# Patient Record
Sex: FEMALE | Race: WHITE | NOT HISPANIC OR LATINO | Employment: UNEMPLOYED | URBAN - METROPOLITAN AREA
[De-identification: names, ages, dates, MRNs, and addresses within clinical notes are randomized per-mention and may not be internally consistent; named-entity substitution may affect disease eponyms.]

---

## 2017-01-02 DIAGNOSIS — G47.09 OTHER INSOMNIA: ICD-10-CM

## 2017-01-02 DIAGNOSIS — F41.8 DEPRESSION WITH ANXIETY: ICD-10-CM

## 2017-01-03 RX ORDER — TRAZODONE HYDROCHLORIDE 50 MG/1
TABLET ORAL
Qty: 30 TABLET | Refills: 4 | Status: SHIPPED | OUTPATIENT
Start: 2017-01-03 | End: 2017-01-11 | Stop reason: DRUGHIGH

## 2017-01-11 RX ORDER — TRAZODONE HYDROCHLORIDE 100 MG/1
100 TABLET ORAL NIGHTLY
Qty: 30 TABLET | Refills: 11 | Status: SHIPPED | OUTPATIENT
Start: 2017-01-11 | End: 2017-05-16

## 2017-01-17 RX ORDER — OMEPRAZOLE 40 MG/1
CAPSULE, DELAYED RELEASE ORAL
Qty: 30 CAPSULE | Refills: 5 | Status: SHIPPED | OUTPATIENT
Start: 2017-01-17 | End: 2017-07-27 | Stop reason: SDUPTHER

## 2017-02-03 ENCOUNTER — OFFICE VISIT (OUTPATIENT)
Dept: FAMILY MEDICINE CLINIC | Facility: CLINIC | Age: 47
End: 2017-02-03

## 2017-02-03 VITALS
BODY MASS INDEX: 28.44 KG/M2 | SYSTOLIC BLOOD PRESSURE: 94 MMHG | OXYGEN SATURATION: 98 % | TEMPERATURE: 98.2 F | WEIGHT: 192 LBS | HEART RATE: 70 BPM | HEIGHT: 69 IN | DIASTOLIC BLOOD PRESSURE: 68 MMHG

## 2017-02-03 DIAGNOSIS — B08.5 ACUTE PHARYNGITIS DUE TO COXSACKIE VIRUS: ICD-10-CM

## 2017-02-03 DIAGNOSIS — J11.1 FLU: Primary | ICD-10-CM

## 2017-02-03 LAB
EXPIRATION DATE: NORMAL
FLUAV AG NPH QL: NORMAL
FLUBV AG NPH QL: NORMAL
INTERNAL CONTROL: NORMAL
Lab: NORMAL

## 2017-02-03 PROCEDURE — 99213 OFFICE O/P EST LOW 20 MIN: CPT | Performed by: NURSE PRACTITIONER

## 2017-02-03 PROCEDURE — 87804 INFLUENZA ASSAY W/OPTIC: CPT | Performed by: NURSE PRACTITIONER

## 2017-02-03 RX ORDER — DOXYCYCLINE HYCLATE 100 MG/1
100 CAPSULE ORAL 2 TIMES DAILY
Qty: 14 CAPSULE | Refills: 0 | Status: SHIPPED | OUTPATIENT
Start: 2017-02-03 | End: 2017-03-02

## 2017-02-03 NOTE — PROGRESS NOTES
"Subjective   Karen Nash is a 46 y.o. female who is here with body aches, fever, mostly dry cough and sore throat.     History of Present Illness   4 day history of fever and body aches, cough, congestion, ST and ear pain, taking tylenol and ibuprofen for fever and joint pain. Unsure how high fever got.   The following portions of the patient's history were reviewed and updated as appropriate: allergies, current medications, past family history, past medical history, past social history, past surgical history and problem list.    Review of Systems   Constitutional: Negative for chills and fever.   HENT: Positive for congestion, ear pain, rhinorrhea, sinus pressure, sneezing and sore throat. Negative for ear discharge, facial swelling, hearing loss, nosebleeds and trouble swallowing.    Respiratory: Positive for cough and wheezing. Negative for shortness of breath.    Cardiovascular: Negative.  Negative for chest pain.   Gastrointestinal: Negative for abdominal pain.   Endocrine: Negative.    Musculoskeletal: Negative for arthralgias.   Allergic/Immunologic: Negative for environmental allergies.   Psychiatric/Behavioral: Negative.      Visit Vitals   • BP 94/68   • Pulse 70   • Temp 98.2 °F (36.8 °C) (Oral)   • Ht 69\" (175.3 cm)   • Wt 192 lb (87.1 kg)   • LMP 11/16/2016   • SpO2 98%   • BMI 28.35 kg/m2     Objective   Physical Exam   Constitutional: She appears well-developed and well-nourished.   HENT:   Mouth/Throat: Posterior oropharyngeal erythema present. No oropharyngeal exudate or posterior oropharyngeal edema.   Neck: Normal range of motion. Neck supple. No JVD present. No tracheal deviation present. No thyromegaly present.   Cardiovascular: Normal rate, regular rhythm and normal heart sounds.    Pulmonary/Chest: Effort normal and breath sounds normal.   Abdominal: Soft. Bowel sounds are normal.   Musculoskeletal: Normal range of motion.   Lymphadenopathy:     She has cervical adenopathy.   Psychiatric: " She has a normal mood and affect. Her behavior is normal. Judgment and thought content normal.   Nursing note and vitals reviewed.    Assessment/Plan   Problems Addressed this Visit     None      Visit Diagnoses     Flu    -  Primary    Relevant Orders    POC Influenza A / B        Flu swab negative, but symptoms improving. Antibiotics sent for pharyngitis, but as improving can hold. Good handwashing.  FU if not settling.

## 2017-02-09 RX ORDER — TOPIRAMATE 100 MG/1
TABLET, FILM COATED ORAL
Qty: 30 TABLET | Refills: 4 | Status: SHIPPED | OUTPATIENT
Start: 2017-02-09 | End: 2017-05-30 | Stop reason: SDUPTHER

## 2017-03-02 ENCOUNTER — OFFICE VISIT (OUTPATIENT)
Dept: FAMILY MEDICINE CLINIC | Facility: CLINIC | Age: 47
End: 2017-03-02

## 2017-03-02 VITALS
OXYGEN SATURATION: 98 % | TEMPERATURE: 97.9 F | DIASTOLIC BLOOD PRESSURE: 70 MMHG | WEIGHT: 190 LBS | SYSTOLIC BLOOD PRESSURE: 128 MMHG | HEIGHT: 69 IN | HEART RATE: 74 BPM | BODY MASS INDEX: 28.14 KG/M2

## 2017-03-02 DIAGNOSIS — Z79.899 DRUG THERAPY: ICD-10-CM

## 2017-03-02 DIAGNOSIS — M25.60 JOINT STIFFNESS: Primary | ICD-10-CM

## 2017-03-02 DIAGNOSIS — R22.9 MULTIPLE SKIN NODULES: ICD-10-CM

## 2017-03-02 DIAGNOSIS — M54.42 CHRONIC LOW BACK PAIN WITH BILATERAL SCIATICA, UNSPECIFIED BACK PAIN LATERALITY: ICD-10-CM

## 2017-03-02 DIAGNOSIS — M54.41 CHRONIC LOW BACK PAIN WITH BILATERAL SCIATICA, UNSPECIFIED BACK PAIN LATERALITY: ICD-10-CM

## 2017-03-02 DIAGNOSIS — R53.83 OTHER FATIGUE: ICD-10-CM

## 2017-03-02 DIAGNOSIS — G89.29 CHRONIC LOW BACK PAIN WITH BILATERAL SCIATICA, UNSPECIFIED BACK PAIN LATERALITY: ICD-10-CM

## 2017-03-02 DIAGNOSIS — F32.89 OTHER DEPRESSION: ICD-10-CM

## 2017-03-02 PROCEDURE — 99214 OFFICE O/P EST MOD 30 MIN: CPT | Performed by: FAMILY MEDICINE

## 2017-03-02 RX ORDER — GABAPENTIN 300 MG/1
CAPSULE ORAL
Qty: 60 CAPSULE | Refills: 5 | Status: SHIPPED | OUTPATIENT
Start: 2017-03-02 | End: 2017-05-16 | Stop reason: SDUPTHER

## 2017-03-02 NOTE — PROGRESS NOTES
Subjective   Karen Nash is a 46 y.o. female. Presents today for   Chief Complaint   Patient presents with   • Back Pain     pt has knots in her back that is swollen and sensitive. the pain is radiating down her right leg and is having a constant ache in groin. and her left knee is giving out on her   • Joint Swelling     pt said all her joints are swelling and painful.       Back Pain   This is a recurrent problem. The current episode started 1 to 4 weeks ago. The problem occurs constantly. The problem has been waxing and waning since onset. The pain is present in the sacro-iliac. The quality of the pain is described as aching, shooting and stabbing. The pain radiates to the left foot, left knee and left thigh. The pain is at a severity of 9/10. The pain is worse during the day. The symptoms are aggravated by bending, position, sitting, standing and twisting. Stiffness is present all day. Associated symptoms include leg pain, paresthesias, pelvic pain, tingling and weakness. Pertinent negatives include no abdominal pain, bladder incontinence, bowel incontinence, chest pain, dysuria, fever, headaches, numbness, paresis, perianal numbness or weight loss. She has tried NSAIDs (tylenol) for the symptoms. The treatment provided no relief.   Joint Swelling   Associated symptoms include joint swelling, a rash (Has kerotosis pilaris) and weakness. Pertinent negatives include no abdominal pain, chest pain, fever, headaches or numbness. She has tried acetaminophen and NSAIDs for the symptoms. The treatment provided no relief.   Arthritis   Presents for initial visit. The disease course has been worsening. She complains of stiffness and joint swelling. Affected locations include the left DIP, left hip, right hip, right DIP, left PIP, left wrist, right wrist, left elbow, left shoulder, left knee, right toes, right elbow and right shoulder. Associated symptoms include dry eyes and rash (Has kerotosis pilaris). Pertinent  negatives include no dysuria, fever or weight loss. Her family medical history includes family history of osteoarthritis. She shows no family history of lupus or family history of rheumatoid arthritis. Past treatments include acetaminophen and NSAIDs. The treatment provided no relief. Factors aggravating her arthritis include activity.     Patient would like to see counselor.  Under a lot of stress.   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    Review of Systems   Constitutional: Negative for fever and weight loss.   Cardiovascular: Negative for chest pain.   Gastrointestinal: Negative for abdominal pain and bowel incontinence.   Genitourinary: Positive for pelvic pain. Negative for bladder incontinence and dysuria.   Musculoskeletal: Positive for arthritis, back pain, joint swelling and stiffness.   Skin: Positive for rash (Has kerotosis pilaris).   Neurological: Positive for tingling, weakness and paresthesias. Negative for numbness and headaches.       The following portions of the patient's history were reviewed and updated as appropriate: allergies, current medications, past medical history, past social history and problem list.    Patient Active Problem List   Diagnosis   • Abdominal pain   • Asthma with acute exacerbation   • Atopic rhinitis   • Anemia   • Benign essential hypertension   • Constipation   • Dyslipidemia   • Gastroesophageal reflux disease   • Menorrhagia   • Migraine   • Muscle cramps   • Vocal cord dysfunction       Allergies   Allergen Reactions   • Corn-Containing Products Rash   • Penicillins        Current Outpatient Prescriptions on File Prior to Visit   Medication Sig Dispense Refill   • amLODIPine (NORVASC) 10 MG tablet TAKE 1 TABLET BY MOUTH ONE TIME A DAY  30 tablet 6   • atorvastatin (LIPITOR) 20 MG tablet TAKE 1 TABLET BY MOUTH ONE TIME A DAY  30 tablet 10   • budesonide-formoterol (SYMBICORT) 160-4.5 MCG/ACT inhaler Inhale 2 puffs 2 (two) times a day. 10.2  "g 12   • busPIRone (BUSPAR) 10 MG tablet Take 1 tablet by mouth 3 (Three) Times a Day. 90 tablet 5   • Cetirizine HCl 10 MG capsule Take  by mouth.     • fluticasone (FLONASE) 50 MCG/ACT nasal spray into each nostril daily.     • hydrochlorothiazide (HYDRODIURIL) 25 MG tablet TAKE 1 TABLET BY MOUTH ONE TIME A DAY  30 tablet 6   • montelukast (SINGULAIR) 10 MG tablet Take 1 tablet (10 mg total) by mouth every night 30 tablet 11   • Multiple Vitamin (MULTI VITAMIN DAILY PO) Take by mouth daily.     • Omega-3 Fatty Acids (FISH OIL) 1000 MG capsule capsule Take 1,000 mg by mouth daily with breakfast.     • omeprazole (priLOSEC) 40 MG capsule TAKE 1 CAPSULE BY MOUTH ONE TIME A DAY  30 capsule 5   • sertraline (ZOLOFT) 100 MG tablet 2 po daily 30 tablet 11   • topiramate (TOPAMAX) 100 MG tablet TAKE 1 TABLET BY MOUTH TWO TIMES A DAY  30 tablet 4   • traZODone (DESYREL) 100 MG tablet Take 1 tablet by mouth Every Night. 30 tablet 11   • VENTOLIN  (90 BASE) MCG/ACT inhaler INHALE 1 OR 2 PUFFS ORALLY EVERY FOUR TO SIX HOURS AS NEEDED 18 g 2   • [DISCONTINUED] doxycycline (VIBRAMYCIN) 100 MG capsule Take 1 capsule by mouth 2 (Two) Times a Day. 14 capsule 0     No current facility-administered medications on file prior to visit.        Objective   Vitals:    03/02/17 1307   BP: 128/70   BP Location: Left arm   Patient Position: Sitting   Cuff Size: Adult   Pulse: 74   Temp: 97.9 °F (36.6 °C)   TempSrc: Oral   SpO2: 98%   Weight: 190 lb (86.2 kg)   Height: 69\" (175.3 cm)       Physical Exam   Constitutional: She appears well-developed and well-nourished.   HENT:   Head: Normocephalic and atraumatic.   Neck: Neck supple. No JVD present. No thyromegaly present.   Cardiovascular: Normal rate, regular rhythm and normal heart sounds.  Exam reveals no gallop and no friction rub.    No murmur heard.  Pulmonary/Chest: Effort normal and breath sounds normal. No respiratory distress. She has no wheezes. She has no rales. "   Abdominal: Soft. Bowel sounds are normal. She exhibits no distension. There is no tenderness. There is no rebound and no guarding.   Musculoskeletal: She exhibits no edema.   Neurological: She is alert.   Skin: Skin is warm and dry.   Psychiatric: She has a normal mood and affect. Her behavior is normal.   Nursing note and vitals reviewed.      Assessment/Plan   Karen was seen today for back pain and joint swelling.    Diagnoses and all orders for this visit:    Joint stiffness  -     CBC & Differential  -     Comprehensive Metabolic Panel  -     [facility]IRSTIE With / DsDNA, RNP, Sjogrens A / B, Smith  -     Sedimentation Rate  -     C-reactive Protein  -     Rheumatoid Factor, Quant  -     Cyclic Citrul Peptide Antibody, IgG / IgA  -     TSH  -     Vitamin B12  -     diclofenac (VOLTAREN) 50 MG EC tablet; Take 1 tablet by mouth 2 (Two) Times a Day As Needed (pain).  -     gabapentin (NEURONTIN) 300 MG capsule; 1 po nightly x 7 days then 1 po bid    Other fatigue  -     CBC & Differential  -     Comprehensive Metabolic Panel  -     KIRSTIE With / DsDNA, RNP, Sjogrens A / B, Smith  -     Sedimentation Rate  -     C-reactive Protein  -     Rheumatoid Factor, Quant  -     Cyclic Citrul Peptide Antibody, IgG / IgA  -     TSH  -     Vitamin B12    Multiple skin nodules  -     CBC & Differential  -     Comprehensive Metabolic Panel  -     KIRSTIE With / DsDNA, RNP, Sjogrens A / B, Smith  -     Sedimentation Rate  -     C-reactive Protein  -     Rheumatoid Factor, Quant  -     Cyclic Citrul Peptide Antibody, IgG / IgA  -     TSH  -     Vitamin B12  -     diclofenac (VOLTAREN) 50 MG EC tablet; Take 1 tablet by mouth 2 (Two) Times a Day As Needed (pain).  -     gabapentin (NEURONTIN) 300 MG capsule; 1 po nightly x 7 days then 1 po bid    Drug therapy  -     CBC & Differential  -     Comprehensive Metabolic Panel  -     KIRSTIE With / DsDNA, RNP, Sjogrens A / B, Smith  -     Sedimentation Rate  -     C-reactive Protein  -     Rheumatoid Factor,  Quant  -     Cyclic Citrul Peptide Antibody, IgG / IgA  -     TSH  -     Vitamin B12    Chronic low back pain with bilateral sciatica, unspecified back pain laterality  -     diclofenac (VOLTAREN) 50 MG EC tablet; Take 1 tablet by mouth 2 (Two) Times a Day As Needed (pain).  -     gabapentin (NEURONTIN) 300 MG capsule; 1 po nightly x 7 days then 1 po bid             -Follow up: 4 weeks       Current Outpatient Prescriptions:   •  amLODIPine (NORVASC) 10 MG tablet, TAKE 1 TABLET BY MOUTH ONE TIME A DAY , Disp: 30 tablet, Rfl: 6  •  atorvastatin (LIPITOR) 20 MG tablet, TAKE 1 TABLET BY MOUTH ONE TIME A DAY , Disp: 30 tablet, Rfl: 10  •  budesonide-formoterol (SYMBICORT) 160-4.5 MCG/ACT inhaler, Inhale 2 puffs 2 (two) times a day., Disp: 10.2 g, Rfl: 12  •  busPIRone (BUSPAR) 10 MG tablet, Take 1 tablet by mouth 3 (Three) Times a Day., Disp: 90 tablet, Rfl: 5  •  Cetirizine HCl 10 MG capsule, Take  by mouth., Disp: , Rfl:   •  fluticasone (FLONASE) 50 MCG/ACT nasal spray, into each nostril daily., Disp: , Rfl:   •  hydrochlorothiazide (HYDRODIURIL) 25 MG tablet, TAKE 1 TABLET BY MOUTH ONE TIME A DAY , Disp: 30 tablet, Rfl: 6  •  montelukast (SINGULAIR) 10 MG tablet, Take 1 tablet (10 mg total) by mouth every night, Disp: 30 tablet, Rfl: 11  •  Multiple Vitamin (MULTI VITAMIN DAILY PO), Take by mouth daily., Disp: , Rfl:   •  Omega-3 Fatty Acids (FISH OIL) 1000 MG capsule capsule, Take 1,000 mg by mouth daily with breakfast., Disp: , Rfl:   •  omeprazole (priLOSEC) 40 MG capsule, TAKE 1 CAPSULE BY MOUTH ONE TIME A DAY , Disp: 30 capsule, Rfl: 5  •  sertraline (ZOLOFT) 100 MG tablet, 2 po daily, Disp: 30 tablet, Rfl: 11  •  topiramate (TOPAMAX) 100 MG tablet, TAKE 1 TABLET BY MOUTH TWO TIMES A DAY , Disp: 30 tablet, Rfl: 4  •  traZODone (DESYREL) 100 MG tablet, Take 1 tablet by mouth Every Night., Disp: 30 tablet, Rfl: 11  •  VENTOLIN  (90 BASE) MCG/ACT inhaler, INHALE 1 OR 2 PUFFS ORALLY EVERY FOUR TO SIX HOURS  AS NEEDED, Disp: 18 g, Rfl: 2

## 2017-03-04 LAB
ALBUMIN SERPL-MCNC: 4.8 G/DL (ref 3.5–5.5)
ALBUMIN/GLOB SERPL: 1.9 {RATIO} (ref 1.1–2.5)
ALP SERPL-CCNC: 74 IU/L (ref 39–117)
ALT SERPL-CCNC: 16 IU/L (ref 0–32)
ANA SER QL: NEGATIVE
AST SERPL-CCNC: 17 IU/L (ref 0–40)
BASOPHILS # BLD AUTO: 0 X10E3/UL (ref 0–0.2)
BASOPHILS NFR BLD AUTO: 0 %
BILIRUB SERPL-MCNC: 0.3 MG/DL (ref 0–1.2)
BUN SERPL-MCNC: 13 MG/DL (ref 6–24)
BUN/CREAT SERPL: 15 (ref 9–23)
CALCIUM SERPL-MCNC: 9.5 MG/DL (ref 8.7–10.2)
CCP IGA+IGG SERPL IA-ACNC: 4 UNITS (ref 0–19)
CHLORIDE SERPL-SCNC: 102 MMOL/L (ref 96–106)
CO2 SERPL-SCNC: 20 MMOL/L (ref 18–29)
CREAT SERPL-MCNC: 0.88 MG/DL (ref 0.57–1)
CRP SERPL-MCNC: 0.7 MG/L (ref 0–4.9)
EOSINOPHIL # BLD AUTO: 0.1 X10E3/UL (ref 0–0.4)
EOSINOPHIL NFR BLD AUTO: 1 %
ERYTHROCYTE [DISTWIDTH] IN BLOOD BY AUTOMATED COUNT: 13.7 % (ref 12.3–15.4)
ERYTHROCYTE [SEDIMENTATION RATE] IN BLOOD BY WESTERGREN METHOD: 9 MM/HR (ref 0–32)
GLOBULIN SER CALC-MCNC: 2.5 G/DL (ref 1.5–4.5)
GLUCOSE SERPL-MCNC: 85 MG/DL (ref 65–99)
HCT VFR BLD AUTO: 45.1 % (ref 34–46.6)
HGB BLD-MCNC: 14.8 G/DL (ref 11.1–15.9)
IMM GRANULOCYTES # BLD: 0 X10E3/UL (ref 0–0.1)
IMM GRANULOCYTES NFR BLD: 0 %
LYMPHOCYTES # BLD AUTO: 2.2 X10E3/UL (ref 0.7–3.1)
LYMPHOCYTES NFR BLD AUTO: 29 %
MCH RBC QN AUTO: 29.2 PG (ref 26.6–33)
MCHC RBC AUTO-ENTMCNC: 32.8 G/DL (ref 31.5–35.7)
MCV RBC AUTO: 89 FL (ref 79–97)
MONOCYTES # BLD AUTO: 0.5 X10E3/UL (ref 0.1–0.9)
MONOCYTES NFR BLD AUTO: 6 %
NEUTROPHILS # BLD AUTO: 4.7 X10E3/UL (ref 1.4–7)
NEUTROPHILS NFR BLD AUTO: 64 %
PLATELET # BLD AUTO: 345 X10E3/UL (ref 150–379)
POTASSIUM SERPL-SCNC: 3.6 MMOL/L (ref 3.5–5.2)
PROT SERPL-MCNC: 7.3 G/DL (ref 6–8.5)
RBC # BLD AUTO: 5.06 X10E6/UL (ref 3.77–5.28)
RHEUMATOID FACT SERPL-ACNC: <10 IU/ML (ref 0–13.9)
SODIUM SERPL-SCNC: 141 MMOL/L (ref 134–144)
TSH SERPL DL<=0.005 MIU/L-ACNC: 4.14 UIU/ML (ref 0.45–4.5)
VIT B12 SERPL-MCNC: 1623 PG/ML (ref 211–946)
WBC # BLD AUTO: 7.5 X10E3/UL (ref 3.4–10.8)

## 2017-03-04 NOTE — PROGRESS NOTES
Call results to patient.  Blood counts, liver, kidney and thyroid function normal.  Blood sugar normal.  Inflammatory markers and rheumatological screen normal.  B12 normal.  Is diclofenac and gabapentin helping?

## 2017-03-06 ENCOUNTER — TELEPHONE (OUTPATIENT)
Dept: FAMILY MEDICINE CLINIC | Facility: CLINIC | Age: 47
End: 2017-03-06

## 2017-03-06 NOTE — TELEPHONE ENCOUNTER
----- Message from Rob Bardales DO sent at 3/4/2017  8:11 AM EST -----  Call results to patient.  Blood counts, liver, kidney and thyroid function normal.  Blood sugar normal.  Inflammatory markers and rheumatological screen normal.  B12 normal.  Is diclofenac and gabapentin helping?

## 2017-03-07 NOTE — TELEPHONE ENCOUNTER
Osteoarthritis likely diagnosis.  It's possible fibromyalgia.  We could try sending to rheumatology as well??

## 2017-03-07 NOTE — TELEPHONE ENCOUNTER
Pt would like to know what next step will be, she wants to know what the painful knots and joint pain is coming from. She doesn't want to just take medications, she would like a diagnosis.

## 2017-03-08 DIAGNOSIS — M40.209 KYPHOSIS, UNSPECIFIED KYPHOSIS TYPE, UNSPECIFIED SPINAL REGION: ICD-10-CM

## 2017-03-08 DIAGNOSIS — M12.9 ARTHRITIS, MULTIPLE JOINT INVOLVEMENT: Primary | ICD-10-CM

## 2017-03-08 DIAGNOSIS — M79.10 MUSCLE PAIN: ICD-10-CM

## 2017-03-08 DIAGNOSIS — M25.60 JOINT STIFFNESS: ICD-10-CM

## 2017-03-29 DIAGNOSIS — F41.8 DEPRESSION WITH ANXIETY: ICD-10-CM

## 2017-03-29 RX ORDER — SERTRALINE HYDROCHLORIDE 100 MG/1
TABLET, FILM COATED ORAL
Qty: 30 TABLET | Refills: 10 | Status: SHIPPED | OUTPATIENT
Start: 2017-03-29 | End: 2017-11-20 | Stop reason: SDUPTHER

## 2017-04-01 DIAGNOSIS — F41.9 ANXIETY: ICD-10-CM

## 2017-04-01 RX ORDER — BUSPIRONE HYDROCHLORIDE 10 MG/1
TABLET ORAL
Qty: 90 TABLET | Refills: 4 | Status: SHIPPED | OUTPATIENT
Start: 2017-04-01 | End: 2017-08-30 | Stop reason: SDUPTHER

## 2017-04-06 RX ORDER — AMLODIPINE BESYLATE 10 MG/1
TABLET ORAL
Qty: 30 TABLET | Refills: 5 | Status: SHIPPED | OUTPATIENT
Start: 2017-04-06 | End: 2018-05-07

## 2017-04-13 RX ORDER — HYDROCHLOROTHIAZIDE 25 MG/1
TABLET ORAL
Qty: 30 TABLET | Refills: 5 | Status: SHIPPED | OUTPATIENT
Start: 2017-04-13 | End: 2017-12-28 | Stop reason: SDUPTHER

## 2017-04-14 ENCOUNTER — TELEPHONE (OUTPATIENT)
Dept: FAMILY MEDICINE CLINIC | Facility: CLINIC | Age: 47
End: 2017-04-14

## 2017-04-28 RX ORDER — TOPIRAMATE 25 MG/1
TABLET ORAL
Qty: 60 TABLET | Refills: 2 | OUTPATIENT
Start: 2017-04-28

## 2017-05-10 ENCOUNTER — TELEPHONE (OUTPATIENT)
Dept: FAMILY MEDICINE CLINIC | Facility: CLINIC | Age: 47
End: 2017-05-10

## 2017-05-16 ENCOUNTER — OFFICE VISIT (OUTPATIENT)
Dept: FAMILY MEDICINE CLINIC | Facility: CLINIC | Age: 47
End: 2017-05-16

## 2017-05-16 VITALS
HEIGHT: 69 IN | DIASTOLIC BLOOD PRESSURE: 78 MMHG | OXYGEN SATURATION: 98 % | TEMPERATURE: 98.5 F | SYSTOLIC BLOOD PRESSURE: 112 MMHG | HEART RATE: 57 BPM | BODY MASS INDEX: 29.62 KG/M2 | WEIGHT: 200 LBS

## 2017-05-16 DIAGNOSIS — N39.41 URGE URINARY INCONTINENCE: Primary | ICD-10-CM

## 2017-05-16 DIAGNOSIS — R22.9 MULTIPLE SKIN NODULES: ICD-10-CM

## 2017-05-16 DIAGNOSIS — M54.42 CHRONIC LOW BACK PAIN WITH BILATERAL SCIATICA, UNSPECIFIED BACK PAIN LATERALITY: ICD-10-CM

## 2017-05-16 DIAGNOSIS — G89.29 CHRONIC LOW BACK PAIN WITH BILATERAL SCIATICA, UNSPECIFIED BACK PAIN LATERALITY: ICD-10-CM

## 2017-05-16 DIAGNOSIS — M54.41 CHRONIC LOW BACK PAIN WITH BILATERAL SCIATICA, UNSPECIFIED BACK PAIN LATERALITY: ICD-10-CM

## 2017-05-16 DIAGNOSIS — M79.10 MUSCLE ACHE: ICD-10-CM

## 2017-05-16 DIAGNOSIS — M15.9 PRIMARY OSTEOARTHRITIS INVOLVING MULTIPLE JOINTS: ICD-10-CM

## 2017-05-16 DIAGNOSIS — M25.60 JOINT STIFFNESS: ICD-10-CM

## 2017-05-16 PROCEDURE — 99213 OFFICE O/P EST LOW 20 MIN: CPT | Performed by: FAMILY MEDICINE

## 2017-05-16 RX ORDER — GABAPENTIN 300 MG/1
CAPSULE ORAL
Qty: 120 CAPSULE | Refills: 5 | Status: SHIPPED | OUTPATIENT
Start: 2017-05-16 | End: 2018-01-29 | Stop reason: SDUPTHER

## 2017-05-16 RX ORDER — OXYBUTYNIN CHLORIDE 5 MG/1
5 TABLET, EXTENDED RELEASE ORAL DAILY
Qty: 30 TABLET | Refills: 5 | Status: SHIPPED | OUTPATIENT
Start: 2017-05-16 | End: 2017-11-03 | Stop reason: SDUPTHER

## 2017-05-30 RX ORDER — TOPIRAMATE 100 MG/1
TABLET, FILM COATED ORAL
Qty: 60 TABLET | Refills: 3 | Status: SHIPPED | OUTPATIENT
Start: 2017-05-30 | End: 2017-10-09 | Stop reason: SDUPTHER

## 2017-06-02 DIAGNOSIS — G89.29 CHRONIC LOW BACK PAIN WITH BILATERAL SCIATICA, UNSPECIFIED BACK PAIN LATERALITY: ICD-10-CM

## 2017-06-02 DIAGNOSIS — M54.42 CHRONIC LOW BACK PAIN WITH BILATERAL SCIATICA, UNSPECIFIED BACK PAIN LATERALITY: ICD-10-CM

## 2017-06-02 DIAGNOSIS — M25.60 JOINT STIFFNESS: ICD-10-CM

## 2017-06-02 DIAGNOSIS — M54.41 CHRONIC LOW BACK PAIN WITH BILATERAL SCIATICA, UNSPECIFIED BACK PAIN LATERALITY: ICD-10-CM

## 2017-06-02 DIAGNOSIS — R22.9 MULTIPLE SKIN NODULES: ICD-10-CM

## 2017-06-29 RX ORDER — ALBUTEROL SULFATE 90 UG/1
AEROSOL, METERED RESPIRATORY (INHALATION)
Qty: 18 G | Refills: 1 | Status: SHIPPED | OUTPATIENT
Start: 2017-06-29 | End: 2018-02-26 | Stop reason: SDUPTHER

## 2017-06-30 DIAGNOSIS — J45.40 MODERATE PERSISTENT ASTHMA WITHOUT COMPLICATION: ICD-10-CM

## 2017-06-30 DIAGNOSIS — J30.9 ATOPIC RHINITIS: ICD-10-CM

## 2017-06-30 RX ORDER — MONTELUKAST SODIUM 10 MG/1
TABLET ORAL
Qty: 30 TABLET | Refills: 10 | Status: SHIPPED | OUTPATIENT
Start: 2017-06-30 | End: 2018-06-25 | Stop reason: SDUPTHER

## 2017-07-27 RX ORDER — OMEPRAZOLE 40 MG/1
CAPSULE, DELAYED RELEASE ORAL
Qty: 30 CAPSULE | Refills: 4 | Status: SHIPPED | OUTPATIENT
Start: 2017-07-27 | End: 2017-12-28 | Stop reason: SDUPTHER

## 2017-08-15 DIAGNOSIS — M25.60 JOINT STIFFNESS: ICD-10-CM

## 2017-08-15 DIAGNOSIS — R22.9 MULTIPLE SKIN NODULES: ICD-10-CM

## 2017-08-15 DIAGNOSIS — M54.42 CHRONIC LOW BACK PAIN WITH BILATERAL SCIATICA, UNSPECIFIED BACK PAIN LATERALITY: ICD-10-CM

## 2017-08-15 DIAGNOSIS — M54.41 CHRONIC LOW BACK PAIN WITH BILATERAL SCIATICA, UNSPECIFIED BACK PAIN LATERALITY: ICD-10-CM

## 2017-08-15 DIAGNOSIS — G89.29 CHRONIC LOW BACK PAIN WITH BILATERAL SCIATICA, UNSPECIFIED BACK PAIN LATERALITY: ICD-10-CM

## 2017-08-17 ENCOUNTER — OFFICE VISIT (OUTPATIENT)
Dept: FAMILY MEDICINE CLINIC | Facility: CLINIC | Age: 47
End: 2017-08-17

## 2017-08-17 VITALS
SYSTOLIC BLOOD PRESSURE: 110 MMHG | DIASTOLIC BLOOD PRESSURE: 82 MMHG | WEIGHT: 194 LBS | TEMPERATURE: 97.8 F | OXYGEN SATURATION: 98 % | HEIGHT: 69 IN | HEART RATE: 60 BPM | BODY MASS INDEX: 28.73 KG/M2

## 2017-08-17 DIAGNOSIS — L03.211 FACIAL CELLULITIS: Primary | ICD-10-CM

## 2017-08-17 PROCEDURE — 99213 OFFICE O/P EST LOW 20 MIN: CPT | Performed by: FAMILY MEDICINE

## 2017-08-17 RX ORDER — GLUCOSAMINE/D3/BOSWELLIA SERRA 1500MG-400
TABLET ORAL
COMMUNITY
End: 2018-05-07

## 2017-08-17 RX ORDER — LANOLIN ALCOHOL/MO/W.PET/CERES
1000 CREAM (GRAM) TOPICAL DAILY
COMMUNITY
End: 2018-05-07

## 2017-08-17 RX ORDER — SULFAMETHOXAZOLE AND TRIMETHOPRIM 800; 160 MG/1; MG/1
1 TABLET ORAL 2 TIMES DAILY
Qty: 20 TABLET | Refills: 0 | Status: SHIPPED | OUTPATIENT
Start: 2017-08-17 | End: 2017-08-21

## 2017-08-17 RX ORDER — TRAZODONE HYDROCHLORIDE 100 MG/1
100 TABLET ORAL NIGHTLY
COMMUNITY
End: 2017-12-18

## 2017-08-17 RX ORDER — MELATONIN
1000 DAILY
COMMUNITY
End: 2018-05-07

## 2017-08-17 RX ORDER — BETA-CAROTENE 7500 MCG
25000 CAPSULE ORAL DAILY
COMMUNITY
End: 2018-05-07

## 2017-08-17 NOTE — PROGRESS NOTES
Subjective   Karen Nash is a 47 y.o. female. Presents today for   Chief Complaint   Patient presents with   • Insect Bite     pt has rash on her jaw for last 2 weeks it has been swelling and seeping and her neck is swollen and sore and has a knot in it. she has tried using coconut oil, tea tree oil, antibiotic ointment, and benadryl ointment on it.   • Follow-up     pt is here for 3 month follow up visit for her incontinence       Rash   This is a new problem. The current episode started in the past 7 days. The problem has been gradually worsening since onset. The affected locations include the face. The rash is characterized by burning, draining, redness and swelling. She was exposed to nothing. Pertinent negatives include no fever. (Did go to IC last night, but has not started bactrim yet;  Started as red dot pruritic.  Now painful, red, warm and tender.) Past treatments include moisturizer (antihistamine). The treatment provided no relief.       Review of Systems   Constitutional: Negative for chills and fever.   Skin: Positive for rash.       The following portions of the patient's history were reviewed and updated as appropriate: allergies, current medications, past medical history and problem list.    Patient Active Problem List   Diagnosis   • Abdominal pain   • Asthma with acute exacerbation   • Atopic rhinitis   • Anemia   • Benign essential hypertension   • Constipation   • Dyslipidemia   • Gastroesophageal reflux disease   • Migraine   • Muscle cramps   • Vocal cord dysfunction       Allergies   Allergen Reactions   • Corn-Containing Products Rash   • Penicillins        Current Outpatient Prescriptions on File Prior to Visit   Medication Sig Dispense Refill   • amLODIPine (NORVASC) 10 MG tablet TAKE 1 TABLET BY MOUTH ONE TIME A DAY  30 tablet 5   • budesonide-formoterol (SYMBICORT) 160-4.5 MCG/ACT inhaler Inhale 2 puffs 2 (two) times a day. 10.2 g 12   • busPIRone (BUSPAR) 10 MG tablet TAKE 1 TABLET BY  "MOUTH THREE TIMES A DAY  90 tablet 4   • Cetirizine HCl 10 MG capsule Take  by mouth.     • diclofenac (VOLTAREN) 50 MG EC tablet TAKE 1 TABLET BY MOUTH TWO TIMES A DAY AS NEEDED 60 tablet 0   • fluticasone (FLONASE) 50 MCG/ACT nasal spray into each nostril daily.     • gabapentin (NEURONTIN) 300 MG capsule 2 po twice daily 120 capsule 5   • hydrochlorothiazide (HYDRODIURIL) 25 MG tablet TAKE 1 TABLET BY MOUTH ONE TIME A DAY  30 tablet 5   • montelukast (SINGULAIR) 10 MG tablet TAKE 1 TABLET BY MOUTH AT BEDTIME  30 tablet 10   • Multiple Vitamin (MULTI VITAMIN DAILY PO) Take by mouth daily.     • Omega-3 Fatty Acids (FISH OIL) 1000 MG capsule capsule Take 1,000 mg by mouth daily with breakfast.     • omeprazole (priLOSEC) 40 MG capsule TAKE 1 CAPSULE BY MOUTH ONE TIME A DAY  30 capsule 4   • oxybutynin XL (DITROPAN-XL) 5 MG 24 hr tablet Take 1 tablet by mouth Daily. 30 tablet 5   • sertraline (ZOLOFT) 100 MG tablet TAKE 2 TABLETS BY MOUTH ONE TIME A DAY  30 tablet 10   • topiramate (TOPAMAX) 100 MG tablet TAKE 1 TABLET BY MOUTH TWO TIMES A DAY  60 tablet 3   • VENTOLIN  (90 BASE) MCG/ACT inhaler INHALE 1 OR 2 PUFFS ORALLY EVERY FOUR TO SIX HOURS as needed 18 g 1     No current facility-administered medications on file prior to visit.        Objective   Vitals:    08/17/17 0958   BP: 110/82   BP Location: Left arm   Patient Position: Sitting   Cuff Size: Adult   Pulse: 60   Temp: 97.8 °F (36.6 °C)   TempSrc: Oral   SpO2: 98%   Weight: 194 lb (88 kg)   Height: 69\" (175.3 cm)       Physical Exam   Constitutional: She is oriented to person, place, and time. She appears well-developed and well-nourished.   Neurological: She is alert and oriented to person, place, and time.   Skin: Skin is warm and dry.   Left lower jaw - skin abrasion with swelling, warmth and redness;  Mildly ttp;  Left neck lymphadenopathy.   Psychiatric: She has a normal mood and affect. Her behavior is normal.   Nursing note and vitals " reviewed.      Assessment/Plan   Karen was seen today for insect bite and follow-up.    Diagnoses and all orders for this visit:    Facial cellulitis  -     mupirocin (BACTROBAN) 2 % ointment; Apply  topically 2 (Two) Times a Day.  -     sulfamethoxazole-trimethoprim (BACTRIM DS,SEPTRA DS) 800-160 MG per tablet; Take 1 tablet by mouth 2 (Two) Times a Day.    -RTC if worsen;  Start abx immediately.  GO ER immediately if worsening.         -Follow up: Prn - RTC if worse or no improvement.          Current Outpatient Prescriptions:   •  amLODIPine (NORVASC) 10 MG tablet, TAKE 1 TABLET BY MOUTH ONE TIME A DAY , Disp: 30 tablet, Rfl: 5  •  beta carotene ([employer] BETA CAROTENE) 74690 UNIT capsule, Take 25,000 Units by mouth Daily., Disp: , Rfl:   •  Biotin 18585 MCG tablet, Take  by mouth., Disp: , Rfl:   •  budesonide-formoterol (SYMBICORT) 160-4.5 MCG/ACT inhaler, Inhale 2 puffs 2 (two) times a day., Disp: 10.2 g, Rfl: 12  •  busPIRone (BUSPAR) 10 MG tablet, TAKE 1 TABLET BY MOUTH THREE TIMES A DAY , Disp: 90 tablet, Rfl: 4  •  CALCIUM-MAGNESIUM-ZINC PO, Take  by mouth., Disp: , Rfl:   •  Cetirizine HCl 10 MG capsule, Take  by mouth., Disp: , Rfl:   •  cholecalciferol (VITAMIN D3) 1000 units tablet, Take 1,000 Units by mouth Daily., Disp: , Rfl:   •  diclofenac (VOLTAREN) 50 MG EC tablet, TAKE 1 TABLET BY MOUTH TWO TIMES A DAY AS NEEDED, Disp: 60 tablet, Rfl: 0  •  fluticasone (FLONASE) 50 MCG/ACT nasal spray, into each nostril daily., Disp: , Rfl:   •  gabapentin (NEURONTIN) 300 MG capsule, 2 po twice daily, Disp: 120 capsule, Rfl: 5  •  Green Tea, Camillia sinensis, (GREEN TEA PO), Take  by mouth., Disp: , Rfl:   •  hydrochlorothiazide (HYDRODIURIL) 25 MG tablet, TAKE 1 TABLET BY MOUTH ONE TIME A DAY , Disp: 30 tablet, Rfl: 5  •  montelukast (SINGULAIR) 10 MG tablet, TAKE 1 TABLET BY MOUTH AT BEDTIME , Disp: 30 tablet, Rfl: 10  •  Multiple Vitamin (MULTI VITAMIN DAILY PO), Take by mouth daily., Disp: , Rfl:   •  Omega-3  Fatty Acids (FISH OIL) 1000 MG capsule capsule, Take 1,000 mg by mouth daily with breakfast., Disp: , Rfl:   •  omeprazole (priLOSEC) 40 MG capsule, TAKE 1 CAPSULE BY MOUTH ONE TIME A DAY , Disp: 30 capsule, Rfl: 4  •  oxybutynin XL (DITROPAN-XL) 5 MG 24 hr tablet, Take 1 tablet by mouth Daily., Disp: 30 tablet, Rfl: 5  •  sertraline (ZOLOFT) 100 MG tablet, TAKE 2 TABLETS BY MOUTH ONE TIME A DAY , Disp: 30 tablet, Rfl: 10  •  topiramate (TOPAMAX) 100 MG tablet, TAKE 1 TABLET BY MOUTH TWO TIMES A DAY , Disp: 60 tablet, Rfl: 3  •  traZODone (DESYREL) 100 MG tablet, Take 100 mg by mouth Every Night., Disp: , Rfl:   •  VENTOLIN  (90 BASE) MCG/ACT inhaler, INHALE 1 OR 2 PUFFS ORALLY EVERY FOUR TO SIX HOURS as needed, Disp: 18 g, Rfl: 1  •  vitamin B-12 (CYANOCOBALAMIN) 1000 MCG tablet, Take 1,000 mcg by mouth Daily., Disp: , Rfl:   •  mupirocin (BACTROBAN) 2 % ointment, Apply  topically 2 (Two) Times a Day., Disp: 22 g, Rfl: 0  •  sulfamethoxazole-trimethoprim (BACTRIM DS,SEPTRA DS) 800-160 MG per tablet, Take 1 tablet by mouth 2 (Two) Times a Day., Disp: 20 tablet, Rfl: 0

## 2017-08-21 ENCOUNTER — TELEPHONE (OUTPATIENT)
Dept: FAMILY MEDICINE CLINIC | Facility: CLINIC | Age: 47
End: 2017-08-21

## 2017-08-21 RX ORDER — METHYLPREDNISOLONE 4 MG/1
TABLET ORAL
Qty: 21 TABLET | Refills: 0 | Status: SHIPPED | OUTPATIENT
Start: 2017-08-21 | End: 2017-10-27

## 2017-08-21 RX ORDER — DOXYCYCLINE HYCLATE 100 MG/1
100 CAPSULE ORAL 2 TIMES DAILY
Qty: 20 CAPSULE | Refills: 0 | Status: SHIPPED | OUTPATIENT
Start: 2017-08-21 | End: 2017-08-31

## 2017-08-30 DIAGNOSIS — J45.51 SEVERE PERSISTENT ASTHMA WITH ACUTE EXACERBATION: ICD-10-CM

## 2017-08-30 DIAGNOSIS — F41.9 ANXIETY: ICD-10-CM

## 2017-08-30 RX ORDER — BUDESONIDE AND FORMOTEROL FUMARATE DIHYDRATE 160; 4.5 UG/1; UG/1
AEROSOL RESPIRATORY (INHALATION)
Qty: 10.2 G | Refills: 11 | Status: SHIPPED | OUTPATIENT
Start: 2017-08-30 | End: 2018-08-16 | Stop reason: SDUPTHER

## 2017-08-30 RX ORDER — BUSPIRONE HYDROCHLORIDE 10 MG/1
TABLET ORAL
Qty: 90 TABLET | Refills: 3 | Status: SHIPPED | OUTPATIENT
Start: 2017-08-30 | End: 2017-12-18

## 2017-09-11 DIAGNOSIS — M54.42 CHRONIC LOW BACK PAIN WITH BILATERAL SCIATICA, UNSPECIFIED BACK PAIN LATERALITY: ICD-10-CM

## 2017-09-11 DIAGNOSIS — M25.60 JOINT STIFFNESS: ICD-10-CM

## 2017-09-11 DIAGNOSIS — R22.9 MULTIPLE SKIN NODULES: ICD-10-CM

## 2017-09-11 DIAGNOSIS — M54.41 CHRONIC LOW BACK PAIN WITH BILATERAL SCIATICA, UNSPECIFIED BACK PAIN LATERALITY: ICD-10-CM

## 2017-09-11 DIAGNOSIS — G89.29 CHRONIC LOW BACK PAIN WITH BILATERAL SCIATICA, UNSPECIFIED BACK PAIN LATERALITY: ICD-10-CM

## 2017-10-09 DIAGNOSIS — M25.60 JOINT STIFFNESS: ICD-10-CM

## 2017-10-09 DIAGNOSIS — M54.41 CHRONIC LOW BACK PAIN WITH BILATERAL SCIATICA, UNSPECIFIED BACK PAIN LATERALITY: ICD-10-CM

## 2017-10-09 DIAGNOSIS — R22.9 MULTIPLE SKIN NODULES: ICD-10-CM

## 2017-10-09 DIAGNOSIS — G89.29 CHRONIC LOW BACK PAIN WITH BILATERAL SCIATICA, UNSPECIFIED BACK PAIN LATERALITY: ICD-10-CM

## 2017-10-09 DIAGNOSIS — M54.42 CHRONIC LOW BACK PAIN WITH BILATERAL SCIATICA, UNSPECIFIED BACK PAIN LATERALITY: ICD-10-CM

## 2017-10-10 RX ORDER — TOPIRAMATE 100 MG/1
TABLET, FILM COATED ORAL
Qty: 60 TABLET | Refills: 2 | Status: SHIPPED | OUTPATIENT
Start: 2017-10-10 | End: 2017-10-27 | Stop reason: SDUPTHER

## 2017-10-13 DIAGNOSIS — R22.9 MULTIPLE SKIN NODULES: ICD-10-CM

## 2017-10-13 DIAGNOSIS — G89.29 CHRONIC LOW BACK PAIN WITH BILATERAL SCIATICA, UNSPECIFIED BACK PAIN LATERALITY: ICD-10-CM

## 2017-10-13 DIAGNOSIS — M54.42 CHRONIC LOW BACK PAIN WITH BILATERAL SCIATICA, UNSPECIFIED BACK PAIN LATERALITY: ICD-10-CM

## 2017-10-13 DIAGNOSIS — M54.41 CHRONIC LOW BACK PAIN WITH BILATERAL SCIATICA, UNSPECIFIED BACK PAIN LATERALITY: ICD-10-CM

## 2017-10-13 DIAGNOSIS — M25.60 JOINT STIFFNESS: ICD-10-CM

## 2017-10-24 ENCOUNTER — TELEPHONE (OUTPATIENT)
Dept: FAMILY MEDICINE CLINIC | Facility: CLINIC | Age: 47
End: 2017-10-24

## 2017-10-24 NOTE — TELEPHONE ENCOUNTER
Pharmacy at [facility] called gabapentin 300mg 2 bid #120 x 2 refills jm

## 2017-10-27 ENCOUNTER — OFFICE VISIT (OUTPATIENT)
Dept: FAMILY MEDICINE CLINIC | Facility: CLINIC | Age: 47
End: 2017-10-27

## 2017-10-27 VITALS
WEIGHT: 203 LBS | OXYGEN SATURATION: 98 % | TEMPERATURE: 97.7 F | SYSTOLIC BLOOD PRESSURE: 104 MMHG | HEART RATE: 62 BPM | DIASTOLIC BLOOD PRESSURE: 80 MMHG | BODY MASS INDEX: 30.07 KG/M2 | HEIGHT: 69 IN

## 2017-10-27 DIAGNOSIS — F32.2 SEVERE MAJOR DEPRESSION WITHOUT PSYCHOTIC FEATURES (HCC): Primary | ICD-10-CM

## 2017-10-27 DIAGNOSIS — G43.009 MIGRAINE WITHOUT AURA AND WITHOUT STATUS MIGRAINOSUS, NOT INTRACTABLE: ICD-10-CM

## 2017-10-27 PROCEDURE — 99214 OFFICE O/P EST MOD 30 MIN: CPT | Performed by: FAMILY MEDICINE

## 2017-10-27 RX ORDER — LAMOTRIGINE 25 MG/1
TABLET ORAL
Qty: 42 TABLET | Refills: 0 | Status: SHIPPED | OUTPATIENT
Start: 2017-10-27 | End: 2017-11-15 | Stop reason: SDUPTHER

## 2017-10-27 RX ORDER — LAMOTRIGINE 100 MG/1
100 TABLET ORAL NIGHTLY
Qty: 30 TABLET | Refills: 4 | Status: SHIPPED | OUTPATIENT
Start: 2017-10-27 | End: 2018-03-26 | Stop reason: SDUPTHER

## 2017-10-27 RX ORDER — TOPIRAMATE 50 MG/1
TABLET, FILM COATED ORAL
Qty: 21 TABLET | Refills: 0 | Status: SHIPPED | OUTPATIENT
Start: 2017-10-27 | End: 2017-12-18

## 2017-11-03 DIAGNOSIS — N39.41 URGE URINARY INCONTINENCE: ICD-10-CM

## 2017-11-03 DIAGNOSIS — R22.9 MULTIPLE SKIN NODULES: ICD-10-CM

## 2017-11-03 DIAGNOSIS — M54.41 CHRONIC LOW BACK PAIN WITH BILATERAL SCIATICA, UNSPECIFIED BACK PAIN LATERALITY: ICD-10-CM

## 2017-11-03 DIAGNOSIS — M54.42 CHRONIC LOW BACK PAIN WITH BILATERAL SCIATICA, UNSPECIFIED BACK PAIN LATERALITY: ICD-10-CM

## 2017-11-03 DIAGNOSIS — M25.60 JOINT STIFFNESS: ICD-10-CM

## 2017-11-03 DIAGNOSIS — G89.29 CHRONIC LOW BACK PAIN WITH BILATERAL SCIATICA, UNSPECIFIED BACK PAIN LATERALITY: ICD-10-CM

## 2017-11-03 RX ORDER — OXYBUTYNIN CHLORIDE 5 MG/1
TABLET, EXTENDED RELEASE ORAL
Qty: 30 TABLET | Refills: 4 | Status: SHIPPED | OUTPATIENT
Start: 2017-11-03 | End: 2017-11-20 | Stop reason: SDUPTHER

## 2017-11-11 DIAGNOSIS — F32.2 SEVERE MAJOR DEPRESSION WITHOUT PSYCHOTIC FEATURES (HCC): ICD-10-CM

## 2017-11-13 RX ORDER — TOPIRAMATE 50 MG/1
TABLET, FILM COATED ORAL
Qty: 21 TABLET | Refills: 0 | OUTPATIENT
Start: 2017-11-13

## 2017-11-15 ENCOUNTER — OFFICE VISIT (OUTPATIENT)
Dept: FAMILY MEDICINE CLINIC | Facility: CLINIC | Age: 47
End: 2017-11-15

## 2017-11-15 VITALS
SYSTOLIC BLOOD PRESSURE: 112 MMHG | WEIGHT: 198 LBS | BODY MASS INDEX: 29.33 KG/M2 | HEART RATE: 72 BPM | TEMPERATURE: 97.7 F | OXYGEN SATURATION: 96 % | HEIGHT: 69 IN | DIASTOLIC BLOOD PRESSURE: 68 MMHG

## 2017-11-15 DIAGNOSIS — G89.29 CHRONIC LOW BACK PAIN WITH BILATERAL SCIATICA, UNSPECIFIED BACK PAIN LATERALITY: ICD-10-CM

## 2017-11-15 DIAGNOSIS — M54.41 CHRONIC LOW BACK PAIN WITH BILATERAL SCIATICA, UNSPECIFIED BACK PAIN LATERALITY: ICD-10-CM

## 2017-11-15 DIAGNOSIS — M25.60 JOINT STIFFNESS: ICD-10-CM

## 2017-11-15 DIAGNOSIS — R22.9 MULTIPLE SKIN NODULES: ICD-10-CM

## 2017-11-15 DIAGNOSIS — T88.1XXA POST-IMMUNIZATION REACTION, INITIAL ENCOUNTER: Primary | ICD-10-CM

## 2017-11-15 DIAGNOSIS — M54.42 CHRONIC LOW BACK PAIN WITH BILATERAL SCIATICA, UNSPECIFIED BACK PAIN LATERALITY: ICD-10-CM

## 2017-11-15 PROCEDURE — 99213 OFFICE O/P EST LOW 20 MIN: CPT | Performed by: NURSE PRACTITIONER

## 2017-11-15 NOTE — PROGRESS NOTES
"Subjective   Karen Nash is a 47 y.o. female who presents with rash on left arm. Had pneumonia vaccine in this arm 2 days ago.     History of Present Illness   L arm rash noted with minimal spread since day after injection. R arm with no rash, but both are painful.     Stressors are escalating in home life, denies any acute worsening of mood.  The following portions of the patient's history were reviewed and updated as appropriate: allergies, current medications, past family history, past medical history, past social history, past surgical history and problem list.    Review of Systems   Constitutional: Negative.    HENT: Negative.    Respiratory: Negative.  Negative for cough and wheezing.    Skin: Positive for rash.   Allergic/Immunologic: Negative for environmental allergies and food allergies.   Psychiatric/Behavioral: Positive for dysphoric mood. Negative for self-injury and suicidal ideas.     /68  Pulse 72  Temp 97.7 °F (36.5 °C) (Oral)   Ht 69\" (175.3 cm)  Wt 198 lb (89.8 kg)  SpO2 96%  BMI 29.24 kg/m2    Objective   Physical Exam   Constitutional: She appears well-developed and well-nourished.   Skin: Skin is warm and dry.   L upper deltoid with diagonal erythema and heat mild measuring 3 cm x 1 cm   Psychiatric: She has a normal mood and affect. Her behavior is normal. Judgment and thought content normal.   Nursing note and vitals reviewed.    Assessment/Plan   Problems Addressed this Visit     None      Visit Diagnoses     Post-immunization reaction, initial encounter    -  Primary        Watch and wait, local reaction should settle. Apply ice to address the pain.     Continue to watch mood, as transitioning lamictal, ready to titrate up to 100mg tomorrow. ER any suicidal thoughts, worsening mood.          "

## 2017-11-20 ENCOUNTER — OFFICE VISIT (OUTPATIENT)
Dept: FAMILY MEDICINE CLINIC | Facility: CLINIC | Age: 47
End: 2017-11-20

## 2017-11-20 VITALS
TEMPERATURE: 98 F | SYSTOLIC BLOOD PRESSURE: 132 MMHG | WEIGHT: 210 LBS | HEART RATE: 65 BPM | OXYGEN SATURATION: 97 % | DIASTOLIC BLOOD PRESSURE: 62 MMHG | BODY MASS INDEX: 31.01 KG/M2

## 2017-11-20 DIAGNOSIS — N39.41 URGE URINARY INCONTINENCE: ICD-10-CM

## 2017-11-20 DIAGNOSIS — F32.2 SEVERE MAJOR DEPRESSION WITHOUT PSYCHOTIC FEATURES (HCC): Primary | ICD-10-CM

## 2017-11-20 DIAGNOSIS — F41.8 DEPRESSION WITH ANXIETY: ICD-10-CM

## 2017-11-20 PROCEDURE — 99213 OFFICE O/P EST LOW 20 MIN: CPT | Performed by: FAMILY MEDICINE

## 2017-11-20 RX ORDER — ACETAMINOPHEN 500 MG
500 TABLET ORAL EVERY 6 HOURS PRN
COMMUNITY

## 2017-11-20 RX ORDER — OXYBUTYNIN CHLORIDE 10 MG/1
10 TABLET, EXTENDED RELEASE ORAL DAILY
Qty: 30 TABLET | Refills: 5 | Status: SHIPPED | OUTPATIENT
Start: 2017-11-20 | End: 2018-05-19 | Stop reason: SDUPTHER

## 2017-11-20 RX ORDER — MELOXICAM 7.5 MG/1
7.5 TABLET ORAL 2 TIMES DAILY
COMMUNITY
End: 2018-05-07

## 2017-11-20 RX ORDER — SERTRALINE HYDROCHLORIDE 25 MG/1
TABLET, FILM COATED ORAL
Qty: 7 TABLET | Refills: 0 | Status: SHIPPED | OUTPATIENT
Start: 2017-11-20 | End: 2017-12-18

## 2017-11-20 RX ORDER — SERTRALINE HYDROCHLORIDE 100 MG/1
TABLET, FILM COATED ORAL
Qty: 21 TABLET | Refills: 0 | Status: SHIPPED | OUTPATIENT
Start: 2017-11-20 | End: 2017-12-11 | Stop reason: SDUPTHER

## 2017-11-20 RX ORDER — ALBUTEROL SULFATE 1.25 MG/3ML
1 SOLUTION RESPIRATORY (INHALATION) EVERY 6 HOURS PRN
Qty: 120 VIAL | Refills: 12 | Status: SHIPPED | OUTPATIENT
Start: 2017-11-20 | End: 2019-01-14 | Stop reason: SDUPTHER

## 2017-11-20 NOTE — PROGRESS NOTES
Subjective   Karen Nash is a 47 y.o. female. Presents today for   Chief Complaint   Patient presents with   • Follow-up     was seen by ra  and changed some meds needs to discuss results.       Depression   Visit Type: follow-up (Patient with RA, told possible fibromyalgia;  Hurts all the time.  Having marital discord, losing business and trying to care for kids with special needs, mother with health problems.  Last ov weaned off topamax and started lamictal;  )  Patient presents with the following symptoms: anhedonia, confusion, decreased concentration, depressed mood, feelings of worthlessness, muscle tension, nervousness/anxiety and thoughts of death.  Patient is not experiencing: suicidal ideas and suicidal planning.  Frequency of symptoms: constantly   Severity: incapacitating   Sleep quality: poor  Nighttime awakenings: several  Compliance with medications:  %        Reports also oxybutinin no longer helping incontinence issues.    Review of Systems   Musculoskeletal: Positive for arthralgias and joint swelling.   Psychiatric/Behavioral: Positive for confusion, decreased concentration and sleep disturbance. Negative for self-injury and suicidal ideas. The patient is nervous/anxious.        The following portions of the patient's history were reviewed and updated as appropriate: allergies, current medications, past medical history and problem list.    Patient Active Problem List   Diagnosis   • Abdominal pain   • Asthma with acute exacerbation   • Atopic rhinitis   • Anemia   • Benign essential hypertension   • Constipation   • Dyslipidemia   • Gastroesophageal reflux disease   • Migraine   • Muscle cramps   • Vocal cord dysfunction       Allergies   Allergen Reactions   • Corn-Containing Products Rash   • Penicillins    • Sulfamethoxazole-Trimethoprim Hives and Itching       Current Outpatient Prescriptions on File Prior to Visit   Medication Sig Dispense Refill   • amLODIPine (NORVASC) 10 MG  tablet TAKE 1 TABLET BY MOUTH ONE TIME A DAY  30 tablet 5   • beta carotene ([employer] BETA CAROTENE) 30973 UNIT capsule Take 25,000 Units by mouth Daily.     • Biotin 18231 MCG tablet Take  by mouth.     • busPIRone (BUSPAR) 10 MG tablet TAKE 1 TABLET BY MOUTH THREE TIMES A DAY  90 tablet 3   • CALCIUM-MAGNESIUM-ZINC PO Take  by mouth.     • Cetirizine HCl 10 MG capsule Take  by mouth.     • cholecalciferol (VITAMIN D3) 1000 units tablet Take 1,000 Units by mouth Daily.     • fluticasone (FLONASE) 50 MCG/ACT nasal spray into each nostril daily.     • gabapentin (NEURONTIN) 300 MG capsule 2 po twice daily 120 capsule 5   • hydrochlorothiazide (HYDRODIURIL) 25 MG tablet TAKE 1 TABLET BY MOUTH ONE TIME A DAY  30 tablet 5   • lamoTRIgine (LaMICtal) 100 MG tablet Take 1 tablet by mouth Every Night. 30 tablet 4   • methotrexate 2.5 MG tablet TAKE 4 TABLETs BY MOUTH ONE TIME A WEEK      • montelukast (SINGULAIR) 10 MG tablet TAKE 1 TABLET BY MOUTH AT BEDTIME  30 tablet 10   • Multiple Vitamin (MULTI VITAMIN DAILY PO) Take by mouth daily.     • Omega-3 Fatty Acids (FISH OIL) 1000 MG capsule capsule Take 1,000 mg by mouth daily with breakfast.     • omeprazole (priLOSEC) 40 MG capsule TAKE 1 CAPSULE BY MOUTH ONE TIME A DAY  30 capsule 4   • SYMBICORT 160-4.5 MCG/ACT inhaler INHALE 2 PUFFS BY MOUTH TWO TIMES A DAY  10.2 g 11   • topiramate (TOPAMAX) 50 MG tablet Once on 100mg of lamictal start 1/2 am and 1 nightly x 7d, 1/2 bid x 7 days, 1/2 nightly x 7 days then stop.  Call if migraines recur 21 tablet 0   • traZODone (DESYREL) 100 MG tablet Take 100 mg by mouth Every Night.     • VENTOLIN  (90 BASE) MCG/ACT inhaler INHALE 1 OR 2 PUFFS ORALLY EVERY FOUR TO SIX HOURS as needed 18 g 1   • vitamin B-12 (CYANOCOBALAMIN) 1000 MCG tablet Take 1,000 mcg by mouth Daily.       No current facility-administered medications on file prior to visit.        Objective   Vitals:    11/20/17 0909   BP: 132/62   Pulse: 65   Temp: 98 °F (36.7  °C)   SpO2: 97%   Weight: 210 lb (95.3 kg)       Physical Exam   Constitutional: She is oriented to person, place, and time. She appears well-developed and well-nourished.   Neurological: She is alert and oriented to person, place, and time.   Skin: Skin is warm and dry.   Psychiatric: Her behavior is normal. Her mood appears anxious. She exhibits a depressed mood.   Nursing note and vitals reviewed.      Assessment/Plan   Karen was seen today for follow-up.    Diagnoses and all orders for this visit:    Severe major depression without psychotic features  -     sertraline (ZOLOFT) 25 MG tablet; After 1/2 100mg, take 1 po daily x 7 days then stop.  -     Vortioxetine HBr (TRINTELLIX) 5 MG tablet; 1 po daily x 4 weeks, then go to 10mg (samples)  -     Vortioxetine HBr (TRINTELLIX) 10 MG tablet; After 5mg daily, take 10mg daily (either 2 5mg tabs or 1 10mg tab daily).    Depression with anxiety  -     sertraline (ZOLOFT) 100 MG tablet; 1.5 tablets x 7 days, then 1 po daily x 7 days, then 1/2 po daily x 7 days then go to 25mg  -     sertraline (ZOLOFT) 25 MG tablet; After 1/2 100mg, take 1 po daily x 7 days then stop.  -     Vortioxetine HBr (TRINTELLIX) 5 MG tablet; 1 po daily x 4 weeks, then go to 10mg (samples)  -     Vortioxetine HBr (TRINTELLIX) 10 MG tablet; After 5mg daily, take 10mg daily (either 2 5mg tabs or 1 10mg tab daily).    Urge urinary incontinence  -     oxybutynin XL (DITROPAN-XL) 10 MG 24 hr tablet; Take 1 tablet by mouth Daily.    Other orders  -     albuterol (ACCUNEB) 1.25 MG/3ML nebulizer solution; Take 3 mL by nebulization Every 6 (Six) Hours As Needed for Wheezing.    -patient with severe depression and failed several medications;  I gave several weeks of trintellix sampes and will try though branded as patient not doing well;  Will also wean off zoloft slowly as patient will be on too many sterotonin related agents.  Will have complete topamax wean as well.  -ok increase ditropan dose  -needs  refills on albuterol  -seek care immediatley if any thoughts of self harm       -Follow up: 4 weeks       Current Outpatient Prescriptions:   •  acetaminophen (TYLENOL) 500 MG tablet, Take 500 mg by mouth Every 6 (Six) Hours As Needed for Mild Pain ., Disp: , Rfl:   •  amLODIPine (NORVASC) 10 MG tablet, TAKE 1 TABLET BY MOUTH ONE TIME A DAY , Disp: 30 tablet, Rfl: 5  •  beta carotene ([employer]S BETA CAROTENE) 35372 UNIT capsule, Take 25,000 Units by mouth Daily., Disp: , Rfl:   •  Biotin 58371 MCG tablet, Take  by mouth., Disp: , Rfl:   •  busPIRone (BUSPAR) 10 MG tablet, TAKE 1 TABLET BY MOUTH THREE TIMES A DAY , Disp: 90 tablet, Rfl: 3  •  CALCIUM-MAGNESIUM-ZINC PO, Take  by mouth., Disp: , Rfl:   •  Cetirizine HCl 10 MG capsule, Take  by mouth., Disp: , Rfl:   •  cholecalciferol (VITAMIN D3) 1000 units tablet, Take 1,000 Units by mouth Daily., Disp: , Rfl:   •  fluticasone (FLONASE) 50 MCG/ACT nasal spray, into each nostril daily., Disp: , Rfl:   •  gabapentin (NEURONTIN) 300 MG capsule, 2 po twice daily, Disp: 120 capsule, Rfl: 5  •  hydrochlorothiazide (HYDRODIURIL) 25 MG tablet, TAKE 1 TABLET BY MOUTH ONE TIME A DAY , Disp: 30 tablet, Rfl: 5  •  lamoTRIgine (LaMICtal) 100 MG tablet, Take 1 tablet by mouth Every Night., Disp: 30 tablet, Rfl: 4  •  meloxicam (MOBIC) 7.5 MG tablet, Take 7.5 mg by mouth 2 (Two) Times a Day., Disp: , Rfl:   •  methotrexate 2.5 MG tablet, TAKE 4 TABLETs BY MOUTH ONE TIME A WEEK , Disp: , Rfl:   •  montelukast (SINGULAIR) 10 MG tablet, TAKE 1 TABLET BY MOUTH AT BEDTIME , Disp: 30 tablet, Rfl: 10  •  Multiple Vitamin (MULTI VITAMIN DAILY PO), Take by mouth daily., Disp: , Rfl:   •  Omega-3 Fatty Acids (FISH OIL) 1000 MG capsule capsule, Take 1,000 mg by mouth daily with breakfast., Disp: , Rfl:   •  omeprazole (priLOSEC) 40 MG capsule, TAKE 1 CAPSULE BY MOUTH ONE TIME A DAY , Disp: 30 capsule, Rfl: 4  •  oxybutynin XL (DITROPAN-XL) 10 MG 24 hr tablet, Take 1 tablet by mouth Daily., Disp: 30  tablet, Rfl: 5  •  sertraline (ZOLOFT) 100 MG tablet, 1.5 tablets x 7 days, then 1 po daily x 7 days, then 1/2 po daily x 7 days then go to 25mg, Disp: 21 tablet, Rfl: 0  •  SYMBICORT 160-4.5 MCG/ACT inhaler, INHALE 2 PUFFS BY MOUTH TWO TIMES A DAY , Disp: 10.2 g, Rfl: 11  •  topiramate (TOPAMAX) 50 MG tablet, Once on 100mg of lamictal start 1/2 am and 1 nightly x 7d, 1/2 bid x 7 days, 1/2 nightly x 7 days then stop.  Call if migraines recur, Disp: 21 tablet, Rfl: 0  •  traZODone (DESYREL) 100 MG tablet, Take 100 mg by mouth Every Night., Disp: , Rfl:   •  VENTOLIN  (90 BASE) MCG/ACT inhaler, INHALE 1 OR 2 PUFFS ORALLY EVERY FOUR TO SIX HOURS as needed, Disp: 18 g, Rfl: 1  •  vitamin B-12 (CYANOCOBALAMIN) 1000 MCG tablet, Take 1,000 mcg by mouth Daily., Disp: , Rfl:   •  albuterol (ACCUNEB) 1.25 MG/3ML nebulizer solution, Take 3 mL by nebulization Every 6 (Six) Hours As Needed for Wheezing., Disp: 120 vial, Rfl: 12  •  sertraline (ZOLOFT) 25 MG tablet, After 1/2 100mg, take 1 po daily x 7 days then stop., Disp: 7 tablet, Rfl: 0  •  Vortioxetine HBr (TRINTELLIX) 10 MG tablet, After 5mg daily, take 10mg daily (either 2 5mg tabs or 1 10mg tab daily)., Disp: 30 tablet, Rfl:   •  Vortioxetine HBr (TRINTELLIX) 5 MG tablet, 1 po daily x 4 weeks, then go to 10mg (samples), Disp: 30 tablet, Rfl:

## 2017-11-24 DIAGNOSIS — G43.009 MIGRAINE WITHOUT AURA AND WITHOUT STATUS MIGRAINOSUS, NOT INTRACTABLE: ICD-10-CM

## 2017-11-24 DIAGNOSIS — F32.2 SEVERE MAJOR DEPRESSION WITHOUT PSYCHOTIC FEATURES (HCC): ICD-10-CM

## 2017-11-25 DIAGNOSIS — F41.8 DEPRESSION WITH ANXIETY: ICD-10-CM

## 2017-11-25 DIAGNOSIS — F32.2 SEVERE MAJOR DEPRESSION WITHOUT PSYCHOTIC FEATURES (HCC): ICD-10-CM

## 2017-11-27 RX ORDER — LAMOTRIGINE 25 MG/1
100 TABLET ORAL DAILY
Qty: 30 TABLET | Refills: 5 | Status: SHIPPED | OUTPATIENT
Start: 2017-11-27 | End: 2017-12-18 | Stop reason: SDUPTHER

## 2017-11-27 RX ORDER — SERTRALINE HYDROCHLORIDE 25 MG/1
TABLET, FILM COATED ORAL
Qty: 7 TABLET | Refills: 0 | OUTPATIENT
Start: 2017-11-27

## 2017-11-30 DIAGNOSIS — R22.9 MULTIPLE SKIN NODULES: ICD-10-CM

## 2017-11-30 DIAGNOSIS — M54.41 CHRONIC LOW BACK PAIN WITH BILATERAL SCIATICA, UNSPECIFIED BACK PAIN LATERALITY: ICD-10-CM

## 2017-11-30 DIAGNOSIS — F41.8 DEPRESSION WITH ANXIETY: ICD-10-CM

## 2017-11-30 DIAGNOSIS — G89.29 CHRONIC LOW BACK PAIN WITH BILATERAL SCIATICA, UNSPECIFIED BACK PAIN LATERALITY: ICD-10-CM

## 2017-11-30 DIAGNOSIS — M54.42 CHRONIC LOW BACK PAIN WITH BILATERAL SCIATICA, UNSPECIFIED BACK PAIN LATERALITY: ICD-10-CM

## 2017-11-30 DIAGNOSIS — M25.60 JOINT STIFFNESS: ICD-10-CM

## 2017-11-30 RX ORDER — SERTRALINE HYDROCHLORIDE 100 MG/1
TABLET, FILM COATED ORAL
Qty: 21 TABLET | Refills: 0 | OUTPATIENT
Start: 2017-11-30

## 2017-11-30 RX ORDER — DICLOFENAC POTASSIUM 50 MG/1
TABLET, FILM COATED ORAL
Qty: 60 TABLET | Refills: 0 | Status: SHIPPED | OUTPATIENT
Start: 2017-11-30 | End: 2018-05-07

## 2017-12-11 DIAGNOSIS — F41.8 DEPRESSION WITH ANXIETY: ICD-10-CM

## 2017-12-11 RX ORDER — SERTRALINE HYDROCHLORIDE 100 MG/1
TABLET, FILM COATED ORAL
Qty: 21 TABLET | Refills: 0 | Status: SHIPPED | OUTPATIENT
Start: 2017-12-11 | End: 2017-12-18

## 2017-12-11 RX ORDER — TOPIRAMATE 100 MG/1
TABLET, FILM COATED ORAL
Qty: 60 TABLET | Refills: 1 | Status: SHIPPED | OUTPATIENT
Start: 2017-12-11 | End: 2017-12-18

## 2017-12-18 ENCOUNTER — OFFICE VISIT (OUTPATIENT)
Dept: FAMILY MEDICINE CLINIC | Facility: CLINIC | Age: 47
End: 2017-12-18

## 2017-12-18 VITALS
OXYGEN SATURATION: 95 % | WEIGHT: 218 LBS | SYSTOLIC BLOOD PRESSURE: 120 MMHG | DIASTOLIC BLOOD PRESSURE: 70 MMHG | HEART RATE: 62 BPM | BODY MASS INDEX: 32.19 KG/M2 | TEMPERATURE: 97.7 F

## 2017-12-18 DIAGNOSIS — J01.10 ACUTE NON-RECURRENT FRONTAL SINUSITIS: ICD-10-CM

## 2017-12-18 DIAGNOSIS — F32.2 SEVERE MAJOR DEPRESSION (HCC): Primary | ICD-10-CM

## 2017-12-18 PROCEDURE — 99214 OFFICE O/P EST MOD 30 MIN: CPT | Performed by: FAMILY MEDICINE

## 2017-12-18 RX ORDER — DOXYCYCLINE HYCLATE 100 MG/1
100 CAPSULE ORAL 2 TIMES DAILY
Qty: 20 CAPSULE | Refills: 0 | Status: SHIPPED | OUTPATIENT
Start: 2017-12-18 | End: 2018-01-29

## 2017-12-18 RX ORDER — TRAZODONE HYDROCHLORIDE 50 MG/1
TABLET ORAL
Qty: 10 TABLET | Refills: 0 | Status: SHIPPED | OUTPATIENT
Start: 2017-12-18 | End: 2018-01-11

## 2017-12-18 RX ORDER — BUSPIRONE HYDROCHLORIDE 5 MG/1
TABLET ORAL
Qty: 32 TABLET | Refills: 0 | Status: SHIPPED | OUTPATIENT
Start: 2017-12-18 | End: 2018-01-11

## 2017-12-18 RX ORDER — BUPROPION HYDROCHLORIDE 150 MG/1
150 TABLET ORAL DAILY
Qty: 30 TABLET | Refills: 5 | Status: SHIPPED | OUTPATIENT
Start: 2017-12-18 | End: 2018-01-11 | Stop reason: SDUPTHER

## 2017-12-18 NOTE — PROGRESS NOTES
Subjective   Karen Nash is a 47 y.o. female. Presents today for   Chief Complaint   Patient presents with   • Follow-up     med changes and depression with weight gain.       Depression   Visit Type: follow-up (Last ov started trintellix now weaned off sertraline and topamax.  Trying to reduce polypharmacy and improve depression.  Have tried several meds;  Trintellix helping a little.  Gave samples, will need Rx but not sure covered.  Will give sampmles. )  Patient presents with the following symptoms: anhedonia, choking sensation, depressed mood, excessive worry, fatigue, feelings of hopelessness, feelings of worthlessness, insomnia, muscle tension, nervousness/anxiety, thoughts of death and weight gain (Frustrated as gaining weight;  Reports has increased appetite.  She has weaned off topamax, ).  Patient is not experiencing: palpitations, shortness of breath, suicidal ideas and suicidal planning.  Frequency of symptoms: most days   Severity: severe   Sleep quality: fair  Nighttime awakenings: occasional  Compliance with medications:  %        Sinusitis   This is a new problem. The current episode started in the past 7 days. The problem is unchanged. There has been no fever. The pain is moderate. Associated symptoms include chills, congestion, coughing, headaches, sinus pressure and a sore throat. Pertinent negatives include no ear pain or shortness of breath. Past treatments include nothing. The treatment provided no relief.       Weaned off topamax and sertraline now.     Review of Systems   Constitutional: Positive for appetite change, chills, fatigue, unexpected weight change and weight gain (Frustrated as gaining weight;  Reports has increased appetite.  She has weaned off topamax, ).   HENT: Positive for congestion, postnasal drip, sinus pain, sinus pressure and sore throat. Negative for ear pain.    Respiratory: Positive for cough and choking. Negative for shortness of breath.    Cardiovascular:  Negative for palpitations.   Gastrointestinal: Negative for abdominal pain, diarrhea, nausea and vomiting.   Musculoskeletal: Positive for arthralgias and myalgias.   Neurological: Positive for headaches.   Psychiatric/Behavioral: Positive for sleep disturbance. Negative for self-injury and suicidal ideas. The patient is nervous/anxious and has insomnia.        The following portions of the patient's history were reviewed and updated as appropriate: allergies, current medications, past medical history and problem list.    Patient Active Problem List   Diagnosis   • Abdominal pain   • Asthma with acute exacerbation   • Atopic rhinitis   • Anemia   • Benign essential hypertension   • Constipation   • Dyslipidemia   • Gastroesophageal reflux disease   • Migraine   • Muscle cramps   • Vocal cord dysfunction       Allergies   Allergen Reactions   • Corn-Containing Products Rash   • Penicillins    • Sulfamethoxazole-Trimethoprim Hives and Itching       Current Outpatient Prescriptions on File Prior to Visit   Medication Sig Dispense Refill   • acetaminophen (TYLENOL) 500 MG tablet Take 500 mg by mouth Every 6 (Six) Hours As Needed for Mild Pain .     • albuterol (ACCUNEB) 1.25 MG/3ML nebulizer solution Take 3 mL by nebulization Every 6 (Six) Hours As Needed for Wheezing. 120 vial 12   • amLODIPine (NORVASC) 10 MG tablet TAKE 1 TABLET BY MOUTH ONE TIME A DAY  30 tablet 5   • beta carotene ([employer]S BETA CAROTENE) 77536 UNIT capsule Take 25,000 Units by mouth Daily.     • Biotin 02692 MCG tablet Take  by mouth.     • busPIRone (BUSPAR) 10 MG tablet TAKE 1 TABLET BY MOUTH THREE TIMES A DAY  90 tablet 3   • CALCIUM-MAGNESIUM-ZINC PO Take  by mouth.     • Cetirizine HCl 10 MG capsule Take  by mouth.     • cholecalciferol (VITAMIN D3) 1000 units tablet Take 1,000 Units by mouth Daily.     • diclofenac (CATAFLAM) 50 MG tablet TAKE 1 TABLET BY MOUTH TWO TIMES A DAY AS NEEDED 60 tablet 0   • fluticasone (FLONASE) 50 MCG/ACT nasal spray  into each nostril daily.     • gabapentin (NEURONTIN) 300 MG capsule 2 po twice daily 120 capsule 5   • hydrochlorothiazide (HYDRODIURIL) 25 MG tablet TAKE 1 TABLET BY MOUTH ONE TIME A DAY  30 tablet 5   • lamoTRIgine (LaMICtal) 100 MG tablet Take 1 tablet by mouth Every Night. 30 tablet 4   • meloxicam (MOBIC) 7.5 MG tablet Take 7.5 mg by mouth 2 (Two) Times a Day.     • methotrexate 2.5 MG tablet TAKE 4 TABLETs BY MOUTH ONE TIME A WEEK      • montelukast (SINGULAIR) 10 MG tablet TAKE 1 TABLET BY MOUTH AT BEDTIME  30 tablet 10   • Multiple Vitamin (MULTI VITAMIN DAILY PO) Take by mouth daily.     • Omega-3 Fatty Acids (FISH OIL) 1000 MG capsule capsule Take 1,000 mg by mouth daily with breakfast.     • omeprazole (priLOSEC) 40 MG capsule TAKE 1 CAPSULE BY MOUTH ONE TIME A DAY  30 capsule 4   • oxybutynin XL (DITROPAN-XL) 10 MG 24 hr tablet Take 1 tablet by mouth Daily. 30 tablet 5   • sertraline (ZOLOFT) 25 MG tablet After 1/2 100mg, take 1 po daily x 7 days then stop. 7 tablet 0   • SYMBICORT 160-4.5 MCG/ACT inhaler INHALE 2 PUFFS BY MOUTH TWO TIMES A DAY  10.2 g 11   • traZODone (DESYREL) 100 MG tablet Take 100 mg by mouth Every Night.     • VENTOLIN  (90 BASE) MCG/ACT inhaler INHALE 1 OR 2 PUFFS ORALLY EVERY FOUR TO SIX HOURS as needed 18 g 1   • vitamin B-12 (CYANOCOBALAMIN) 1000 MCG tablet Take 1,000 mcg by mouth Daily.     • Vortioxetine HBr (TRINTELLIX) 10 MG tablet After 5mg daily, take 10mg daily (either 2 5mg tabs or 1 10mg tab daily). 30 tablet    • [DISCONTINUED] Vortioxetine HBr (TRINTELLIX) 5 MG tablet 1 po daily x 4 weeks, then go to 10mg (samples) 30 tablet    • [DISCONTINUED] lamoTRIgine (LaMICtal) 25 MG tablet Take 4 tablets by mouth Daily. 30 tablet 5   • [DISCONTINUED] sertraline (ZOLOFT) 100 MG tablet TAKE 1.5 TABLETS by mouth FOR 7 DAYS, 1 TABLET BY MOUTH FOR 7 DAYS, 1/2 TABLET BY MOUTH FOR 7 DAYS THEN GO TO 25 MG 21 tablet 0   • [DISCONTINUED] topiramate (TOPAMAX) 100 MG tablet TAKE 1  TABLET BY MOUTH TWO TIMES A DAY  60 tablet 1   • [DISCONTINUED] topiramate (TOPAMAX) 50 MG tablet Once on 100mg of lamictal start 1/2 am and 1 nightly x 7d, 1/2 bid x 7 days, 1/2 nightly x 7 days then stop.  Call if migraines recur 21 tablet 0     No current facility-administered medications on file prior to visit.        Objective   Vitals:    12/18/17 0904   BP: 120/70   Pulse: 62   Temp: 97.7 °F (36.5 °C)   SpO2: 95%   Weight: 98.9 kg (218 lb)       Physical Exam   Constitutional: She appears well-developed and well-nourished.   HENT:   Head: Normocephalic and atraumatic.   Right Ear: Tympanic membrane normal.   Left Ear: Tympanic membrane normal.   Nose: Mucosal edema present.   Mouth/Throat: Uvula is midline, oropharynx is clear and moist and mucous membranes are normal.   Neck: Neck supple. No JVD present. No thyromegaly present.   Cardiovascular: Normal rate, regular rhythm and normal heart sounds.  Exam reveals no gallop and no friction rub.    No murmur heard.  Pulmonary/Chest: Effort normal and breath sounds normal. No respiratory distress. She has no wheezes. She has no rales.   Abdominal: Soft. Bowel sounds are normal. She exhibits no distension. There is no tenderness. There is no rebound and no guarding.   Musculoskeletal: She exhibits no edema.   Neurological: She is alert.   Skin: Skin is warm and dry.   Psychiatric: Her behavior is normal. Her mood appears anxious. She exhibits a depressed mood. She expresses no homicidal and no suicidal ideation. She expresses no suicidal plans.   Nursing note and vitals reviewed.      Assessment/Plan   Karen was seen today for follow-up.    Diagnoses and all orders for this visit:    Severe major depression  -     buPROPion XL (WELLBUTRIN XL) 150 MG 24 hr tablet; Take 1 tablet by mouth Daily.  -     traZODone (DESYREL) 50 MG tablet; 1 po nightly x 1 week, then 1/2 po nightly x 1 week then stop    Acute non-recurrent frontal sinusitis  -     doxycycline (VIBRAMYCIN)  100 MG capsule; Take 1 capsule by mouth 2 (Two) Times a Day.    Other orders  -     busPIRone (BUSPAR) 5 MG tablet; 1 po tid x x 7 days, then 1/2 po tid x 7 days then stop    -reports headaches same off topamax, tolerating lamictal.  Will try adding buproprion as may decrease appetite, give more energy and improve mood.   Will effectively increase trintellix dose as well.  Gave another 10 weeks of trintellix.    -to avoid polypharmacy and b/c no relief, will wean off trazodone and buspar.  -To seek care immediately if any thoughts of self harm  -nasal saline rinses as directed;  Rx as directed for sinusitis       -Follow up: 2 months       Current Outpatient Prescriptions:   •  acetaminophen (TYLENOL) 500 MG tablet, Take 500 mg by mouth Every 6 (Six) Hours As Needed for Mild Pain ., Disp: , Rfl:   •  albuterol (ACCUNEB) 1.25 MG/3ML nebulizer solution, Take 3 mL by nebulization Every 6 (Six) Hours As Needed for Wheezing., Disp: 120 vial, Rfl: 12  •  amLODIPine (NORVASC) 10 MG tablet, TAKE 1 TABLET BY MOUTH ONE TIME A DAY , Disp: 30 tablet, Rfl: 5  •  beta carotene ([employer] BETA CAROTENE) 21849 UNIT capsule, Take 25,000 Units by mouth Daily., Disp: , Rfl:   •  Biotin 82941 MCG tablet, Take  by mouth., Disp: , Rfl:   •  busPIRone (BUSPAR) 10 MG tablet, TAKE 1 TABLET BY MOUTH THREE TIMES A DAY , Disp: 90 tablet, Rfl: 3  •  CALCIUM-MAGNESIUM-ZINC PO, Take  by mouth., Disp: , Rfl:   •  Cetirizine HCl 10 MG capsule, Take  by mouth., Disp: , Rfl:   •  cholecalciferol (VITAMIN D3) 1000 units tablet, Take 1,000 Units by mouth Daily., Disp: , Rfl:   •  diclofenac (CATAFLAM) 50 MG tablet, TAKE 1 TABLET BY MOUTH TWO TIMES A DAY AS NEEDED, Disp: 60 tablet, Rfl: 0  •  fluticasone (FLONASE) 50 MCG/ACT nasal spray, into each nostril daily., Disp: , Rfl:   •  gabapentin (NEURONTIN) 300 MG capsule, 2 po twice daily, Disp: 120 capsule, Rfl: 5  •  hydrochlorothiazide (HYDRODIURIL) 25 MG tablet, TAKE 1 TABLET BY MOUTH ONE TIME A DAY , Disp:  30 tablet, Rfl: 5  •  lamoTRIgine (LaMICtal) 100 MG tablet, Take 1 tablet by mouth Every Night., Disp: 30 tablet, Rfl: 4  •  meloxicam (MOBIC) 7.5 MG tablet, Take 7.5 mg by mouth 2 (Two) Times a Day., Disp: , Rfl:   •  methotrexate 2.5 MG tablet, TAKE 4 TABLETs BY MOUTH ONE TIME A WEEK , Disp: , Rfl:   •  montelukast (SINGULAIR) 10 MG tablet, TAKE 1 TABLET BY MOUTH AT BEDTIME , Disp: 30 tablet, Rfl: 10  •  Multiple Vitamin (MULTI VITAMIN DAILY PO), Take by mouth daily., Disp: , Rfl:   •  Omega-3 Fatty Acids (FISH OIL) 1000 MG capsule capsule, Take 1,000 mg by mouth daily with breakfast., Disp: , Rfl:   •  omeprazole (priLOSEC) 40 MG capsule, TAKE 1 CAPSULE BY MOUTH ONE TIME A DAY , Disp: 30 capsule, Rfl: 4  •  oxybutynin XL (DITROPAN-XL) 10 MG 24 hr tablet, Take 1 tablet by mouth Daily., Disp: 30 tablet, Rfl: 5  •  sertraline (ZOLOFT) 25 MG tablet, After 1/2 100mg, take 1 po daily x 7 days then stop., Disp: 7 tablet, Rfl: 0  •  SYMBICORT 160-4.5 MCG/ACT inhaler, INHALE 2 PUFFS BY MOUTH TWO TIMES A DAY , Disp: 10.2 g, Rfl: 11  •  traZODone (DESYREL) 100 MG tablet, Take 100 mg by mouth Every Night., Disp: , Rfl:   •  VENTOLIN  (90 BASE) MCG/ACT inhaler, INHALE 1 OR 2 PUFFS ORALLY EVERY FOUR TO SIX HOURS as needed, Disp: 18 g, Rfl: 1  •  vitamin B-12 (CYANOCOBALAMIN) 1000 MCG tablet, Take 1,000 mcg by mouth Daily., Disp: , Rfl:   •  Vortioxetine HBr (TRINTELLIX) 10 MG tablet, After 5mg daily, take 10mg daily (either 2 5mg tabs or 1 10mg tab daily)., Disp: 30 tablet, Rfl:

## 2017-12-25 DIAGNOSIS — F41.9 ANXIETY: ICD-10-CM

## 2017-12-26 RX ORDER — BUSPIRONE HYDROCHLORIDE 10 MG/1
TABLET ORAL
Qty: 90 TABLET | Refills: 2 | OUTPATIENT
Start: 2017-12-26

## 2017-12-28 DIAGNOSIS — F41.8 DEPRESSION WITH ANXIETY: ICD-10-CM

## 2017-12-28 RX ORDER — SERTRALINE HYDROCHLORIDE 100 MG/1
TABLET, FILM COATED ORAL
Qty: 21 TABLET | Refills: 0 | OUTPATIENT
Start: 2017-12-28

## 2017-12-28 RX ORDER — OMEPRAZOLE 40 MG/1
CAPSULE, DELAYED RELEASE ORAL
Qty: 15 CAPSULE | Refills: 3 | Status: SHIPPED | OUTPATIENT
Start: 2017-12-28 | End: 2018-05-07 | Stop reason: SDUPTHER

## 2017-12-28 RX ORDER — HYDROCHLOROTHIAZIDE 25 MG/1
TABLET ORAL
Qty: 15 TABLET | Refills: 4 | Status: SHIPPED | OUTPATIENT
Start: 2017-12-28 | End: 2018-03-12 | Stop reason: SDUPTHER

## 2018-01-03 DIAGNOSIS — F41.8 DEPRESSION WITH ANXIETY: ICD-10-CM

## 2018-01-03 RX ORDER — SERTRALINE HYDROCHLORIDE 100 MG/1
TABLET, FILM COATED ORAL
Qty: 21 TABLET | Refills: 0 | OUTPATIENT
Start: 2018-01-03

## 2018-01-11 DIAGNOSIS — F32.2 SEVERE MAJOR DEPRESSION (HCC): ICD-10-CM

## 2018-01-11 RX ORDER — BUPROPION HYDROCHLORIDE 300 MG/1
300 TABLET ORAL DAILY
Qty: 30 TABLET | Refills: 5 | Status: SHIPPED | OUTPATIENT
Start: 2018-01-11 | End: 2018-08-17 | Stop reason: SDUPTHER

## 2018-01-12 RX ORDER — TRAZODONE HYDROCHLORIDE 100 MG/1
TABLET ORAL
Qty: 15 TABLET | Refills: 10 | OUTPATIENT
Start: 2018-01-12

## 2018-01-13 DIAGNOSIS — F41.8 DEPRESSION WITH ANXIETY: ICD-10-CM

## 2018-01-15 RX ORDER — SERTRALINE HYDROCHLORIDE 100 MG/1
TABLET, FILM COATED ORAL
Qty: 21 TABLET | Refills: 0 | OUTPATIENT
Start: 2018-01-15

## 2018-01-15 RX ORDER — BUSPIRONE HYDROCHLORIDE 5 MG/1
TABLET ORAL
Qty: 32 TABLET | Refills: 0 | OUTPATIENT
Start: 2018-01-15

## 2018-01-15 RX ORDER — TRAZODONE HYDROCHLORIDE 100 MG/1
TABLET ORAL
Qty: 15 TABLET | Refills: 10 | OUTPATIENT
Start: 2018-01-15

## 2018-01-28 DIAGNOSIS — M25.60 JOINT STIFFNESS: ICD-10-CM

## 2018-01-28 DIAGNOSIS — R22.9 MULTIPLE SKIN NODULES: ICD-10-CM

## 2018-01-28 DIAGNOSIS — M54.42 CHRONIC LOW BACK PAIN WITH BILATERAL SCIATICA, UNSPECIFIED BACK PAIN LATERALITY: ICD-10-CM

## 2018-01-28 DIAGNOSIS — M54.41 CHRONIC LOW BACK PAIN WITH BILATERAL SCIATICA, UNSPECIFIED BACK PAIN LATERALITY: ICD-10-CM

## 2018-01-28 DIAGNOSIS — G89.29 CHRONIC LOW BACK PAIN WITH BILATERAL SCIATICA, UNSPECIFIED BACK PAIN LATERALITY: ICD-10-CM

## 2018-01-28 DIAGNOSIS — F41.8 DEPRESSION WITH ANXIETY: ICD-10-CM

## 2018-01-28 RX ORDER — GABAPENTIN 300 MG/1
CAPSULE ORAL
Qty: 60 CAPSULE | Refills: 1 | Status: CANCELLED | OUTPATIENT
Start: 2018-01-28

## 2018-01-29 ENCOUNTER — OFFICE VISIT (OUTPATIENT)
Dept: FAMILY MEDICINE CLINIC | Facility: CLINIC | Age: 48
End: 2018-01-29

## 2018-01-29 VITALS
WEIGHT: 233 LBS | DIASTOLIC BLOOD PRESSURE: 82 MMHG | HEART RATE: 72 BPM | OXYGEN SATURATION: 97 % | HEIGHT: 69 IN | SYSTOLIC BLOOD PRESSURE: 122 MMHG | BODY MASS INDEX: 34.51 KG/M2

## 2018-01-29 DIAGNOSIS — F32.1 MODERATE SINGLE CURRENT EPISODE OF MAJOR DEPRESSIVE DISORDER (HCC): Primary | ICD-10-CM

## 2018-01-29 DIAGNOSIS — M54.41 CHRONIC LOW BACK PAIN WITH BILATERAL SCIATICA, UNSPECIFIED BACK PAIN LATERALITY: ICD-10-CM

## 2018-01-29 DIAGNOSIS — M25.60 JOINT STIFFNESS: ICD-10-CM

## 2018-01-29 DIAGNOSIS — G89.29 CHRONIC LOW BACK PAIN WITH BILATERAL SCIATICA, UNSPECIFIED BACK PAIN LATERALITY: ICD-10-CM

## 2018-01-29 DIAGNOSIS — G43.009 MIGRAINE WITHOUT AURA AND WITHOUT STATUS MIGRAINOSUS, NOT INTRACTABLE: ICD-10-CM

## 2018-01-29 DIAGNOSIS — M54.42 CHRONIC LOW BACK PAIN WITH BILATERAL SCIATICA, UNSPECIFIED BACK PAIN LATERALITY: ICD-10-CM

## 2018-01-29 DIAGNOSIS — R22.9 MULTIPLE SKIN NODULES: ICD-10-CM

## 2018-01-29 PROCEDURE — 99213 OFFICE O/P EST LOW 20 MIN: CPT | Performed by: FAMILY MEDICINE

## 2018-01-29 RX ORDER — TRAZODONE HYDROCHLORIDE 100 MG/1
TABLET ORAL
Qty: 15 TABLET | Refills: 10 | OUTPATIENT
Start: 2018-01-29

## 2018-01-29 RX ORDER — TOPIRAMATE 25 MG/1
CAPSULE, EXTENDED RELEASE ORAL
Qty: 30 CAPSULE
Start: 2018-01-29 | End: 2018-03-12 | Stop reason: SDUPTHER

## 2018-01-29 RX ORDER — BUSPIRONE HYDROCHLORIDE 5 MG/1
TABLET ORAL
Qty: 32 TABLET | Refills: 0 | OUTPATIENT
Start: 2018-01-29

## 2018-01-29 RX ORDER — SERTRALINE HYDROCHLORIDE 100 MG/1
TABLET, FILM COATED ORAL
Qty: 21 TABLET | Refills: 0 | OUTPATIENT
Start: 2018-01-29

## 2018-01-29 RX ORDER — GABAPENTIN 300 MG/1
CAPSULE ORAL
Qty: 120 CAPSULE | Refills: 5 | OUTPATIENT
Start: 2018-01-29 | End: 2018-09-26

## 2018-01-29 RX ORDER — TOPIRAMATE 100 MG/1
CAPSULE, EXTENDED RELEASE ORAL
Qty: 30 CAPSULE
Start: 2018-01-29 | End: 2018-05-07

## 2018-01-29 RX ORDER — TOPIRAMATE 100 MG/1
TABLET, FILM COATED ORAL
Qty: 60 TABLET | Refills: 0 | OUTPATIENT
Start: 2018-01-29

## 2018-01-29 NOTE — PROGRESS NOTES
Subjective   Karen Nash is a 47 y.o. female. Presents today for   Chief Complaint   Patient presents with   • Depression       Depression   Visit Type: follow-up  Patient presents with the following symptoms: depressed mood, excessive worry, feelings of hopelessness, feelings of worthlessness, thoughts of death and weight gain.  Patient is not experiencing: suicidal ideas and suicidal planning.  Frequency of symptoms: most days   Current severity: Now moderate, not severe with improvement.   Sleep quality: fair  Compliance with medications:  % (Gave samples trintellix;  Have been weaning off medications placed on in past as no relief;  Reports concerned as gaining a lot of weight (started more after went off topamax))        Migraine    The current episode started more than 1 year ago. The problem occurs intermittently. The problem has been gradually worsening. The quality of the pain is described as throbbing. The pain is moderate. Nothing aggravates the symptoms. Treatments tried: Have been weaning off some meds as no relief of depression and had migraines no relief, but feels actually worse (off topamax) Her past medical history is significant for migraine headaches.     Close to Mom and now living with her sister as cannot stand her .    Review of Systems   Constitutional: Positive for weight gain.   Psychiatric/Behavioral: Negative for suicidal ideas.       The following portions of the patient's history were reviewed and updated as appropriate: allergies, current medications, past medical history and problem list.    Patient Active Problem List   Diagnosis   • Abdominal pain   • Asthma with acute exacerbation   • Atopic rhinitis   • Anemia   • Benign essential hypertension   • Constipation   • Dyslipidemia   • Gastroesophageal reflux disease   • Migraine   • Muscle cramps   • Vocal cord dysfunction       Allergies   Allergen Reactions   • Corn-Containing Products Rash   • Penicillins    •  Sulfamethoxazole-Trimethoprim Hives and Itching       Current Outpatient Prescriptions on File Prior to Visit   Medication Sig Dispense Refill   • acetaminophen (TYLENOL) 500 MG tablet Take 500 mg by mouth Every 6 (Six) Hours As Needed for Mild Pain .     • albuterol (ACCUNEB) 1.25 MG/3ML nebulizer solution Take 3 mL by nebulization Every 6 (Six) Hours As Needed for Wheezing. 120 vial 12   • amLODIPine (NORVASC) 10 MG tablet TAKE 1 TABLET BY MOUTH ONE TIME A DAY  30 tablet 5   • beta carotene ([employer]S BETA CAROTENE) 70167 UNIT capsule Take 25,000 Units by mouth Daily.     • Biotin 18856 MCG tablet Take  by mouth.     • buPROPion XL (WELLBUTRIN XL) 300 MG 24 hr tablet Take 1 tablet by mouth Daily. 30 tablet 5   • CALCIUM-MAGNESIUM-ZINC PO Take  by mouth.     • Cetirizine HCl 10 MG capsule Take  by mouth.     • cholecalciferol (VITAMIN D3) 1000 units tablet Take 1,000 Units by mouth Daily.     • diclofenac (CATAFLAM) 50 MG tablet TAKE 1 TABLET BY MOUTH TWO TIMES A DAY AS NEEDED 60 tablet 0   • fluticasone (FLONASE) 50 MCG/ACT nasal spray into each nostril daily.     • gabapentin (NEURONTIN) 300 MG capsule 2 po twice daily 120 capsule 5   • hydrochlorothiazide (HYDRODIURIL) 25 MG tablet TAKE 1 TABLET BY MOUTH ONE TIME A DAY  15 tablet 4   • lamoTRIgine (LaMICtal) 100 MG tablet Take 1 tablet by mouth Every Night. 30 tablet 4   • meloxicam (MOBIC) 7.5 MG tablet Take 7.5 mg by mouth 2 (Two) Times a Day.     • methotrexate 2.5 MG tablet TAKE 4 TABLETs BY MOUTH ONE TIME A WEEK      • montelukast (SINGULAIR) 10 MG tablet TAKE 1 TABLET BY MOUTH AT BEDTIME  30 tablet 10   • Multiple Vitamin (MULTI VITAMIN DAILY PO) Take by mouth daily.     • Omega-3 Fatty Acids (FISH OIL) 1000 MG capsule capsule Take 1,000 mg by mouth daily with breakfast.     • omeprazole (priLOSEC) 40 MG capsule TAKE 1 CAPSULE BY MOUTH ONE TIME A DAY  15 capsule 3   • oxybutynin XL (DITROPAN-XL) 10 MG 24 hr tablet Take 1 tablet by mouth Daily. 30 tablet 5   •  "SYMBICORT 160-4.5 MCG/ACT inhaler INHALE 2 PUFFS BY MOUTH TWO TIMES A DAY  10.2 g 11   • VENTOLIN  (90 BASE) MCG/ACT inhaler INHALE 1 OR 2 PUFFS ORALLY EVERY FOUR TO SIX HOURS as needed 18 g 1   • vitamin B-12 (CYANOCOBALAMIN) 1000 MCG tablet Take 1,000 mcg by mouth Daily.     • Vortioxetine HBr (TRINTELLIX) 10 MG tablet After 5mg daily, take 10mg daily (either 2 5mg tabs or 1 10mg tab daily). 30 tablet    • [DISCONTINUED] doxycycline (VIBRAMYCIN) 100 MG capsule Take 1 capsule by mouth 2 (Two) Times a Day. 20 capsule 0     No current facility-administered medications on file prior to visit.        Objective   Vitals:    01/29/18 0841   BP: 122/82   BP Location: Left arm   Patient Position: Sitting   Cuff Size: Adult   Pulse: 72   SpO2: 97%   Weight: 106 kg (233 lb)   Height: 175.3 cm (69\")       Physical Exam   Constitutional: She is oriented to person, place, and time. She appears well-developed and well-nourished.   Neurological: She is alert and oriented to person, place, and time.   Skin: Skin is warm and dry.   Psychiatric: She has a normal mood and affect. Her behavior is normal.   Nursing note and vitals reviewed.      Assessment/Plan   Karen was seen today for depression.    Diagnoses and all orders for this visit:    Moderate single current episode of major depressive disorder    Migraine without aura and without status migrainosus, not intractable  -     Topiramate ER 25 MG capsule sustained-release 24 hr; 1 po daily x 7 days, then 2 po daily x 7days  -     Topiramate  MG capsule sustained-release 24 hr; 1 po daily    -will continue mood medications as Rx for now as improving. Is going to counseling;  Having marital discord and financial issues, encouraged to invite  to go to counseling again.  Seek care if thoughts of self harm.  -will try adding trokendi as less ses than topamax and may be better tolerated, more likely keep appetite down.  Consider weaning off gabapentin and/or " lamictal, but will try trokendi.  Titrate as directed, gave Rx card.       -Follow up: 6 weeks       Current Outpatient Prescriptions:   •  acetaminophen (TYLENOL) 500 MG tablet, Take 500 mg by mouth Every 6 (Six) Hours As Needed for Mild Pain ., Disp: , Rfl:   •  albuterol (ACCUNEB) 1.25 MG/3ML nebulizer solution, Take 3 mL by nebulization Every 6 (Six) Hours As Needed for Wheezing., Disp: 120 vial, Rfl: 12  •  amLODIPine (NORVASC) 10 MG tablet, TAKE 1 TABLET BY MOUTH ONE TIME A DAY , Disp: 30 tablet, Rfl: 5  •  beta carotene (CVS BETA CAROTENE) 90089 UNIT capsule, Take 25,000 Units by mouth Daily., Disp: , Rfl:   •  Biotin 07534 MCG tablet, Take  by mouth., Disp: , Rfl:   •  buPROPion XL (WELLBUTRIN XL) 300 MG 24 hr tablet, Take 1 tablet by mouth Daily., Disp: 30 tablet, Rfl: 5  •  CALCIUM-MAGNESIUM-ZINC PO, Take  by mouth., Disp: , Rfl:   •  Cetirizine HCl 10 MG capsule, Take  by mouth., Disp: , Rfl:   •  cholecalciferol (VITAMIN D3) 1000 units tablet, Take 1,000 Units by mouth Daily., Disp: , Rfl:   •  diclofenac (CATAFLAM) 50 MG tablet, TAKE 1 TABLET BY MOUTH TWO TIMES A DAY AS NEEDED, Disp: 60 tablet, Rfl: 0  •  fluticasone (FLONASE) 50 MCG/ACT nasal spray, into each nostril daily., Disp: , Rfl:   •  gabapentin (NEURONTIN) 300 MG capsule, 2 po twice daily, Disp: 120 capsule, Rfl: 5  •  hydrochlorothiazide (HYDRODIURIL) 25 MG tablet, TAKE 1 TABLET BY MOUTH ONE TIME A DAY , Disp: 15 tablet, Rfl: 4  •  lamoTRIgine (LaMICtal) 100 MG tablet, Take 1 tablet by mouth Every Night., Disp: 30 tablet, Rfl: 4  •  meloxicam (MOBIC) 7.5 MG tablet, Take 7.5 mg by mouth 2 (Two) Times a Day., Disp: , Rfl:   •  methotrexate 2.5 MG tablet, TAKE 4 TABLETs BY MOUTH ONE TIME A WEEK , Disp: , Rfl:   •  montelukast (SINGULAIR) 10 MG tablet, TAKE 1 TABLET BY MOUTH AT BEDTIME , Disp: 30 tablet, Rfl: 10  •  Multiple Vitamin (MULTI VITAMIN DAILY PO), Take by mouth daily., Disp: , Rfl:   •  Omega-3 Fatty Acids (FISH OIL) 1000 MG capsule  capsule, Take 1,000 mg by mouth daily with breakfast., Disp: , Rfl:   •  omeprazole (priLOSEC) 40 MG capsule, TAKE 1 CAPSULE BY MOUTH ONE TIME A DAY , Disp: 15 capsule, Rfl: 3  •  oxybutynin XL (DITROPAN-XL) 10 MG 24 hr tablet, Take 1 tablet by mouth Daily., Disp: 30 tablet, Rfl: 5  •  SYMBICORT 160-4.5 MCG/ACT inhaler, INHALE 2 PUFFS BY MOUTH TWO TIMES A DAY , Disp: 10.2 g, Rfl: 11  •  VENTOLIN  (90 BASE) MCG/ACT inhaler, INHALE 1 OR 2 PUFFS ORALLY EVERY FOUR TO SIX HOURS as needed, Disp: 18 g, Rfl: 1  •  vitamin B-12 (CYANOCOBALAMIN) 1000 MCG tablet, Take 1,000 mcg by mouth Daily., Disp: , Rfl:   •  Vortioxetine HBr (TRINTELLIX) 10 MG tablet, After 5mg daily, take 10mg daily (either 2 5mg tabs or 1 10mg tab daily)., Disp: 30 tablet, Rfl:   •  Topiramate  MG capsule sustained-release 24 hr, 1 po daily, Disp: 30 capsule, Rfl:   •  Topiramate ER 25 MG capsule sustained-release 24 hr, 1 po daily x 7 days, then 2 po daily x 7days, Disp: 30 capsule, Rfl:

## 2018-02-15 DIAGNOSIS — F41.8 DEPRESSION WITH ANXIETY: ICD-10-CM

## 2018-02-15 RX ORDER — BUSPIRONE HYDROCHLORIDE 5 MG/1
TABLET ORAL
Qty: 32 TABLET | Refills: 0 | OUTPATIENT
Start: 2018-02-15

## 2018-02-15 RX ORDER — SERTRALINE HYDROCHLORIDE 100 MG/1
TABLET, FILM COATED ORAL
Qty: 21 TABLET | Refills: 0 | OUTPATIENT
Start: 2018-02-15

## 2018-02-15 RX ORDER — TOPIRAMATE 100 MG/1
TABLET, FILM COATED ORAL
Qty: 60 TABLET | Refills: 0 | Status: SHIPPED | OUTPATIENT
Start: 2018-02-15 | End: 2018-03-02 | Stop reason: SDUPTHER

## 2018-02-15 RX ORDER — TRAZODONE HYDROCHLORIDE 100 MG/1
TABLET ORAL
Qty: 15 TABLET | Refills: 10 | OUTPATIENT
Start: 2018-02-15

## 2018-02-15 RX ORDER — OMEPRAZOLE 40 MG/1
CAPSULE, DELAYED RELEASE ORAL
Qty: 15 CAPSULE | Refills: 3 | Status: SHIPPED | OUTPATIENT
Start: 2018-02-15 | End: 2018-03-12 | Stop reason: SDUPTHER

## 2018-02-20 ENCOUNTER — TELEPHONE (OUTPATIENT)
Dept: FAMILY MEDICINE CLINIC | Facility: CLINIC | Age: 48
End: 2018-02-20

## 2018-02-21 NOTE — TELEPHONE ENCOUNTER
Is she on trokendi?  Had try restarting it as when she went off topamax is when weight gain began.    RRJ

## 2018-02-23 RX ORDER — PHENTERMINE HYDROCHLORIDE 15 MG/1
15 CAPSULE ORAL EVERY MORNING
Qty: 30 CAPSULE | Refills: 2 | OUTPATIENT
Start: 2018-02-23 | End: 2018-03-12 | Stop reason: SDUPTHER

## 2018-02-26 RX ORDER — ALBUTEROL SULFATE 90 UG/1
AEROSOL, METERED RESPIRATORY (INHALATION)
Qty: 18 G | Refills: 0 | Status: SHIPPED | OUTPATIENT
Start: 2018-02-26 | End: 2018-04-24 | Stop reason: SDUPTHER

## 2018-02-27 ENCOUNTER — OFFICE VISIT (OUTPATIENT)
Dept: FAMILY MEDICINE CLINIC | Facility: CLINIC | Age: 48
End: 2018-02-27

## 2018-02-27 VITALS
HEIGHT: 69 IN | WEIGHT: 231 LBS | OXYGEN SATURATION: 98 % | HEART RATE: 82 BPM | DIASTOLIC BLOOD PRESSURE: 80 MMHG | SYSTOLIC BLOOD PRESSURE: 112 MMHG | BODY MASS INDEX: 34.21 KG/M2 | TEMPERATURE: 98.4 F

## 2018-02-27 DIAGNOSIS — J20.9 ACUTE BRONCHITIS WITH BRONCHOSPASM: Primary | ICD-10-CM

## 2018-02-27 DIAGNOSIS — R06.02 SHORTNESS OF BREATH: ICD-10-CM

## 2018-02-27 DIAGNOSIS — R05.9 COUGH: ICD-10-CM

## 2018-02-27 LAB
HCT VFR BLDA CALC: 45.7 %
HGB BLDA-MCNC: 14.8 G/DL
MCH, POC: 30.4
MCHC, POC: 32.5
MCV, POC: 93.6
PLATELET # BLD AUTO: 289 10*3/MM3
PMV BLD: 7.6 FL
RBC, POC: 4.88
RDW, POC: 14.4
WBC # BLD: 7.2 10*3/UL

## 2018-02-27 PROCEDURE — 94640 AIRWAY INHALATION TREATMENT: CPT | Performed by: FAMILY MEDICINE

## 2018-02-27 PROCEDURE — 99214 OFFICE O/P EST MOD 30 MIN: CPT | Performed by: FAMILY MEDICINE

## 2018-02-27 PROCEDURE — 85027 COMPLETE CBC AUTOMATED: CPT | Performed by: FAMILY MEDICINE

## 2018-02-27 PROCEDURE — 96372 THER/PROPH/DIAG INJ SC/IM: CPT | Performed by: FAMILY MEDICINE

## 2018-02-27 PROCEDURE — 71046 X-RAY EXAM CHEST 2 VIEWS: CPT | Performed by: FAMILY MEDICINE

## 2018-02-27 RX ORDER — IPRATROPIUM BROMIDE AND ALBUTEROL SULFATE 2.5; .5 MG/3ML; MG/3ML
3 SOLUTION RESPIRATORY (INHALATION) ONCE
Status: COMPLETED | OUTPATIENT
Start: 2018-02-27 | End: 2018-02-27

## 2018-02-27 RX ORDER — DOXYCYCLINE HYCLATE 100 MG/1
100 CAPSULE ORAL 2 TIMES DAILY
Qty: 20 CAPSULE | Refills: 0 | Status: SHIPPED | OUTPATIENT
Start: 2018-02-27 | End: 2018-03-12

## 2018-02-27 RX ORDER — IPRATROPIUM BROMIDE AND ALBUTEROL SULFATE 2.5; .5 MG/3ML; MG/3ML
3 SOLUTION RESPIRATORY (INHALATION)
Status: DISCONTINUED | OUTPATIENT
Start: 2018-02-27 | End: 2018-02-27

## 2018-02-27 RX ORDER — METHYLPREDNISOLONE ACETATE 80 MG/ML
80 INJECTION, SUSPENSION INTRA-ARTICULAR; INTRALESIONAL; INTRAMUSCULAR; SOFT TISSUE ONCE
Status: COMPLETED | OUTPATIENT
Start: 2018-02-27 | End: 2018-02-27

## 2018-02-27 RX ADMIN — IPRATROPIUM BROMIDE AND ALBUTEROL SULFATE 3 ML: 2.5; .5 SOLUTION RESPIRATORY (INHALATION) at 09:09

## 2018-02-27 RX ADMIN — METHYLPREDNISOLONE ACETATE 80 MG: 80 INJECTION, SUSPENSION INTRA-ARTICULAR; INTRALESIONAL; INTRAMUSCULAR; SOFT TISSUE at 09:07

## 2018-02-27 NOTE — PROGRESS NOTES
Subjective   Karen Nash is a 47 y.o. female. Presents today for   Chief Complaint   Patient presents with   • Illness     pt has been sick for about 2 weeks. cough, congestion, fever, body aches, headache, soa       Cough   This is a new problem. The current episode started 1 to 4 weeks ago. The problem has been unchanged. The problem occurs constantly. The cough is productive of sputum. Associated symptoms include chills, ear congestion, ear pain, a fever, nasal congestion, postnasal drip, a sore throat, shortness of breath and wheezing. Pertinent negatives include no chest pain. Nothing aggravates the symptoms. She has tried a beta-agonist inhaler for the symptoms. The treatment provided mild relief. Her past medical history is significant for asthma. VCD       Review of Systems   Constitutional: Positive for chills and fever.   HENT: Positive for ear pain, postnasal drip and sore throat.    Respiratory: Positive for cough, shortness of breath and wheezing.    Cardiovascular: Negative for chest pain, palpitations and leg swelling.   Gastrointestinal: Negative for diarrhea, nausea and vomiting.       The following portions of the patient's history were reviewed and updated as appropriate: allergies, current medications, past medical history and problem list.    Patient Active Problem List   Diagnosis   • Abdominal pain   • Asthma with acute exacerbation   • Atopic rhinitis   • Anemia   • Benign essential hypertension   • Constipation   • Dyslipidemia   • Gastroesophageal reflux disease   • Migraine   • Muscle cramps   • Vocal cord dysfunction       Allergies   Allergen Reactions   • Corn-Containing Products Rash   • Penicillins    • Sulfamethoxazole-Trimethoprim Hives and Itching       Current Outpatient Prescriptions on File Prior to Visit   Medication Sig Dispense Refill   • acetaminophen (TYLENOL) 500 MG tablet Take 500 mg by mouth Every 6 (Six) Hours As Needed for Mild Pain .     • albuterol (ACCUNEB) 1.25  MG/3ML nebulizer solution Take 3 mL by nebulization Every 6 (Six) Hours As Needed for Wheezing. 120 vial 12   • amLODIPine (NORVASC) 10 MG tablet TAKE 1 TABLET BY MOUTH ONE TIME A DAY  30 tablet 5   • beta carotene ([employer]S BETA CAROTENE) 78997 UNIT capsule Take 25,000 Units by mouth Daily.     • Biotin 67712 MCG tablet Take  by mouth.     • buPROPion XL (WELLBUTRIN XL) 300 MG 24 hr tablet Take 1 tablet by mouth Daily. 30 tablet 5   • CALCIUM-MAGNESIUM-ZINC PO Take  by mouth.     • Cetirizine HCl 10 MG capsule Take  by mouth.     • cholecalciferol (VITAMIN D3) 1000 units tablet Take 1,000 Units by mouth Daily.     • diclofenac (CATAFLAM) 50 MG tablet TAKE 1 TABLET BY MOUTH TWO TIMES A DAY AS NEEDED 60 tablet 0   • fluticasone (FLONASE) 50 MCG/ACT nasal spray into each nostril daily.     • gabapentin (NEURONTIN) 300 MG capsule 2 po twice daily 120 capsule 5   • hydrochlorothiazide (HYDRODIURIL) 25 MG tablet TAKE 1 TABLET BY MOUTH ONE TIME A DAY  15 tablet 4   • lamoTRIgine (LaMICtal) 100 MG tablet Take 1 tablet by mouth Every Night. 30 tablet 4   • meloxicam (MOBIC) 7.5 MG tablet Take 7.5 mg by mouth 2 (Two) Times a Day.     • methotrexate 2.5 MG tablet TAKE 4 TABLETs BY MOUTH ONE TIME A WEEK      • montelukast (SINGULAIR) 10 MG tablet TAKE 1 TABLET BY MOUTH AT BEDTIME  30 tablet 10   • Multiple Vitamin (MULTI VITAMIN DAILY PO) Take by mouth daily.     • Omega-3 Fatty Acids (FISH OIL) 1000 MG capsule capsule Take 1,000 mg by mouth daily with breakfast.     • omeprazole (priLOSEC) 40 MG capsule TAKE 1 CAPSULE BY MOUTH ONE TIME A DAY  15 capsule 3   • omeprazole (priLOSEC) 40 MG capsule TAKE 1 CAPSULE BY MOUTH ONE TIME A DAY  15 capsule 3   • oxybutynin XL (DITROPAN-XL) 10 MG 24 hr tablet Take 1 tablet by mouth Daily. 30 tablet 5   • phentermine 15 MG capsule Take 1 capsule by mouth Every Morning. 30 capsule 2   • SYMBICORT 160-4.5 MCG/ACT inhaler INHALE 2 PUFFS BY MOUTH TWO TIMES A DAY  10.2 g 11   • topiramate (TOPAMAX)  "100 MG tablet TAKE 1 TABLET BY MOUTH TWO TIMES A DAY  60 tablet 0   • Topiramate  MG capsule sustained-release 24 hr 1 po daily 30 capsule    • Topiramate ER 25 MG capsule sustained-release 24 hr 1 po daily x 7 days, then 2 po daily x 7days 30 capsule    • VENTOLIN  (90 Base) MCG/ACT inhaler INHALE 1 OR 2 PUFFS ORALLY EVERY FOUR TO SIX HOURS AS NEEDED 18 g 0   • vitamin B-12 (CYANOCOBALAMIN) 1000 MCG tablet Take 1,000 mcg by mouth Daily.     • Vortioxetine HBr (TRINTELLIX) 10 MG tablet After 5mg daily, take 10mg daily (either 2 5mg tabs or 1 10mg tab daily). 30 tablet      No current facility-administered medications on file prior to visit.        Objective   Vitals:    02/27/18 0827   BP: 112/80   BP Location: Right arm   Patient Position: Sitting   Cuff Size: Large Adult   Pulse: 82   Temp: 98.4 °F (36.9 °C)   TempSrc: Oral   SpO2: 98%   Weight: 105 kg (231 lb)   Height: 175.3 cm (69.02\")       Physical Exam   Constitutional: She appears well-developed and well-nourished.   HENT:   Head: Normocephalic and atraumatic.   Right Ear: Tympanic membrane normal.   Left Ear: Tympanic membrane normal.   Nose: Mucosal edema present.   Mouth/Throat: Uvula is midline, oropharynx is clear and moist and mucous membranes are normal.   Neck: Neck supple. No JVD present. No thyromegaly present.   Cardiovascular: Normal rate, regular rhythm and normal heart sounds.  Exam reveals no gallop and no friction rub.    No murmur heard.  Pulmonary/Chest: Effort normal. No accessory muscle usage. Tachypnea noted. No respiratory distress. She has decreased breath sounds. She has wheezes. She has no rales.   Abdominal: Soft. Bowel sounds are normal. She exhibits no distension. There is no tenderness. There is no rebound and no guarding.   Musculoskeletal: She exhibits no edema.   Neurological: She is alert.   Skin: Skin is warm and dry.   Psychiatric: She has a normal mood and affect. Her behavior is normal.   Nursing note and " vitals reviewed.    Duo neb x1  Depo Medrol 80mg IM x1  CBC ordered and reviewed.  POC CBC   Order: [id number]   Status:  Final result   Visible to patient:  No (Not Released) Dx:  Cough; Shortness of breath      Ref Range & Units 9:06 AM     Hematocrit % 45.7   Hemoglobin g/dL 14.8   RBC  4.88   WBC  7.2   MCV  93.6   MCH  30.4   MCHC  32.5 (L)   RDW-CV  14.4 (H)   Platelets 10*3/mm3 289   MPV  7.6 (L)   Resulting Agency  [facility]      Specimen Collected: 02/27/18  9:06 AM Last Resulted: 02/27/18  9:06 AM                XRAY: cxr  INDICATION:  Cough, Dyspnea  Wet read:  Exhalation film, old granulomas;  Hard to read  No Comparative data  Imagine independently viewed by myself  Radiology reading pending.      Assessment/Plan   Karen was seen today for illness.    Diagnoses and all orders for this visit:    Acute bronchitis with bronchospasm  -     doxycycline (VIBRAMYCIN) 100 MG capsule; Take 1 capsule by mouth 2 (Two) Times a Day.    Cough  -     POC CBC  -     XR Chest PA & Lateral (In Office)  -     Discontinue: ipratropium-albuterol (DUO-NEB) nebulizer solution 3 mL; Take 3 mL by nebulization 4 (Four) Times a Day.  -     methylPREDNISolone acetate (DEPO-medrol) injection 80 mg; Inject 1 mL into the shoulder, thigh, or buttocks 1 (One) Time.  -     ipratropium-albuterol (DUO-NEB) nebulizer solution 3 mL; Take 3 mL by nebulization 1 (One) Time.    Shortness of breath  -     POC CBC  -     XR Chest PA & Lateral (In Office)  -     Discontinue: ipratropium-albuterol (DUO-NEB) nebulizer solution 3 mL; Take 3 mL by nebulization 4 (Four) Times a Day.  -     methylPREDNISolone acetate (DEPO-medrol) injection 80 mg; Inject 1 mL into the shoulder, thigh, or buttocks 1 (One) Time.  -     ipratropium-albuterol (DUO-NEB) nebulizer solution 3 mL; Take 3 mL by nebulization 1 (One) Time.    -push fluids, abx as directed  -steroids via injection  Prn - RTC if worse or no improvement.           -Follow up:  as scheduled and Prn - RTC if worse or no improvement.          Current Outpatient Prescriptions:   •  acetaminophen (TYLENOL) 500 MG tablet, Take 500 mg by mouth Every 6 (Six) Hours As Needed for Mild Pain ., Disp: , Rfl:   •  albuterol (ACCUNEB) 1.25 MG/3ML nebulizer solution, Take 3 mL by nebulization Every 6 (Six) Hours As Needed for Wheezing., Disp: 120 vial, Rfl: 12  •  amLODIPine (NORVASC) 10 MG tablet, TAKE 1 TABLET BY MOUTH ONE TIME A DAY , Disp: 30 tablet, Rfl: 5  •  beta carotene (CVS BETA CAROTENE) 67444 UNIT capsule, Take 25,000 Units by mouth Daily., Disp: , Rfl:   •  Biotin 55596 MCG tablet, Take  by mouth., Disp: , Rfl:   •  buPROPion XL (WELLBUTRIN XL) 300 MG 24 hr tablet, Take 1 tablet by mouth Daily., Disp: 30 tablet, Rfl: 5  •  CALCIUM-MAGNESIUM-ZINC PO, Take  by mouth., Disp: , Rfl:   •  Cetirizine HCl 10 MG capsule, Take  by mouth., Disp: , Rfl:   •  cholecalciferol (VITAMIN D3) 1000 units tablet, Take 1,000 Units by mouth Daily., Disp: , Rfl:   •  diclofenac (CATAFLAM) 50 MG tablet, TAKE 1 TABLET BY MOUTH TWO TIMES A DAY AS NEEDED, Disp: 60 tablet, Rfl: 0  •  fluticasone (FLONASE) 50 MCG/ACT nasal spray, into each nostril daily., Disp: , Rfl:   •  gabapentin (NEURONTIN) 300 MG capsule, 2 po twice daily, Disp: 120 capsule, Rfl: 5  •  hydrochlorothiazide (HYDRODIURIL) 25 MG tablet, TAKE 1 TABLET BY MOUTH ONE TIME A DAY , Disp: 15 tablet, Rfl: 4  •  lamoTRIgine (LaMICtal) 100 MG tablet, Take 1 tablet by mouth Every Night., Disp: 30 tablet, Rfl: 4  •  meloxicam (MOBIC) 7.5 MG tablet, Take 7.5 mg by mouth 2 (Two) Times a Day., Disp: , Rfl:   •  methotrexate 2.5 MG tablet, TAKE 4 TABLETs BY MOUTH ONE TIME A WEEK , Disp: , Rfl:   •  montelukast (SINGULAIR) 10 MG tablet, TAKE 1 TABLET BY MOUTH AT BEDTIME , Disp: 30 tablet, Rfl: 10  •  Multiple Vitamin (MULTI VITAMIN DAILY PO), Take by mouth daily., Disp: , Rfl:   •  Omega-3 Fatty Acids (FISH OIL) 1000 MG capsule capsule, Take 1,000 mg by mouth daily  with breakfast., Disp: , Rfl:   •  omeprazole (priLOSEC) 40 MG capsule, TAKE 1 CAPSULE BY MOUTH ONE TIME A DAY , Disp: 15 capsule, Rfl: 3  •  omeprazole (priLOSEC) 40 MG capsule, TAKE 1 CAPSULE BY MOUTH ONE TIME A DAY , Disp: 15 capsule, Rfl: 3  •  oxybutynin XL (DITROPAN-XL) 10 MG 24 hr tablet, Take 1 tablet by mouth Daily., Disp: 30 tablet, Rfl: 5  •  phentermine 15 MG capsule, Take 1 capsule by mouth Every Morning., Disp: 30 capsule, Rfl: 2  •  SYMBICORT 160-4.5 MCG/ACT inhaler, INHALE 2 PUFFS BY MOUTH TWO TIMES A DAY , Disp: 10.2 g, Rfl: 11  •  topiramate (TOPAMAX) 100 MG tablet, TAKE 1 TABLET BY MOUTH TWO TIMES A DAY , Disp: 60 tablet, Rfl: 0  •  Topiramate  MG capsule sustained-release 24 hr, 1 po daily, Disp: 30 capsule, Rfl:   •  Topiramate ER 25 MG capsule sustained-release 24 hr, 1 po daily x 7 days, then 2 po daily x 7days, Disp: 30 capsule, Rfl:   •  VENTOLIN  (90 Base) MCG/ACT inhaler, INHALE 1 OR 2 PUFFS ORALLY EVERY FOUR TO SIX HOURS AS NEEDED, Disp: 18 g, Rfl: 0  •  vitamin B-12 (CYANOCOBALAMIN) 1000 MCG tablet, Take 1,000 mcg by mouth Daily., Disp: , Rfl:   •  Vortioxetine HBr (TRINTELLIX) 10 MG tablet, After 5mg daily, take 10mg daily (either 2 5mg tabs or 1 10mg tab daily)., Disp: 30 tablet, Rfl:   •  doxycycline (VIBRAMYCIN) 100 MG capsule, Take 1 capsule by mouth 2 (Two) Times a Day., Disp: 20 capsule, Rfl: 0  No current facility-administered medications for this visit.

## 2018-03-02 DIAGNOSIS — F41.8 DEPRESSION WITH ANXIETY: ICD-10-CM

## 2018-03-02 RX ORDER — SERTRALINE HYDROCHLORIDE 100 MG/1
TABLET, FILM COATED ORAL
Qty: 21 TABLET | Refills: 0 | OUTPATIENT
Start: 2018-03-02

## 2018-03-02 RX ORDER — TOPIRAMATE 100 MG/1
TABLET, FILM COATED ORAL
Qty: 60 TABLET | Refills: 0 | Status: SHIPPED | OUTPATIENT
Start: 2018-03-02 | End: 2018-03-12

## 2018-03-02 RX ORDER — BUSPIRONE HYDROCHLORIDE 5 MG/1
TABLET ORAL
Qty: 32 TABLET | Refills: 0 | OUTPATIENT
Start: 2018-03-02

## 2018-03-02 RX ORDER — TRAZODONE HYDROCHLORIDE 100 MG/1
TABLET ORAL
Qty: 15 TABLET | Refills: 10 | OUTPATIENT
Start: 2018-03-02

## 2018-03-02 RX ORDER — HYDROCHLOROTHIAZIDE 25 MG/1
TABLET ORAL
Qty: 15 TABLET | Refills: 4 | Status: SHIPPED | OUTPATIENT
Start: 2018-03-02 | End: 2018-06-14 | Stop reason: SDUPTHER

## 2018-03-02 NOTE — TELEPHONE ENCOUNTER
PT WOULD LIKE TO KNOW HOW LONG IT TAKES FOR THE ANTIBIOTIC TO START WORKING. PT STILL HAS SORE THROAT AND BAD COUGH

## 2018-03-12 ENCOUNTER — OFFICE VISIT (OUTPATIENT)
Dept: FAMILY MEDICINE CLINIC | Facility: CLINIC | Age: 48
End: 2018-03-12

## 2018-03-12 VITALS
SYSTOLIC BLOOD PRESSURE: 102 MMHG | TEMPERATURE: 98.4 F | DIASTOLIC BLOOD PRESSURE: 76 MMHG | OXYGEN SATURATION: 94 % | HEART RATE: 58 BPM | WEIGHT: 227 LBS | BODY MASS INDEX: 33.51 KG/M2

## 2018-03-12 DIAGNOSIS — F32.4 MAJOR DEPRESSIVE DISORDER WITH SINGLE EPISODE, IN PARTIAL REMISSION (HCC): Primary | ICD-10-CM

## 2018-03-12 DIAGNOSIS — Z12.39 BREAST CANCER SCREENING: ICD-10-CM

## 2018-03-12 DIAGNOSIS — E66.09 CLASS 1 OBESITY DUE TO EXCESS CALORIES WITHOUT SERIOUS COMORBIDITY WITH BODY MASS INDEX (BMI) OF 34.0 TO 34.9 IN ADULT: ICD-10-CM

## 2018-03-12 PROCEDURE — 99213 OFFICE O/P EST LOW 20 MIN: CPT | Performed by: FAMILY MEDICINE

## 2018-03-12 RX ORDER — PHENTERMINE HYDROCHLORIDE 37.5 MG/1
37.5 CAPSULE ORAL EVERY MORNING
Qty: 30 CAPSULE | Refills: 2 | Status: SHIPPED | OUTPATIENT
Start: 2018-03-12 | End: 2018-10-19 | Stop reason: SDUPTHER

## 2018-03-12 NOTE — PROGRESS NOTES
Subjective   Karen Nash is a 47 y.o. female. Presents today for   Chief Complaint   Patient presents with   • Follow-up     depression weight loss needs b12 level and cbc and mammogram scheduled.         Depression   Visit Type: follow-up (Doing better on wellbutrin and trintellix)  Patient presents with the following symptoms: anhedonia, depressed mood and weight loss (Has lost 6 lbs since last January.  Put back on trokendi and started phentermine 15mg;  tolerating well and helping).  Patient is not experiencing: shortness of breath, suicidal ideas, suicidal planning, thoughts of death and weight gain.  Severity: moderate   Sleep quality: fair  Nighttime awakenings: occasional  Compliance with medications:  %  Treatment side effects: no side effects.      Review of Systems   Constitutional: Positive for weight loss (Has lost 6 lbs since last January.  Put back on trokendi and started phentermine 15mg;  tolerating well and helping). Negative for weight gain.   Respiratory: Negative for shortness of breath.    Cardiovascular: Negative for chest pain.   Psychiatric/Behavioral: Negative for self-injury, sleep disturbance and suicidal ideas.       The following portions of the patient's history were reviewed and updated as appropriate: allergies, current medications, past medical history and problem list.    Patient Active Problem List   Diagnosis   • Abdominal pain   • Asthma with acute exacerbation   • Atopic rhinitis   • Anemia   • Benign essential hypertension   • Constipation   • Dyslipidemia   • Gastroesophageal reflux disease   • Migraine   • Muscle cramps   • Vocal cord dysfunction       Allergies   Allergen Reactions   • Corn-Containing Products Rash   • Penicillins    • Sulfamethoxazole-Trimethoprim Hives and Itching       Current Outpatient Prescriptions on File Prior to Visit   Medication Sig Dispense Refill   • acetaminophen (TYLENOL) 500 MG tablet Take 500 mg by mouth Every 6 (Six) Hours As  Needed for Mild Pain .     • albuterol (ACCUNEB) 1.25 MG/3ML nebulizer solution Take 3 mL by nebulization Every 6 (Six) Hours As Needed for Wheezing. 120 vial 12   • amLODIPine (NORVASC) 10 MG tablet TAKE 1 TABLET BY MOUTH ONE TIME A DAY  30 tablet 5   • beta carotene ([employer] BETA CAROTENE) 47285 UNIT capsule Take 25,000 Units by mouth Daily.     • Biotin 33213 MCG tablet Take  by mouth.     • buPROPion XL (WELLBUTRIN XL) 300 MG 24 hr tablet Take 1 tablet by mouth Daily. 30 tablet 5   • CALCIUM-MAGNESIUM-ZINC PO Take  by mouth.     • Cetirizine HCl 10 MG capsule Take  by mouth.     • cholecalciferol (VITAMIN D3) 1000 units tablet Take 1,000 Units by mouth Daily.     • diclofenac (CATAFLAM) 50 MG tablet TAKE 1 TABLET BY MOUTH TWO TIMES A DAY AS NEEDED 60 tablet 0   • fluticasone (FLONASE) 50 MCG/ACT nasal spray into each nostril daily.     • gabapentin (NEURONTIN) 300 MG capsule 2 po twice daily 120 capsule 5   • hydrochlorothiazide (HYDRODIURIL) 25 MG tablet TAKE 1 TABLET BY MOUTH ONE TIME A DAY  15 tablet 4   • lamoTRIgine (LaMICtal) 100 MG tablet Take 1 tablet by mouth Every Night. 30 tablet 4   • meloxicam (MOBIC) 7.5 MG tablet Take 7.5 mg by mouth 2 (Two) Times a Day.     • methotrexate 2.5 MG tablet TAKE 4 TABLETs BY MOUTH ONE TIME A WEEK      • montelukast (SINGULAIR) 10 MG tablet TAKE 1 TABLET BY MOUTH AT BEDTIME  30 tablet 10   • Multiple Vitamin (MULTI VITAMIN DAILY PO) Take by mouth daily.     • Omega-3 Fatty Acids (FISH OIL) 1000 MG capsule capsule Take 1,000 mg by mouth daily with breakfast.     • omeprazole (priLOSEC) 40 MG capsule TAKE 1 CAPSULE BY MOUTH ONE TIME A DAY  15 capsule 3   • oxybutynin XL (DITROPAN-XL) 10 MG 24 hr tablet Take 1 tablet by mouth Daily. 30 tablet 5   • phentermine 15 MG capsule Take 1 capsule by mouth Every Morning. 30 capsule 2   • SYMBICORT 160-4.5 MCG/ACT inhaler INHALE 2 PUFFS BY MOUTH TWO TIMES A DAY  10.2 g 11   • topiramate (TOPAMAX) 100 MG tablet TAKE 1 TABLET BY MOUTH  TWO TIMES A DAY  60 tablet 0   • Topiramate  MG capsule sustained-release 24 hr 1 po daily 30 capsule    • VENTOLIN  (90 Base) MCG/ACT inhaler INHALE 1 OR 2 PUFFS ORALLY EVERY FOUR TO SIX HOURS AS NEEDED 18 g 0   • vitamin B-12 (CYANOCOBALAMIN) 1000 MCG tablet Take 1,000 mcg by mouth Daily.     • Vortioxetine HBr (TRINTELLIX) 10 MG tablet After 5mg daily, take 10mg daily (either 2 5mg tabs or 1 10mg tab daily). 30 tablet    • [DISCONTINUED] omeprazole (priLOSEC) 40 MG capsule TAKE 1 CAPSULE BY MOUTH ONE TIME A DAY  15 capsule 3   • [DISCONTINUED] doxycycline (VIBRAMYCIN) 100 MG capsule Take 1 capsule by mouth 2 (Two) Times a Day. 20 capsule 0   • [DISCONTINUED] hydrochlorothiazide (HYDRODIURIL) 25 MG tablet TAKE 1 TABLET BY MOUTH ONE TIME A DAY  15 tablet 4   • [DISCONTINUED] Topiramate ER 25 MG capsule sustained-release 24 hr 1 po daily x 7 days, then 2 po daily x 7days 30 capsule      No current facility-administered medications on file prior to visit.        Objective   Vitals:    03/12/18 0855   BP: 102/76   Pulse: 58   Temp: 98.4 °F (36.9 °C)   SpO2: 94%   Weight: 103 kg (227 lb)       Physical Exam   Constitutional: She is oriented to person, place, and time. She appears well-developed and well-nourished.   Neurological: She is alert and oriented to person, place, and time.   Skin: Skin is warm and dry.   Psychiatric: She has a normal mood and affect. Her behavior is normal.   Nursing note and vitals reviewed.      Assessment/Plan   Karen was seen today for follow-up, depression and obesity.    Diagnoses and all orders for this visit:    Major depressive disorder with single episode, in partial remission    Class 1 obesity due to excess calories without serious comorbidity with body mass index (BMI) of 34.0 to 34.9 in adult    Breast cancer screening  -     Mammo Screening Bilateral With CAD    Other orders  -     phentermine 37.5 MG capsule; Take 1 capsule by mouth Every Morning.    -continue  medicaitons for depression  -will increase phentermine to 37.5mg  -due for breast cancer screening, will order         -Follow up: 2 months       Current Outpatient Prescriptions:   •  acetaminophen (TYLENOL) 500 MG tablet, Take 500 mg by mouth Every 6 (Six) Hours As Needed for Mild Pain ., Disp: , Rfl:   •  albuterol (ACCUNEB) 1.25 MG/3ML nebulizer solution, Take 3 mL by nebulization Every 6 (Six) Hours As Needed for Wheezing., Disp: 120 vial, Rfl: 12  •  amLODIPine (NORVASC) 10 MG tablet, TAKE 1 TABLET BY MOUTH ONE TIME A DAY , Disp: 30 tablet, Rfl: 5  •  beta carotene ([employer]S BETA CAROTENE) 52588 UNIT capsule, Take 25,000 Units by mouth Daily., Disp: , Rfl:   •  Biotin 48799 MCG tablet, Take  by mouth., Disp: , Rfl:   •  buPROPion XL (WELLBUTRIN XL) 300 MG 24 hr tablet, Take 1 tablet by mouth Daily., Disp: 30 tablet, Rfl: 5  •  CALCIUM-MAGNESIUM-ZINC PO, Take  by mouth., Disp: , Rfl:   •  Cetirizine HCl 10 MG capsule, Take  by mouth., Disp: , Rfl:   •  cholecalciferol (VITAMIN D3) 1000 units tablet, Take 1,000 Units by mouth Daily., Disp: , Rfl:   •  diclofenac (CATAFLAM) 50 MG tablet, TAKE 1 TABLET BY MOUTH TWO TIMES A DAY AS NEEDED, Disp: 60 tablet, Rfl: 0  •  fluticasone (FLONASE) 50 MCG/ACT nasal spray, into each nostril daily., Disp: , Rfl:   •  gabapentin (NEURONTIN) 300 MG capsule, 2 po twice daily, Disp: 120 capsule, Rfl: 5  •  hydrochlorothiazide (HYDRODIURIL) 25 MG tablet, TAKE 1 TABLET BY MOUTH ONE TIME A DAY , Disp: 15 tablet, Rfl: 4  •  lamoTRIgine (LaMICtal) 100 MG tablet, Take 1 tablet by mouth Every Night., Disp: 30 tablet, Rfl: 4  •  meloxicam (MOBIC) 7.5 MG tablet, Take 7.5 mg by mouth 2 (Two) Times a Day., Disp: , Rfl:   •  methotrexate 2.5 MG tablet, TAKE 4 TABLETs BY MOUTH ONE TIME A WEEK , Disp: , Rfl:   •  montelukast (SINGULAIR) 10 MG tablet, TAKE 1 TABLET BY MOUTH AT BEDTIME , Disp: 30 tablet, Rfl: 10  •  Multiple Vitamin (MULTI VITAMIN DAILY PO), Take by mouth daily., Disp: , Rfl:   •   Omega-3 Fatty Acids (FISH OIL) 1000 MG capsule capsule, Take 1,000 mg by mouth daily with breakfast., Disp: , Rfl:   •  omeprazole (priLOSEC) 40 MG capsule, TAKE 1 CAPSULE BY MOUTH ONE TIME A DAY , Disp: 15 capsule, Rfl: 3  •  oxybutynin XL (DITROPAN-XL) 10 MG 24 hr tablet, Take 1 tablet by mouth Daily., Disp: 30 tablet, Rfl: 5  •  phentermine 15 MG capsule, Take 1 capsule by mouth Every Morning., Disp: 30 capsule, Rfl: 2  •  SYMBICORT 160-4.5 MCG/ACT inhaler, INHALE 2 PUFFS BY MOUTH TWO TIMES A DAY , Disp: 10.2 g, Rfl: 11  •  topiramate (TOPAMAX) 100 MG tablet, TAKE 1 TABLET BY MOUTH TWO TIMES A DAY , Disp: 60 tablet, Rfl: 0  •  Topiramate  MG capsule sustained-release 24 hr, 1 po daily, Disp: 30 capsule, Rfl:   •  VENTOLIN  (90 Base) MCG/ACT inhaler, INHALE 1 OR 2 PUFFS ORALLY EVERY FOUR TO SIX HOURS AS NEEDED, Disp: 18 g, Rfl: 0  •  vitamin B-12 (CYANOCOBALAMIN) 1000 MCG tablet, Take 1,000 mcg by mouth Daily., Disp: , Rfl:   •  Vortioxetine HBr (TRINTELLIX) 10 MG tablet, After 5mg daily, take 10mg daily (either 2 5mg tabs or 1 10mg tab daily)., Disp: 30 tablet, Rfl:

## 2018-03-24 DIAGNOSIS — F41.8 DEPRESSION WITH ANXIETY: ICD-10-CM

## 2018-03-26 DIAGNOSIS — G43.009 MIGRAINE WITHOUT AURA AND WITHOUT STATUS MIGRAINOSUS, NOT INTRACTABLE: ICD-10-CM

## 2018-03-26 DIAGNOSIS — N39.41 URGE URINARY INCONTINENCE: ICD-10-CM

## 2018-03-26 DIAGNOSIS — F32.2 SEVERE MAJOR DEPRESSION WITHOUT PSYCHOTIC FEATURES (HCC): ICD-10-CM

## 2018-03-26 RX ORDER — OXYBUTYNIN CHLORIDE 5 MG/1
TABLET, EXTENDED RELEASE ORAL
Qty: 30 TABLET | Refills: 3 | Status: SHIPPED | OUTPATIENT
Start: 2018-03-26 | End: 2018-05-07

## 2018-03-26 RX ORDER — LAMOTRIGINE 100 MG/1
TABLET ORAL
Qty: 30 TABLET | Refills: 3 | Status: SHIPPED | OUTPATIENT
Start: 2018-03-26 | End: 2018-08-17 | Stop reason: SDUPTHER

## 2018-03-27 RX ORDER — TRAZODONE HYDROCHLORIDE 100 MG/1
TABLET ORAL
Qty: 15 TABLET | Refills: 10 | OUTPATIENT
Start: 2018-03-27

## 2018-03-27 RX ORDER — BUSPIRONE HYDROCHLORIDE 5 MG/1
TABLET ORAL
Qty: 32 TABLET | Refills: 0 | OUTPATIENT
Start: 2018-03-27

## 2018-03-27 RX ORDER — SERTRALINE HYDROCHLORIDE 100 MG/1
TABLET, FILM COATED ORAL
Qty: 21 TABLET | Refills: 0 | OUTPATIENT
Start: 2018-03-27

## 2018-04-06 ENCOUNTER — TELEPHONE (OUTPATIENT)
Dept: FAMILY MEDICINE CLINIC | Facility: CLINIC | Age: 48
End: 2018-04-06

## 2018-04-10 RX ORDER — PREGABALIN 75 MG/1
75 CAPSULE ORAL 2 TIMES DAILY
Qty: 28 CAPSULE | Refills: 0 | Status: CANCELLED | OUTPATIENT
Start: 2018-04-10 | End: 2018-04-24

## 2018-04-10 RX ORDER — PREGABALIN 150 MG/1
150 CAPSULE ORAL 2 TIMES DAILY
Qty: 60 CAPSULE | Refills: 5 | Status: CANCELLED | OUTPATIENT
Start: 2018-04-10

## 2018-04-10 NOTE — TELEPHONE ENCOUNTER
lyrica 75mg 1 po bid disp #28, give free trial card, then go to lyrica 150mg 1 po bid disp #60 5 ref.  Stop gabapentin when starts.

## 2018-04-12 RX ORDER — PREGABALIN 75 MG/1
75 CAPSULE ORAL 2 TIMES DAILY
Qty: 28 CAPSULE | Refills: 0 | Status: SHIPPED | OUTPATIENT
Start: 2018-04-12 | End: 2018-09-26

## 2018-04-12 RX ORDER — PREGABALIN 150 MG/1
150 CAPSULE ORAL 2 TIMES DAILY
Qty: 60 CAPSULE | Refills: 2 | Status: SHIPPED | OUTPATIENT
Start: 2018-04-12 | End: 2019-02-18 | Stop reason: SDUPTHER

## 2018-04-23 RX ORDER — OMEPRAZOLE 40 MG/1
CAPSULE, DELAYED RELEASE ORAL
Qty: 15 CAPSULE | Refills: 2 | Status: SHIPPED | OUTPATIENT
Start: 2018-04-23 | End: 2018-08-13 | Stop reason: SDUPTHER

## 2018-04-24 RX ORDER — ALBUTEROL SULFATE 90 UG/1
AEROSOL, METERED RESPIRATORY (INHALATION)
Qty: 18 G | Refills: 2 | Status: SHIPPED | OUTPATIENT
Start: 2018-04-24 | End: 2018-05-07 | Stop reason: SDUPTHER

## 2018-05-07 ENCOUNTER — OFFICE VISIT (OUTPATIENT)
Dept: FAMILY MEDICINE CLINIC | Facility: CLINIC | Age: 48
End: 2018-05-07

## 2018-05-07 VITALS
HEART RATE: 67 BPM | DIASTOLIC BLOOD PRESSURE: 82 MMHG | SYSTOLIC BLOOD PRESSURE: 132 MMHG | WEIGHT: 224 LBS | BODY MASS INDEX: 33.18 KG/M2 | OXYGEN SATURATION: 98 % | HEIGHT: 69 IN | TEMPERATURE: 97.9 F

## 2018-05-07 DIAGNOSIS — F33.41 RECURRENT MAJOR DEPRESSIVE DISORDER, IN PARTIAL REMISSION (HCC): Primary | ICD-10-CM

## 2018-05-07 PROCEDURE — 99213 OFFICE O/P EST LOW 20 MIN: CPT | Performed by: FAMILY MEDICINE

## 2018-05-07 RX ORDER — FOLIC ACID 1 MG/1
1 TABLET ORAL
COMMUNITY
End: 2021-03-04 | Stop reason: SDUPTHER

## 2018-05-07 RX ORDER — FLUTICASONE PROPIONATE 50 MCG
2 SPRAY, SUSPENSION (ML) NASAL DAILY
Qty: 16 G | Refills: 11 | Status: SHIPPED | OUTPATIENT
Start: 2018-05-07 | End: 2019-12-02 | Stop reason: SDUPTHER

## 2018-05-07 RX ORDER — ETODOLAC 500 MG/1
500 TABLET, FILM COATED ORAL
COMMUNITY
Start: 2018-04-30 | End: 2019-06-21

## 2018-05-07 RX ORDER — ALBUTEROL SULFATE 90 UG/1
2 AEROSOL, METERED RESPIRATORY (INHALATION) EVERY 6 HOURS PRN
Qty: 18 G | Refills: 3 | Status: SHIPPED | OUTPATIENT
Start: 2018-05-07 | End: 2018-08-17 | Stop reason: SDUPTHER

## 2018-05-07 NOTE — PROGRESS NOTES
Subjective   Karen Nash is a 47 y.o. female. Presents today for   Chief Complaint   Patient presents with   • Follow-up     pt here for follow up visit.       Depression   Visit Type: follow-up (Overall doing better, needs more samples of trintellix (not covered, but has done well with this on top of wellbutrin).)  Patient presents with the following symptoms: depressed mood and nervousness/anxiety.  Frequency of symptoms: most days   Severity: mild   Sleep quality: fair  Compliance with medications:  %          Lyrica helped fibromyalgia greatly, but insurance declined and since off more muscle pain and impacting daily functioning.  Hx of asthma, has been stable.  Needs refill of ventolin.    Review of Systems   Psychiatric/Behavioral: The patient is nervous/anxious.        The following portions of the patient's history were reviewed and updated as appropriate: allergies, current medications, past medical history and problem list.    Patient Active Problem List   Diagnosis   • Abdominal pain   • Asthma with acute exacerbation   • Atopic rhinitis   • Anemia   • Benign essential hypertension   • Constipation   • Dyslipidemia   • Gastroesophageal reflux disease   • Migraine   • Muscle cramps   • Vocal cord dysfunction       Allergies   Allergen Reactions   • Corn-Containing Products Rash   • Penicillins    • Sulfamethoxazole-Trimethoprim Hives and Itching       Current Outpatient Prescriptions on File Prior to Visit   Medication Sig Dispense Refill   • acetaminophen (TYLENOL) 500 MG tablet Take 500 mg by mouth Every 6 (Six) Hours As Needed for Mild Pain .     • albuterol (ACCUNEB) 1.25 MG/3ML nebulizer solution Take 3 mL by nebulization Every 6 (Six) Hours As Needed for Wheezing. 120 vial 12   • buPROPion XL (WELLBUTRIN XL) 300 MG 24 hr tablet Take 1 tablet by mouth Daily. 30 tablet 5   • Cetirizine HCl 10 MG capsule Take  by mouth.     • gabapentin (NEURONTIN) 300 MG capsule 2 po twice daily 120 capsule 5    • hydrochlorothiazide (HYDRODIURIL) 25 MG tablet TAKE 1 TABLET BY MOUTH ONE TIME A DAY  15 tablet 4   • lamoTRIgine (LaMICtal) 100 MG tablet TAKE 1 TABLET BY MOUTH NIGHTLY 30 tablet 3   • methotrexate 2.5 MG tablet TAKE 10 TABLETs BY MOUTH ONE TIME A WEEK     • montelukast (SINGULAIR) 10 MG tablet TAKE 1 TABLET BY MOUTH AT BEDTIME  30 tablet 10   • Multiple Vitamin (MULTI VITAMIN DAILY PO) Take by mouth daily.     • omeprazole (priLOSEC) 40 MG capsule TAKE 1 CAPSULE BY MOUTH ONE TIME A DAY  15 capsule 2   • oxybutynin XL (DITROPAN-XL) 10 MG 24 hr tablet Take 1 tablet by mouth Daily. 30 tablet 5   • phentermine 37.5 MG capsule Take 1 capsule by mouth Every Morning. 30 capsule 2   • pregabalin (LYRICA) 150 MG capsule Take 1 capsule by mouth 2 (Two) Times a Day. 60 capsule 2   • pregabalin (LYRICA) 75 MG capsule Take 1 capsule by mouth 2 (Two) Times a Day. 28 capsule 0   • SYMBICORT 160-4.5 MCG/ACT inhaler INHALE 2 PUFFS BY MOUTH TWO TIMES A DAY  10.2 g 11   • topiramate (TOPAMAX) 100 MG tablet TAKE 1 TABLET BY MOUTH TWO TIMES A DAY  60 tablet 3   • traZODone (DESYREL) 100 MG tablet TAKE 1 TABLET BY MOUTH AT BEDTIME  30 tablet 3   • Vortioxetine HBr (TRINTELLIX) 10 MG tablet After 5mg daily, take 10mg daily (either 2 5mg tabs or 1 10mg tab daily). 30 tablet    • [DISCONTINUED] fluticasone (FLONASE) 50 MCG/ACT nasal spray into each nostril daily.     • [DISCONTINUED] VENTOLIN  (90 Base) MCG/ACT inhaler INHALE 1-2 PUFFS EVERY 4-6 HOURS AS NEEDED 18 g 2   • CALCIUM-MAGNESIUM-ZINC PO Take  by mouth.     • [DISCONTINUED] amLODIPine (NORVASC) 10 MG tablet TAKE 1 TABLET BY MOUTH ONE TIME A DAY  30 tablet 5   • [DISCONTINUED] beta carotene (CVS BETA CAROTENE) 56337 UNIT capsule Take 25,000 Units by mouth Daily.     • [DISCONTINUED] Biotin 92221 MCG tablet Take  by mouth.     • [DISCONTINUED] cholecalciferol (VITAMIN D3) 1000 units tablet Take 1,000 Units by mouth Daily.     • [DISCONTINUED] diclofenac (CATAFLAM) 50 MG  "tablet TAKE 1 TABLET BY MOUTH TWO TIMES A DAY AS NEEDED 60 tablet 0   • [DISCONTINUED] meloxicam (MOBIC) 7.5 MG tablet Take 7.5 mg by mouth 2 (Two) Times a Day.     • [DISCONTINUED] Omega-3 Fatty Acids (FISH OIL) 1000 MG capsule capsule Take 1,000 mg by mouth daily with breakfast.     • [DISCONTINUED] omeprazole (priLOSEC) 40 MG capsule TAKE 1 CAPSULE BY MOUTH ONE TIME A DAY  15 capsule 3   • [DISCONTINUED] oxybutynin XL (DITROPAN-XL) 5 MG 24 hr tablet TAKE 1 TABLET BY MOUTH ONE TIME A DAY  30 tablet 3   • [DISCONTINUED] Topiramate  MG capsule sustained-release 24 hr 1 po daily 30 capsule    • [DISCONTINUED] vitamin B-12 (CYANOCOBALAMIN) 1000 MCG tablet Take 1,000 mcg by mouth Daily.       No current facility-administered medications on file prior to visit.        Objective   Vitals:    05/07/18 1057   BP: 132/82   BP Location: Right arm   Patient Position: Sitting   Cuff Size: Large Adult   Pulse: 67   Temp: 97.9 °F (36.6 °C)   TempSrc: Oral   SpO2: 98%   Weight: 102 kg (224 lb)   Height: 175.3 cm (69.02\")       Physical Exam   Constitutional: She is oriented to person, place, and time. She appears well-developed and well-nourished.   Neurological: She is alert and oriented to person, place, and time.   Skin: Skin is warm and dry.   Psychiatric: She has a normal mood and affect. Her behavior is normal.   Nursing note and vitals reviewed.      Assessment/Plan   Karen was seen today for follow-up.    Diagnoses and all orders for this visit:    Recurrent major depressive disorder, in partial remission    Other orders  -     fluticasone (FLONASE) 50 MCG/ACT nasal spray; 2 sprays into each nostril Daily.  -     albuterol (VENTOLIN HFA) 108 (90 Base) MCG/ACT inhaler; Inhale 2 puffs Every 6 (Six) Hours As Needed for Wheezing.      -doing better overall;  Gave 12 weeks of samples of trintellix 10mg  -gave X 2 samples of symbicort  Has lost 9 lbs since January, which is great  Continue current medications  Will appeal " denial of lyrica as gabapentin no relief and lyrica had significant relief.       -Follow up: 3 months       Current Outpatient Prescriptions:   •  acetaminophen (TYLENOL) 500 MG tablet, Take 500 mg by mouth Every 6 (Six) Hours As Needed for Mild Pain ., Disp: , Rfl:   •  albuterol (ACCUNEB) 1.25 MG/3ML nebulizer solution, Take 3 mL by nebulization Every 6 (Six) Hours As Needed for Wheezing., Disp: 120 vial, Rfl: 12  •  albuterol (VENTOLIN HFA) 108 (90 Base) MCG/ACT inhaler, Inhale 2 puffs Every 6 (Six) Hours As Needed for Wheezing., Disp: 18 g, Rfl: 3  •  buPROPion XL (WELLBUTRIN XL) 300 MG 24 hr tablet, Take 1 tablet by mouth Daily., Disp: 30 tablet, Rfl: 5  •  Cetirizine HCl 10 MG capsule, Take  by mouth., Disp: , Rfl:   •  etodolac (LODINE) 500 MG tablet, Take 500 mg by mouth., Disp: , Rfl:   •  fluticasone (FLONASE) 50 MCG/ACT nasal spray, 2 sprays into each nostril Daily., Disp: 16 g, Rfl: 11  •  folic acid (FOLVITE) 1 MG tablet, Take 1 mg by mouth., Disp: , Rfl:   •  gabapentin (NEURONTIN) 300 MG capsule, 2 po twice daily, Disp: 120 capsule, Rfl: 5  •  hydrochlorothiazide (HYDRODIURIL) 25 MG tablet, TAKE 1 TABLET BY MOUTH ONE TIME A DAY , Disp: 15 tablet, Rfl: 4  •  lamoTRIgine (LaMICtal) 100 MG tablet, TAKE 1 TABLET BY MOUTH NIGHTLY, Disp: 30 tablet, Rfl: 3  •  methotrexate 2.5 MG tablet, TAKE 10 TABLETs BY MOUTH ONE TIME A WEEK, Disp: , Rfl:   •  montelukast (SINGULAIR) 10 MG tablet, TAKE 1 TABLET BY MOUTH AT BEDTIME , Disp: 30 tablet, Rfl: 10  •  Multiple Vitamin (MULTI VITAMIN DAILY PO), Take by mouth daily., Disp: , Rfl:   •  omeprazole (priLOSEC) 40 MG capsule, TAKE 1 CAPSULE BY MOUTH ONE TIME A DAY , Disp: 15 capsule, Rfl: 2  •  oxybutynin XL (DITROPAN-XL) 10 MG 24 hr tablet, Take 1 tablet by mouth Daily., Disp: 30 tablet, Rfl: 5  •  phentermine 37.5 MG capsule, Take 1 capsule by mouth Every Morning., Disp: 30 capsule, Rfl: 2  •  pregabalin (LYRICA) 150 MG capsule, Take 1 capsule by mouth 2 (Two) Times  a Day., Disp: 60 capsule, Rfl: 2  •  pregabalin (LYRICA) 75 MG capsule, Take 1 capsule by mouth 2 (Two) Times a Day., Disp: 28 capsule, Rfl: 0  •  SYMBICORT 160-4.5 MCG/ACT inhaler, INHALE 2 PUFFS BY MOUTH TWO TIMES A DAY , Disp: 10.2 g, Rfl: 11  •  topiramate (TOPAMAX) 100 MG tablet, TAKE 1 TABLET BY MOUTH TWO TIMES A DAY , Disp: 60 tablet, Rfl: 3  •  traZODone (DESYREL) 100 MG tablet, TAKE 1 TABLET BY MOUTH AT BEDTIME , Disp: 30 tablet, Rfl: 3  •  Vortioxetine HBr (TRINTELLIX) 10 MG tablet, After 5mg daily, take 10mg daily (either 2 5mg tabs or 1 10mg tab daily)., Disp: 30 tablet, Rfl:   •  CALCIUM-MAGNESIUM-ZINC PO, Take  by mouth., Disp: , Rfl:

## 2018-05-19 DIAGNOSIS — N39.41 URGE URINARY INCONTINENCE: ICD-10-CM

## 2018-05-21 RX ORDER — OMEPRAZOLE 40 MG/1
CAPSULE, DELAYED RELEASE ORAL
Qty: 30 CAPSULE | Refills: 2 | Status: SHIPPED | OUTPATIENT
Start: 2018-05-21 | End: 2018-08-17 | Stop reason: SDUPTHER

## 2018-05-21 RX ORDER — OXYBUTYNIN CHLORIDE 10 MG/1
TABLET, EXTENDED RELEASE ORAL
Qty: 30 TABLET | Refills: 2 | Status: SHIPPED | OUTPATIENT
Start: 2018-05-21 | End: 2018-08-17 | Stop reason: SDUPTHER

## 2018-06-14 RX ORDER — HYDROCHLOROTHIAZIDE 25 MG/1
TABLET ORAL
Qty: 30 TABLET | Refills: 3 | Status: SHIPPED | OUTPATIENT
Start: 2018-06-14 | End: 2018-10-16 | Stop reason: SDUPTHER

## 2018-06-25 DIAGNOSIS — J45.40 MODERATE PERSISTENT ASTHMA WITHOUT COMPLICATION: ICD-10-CM

## 2018-06-25 DIAGNOSIS — J30.9 ATOPIC RHINITIS: ICD-10-CM

## 2018-06-25 RX ORDER — MONTELUKAST SODIUM 10 MG/1
TABLET ORAL
Qty: 30 TABLET | Refills: 9 | Status: SHIPPED | OUTPATIENT
Start: 2018-06-25 | End: 2019-03-18 | Stop reason: SDUPTHER

## 2018-07-26 RX ORDER — PHENTERMINE HYDROCHLORIDE 37.5 MG/1
TABLET ORAL
Qty: 30 TABLET | Refills: 1 | OUTPATIENT
Start: 2018-07-26

## 2018-08-13 ENCOUNTER — OFFICE VISIT (OUTPATIENT)
Dept: FAMILY MEDICINE CLINIC | Facility: CLINIC | Age: 48
End: 2018-08-13

## 2018-08-13 VITALS
DIASTOLIC BLOOD PRESSURE: 62 MMHG | SYSTOLIC BLOOD PRESSURE: 102 MMHG | OXYGEN SATURATION: 98 % | TEMPERATURE: 97.4 F | HEART RATE: 77 BPM | WEIGHT: 226 LBS | BODY MASS INDEX: 33.36 KG/M2

## 2018-08-13 DIAGNOSIS — Z12.39 BREAST CANCER SCREENING: ICD-10-CM

## 2018-08-13 DIAGNOSIS — Z00.00 HEALTH CARE MAINTENANCE: ICD-10-CM

## 2018-08-13 DIAGNOSIS — J34.89 INTERNAL NASAL LESION: ICD-10-CM

## 2018-08-13 DIAGNOSIS — Z00.00 WELLNESS EXAMINATION: Primary | ICD-10-CM

## 2018-08-13 DIAGNOSIS — Z79.899 DRUG THERAPY: ICD-10-CM

## 2018-08-13 LAB
ALBUMIN SERPL-MCNC: 4.8 G/DL (ref 3.5–5.2)
ALBUMIN/GLOB SERPL: 2.1 G/DL
ALP SERPL-CCNC: 86 U/L (ref 39–117)
ALT SERPL-CCNC: 18 U/L (ref 1–33)
AST SERPL-CCNC: 14 U/L (ref 1–32)
BILIRUB SERPL-MCNC: 0.3 MG/DL (ref 0.1–1.2)
BUN SERPL-MCNC: 21 MG/DL (ref 6–20)
BUN/CREAT SERPL: 20.2 (ref 7–25)
CALCIUM SERPL-MCNC: 9.4 MG/DL (ref 8.6–10.5)
CHLORIDE SERPL-SCNC: 103 MMOL/L (ref 98–107)
CHOLEST SERPL-MCNC: 201 MG/DL (ref 0–200)
CO2 SERPL-SCNC: 25.8 MMOL/L (ref 22–29)
CREAT SERPL-MCNC: 1.04 MG/DL (ref 0.57–1)
GLOBULIN SER CALC-MCNC: 2.3 GM/DL
GLUCOSE SERPL-MCNC: 88 MG/DL (ref 65–99)
HCT VFR BLDA CALC: 42.3 %
HDLC SERPL-MCNC: 60 MG/DL (ref 40–60)
HGB BLDA-MCNC: 14.1 G/DL
LDLC SERPL CALC-MCNC: 104 MG/DL (ref 0–100)
MCH, POC: 32
MCHC, POC: 33.2
MCV, POC: 96.2
PLATELET # BLD AUTO: 269 10*3/MM3
PMV BLD: 8.2 FL
POTASSIUM SERPL-SCNC: 4.7 MMOL/L (ref 3.5–5.2)
PROT SERPL-MCNC: 7.1 G/DL (ref 6–8.5)
RBC, POC: 4.39
RDW, POC: 14.5
SODIUM SERPL-SCNC: 142 MMOL/L (ref 136–145)
TRIGL SERPL-MCNC: 187 MG/DL (ref 0–150)
VLDLC SERPL CALC-MCNC: 37.4 MG/DL (ref 5–40)
WBC # BLD: 6.1 10*3/UL

## 2018-08-13 PROCEDURE — 85027 COMPLETE CBC AUTOMATED: CPT | Performed by: FAMILY MEDICINE

## 2018-08-13 PROCEDURE — 99396 PREV VISIT EST AGE 40-64: CPT | Performed by: FAMILY MEDICINE

## 2018-08-13 RX ORDER — NACL/NAHCO3/HYALURON SOD/ALOE 0.9 %
GEL (GRAM) NASAL
Qty: 30 G | Refills: 12 | Status: SHIPPED | OUTPATIENT
Start: 2018-08-13

## 2018-08-13 NOTE — PROGRESS NOTES
Subjective   Karen Nash is a 48 y.o. female. Presents today for   Chief Complaint   Patient presents with   • Annual Exam     getting sores in nose.  needs mammogram scheduled at [facility]       History of Present Illness  Patient here for wellness examination;  Trying to exercise and working on diet;  Weight has plateaued;  Still having migraines.   On medications for mood;       Preventative:  Due for mammo;  Due for pap;  Up to date on colonoscopy     Review of Systems   Respiratory: Negative for shortness of breath.    Cardiovascular: Negative for chest pain.   Gastrointestinal: Negative for abdominal pain.       Patient Active Problem List   Diagnosis   • Abdominal pain   • Asthma with acute exacerbation   • Atopic rhinitis   • Anemia   • Benign essential hypertension   • Constipation   • Dyslipidemia   • Gastroesophageal reflux disease   • Migraine   • Muscle cramps   • Vocal cord dysfunction       Social History     Social History   • Marital status:      Social History Main Topics   • Smoking status: Former Smoker     Types: Cigarettes   • Smokeless tobacco: Never Used   • Alcohol use Yes      Comment: SOCIAL USE   • Drug use: No   • Sexual activity: Defer     Other Topics Concern   • Not on file       Allergies   Allergen Reactions   • Corn-Containing Products Rash   • Penicillins    • Sulfamethoxazole-Trimethoprim Hives and Itching       Current Outpatient Prescriptions on File Prior to Visit   Medication Sig Dispense Refill   • acetaminophen (TYLENOL) 500 MG tablet Take 500 mg by mouth Every 6 (Six) Hours As Needed for Mild Pain .     • albuterol (ACCUNEB) 1.25 MG/3ML nebulizer solution Take 3 mL by nebulization Every 6 (Six) Hours As Needed for Wheezing. 120 vial 12   • albuterol (VENTOLIN HFA) 108 (90 Base) MCG/ACT inhaler Inhale 2 puffs Every 6 (Six) Hours As Needed for Wheezing. 18 g 3   • buPROPion XL (WELLBUTRIN XL) 300 MG 24 hr tablet Take 1 tablet by mouth Daily. 30 tablet 5   •  CALCIUM-MAGNESIUM-ZINC PO Take  by mouth.     • Cetirizine HCl 10 MG capsule Take  by mouth.     • etodolac (LODINE) 500 MG tablet Take 500 mg by mouth.     • fluticasone (FLONASE) 50 MCG/ACT nasal spray 2 sprays into each nostril Daily. 16 g 11   • folic acid (FOLVITE) 1 MG tablet Take 1 mg by mouth.     • gabapentin (NEURONTIN) 300 MG capsule 2 po twice daily 120 capsule 5   • hydrochlorothiazide (HYDRODIURIL) 25 MG tablet TAKE 1 TABLET BY MOUTH ONE TIME A DAY  30 tablet 3   • lamoTRIgine (LaMICtal) 100 MG tablet TAKE 1 TABLET BY MOUTH NIGHTLY 30 tablet 3   • methotrexate 2.5 MG tablet TAKE 10 TABLETs BY MOUTH ONE TIME A WEEK     • montelukast (SINGULAIR) 10 MG tablet TAKE 1 TABLET BY MOUTH AT BEDTIME  30 tablet 9   • Multiple Vitamin (MULTI VITAMIN DAILY PO) Take by mouth daily.     • omeprazole (priLOSEC) 40 MG capsule TAKE 1 CAPSULE BY MOUTH ONE TIME A DAY  30 capsule 2   • oxybutynin XL (DITROPAN-XL) 10 MG 24 hr tablet TAKE 1 TABLET BY MOUTH ONE TIME A DAY  30 tablet 2   • phentermine 37.5 MG capsule Take 1 capsule by mouth Every Morning. 30 capsule 2   • pregabalin (LYRICA) 150 MG capsule Take 1 capsule by mouth 2 (Two) Times a Day. 60 capsule 2   • pregabalin (LYRICA) 75 MG capsule Take 1 capsule by mouth 2 (Two) Times a Day. 28 capsule 0   • SYMBICORT 160-4.5 MCG/ACT inhaler INHALE 2 PUFFS BY MOUTH TWO TIMES A DAY  10.2 g 11   • topiramate (TOPAMAX) 100 MG tablet TAKE 1 TABLET BY MOUTH TWO TIMES A DAY  60 tablet 3   • traZODone (DESYREL) 100 MG tablet TAKE 1 TABLET BY MOUTH AT BEDTIME  30 tablet 3   • Vortioxetine HBr (TRINTELLIX) 10 MG tablet After 5mg daily, take 10mg daily (either 2 5mg tabs or 1 10mg tab daily). 30 tablet    • [DISCONTINUED] omeprazole (priLOSEC) 40 MG capsule TAKE 1 CAPSULE BY MOUTH ONE TIME A DAY  15 capsule 2     No current facility-administered medications on file prior to visit.        Objective   Vitals:    08/13/18 0954   BP: 102/62   Pulse: 77   Temp: 97.4 °F (36.3 °C)   SpO2:  98%   Weight: 103 kg (226 lb)       Physical Exam   Constitutional: She appears well-developed and well-nourished.   HENT:   Head: Normocephalic and atraumatic.   Neck: Neck supple. No JVD present. No thyromegaly present.   Cardiovascular: Normal rate, regular rhythm and normal heart sounds.  Exam reveals no gallop and no friction rub.    No murmur heard.  Pulmonary/Chest: Effort normal and breath sounds normal. No respiratory distress. She has no wheezes. She has no rales.   Abdominal: Soft. Bowel sounds are normal. She exhibits no distension. There is no tenderness. There is no rebound and no guarding.   Musculoskeletal: She exhibits no edema.   Neurological: She is alert.   Skin: Skin is warm and dry.   Psychiatric: She has a normal mood and affect. Her behavior is normal.   Nursing note and vitals reviewed.    CBC ordered and reviewed.    Assessment/Plan   Karen was seen today for annual exam.    Diagnoses and all orders for this visit:    Wellness examination    Breast cancer screening  -     Mammo Screening Bilateral With CAD; Future    Health care maintenance  -     Comprehensive Metabolic Panel  -     Lipid Panel  -     POC CBC    Drug therapy  -     Comprehensive Metabolic Panel  -     Lipid Panel  -     POC CBC     counseled on diet and exercise  Ordered mammo  Pap with aprn  nasogel 1 spray to each nostril for nasal sores.         -Follow up: 3 months and prn

## 2018-08-14 NOTE — PROGRESS NOTES
Call and mail copy of results to patient.  Cholesterol borderline, work on diet as doing  Liver function normal  Renal function mild decline, will continue to monitor.  For now avoid nsaids; Tylenol ok for pain.

## 2018-08-16 DIAGNOSIS — J45.51 SEVERE PERSISTENT ASTHMA WITH ACUTE EXACERBATION: ICD-10-CM

## 2018-08-16 RX ORDER — BUDESONIDE AND FORMOTEROL FUMARATE DIHYDRATE 160; 4.5 UG/1; UG/1
AEROSOL RESPIRATORY (INHALATION)
Qty: 10.2 G | Refills: 10 | Status: SHIPPED | OUTPATIENT
Start: 2018-08-16 | End: 2020-01-28

## 2018-08-17 DIAGNOSIS — F32.2 SEVERE MAJOR DEPRESSION WITHOUT PSYCHOTIC FEATURES (HCC): ICD-10-CM

## 2018-08-17 DIAGNOSIS — N39.41 URGE URINARY INCONTINENCE: ICD-10-CM

## 2018-08-17 DIAGNOSIS — G43.009 MIGRAINE WITHOUT AURA AND WITHOUT STATUS MIGRAINOSUS, NOT INTRACTABLE: ICD-10-CM

## 2018-08-17 DIAGNOSIS — F32.2 SEVERE MAJOR DEPRESSION (HCC): ICD-10-CM

## 2018-08-17 RX ORDER — OMEPRAZOLE 40 MG/1
CAPSULE, DELAYED RELEASE ORAL
Qty: 30 CAPSULE | Refills: 5 | Status: SHIPPED | OUTPATIENT
Start: 2018-08-17 | End: 2019-02-14

## 2018-08-17 RX ORDER — ALBUTEROL SULFATE 90 UG/1
AEROSOL, METERED RESPIRATORY (INHALATION)
Qty: 18 G | Refills: 2 | Status: SHIPPED | OUTPATIENT
Start: 2018-08-17 | End: 2019-01-14 | Stop reason: SDUPTHER

## 2018-08-17 RX ORDER — BUPROPION HYDROCHLORIDE 300 MG/1
TABLET ORAL
Qty: 30 TABLET | Refills: 4 | Status: SHIPPED | OUTPATIENT
Start: 2018-08-17 | End: 2019-01-07 | Stop reason: SDUPTHER

## 2018-08-17 RX ORDER — TRAZODONE HYDROCHLORIDE 100 MG/1
TABLET ORAL
Qty: 30 TABLET | Refills: 2 | Status: SHIPPED | OUTPATIENT
Start: 2018-08-17 | End: 2019-03-08

## 2018-08-17 RX ORDER — OXYBUTYNIN CHLORIDE 10 MG/1
TABLET, EXTENDED RELEASE ORAL
Qty: 30 TABLET | Refills: 5 | Status: SHIPPED | OUTPATIENT
Start: 2018-08-17 | End: 2019-06-30 | Stop reason: SDUPTHER

## 2018-08-17 RX ORDER — LAMOTRIGINE 100 MG/1
TABLET ORAL
Qty: 30 TABLET | Refills: 2 | Status: SHIPPED | OUTPATIENT
Start: 2018-08-17 | End: 2018-09-26 | Stop reason: SDUPTHER

## 2018-08-28 ENCOUNTER — APPOINTMENT (OUTPATIENT)
Dept: MAMMOGRAPHY | Facility: HOSPITAL | Age: 48
End: 2018-08-28

## 2018-09-13 ENCOUNTER — HOSPITAL ENCOUNTER (OUTPATIENT)
Dept: MAMMOGRAPHY | Facility: HOSPITAL | Age: 48
Discharge: HOME | End: 2018-09-13
Admitting: FAMILY MEDICINE

## 2018-09-13 DIAGNOSIS — Z12.39 BREAST CANCER SCREENING: ICD-10-CM

## 2018-09-13 PROCEDURE — 77067 SCR MAMMO BI INCL CAD: CPT

## 2018-09-15 NOTE — PROGRESS NOTES
Dear Karen Nash:    Your mammogram was normal.  We have enclosed a copy.    Sincerely,  ROLAN Bardales DO, MS

## 2018-09-17 RX ORDER — TOPIRAMATE 100 MG/1
TABLET, FILM COATED ORAL
Qty: 30 TABLET | Refills: 2 | Status: SHIPPED | OUTPATIENT
Start: 2018-09-17 | End: 2019-01-14 | Stop reason: SDUPTHER

## 2018-09-26 ENCOUNTER — OFFICE VISIT (OUTPATIENT)
Dept: FAMILY MEDICINE CLINIC | Facility: CLINIC | Age: 48
End: 2018-09-26

## 2018-09-26 VITALS
HEART RATE: 81 BPM | DIASTOLIC BLOOD PRESSURE: 80 MMHG | SYSTOLIC BLOOD PRESSURE: 120 MMHG | OXYGEN SATURATION: 97 % | HEIGHT: 69 IN | WEIGHT: 218 LBS | TEMPERATURE: 97.4 F | BODY MASS INDEX: 32.29 KG/M2

## 2018-09-26 DIAGNOSIS — G43.009 MIGRAINE WITHOUT AURA AND WITHOUT STATUS MIGRAINOSUS, NOT INTRACTABLE: ICD-10-CM

## 2018-09-26 DIAGNOSIS — M06.09 RHEUMATOID ARTHRITIS OF MULTIPLE SITES WITH NEGATIVE RHEUMATOID FACTOR (HCC): ICD-10-CM

## 2018-09-26 DIAGNOSIS — F32.2 SEVERE MAJOR DEPRESSION WITHOUT PSYCHOTIC FEATURES (HCC): ICD-10-CM

## 2018-09-26 DIAGNOSIS — F32.2 SEVERE SINGLE CURRENT EPISODE OF MAJOR DEPRESSIVE DISORDER, WITHOUT PSYCHOTIC FEATURES (HCC): Primary | ICD-10-CM

## 2018-09-26 PROCEDURE — 99213 OFFICE O/P EST LOW 20 MIN: CPT | Performed by: FAMILY MEDICINE

## 2018-09-26 RX ORDER — LAMOTRIGINE 100 MG/1
TABLET ORAL
Qty: 45 TABLET | Refills: 3 | Status: SHIPPED | OUTPATIENT
Start: 2018-09-26 | End: 2019-01-23 | Stop reason: SDUPTHER

## 2018-09-26 NOTE — PROGRESS NOTES
Subjective   Karen Nash is a 48 y.o. female. Presents today for   Chief Complaint   Patient presents with   • Depression     pt's depression is getting worse.        Depression   Visit Type: initial (Patient severe depression;  Not doing well today, worked in emergently. )  Onset of symptoms: more than 1 year ago  Progression since onset: rapidly worsening  Patient presents with the following symptoms: anhedonia, chest pain, confusion, decreased concentration, depressed mood, dizziness, dry mouth, excessive worry, fatigue, feelings of hopelessness, feelings of worthlessness, hypersomnia, insomnia, irritability, memory impairment, muscle tension, nervousness/anxiety, psychomotor agitation, psychomotor retardation, shortness of breath, suicidal ideas, thoughts of death and weight loss.  Patient is not experiencing: suicidal planning.  Frequency of symptoms: constantly   Severity: incapacitating   Aggravated by: family issues ( never sent in taxes, worried lose house.  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  However very compliant patient and has followed medical advice.          Review of  Systems   Constitutional: Positive for irritability and weight loss.   Respiratory: Positive for shortness of breath.    Psychiatric/Behavioral: Positive for confusion, decreased concentration and suicidal ideas. Negative for substance abuse. The patient is nervous/anxious and has insomnia.        Patient Active Problem List   Diagnosis   • Abdominal pain   • Asthma with acute exacerbation   • Atopic rhinitis   • Anemia   • Benign essential hypertension   • Constipation   • Dyslipidemia   • Gastroesophageal reflux disease   • Migraine   • Muscle cramps   • Vocal cord dysfunction       Social History     Social History   • Marital status:      Social History Main Topics   • Smoking status: Former Smoker     Types: Cigarettes   • Smokeless tobacco: Never Used   • Alcohol use Yes      Comment: SOCIAL USE   • Drug use: No   • Sexual activity: Defer     Other Topics Concern   • Not on file       Allergies   Allergen Reactions   • Corn-Containing Products Rash   • Penicillins    • Sulfamethoxazole-Trimethoprim Hives and Itching       Current Outpatient Prescriptions on File Prior to Visit   Medication Sig Dispense Refill   • acetaminophen (TYLENOL) 500 MG tablet Take 500 mg by mouth Every 6 (Six) Hours As Needed for Mild Pain .     • albuterol (ACCUNEB) 1.25 MG/3ML nebulizer solution Take 3 mL by nebulization Every 6 (Six) Hours As Needed for Wheezing. 120 vial 12   • buPROPion XL (WELLBUTRIN XL) 300 MG 24 hr tablet TAKE 1 TABLET BY MOUTH ONE TIME A DAY  30 tablet 4   • Cetirizine HCl 10 MG capsule Take 10 mg by mouth Daily. 30 capsule 5   • etodolac (LODINE) 500 MG tablet Take 500 mg by mouth.     • folic acid (FOLVITE) 1 MG tablet Take 1 mg by mouth.     • hydrochlorothiazide (HYDRODIURIL) 25 MG tablet TAKE 1 TABLET BY MOUTH ONE TIME A DAY  30 tablet 3   • methotrexate 2.5 MG tablet TAKE 10 TABLETs BY MOUTH ONE TIME A WEEK     • montelukast (SINGULAIR) 10 MG tablet TAKE 1 TABLET BY MOUTH AT BEDTIME  30 tablet 9  "  • Multiple Vitamin (MULTI VITAMIN DAILY PO) Take by mouth daily.     • omeprazole (priLOSEC) 40 MG capsule TAKE 1 CAPSULE BY MOUTH ONE TIME A DAY  30 capsule 5   • oxybutynin XL (DITROPAN-XL) 10 MG 24 hr tablet TAKE 1 TABLET BY MOUTH ONE TIME A DAY  30 tablet 5   • phentermine 37.5 MG capsule Take 1 capsule by mouth Every Morning. 30 capsule 2   • pregabalin (LYRICA) 150 MG capsule Take 1 capsule by mouth 2 (Two) Times a Day. 60 capsule 2   • Saline (NASOGEL) gel 1 spray each nostril twice daily 30 g 12   • SYMBICORT 160-4.5 MCG/ACT inhaler INHALE 2 PUFFS BY MOUTH TWO TIMES A DAY  10.2 g 10   • topiramate (TOPAMAX) 100 MG tablet TAKE 1 TABLET BY MOUTH TWO TIMES A DAY  30 tablet 2   • traZODone (DESYREL) 100 MG tablet TAKE 1 TABLET BY MOUTH AT BEDTIME  30 tablet 2   • VENTOLIN  (90 Base) MCG/ACT inhaler INHALE 2 PUFFS BY MOUTH EVERY SIX HOURS AS NEEDED for wheezing  18 g 2   • Vortioxetine HBr (TRINTELLIX) 10 MG tablet After 5mg daily, take 10mg daily (either 2 5mg tabs or 1 10mg tab daily). 30 tablet    • [DISCONTINUED] lamoTRIgine (LaMICtal) 100 MG tablet TAKE 1 TABLET BY MOUTH nightly  30 tablet 2   • CALCIUM-MAGNESIUM-ZINC PO Take  by mouth.     • fluticasone (FLONASE) 50 MCG/ACT nasal spray 2 sprays into each nostril Daily. 16 g 11   • [DISCONTINUED] gabapentin (NEURONTIN) 300 MG capsule 2 po twice daily 120 capsule 5   • [DISCONTINUED] pregabalin (LYRICA) 75 MG capsule Take 1 capsule by mouth 2 (Two) Times a Day. 28 capsule 0     No current facility-administered medications on file prior to visit.        Objective   Vitals:    09/26/18 0946   BP: 120/80   BP Location: Left arm   Patient Position: Sitting   Cuff Size: Large Adult   Pulse: 81   Temp: 97.4 °F (36.3 °C)   TempSrc: Oral   SpO2: 97%   Weight: 98.9 kg (218 lb)   Height: 175.3 cm (69.02\")       Physical Exam   Constitutional: She is oriented to person, place, and time. She appears well-developed and well-nourished.   Neurological: She is alert " and oriented to person, place, and time.   Skin: Skin is warm and dry.   Psychiatric: Her mood appears anxious. Her speech is delayed. She is withdrawn. Thought content is not paranoid and not delusional. She exhibits a depressed mood. She expresses no suicidal plans and no homicidal plans.   Nursing note and vitals reviewed.      Assessment/Plan   Karen was seen today for depression.    Diagnoses and all orders for this visit:    Severe single current episode of major depressive disorder, without psychotic features (CMS/HCC)    Rheumatoid arthritis of multiple sites with negative rheumatoid factor (CMS/HCC)    Severe major depression without psychotic features (CMS/HCC)  -     lamoTRIgine (LaMICtal) 100 MG tablet; 1/2 am and 1 nightly    Migraine without aura and without status migrainosus, not intractable  -     lamoTRIgine (LaMICtal) 100 MG tablet; 1/2 am and 1 nightly    Will increase lamictal to 1/2 am and continue 1 pm;  Gave samples of trintellix 5mg take 2 daily (not covered, giving samples).  See if lamictal helps mood more and help migraines.  Encouraged to complete paperwork for SSI, she has not been able to work and barely function mood wise.  Haven't found any psych takes insuracne.  To seek care immediately if plans to self harm.  RA has been severe and struggling to function along with VCD and asthma.         -Follow up: 3 weeks and prn

## 2018-10-16 ENCOUNTER — TELEPHONE (OUTPATIENT)
Dept: FAMILY MEDICINE CLINIC | Facility: CLINIC | Age: 48
End: 2018-10-16

## 2018-10-16 RX ORDER — HYDROCHLOROTHIAZIDE 25 MG/1
TABLET ORAL
Qty: 30 TABLET | Refills: 2 | Status: SHIPPED | OUTPATIENT
Start: 2018-10-16 | End: 2019-02-16 | Stop reason: SDUPTHER

## 2018-10-16 RX ORDER — TRAZODONE HYDROCHLORIDE 100 MG/1
TABLET ORAL
Qty: 15 TABLET | Refills: 2 | Status: SHIPPED | OUTPATIENT
Start: 2018-10-16 | End: 2018-10-19 | Stop reason: SDUPTHER

## 2018-10-17 NOTE — TELEPHONE ENCOUNTER
If it's truly fractured (depending on location) often surgical issues and also could need splinting to keep stable.  See Chacha today?  Or can work in with me on same day appt tomorrow.

## 2018-10-19 ENCOUNTER — OFFICE VISIT (OUTPATIENT)
Dept: FAMILY MEDICINE CLINIC | Facility: CLINIC | Age: 48
End: 2018-10-19

## 2018-10-19 VITALS
HEART RATE: 71 BPM | DIASTOLIC BLOOD PRESSURE: 80 MMHG | SYSTOLIC BLOOD PRESSURE: 128 MMHG | TEMPERATURE: 97.6 F | WEIGHT: 212 LBS | OXYGEN SATURATION: 98 % | BODY MASS INDEX: 31.29 KG/M2

## 2018-10-19 DIAGNOSIS — F32.4 MAJOR DEPRESSIVE DISORDER WITH SINGLE EPISODE, IN PARTIAL REMISSION (HCC): ICD-10-CM

## 2018-10-19 DIAGNOSIS — E66.09 CLASS 1 OBESITY DUE TO EXCESS CALORIES WITH SERIOUS COMORBIDITY AND BODY MASS INDEX (BMI) OF 32.0 TO 32.9 IN ADULT: ICD-10-CM

## 2018-10-19 DIAGNOSIS — N94.10 COITUS PAINFUL FOR FEMALE: ICD-10-CM

## 2018-10-19 DIAGNOSIS — M25.522 LEFT ELBOW PAIN: ICD-10-CM

## 2018-10-19 DIAGNOSIS — M25.562 ACUTE PAIN OF LEFT KNEE: Primary | ICD-10-CM

## 2018-10-19 PROCEDURE — 99213 OFFICE O/P EST LOW 20 MIN: CPT | Performed by: FAMILY MEDICINE

## 2018-10-19 RX ORDER — PHENTERMINE HYDROCHLORIDE 37.5 MG/1
37.5 TABLET ORAL EVERY MORNING
Refills: 2 | COMMUNITY
Start: 2018-09-29 | End: 2019-02-14

## 2018-10-19 NOTE — PROGRESS NOTES
Subjective   Karen Nash is a 48 y.o. female. Presents today for   Chief Complaint   Patient presents with   • Follow-up     weight loss.  Fell x 2 weeks ago and hurt her lt elbow and lt knee and rt thigh has a knot on it.  Since she hit her head she is having trouble rethinking small problems.     • Constipation     painful sex and she has bleeding afterwards and chronic constipation.         Constipation   This is a chronic problem. The current episode started more than 1 month ago. The problem is unchanged. The stool is described as firm. The patient is on a high fiber diet. Pertinent negatives include no diarrhea, hematochezia or melena. Risk factors include change in medication usage/dosage. She has tried diet changes (Declines other treatment, will try otc metamucil) for the symptoms. The treatment provided mild relief.   Depression   Visit Type: follow-up (Reports mood stable)  Patient presents with the following symptoms: anhedonia and depressed mood.  Patient is not experiencing: suicidal planning.  Sleep quality: fair  Compliance with medications:  %        Obesity   This is a chronic problem. The current episode started more than 1 month ago. The problem occurs constantly. The problem has been gradually improving. Nothing aggravates the symptoms. Treatments tried: on phentermien;  on topamax for headaches (has memory issues, d/w stopping but doesn't want to as prevents migraines well). The treatment provided moderate (down 14 lbs from August) relief.   Fall   The accident occurred 5 to 7 days ago. Fall occurred: Tripped. She landed on hard floor. The point of impact was the left knee and left elbow. The pain is present in the left knee and left elbow. She has tried nothing for the symptoms. Improvement on treatment: doing ok.     Requests referral to female gynecologist for above.    Review of Systems   Gastrointestinal: Positive for constipation. Negative for diarrhea, hematochezia and melena.        Patient Active Problem List   Diagnosis   • Abdominal pain   • Asthma with acute exacerbation   • Atopic rhinitis   • Anemia   • Benign essential hypertension   • Constipation   • Dyslipidemia   • Gastroesophageal reflux disease   • Migraine   • Muscle cramps   • Vocal cord dysfunction       Social History     Social History   • Marital status:      Social History Main Topics   • Smoking status: Former Smoker     Types: Cigarettes   • Smokeless tobacco: Never Used   • Alcohol use Yes      Comment: SOCIAL USE   • Drug use: No   • Sexual activity: Defer     Other Topics Concern   • Not on file       Allergies   Allergen Reactions   • Corn-Containing Products Rash   • Penicillins    • Sulfamethoxazole-Trimethoprim Hives and Itching       Current Outpatient Prescriptions on File Prior to Visit   Medication Sig Dispense Refill   • acetaminophen (TYLENOL) 500 MG tablet Take 500 mg by mouth Every 6 (Six) Hours As Needed for Mild Pain .     • albuterol (ACCUNEB) 1.25 MG/3ML nebulizer solution Take 3 mL by nebulization Every 6 (Six) Hours As Needed for Wheezing. 120 vial 12   • buPROPion XL (WELLBUTRIN XL) 300 MG 24 hr tablet TAKE 1 TABLET BY MOUTH ONE TIME A DAY  30 tablet 4   • CALCIUM-MAGNESIUM-ZINC PO Take  by mouth.     • Cetirizine HCl 10 MG capsule Take 10 mg by mouth Daily. 30 capsule 5   • etodolac (LODINE) 500 MG tablet Take 500 mg by mouth.     • fluticasone (FLONASE) 50 MCG/ACT nasal spray 2 sprays into each nostril Daily. 16 g 11   • folic acid (FOLVITE) 1 MG tablet Take 1 mg by mouth.     • hydrochlorothiazide (HYDRODIURIL) 25 MG tablet TAKE 1 TABLET BY MOUTH ONE TIME A DAY  30 tablet 2   • lamoTRIgine (LaMICtal) 100 MG tablet 1/2 am and 1 nightly 45 tablet 3   • methotrexate 2.5 MG tablet TAKE 10 TABLETs BY MOUTH ONE TIME A WEEK     • montelukast (SINGULAIR) 10 MG tablet TAKE 1 TABLET BY MOUTH AT BEDTIME  30 tablet 9   • Multiple Vitamin (MULTI VITAMIN DAILY PO) Take by mouth daily.     •  omeprazole (priLOSEC) 40 MG capsule TAKE 1 CAPSULE BY MOUTH ONE TIME A DAY  30 capsule 5   • oxybutynin XL (DITROPAN-XL) 10 MG 24 hr tablet TAKE 1 TABLET BY MOUTH ONE TIME A DAY  30 tablet 5   • pregabalin (LYRICA) 150 MG capsule Take 1 capsule by mouth 2 (Two) Times a Day. 60 capsule 2   • Saline (NASOGEL) gel 1 spray each nostril twice daily 30 g 12   • SYMBICORT 160-4.5 MCG/ACT inhaler INHALE 2 PUFFS BY MOUTH TWO TIMES A DAY  10.2 g 10   • topiramate (TOPAMAX) 100 MG tablet TAKE 1 TABLET BY MOUTH TWO TIMES A DAY  30 tablet 2   • traZODone (DESYREL) 100 MG tablet TAKE 1 TABLET BY MOUTH AT BEDTIME  30 tablet 2   • VENTOLIN  (90 Base) MCG/ACT inhaler INHALE 2 PUFFS BY MOUTH EVERY SIX HOURS AS NEEDED for wheezing  18 g 2   • Vortioxetine HBr (TRINTELLIX) 10 MG tablet After 5mg daily, take 10mg daily (either 2 5mg tabs or 1 10mg tab daily). 30 tablet    • [DISCONTINUED] phentermine 37.5 MG capsule Take 1 capsule by mouth Every Morning. 30 capsule 2   • [DISCONTINUED] traZODone (DESYREL) 100 MG tablet TAKE 1 TABLET BY MOUTH AT BEDTIME  15 tablet 2     No current facility-administered medications on file prior to visit.        Objective   Vitals:    10/19/18 0839   BP: 128/80   Pulse: 71   Temp: 97.6 °F (36.4 °C)   SpO2: 98%   Weight: 96.2 kg (212 lb)       Physical Exam   Constitutional: She is oriented to person, place, and time. She appears well-developed and well-nourished.   Musculoskeletal:        Left elbow: She exhibits normal range of motion, no swelling, no effusion, no deformity and no laceration. Tenderness found. Olecranon process tenderness noted. No radial head, no medial epicondyle and no lateral epicondyle tenderness noted.        Left knee: She exhibits effusion. She exhibits normal range of motion, no swelling, no deformity, normal alignment, no LCL laxity, no bony tenderness and no MCL laxity. Tenderness found. Lateral joint line tenderness noted. No MCL, no LCL and no patellar tendon  tenderness noted.   Neurological: She is alert and oriented to person, place, and time.   Skin: Skin is warm and dry.   Psychiatric: She has a normal mood and affect. Her behavior is normal.   Nursing note and vitals reviewed.      Assessment/Plan   Karen was seen today for follow-up and constipation.    Diagnoses and all orders for this visit:    Acute pain of left knee    Left elbow pain    Major depressive disorder with single episode, in partial remission (CMS/[facility])    Class 1 obesity due to excess calories with serious comorbidity and body mass index (BMI) of 32.0 to 32.9 in adult    Coitus painful for female  -     Ambulatory Referral to Gynecology    -ace wrapped knee and elbow, recommend ice and elevation.  Tylenol or nsaid as needed for pain;  Consider imaging if no relief.  AMbulating normally and FROM of elbow, fx unlikely so will hold off for now  -refer to gyn as noted above, prefers female  -mood improved, per patient  -counseled on diet and weight loss;  Continue medication, is losing weight           -Follow up: 3 months and prn

## 2018-10-24 ENCOUNTER — TELEPHONE (OUTPATIENT)
Dept: OBSTETRICS AND GYNECOLOGY | Age: 48
End: 2018-10-24

## 2018-10-24 NOTE — TELEPHONE ENCOUNTER
New Patient  Insurance- [facility]  Ref- Dr. Jeffy Phillips- Diaz    Coitus Painful for Female

## 2018-11-05 RX ORDER — PHENTERMINE HYDROCHLORIDE 37.5 MG/1
TABLET ORAL
Qty: 30 TABLET | Refills: 1 | OUTPATIENT
Start: 2018-11-05

## 2018-12-21 ENCOUNTER — OFFICE VISIT (OUTPATIENT)
Dept: OBSTETRICS AND GYNECOLOGY | Age: 48
End: 2018-12-21

## 2018-12-21 VITALS
HEIGHT: 69 IN | SYSTOLIC BLOOD PRESSURE: 120 MMHG | BODY MASS INDEX: 31.1 KG/M2 | DIASTOLIC BLOOD PRESSURE: 78 MMHG | WEIGHT: 210 LBS

## 2018-12-21 DIAGNOSIS — Z01.419 ENCOUNTER FOR GYNECOLOGICAL EXAMINATION WITHOUT ABNORMAL FINDING: Primary | ICD-10-CM

## 2018-12-21 DIAGNOSIS — N89.8 VAGINAL DISCHARGE: ICD-10-CM

## 2018-12-21 DIAGNOSIS — N94.10 FEMALE DYSPAREUNIA: ICD-10-CM

## 2018-12-21 DIAGNOSIS — N95.2 VAGINAL ATROPHY: ICD-10-CM

## 2018-12-21 DIAGNOSIS — Z13.89 SCREENING FOR BLOOD OR PROTEIN IN URINE: ICD-10-CM

## 2018-12-21 DIAGNOSIS — Z12.4 SCREENING FOR MALIGNANT NEOPLASM OF CERVIX: ICD-10-CM

## 2018-12-21 LAB
BILIRUB BLD-MCNC: ABNORMAL MG/DL
GLUCOSE UR STRIP-MCNC: NEGATIVE MG/DL
KETONES UR QL: NEGATIVE
LEUKOCYTE EST, POC: ABNORMAL
NITRITE UR-MCNC: NEGATIVE MG/ML
PH UR: 5.5 [PH] (ref 5–8)
PROT UR STRIP-MCNC: NEGATIVE MG/DL
RBC # UR STRIP: NEGATIVE /UL
SP GR UR: 1.03 (ref 1–1.03)
UROBILINOGEN UR QL: NORMAL

## 2018-12-21 PROCEDURE — 99386 PREV VISIT NEW AGE 40-64: CPT | Performed by: OBSTETRICS & GYNECOLOGY

## 2018-12-21 PROCEDURE — 81002 URINALYSIS NONAUTO W/O SCOPE: CPT | Performed by: OBSTETRICS & GYNECOLOGY

## 2018-12-21 NOTE — PROGRESS NOTES
"Subjective   Karen Nash is a 48 y.o. female annual visit , last mmg 2 mnts ago at [facility] , last pap smear 3 yrs ago neg done  by dr Rob Bardales  ,colonoscopy in 2016,  c/o of painful intercourse  Patient adopted 4 children. No pregnancies. Menopause age 47. Reports she has to push stool out with her hand at times.        History of Present Illness    The following portions of the patient's history were reviewed and updated as appropriate: allergies, current medications, past family history, past medical history, past social history, past surgical history and problem list.    Review of Systems   Constitutional: Negative for chills and fever.   Gastrointestinal: Positive for abdominal pain. Negative for abdominal distention.   Genitourinary: Positive for dyspareunia. Negative for dysuria, menstrual problem, pelvic pain, vaginal bleeding, vaginal discharge and vaginal pain.   All other systems reviewed and are negative.  /78   Ht 175.3 cm (69.02\")   Wt 95.3 kg (210 lb)   LMP  (LMP Unknown)   BMI 30.99 kg/m²     Objective   Physical Exam   Constitutional: She is oriented to person, place, and time. She appears well-developed and well-nourished.   Neck: Normal range of motion. Neck supple. No thyromegaly present.   Cardiovascular: Normal rate and regular rhythm.   Pulmonary/Chest: Effort normal and breath sounds normal. Right breast exhibits no mass, no nipple discharge, no skin change and no tenderness. Left breast exhibits no mass, no nipple discharge, no skin change and no tenderness.   Abdominal: Soft. Bowel sounds are normal. She exhibits no distension. There is no tenderness.   Genitourinary: Vagina normal and uterus normal. Pelvic exam was performed with patient supine. Uterus is not tender. Cervix exhibits no friability. Right adnexum displays no mass and no tenderness. Left adnexum displays no mass and no tenderness. No vaginal discharge found.   Musculoskeletal: Normal range of motion. She " exhibits no edema.   Neurological: She is alert and oriented to person, place, and time.   Skin: Skin is warm and dry. No rash noted.   Psychiatric: She has a normal mood and affect. Her behavior is normal.   Nursing note and vitals reviewed.        Assessment/Plan   Karen was seen today for gynecologic exam.    Diagnoses and all orders for this visit:    Encounter for gynecological examination without abnormal finding    Screening for malignant neoplasm of cervix  -     IgP, Aptima HPV    Screening for blood or protein in urine  -     POC Urinalysis Dipstick    Vaginal discharge  -     NuSwab VG+ - Swab, Vagina    Vaginal atrophy  -     Estradiol 10 MCG insert; Insert 1 capsule into the vagina 2 (Two) Times a Week.    Female dyspareunia  -     Estradiol 10 MCG insert; Insert 1 capsule into the vagina 2 (Two) Times a Week.      Return 2 months   Counseling was given to patient for the following topics: instructions for management, risks and benefits of treatment options and self-breast exams .

## 2018-12-24 LAB
CYTOLOGIST CVX/VAG CYTO: NORMAL
CYTOLOGY CVX/VAG DOC THIN PREP: NORMAL
DX ICD CODE: NORMAL
HIV 1 & 2 AB SER-IMP: NORMAL
HPV I/H RISK 4 DNA CVX QL PROBE+SIG AMP: NEGATIVE
OTHER STN SPEC: NORMAL
PATH REPORT.FINAL DX SPEC: NORMAL
STAT OF ADQ CVX/VAG CYTO-IMP: NORMAL

## 2018-12-27 ENCOUNTER — TELEPHONE (OUTPATIENT)
Dept: OBSTETRICS AND GYNECOLOGY | Age: 48
End: 2018-12-27

## 2018-12-27 LAB
A VAGINAE DNA VAG QL NAA+PROBE: NORMAL SCORE
BVAB2 DNA VAG QL NAA+PROBE: NORMAL SCORE
C ALBICANS DNA VAG QL NAA+PROBE: NEGATIVE
C GLABRATA DNA VAG QL NAA+PROBE: NEGATIVE
C TRACH RRNA SPEC QL NAA+PROBE: NEGATIVE
MEGA1 DNA VAG QL NAA+PROBE: NORMAL SCORE
N GONORRHOEA RRNA SPEC QL NAA+PROBE: NEGATIVE
T VAGINALIS RRNA SPEC QL NAA+PROBE: NEGATIVE

## 2018-12-27 NOTE — TELEPHONE ENCOUNTER
----- Message from Aparna Diaz MD sent at 12/27/2018  2:24 PM EST -----  Please call patient and notify of normal results of pap smear and vaginal swab

## 2019-01-07 DIAGNOSIS — F32.2 SEVERE MAJOR DEPRESSION (HCC): ICD-10-CM

## 2019-01-07 RX ORDER — BUPROPION HYDROCHLORIDE 300 MG/1
TABLET ORAL
Qty: 30 TABLET | Refills: 3 | Status: SHIPPED | OUTPATIENT
Start: 2019-01-07 | End: 2019-04-20 | Stop reason: SDUPTHER

## 2019-01-10 ENCOUNTER — OFFICE VISIT (OUTPATIENT)
Dept: FAMILY MEDICINE CLINIC | Facility: CLINIC | Age: 49
End: 2019-01-10

## 2019-01-10 VITALS
DIASTOLIC BLOOD PRESSURE: 70 MMHG | WEIGHT: 206 LBS | OXYGEN SATURATION: 93 % | SYSTOLIC BLOOD PRESSURE: 110 MMHG | BODY MASS INDEX: 30.4 KG/M2 | TEMPERATURE: 97.7 F | HEART RATE: 83 BPM

## 2019-01-10 DIAGNOSIS — G43.009 MIGRAINE WITHOUT AURA AND WITHOUT STATUS MIGRAINOSUS, NOT INTRACTABLE: ICD-10-CM

## 2019-01-10 DIAGNOSIS — J01.00 ACUTE NON-RECURRENT MAXILLARY SINUSITIS: Primary | ICD-10-CM

## 2019-01-10 PROCEDURE — 99213 OFFICE O/P EST LOW 20 MIN: CPT | Performed by: FAMILY MEDICINE

## 2019-01-10 RX ORDER — DEXAMETHASONE 4 MG/1
4 TABLET ORAL 2 TIMES DAILY WITH MEALS
Qty: 10 TABLET | Refills: 0 | Status: SHIPPED | OUTPATIENT
Start: 2019-01-10 | End: 2019-02-14

## 2019-01-10 RX ORDER — AZITHROMYCIN 250 MG/1
TABLET, FILM COATED ORAL
Qty: 6 TABLET | Refills: 0 | Status: SHIPPED | OUTPATIENT
Start: 2019-01-10 | End: 2019-02-14

## 2019-01-10 NOTE — PROGRESS NOTES
Subjective   Karen Nash is a 48 y.o. female. Presents today for   Chief Complaint   Patient presents with   • Follow-up     depression check and ears hurt and temp with sinus drainage       Sinusitis   This is a new problem. The current episode started in the past 7 days. The problem is unchanged. Maximum temperature: subjective fever. The pain is moderate. Associated symptoms include chills, congestion, coughing, ear pain and sinus pressure.     Depression stable;  No thoughts self harm;  Needs trintellix samples    Migraines worsening, doesn't do well off topamax, but does have some side effects.    Did lose more weight, progressive weight loss.  Intending to continue.    Review of Systems   Constitutional: Positive for chills.   HENT: Positive for congestion, ear pain and sinus pressure.    Respiratory: Positive for cough.        Patient Active Problem List   Diagnosis   • Abdominal pain   • Asthma with acute exacerbation   • Atopic rhinitis   • Anemia   • Benign essential hypertension   • Constipation   • Dyslipidemia   • Gastroesophageal reflux disease   • Migraine   • Muscle cramps   • Vocal cord dysfunction   • Female dyspareunia   • Vaginal atrophy       Social History     Socioeconomic History   • Marital status:      Spouse name: Not on file   • Number of children: Not on file   • Years of education: Not on file   • Highest education level: Not on file   Tobacco Use   • Smoking status: Former Smoker     Types: Cigarettes   • Smokeless tobacco: Never Used   Substance and Sexual Activity   • Alcohol use: Yes     Comment: SOCIAL USE   • Drug use: No   • Sexual activity: Yes     Partners: Male     Birth control/protection: None       Allergies   Allergen Reactions   • Corn-Containing Products Rash   • Penicillins    • Sulfamethoxazole-Trimethoprim Hives and Itching       Current Outpatient Medications on File Prior to Visit   Medication Sig Dispense Refill   • acetaminophen (TYLENOL) 500 MG tablet  Take 500 mg by mouth Every 6 (Six) Hours As Needed for Mild Pain .     • albuterol (ACCUNEB) 1.25 MG/3ML nebulizer solution Take 3 mL by nebulization Every 6 (Six) Hours As Needed for Wheezing. 120 vial 12   • buPROPion XL (WELLBUTRIN XL) 300 MG 24 hr tablet TAKE 1 TABLET BY MOUTH ONE TIME A DAY  30 tablet 3   • CALCIUM-MAGNESIUM-ZINC PO Take  by mouth.     • Cetirizine HCl 10 MG capsule Take 10 mg by mouth Daily. 30 capsule 5   • Estradiol 10 MCG insert Insert 1 capsule into the vagina 2 (Two) Times a Week. 8 each 12   • etodolac (LODINE) 500 MG tablet Take 500 mg by mouth.     • fluticasone (FLONASE) 50 MCG/ACT nasal spray 2 sprays into each nostril Daily. 16 g 11   • folic acid (FOLVITE) 1 MG tablet Take 1 mg by mouth.     • hydrochlorothiazide (HYDRODIURIL) 25 MG tablet TAKE 1 TABLET BY MOUTH ONE TIME A DAY  30 tablet 2   • lamoTRIgine (LaMICtal) 100 MG tablet 1/2 am and 1 nightly 45 tablet 3   • montelukast (SINGULAIR) 10 MG tablet TAKE 1 TABLET BY MOUTH AT BEDTIME  30 tablet 9   • Multiple Vitamin (MULTI VITAMIN DAILY PO) Take by mouth daily.     • omeprazole (priLOSEC) 40 MG capsule TAKE 1 CAPSULE BY MOUTH ONE TIME A DAY  30 capsule 5   • oxybutynin XL (DITROPAN-XL) 10 MG 24 hr tablet TAKE 1 TABLET BY MOUTH ONE TIME A DAY  30 tablet 5   • phentermine (ADIPEX-P) 37.5 MG tablet Take 37.5 mg by mouth Every Morning.  2   • pregabalin (LYRICA) 150 MG capsule Take 1 capsule by mouth 2 (Two) Times a Day. 60 capsule 2   • Saline (NASOGEL) gel 1 spray each nostril twice daily 30 g 12   • SYMBICORT 160-4.5 MCG/ACT inhaler INHALE 2 PUFFS BY MOUTH TWO TIMES A DAY  10.2 g 10   • topiramate (TOPAMAX) 100 MG tablet TAKE 1 TABLET BY MOUTH TWO TIMES A DAY  30 tablet 2   • traZODone (DESYREL) 100 MG tablet TAKE 1 TABLET BY MOUTH AT BEDTIME  30 tablet 2   • VENTOLIN  (90 Base) MCG/ACT inhaler INHALE 2 PUFFS BY MOUTH EVERY SIX HOURS AS NEEDED for wheezing  18 g 2   • Vortioxetine HBr (TRINTELLIX) 10 MG tablet After 5mg  daily, take 10mg daily (either 2 5mg tabs or 1 10mg tab daily). 30 tablet    • [DISCONTINUED] methotrexate 2.5 MG tablet TAKE 10 TABLETs BY MOUTH ONE TIME A WEEK       No current facility-administered medications on file prior to visit.        Objective   Vitals:    01/10/19 0757   BP: 110/70   Pulse: 83   Temp: 97.7 °F (36.5 °C)   SpO2: 93%   Weight: 93.4 kg (206 lb)       Physical Exam   Constitutional: She appears well-developed and well-nourished.   HENT:   Head: Normocephalic and atraumatic.   Right Ear: Tympanic membrane normal.   Left Ear: Tympanic membrane normal.   Nose: Mucosal edema present.   Mouth/Throat: Uvula is midline and oropharynx is clear and moist.   +PND   Neck: Neck supple. No JVD present. No thyromegaly present.   Cardiovascular: Normal rate, regular rhythm and normal heart sounds. Exam reveals no gallop and no friction rub.   No murmur heard.  Pulmonary/Chest: Effort normal and breath sounds normal. No respiratory distress. She has no wheezes. She has no rales.   Abdominal: Soft. Bowel sounds are normal. She exhibits no distension. There is no tenderness. There is no rebound and no guarding.   Musculoskeletal: She exhibits no edema.   Neurological: She is alert.   Skin: Skin is warm and dry.   Psychiatric: She has a normal mood and affect. Her behavior is normal.   Nursing note and vitals reviewed.      Assessment/Plan   Karen was seen today for follow-up.    Diagnoses and all orders for this visit:    Acute non-recurrent maxillary sinusitis  -     azithromycin (ZITHROMAX) 250 MG tablet; Take first 2 tablets together, then 1 every day until finished.    Migraine without aura and without status migrainosus, not intractable  -     dexamethasone (DECADRON) 4 MG tablet; Take 1 tablet by mouth 2 (Two) Times a Day With Meals.        -gave samples of trintellix  -dexamethasone - for headaches;  Let me know if doesn't work  -abx as directed.         -Follow up: 3 months and prn

## 2019-01-14 RX ORDER — TOPIRAMATE 100 MG/1
TABLET, FILM COATED ORAL
Qty: 30 TABLET | Refills: 1 | Status: SHIPPED | OUTPATIENT
Start: 2019-01-14 | End: 2019-02-14

## 2019-01-14 RX ORDER — OMEPRAZOLE 40 MG/1
CAPSULE, DELAYED RELEASE ORAL
Qty: 30 CAPSULE | Refills: 1 | Status: SHIPPED | OUTPATIENT
Start: 2019-01-14 | End: 2019-06-30 | Stop reason: SDUPTHER

## 2019-01-14 RX ORDER — ALBUTEROL SULFATE 1.25 MG/3ML
SOLUTION RESPIRATORY (INHALATION)
Qty: 60 ML | Refills: 11 | Status: SHIPPED | OUTPATIENT
Start: 2019-01-14 | End: 2022-02-11 | Stop reason: SDUPTHER

## 2019-01-14 RX ORDER — ALBUTEROL SULFATE 90 UG/1
AEROSOL, METERED RESPIRATORY (INHALATION)
Qty: 18 G | Refills: 1 | Status: SHIPPED | OUTPATIENT
Start: 2019-01-14 | End: 2019-03-18 | Stop reason: SDUPTHER

## 2019-01-22 RX ORDER — OSELTAMIVIR PHOSPHATE 75 MG/1
75 CAPSULE ORAL DAILY
Qty: 10 CAPSULE | Refills: 0 | Status: SHIPPED | OUTPATIENT
Start: 2019-01-22 | End: 2019-02-14

## 2019-01-23 DIAGNOSIS — F32.2 SEVERE MAJOR DEPRESSION WITHOUT PSYCHOTIC FEATURES (HCC): ICD-10-CM

## 2019-01-23 DIAGNOSIS — G43.009 MIGRAINE WITHOUT AURA AND WITHOUT STATUS MIGRAINOSUS, NOT INTRACTABLE: ICD-10-CM

## 2019-01-23 RX ORDER — LAMOTRIGINE 100 MG/1
TABLET ORAL
Qty: 45 TABLET | Refills: 2 | Status: SHIPPED | OUTPATIENT
Start: 2019-01-23 | End: 2019-02-14 | Stop reason: SDUPTHER

## 2019-01-28 RX ORDER — PHENTERMINE HYDROCHLORIDE 37.5 MG/1
TABLET ORAL
Qty: 30 TABLET | Refills: 0 | OUTPATIENT
Start: 2019-01-28

## 2019-02-14 ENCOUNTER — OFFICE VISIT (OUTPATIENT)
Dept: FAMILY MEDICINE CLINIC | Facility: CLINIC | Age: 49
End: 2019-02-14

## 2019-02-14 VITALS
WEIGHT: 216 LBS | TEMPERATURE: 97.8 F | DIASTOLIC BLOOD PRESSURE: 60 MMHG | BODY MASS INDEX: 31.88 KG/M2 | SYSTOLIC BLOOD PRESSURE: 120 MMHG | OXYGEN SATURATION: 97 % | HEART RATE: 76 BPM

## 2019-02-14 DIAGNOSIS — G43.009 MIGRAINE WITHOUT AURA AND WITHOUT STATUS MIGRAINOSUS, NOT INTRACTABLE: ICD-10-CM

## 2019-02-14 DIAGNOSIS — F32.2 SEVERE MAJOR DEPRESSION WITHOUT PSYCHOTIC FEATURES (HCC): ICD-10-CM

## 2019-02-14 DIAGNOSIS — F41.8 DEPRESSION WITH ANXIETY: Primary | ICD-10-CM

## 2019-02-14 DIAGNOSIS — Z79.899 POLYPHARMACY: ICD-10-CM

## 2019-02-14 DIAGNOSIS — K59.09 OTHER CONSTIPATION: ICD-10-CM

## 2019-02-14 PROCEDURE — 99213 OFFICE O/P EST LOW 20 MIN: CPT | Performed by: FAMILY MEDICINE

## 2019-02-14 RX ORDER — LAMOTRIGINE 100 MG/1
TABLET ORAL
Qty: 60 TABLET | Refills: 5 | Status: SHIPPED | OUTPATIENT
Start: 2019-02-14 | End: 2019-08-05

## 2019-02-14 RX ORDER — TOPIRAMATE 25 MG/1
TABLET ORAL
Qty: 49 TABLET | Refills: 0 | Status: SHIPPED | OUTPATIENT
Start: 2019-02-14 | End: 2019-03-08

## 2019-02-14 RX ORDER — POLYETHYLENE GLYCOL 3350 17 G/17G
17 POWDER, FOR SOLUTION ORAL NIGHTLY
Qty: 30 PACKET | Refills: 5 | Status: SHIPPED | OUTPATIENT
Start: 2019-02-14 | End: 2019-10-11 | Stop reason: SDUPTHER

## 2019-02-14 NOTE — PROGRESS NOTES
Subjective   Karen Nash is a 48 y.o. female. Presents today for   Chief Complaint   Patient presents with   • Follow-up     Having bouts of crying and wants to leave home and never come back.       Constipation   This is a chronic problem. The current episode started more than 1 month ago. The problem is unchanged. The stool is described as pellet like. The patient is on a high fiber diet. Pertinent negatives include no hematochezia or melena. Associated symptoms comments: Reports having rectal prolapse, has had to go to ER to place back in.. She has tried diet changes for the symptoms. The treatment provided no relief.   Anxiety   Presents for follow-up visit. Symptoms include depressed mood, excessive worry and nervous/anxious behavior. Patient reports no suicidal ideas. Symptoms occur most days. The severity of symptoms is moderate. The quality of sleep is fair. Nighttime awakenings: occasional.     Compliance with medications is %. Treatment side effects: possible constipation.       Review of Systems   Gastrointestinal: Positive for constipation. Negative for hematochezia and melena.   Psychiatric/Behavioral: Negative for suicidal ideas. The patient is nervous/anxious.        Patient Active Problem List   Diagnosis   • Abdominal pain   • Asthma with acute exacerbation   • Atopic rhinitis   • Anemia   • Benign essential hypertension   • Constipation   • Dyslipidemia   • Gastroesophageal reflux disease   • Migraine   • Muscle cramps   • Vocal cord dysfunction   • Female dyspareunia   • Vaginal atrophy       Social History     Socioeconomic History   • Marital status:      Spouse name: Not on file   • Number of children: Not on file   • Years of education: Not on file   • Highest education level: Not on file   Tobacco Use   • Smoking status: Former Smoker     Types: Cigarettes   • Smokeless tobacco: Never Used   Substance and Sexual Activity   • Alcohol use: Yes     Comment: SOCIAL USE   • Drug  use: No   • Sexual activity: Yes     Partners: Male     Birth control/protection: None       Allergies   Allergen Reactions   • Corn-Containing Products Rash   • Penicillins    • Sulfamethoxazole-Trimethoprim Hives and Itching       Current Outpatient Medications on File Prior to Visit   Medication Sig Dispense Refill   • acetaminophen (TYLENOL) 500 MG tablet Take 500 mg by mouth Every 6 (Six) Hours As Needed for Mild Pain .     • albuterol (ACCUNEB) 1.25 MG/3ML nebulizer solution USE 1 AMPULE IN NEBULIZER EVERY SIX HOURS AS NEEDED FOR WHEEZING  60 mL 11   • buPROPion XL (WELLBUTRIN XL) 300 MG 24 hr tablet TAKE 1 TABLET BY MOUTH ONE TIME A DAY  30 tablet 3   • CALCIUM-MAGNESIUM-ZINC PO Take  by mouth.     • Cetirizine HCl 10 MG capsule Take 10 mg by mouth Daily. 30 capsule 5   • Estradiol 10 MCG insert Insert 1 capsule into the vagina 2 (Two) Times a Week. 8 each 12   • etodolac (LODINE) 500 MG tablet Take 500 mg by mouth.     • fluticasone (FLONASE) 50 MCG/ACT nasal spray 2 sprays into each nostril Daily. 16 g 11   • folic acid (FOLVITE) 1 MG tablet Take 1 mg by mouth.     • hydrochlorothiazide (HYDRODIURIL) 25 MG tablet TAKE 1 TABLET BY MOUTH ONE TIME A DAY  30 tablet 2   • lamoTRIgine (LaMICtal) 100 MG tablet TAKE 1/2 TABLET BY MOUTH IN THE MORNING AND 1 TABLET  BY MOUTH NIGHTLY 45 tablet 2   • montelukast (SINGULAIR) 10 MG tablet TAKE 1 TABLET BY MOUTH AT BEDTIME  30 tablet 9   • Multiple Vitamin (MULTI VITAMIN DAILY PO) Take by mouth daily.     • omeprazole (priLOSEC) 40 MG capsule TAKE 1 CAPSULE BY MOUTH ONE TIME A DAY  30 capsule 1   • oxybutynin XL (DITROPAN-XL) 10 MG 24 hr tablet TAKE 1 TABLET BY MOUTH ONE TIME A DAY  30 tablet 5   • phentermine (ADIPEX-P) 37.5 MG tablet Take 37.5 mg by mouth Every Morning.  2   • pregabalin (LYRICA) 150 MG capsule Take 1 capsule by mouth 2 (Two) Times a Day. 60 capsule 2   • Saline (NASOGEL) gel 1 spray each nostril twice daily 30 g 12   • SYMBICORT 160-4.5 MCG/ACT inhaler  INHALE 2 PUFFS BY MOUTH TWO TIMES A DAY  10.2 g 10   • topiramate (TOPAMAX) 100 MG tablet TAKE 1 TABLET BY MOUTH TWO TIMES A DAY  30 tablet 1   • traZODone (DESYREL) 100 MG tablet TAKE 1 TABLET BY MOUTH AT BEDTIME  30 tablet 2   • VENTOLIN  (90 Base) MCG/ACT inhaler INHALE 2 PUFFS BY MOUTH EVERY SIX HOURS AS NEEDED FOR WHEEZING  18 g 1   • Vortioxetine HBr (TRINTELLIX) 10 MG tablet After 5mg daily, take 10mg daily (either 2 5mg tabs or 1 10mg tab daily). 30 tablet    • [DISCONTINUED] azithromycin (ZITHROMAX) 250 MG tablet Take first 2 tablets together, then 1 every day until finished. 6 tablet 0   • [DISCONTINUED] dexamethasone (DECADRON) 4 MG tablet Take 1 tablet by mouth 2 (Two) Times a Day With Meals. 10 tablet 0   • [DISCONTINUED] omeprazole (priLOSEC) 40 MG capsule TAKE 1 CAPSULE BY MOUTH ONE TIME A DAY  30 capsule 5   • [DISCONTINUED] oseltamivir (TAMIFLU) 75 MG capsule Take 1 capsule by mouth Daily. 10 capsule 0     No current facility-administered medications on file prior to visit.        Objective   Vitals:    02/14/19 1138   BP: 120/60   Pulse: 76   Temp: 97.8 °F (36.6 °C)   SpO2: 97%   Weight: 98 kg (216 lb)       Physical Exam   Constitutional: She is oriented to person, place, and time. She appears well-developed and well-nourished.   Neurological: She is alert and oriented to person, place, and time.   Skin: Skin is warm and dry.   Psychiatric: Her behavior is normal. Her mood appears anxious. She exhibits a depressed mood.   Nursing note and vitals reviewed.      Assessment/Plan   Karen was seen today for follow-up.    Diagnoses and all orders for this visit:    Depression with anxiety    Other constipation  -     polyethylene glycol (MIRALAX) packet; Take 17 g by mouth Every Night.    Polypharmacy    Severe major depression without psychotic features (CMS/HCC)  -     lamoTRIgine (LaMICtal) 100 MG tablet; 1 po bid    Migraine without aura and without status migrainosus, not intractable  -      lamoTRIgine (LaMICtal) 100 MG tablet; 1 po bid    Other orders  -     topiramate (TOPAMAX) 25 MG tablet; 2 po bid x 7 days, 1 po bid x 7 days, 1 po pm x 7 days then stop    patient in very bad place, has polypharmacy and thinks some of her symptoms related, will start weaning off meds  trintellix on samples as not covered, 1/2 po daily x 14 days then stop.  Wean topamax;  Will increase lamictal to 1 po bid for now.  Consider wean off trazodone and/or wellbutrin as well.    Suspect constipation as cause start miralax nightly.         -Follow up:

## 2019-02-14 NOTE — PATIENT INSTRUCTIONS
Trintellix 10mg 1/2 po daily x 14 days then stop.   Will wean off topiramate (topamax)  I sent stool softener (miralax) to take nightly for stool  Will increase lamotrigine to 1 pill twice daily  Stop phentermine for now.

## 2019-02-18 RX ORDER — PREGABALIN 150 MG/1
150 CAPSULE ORAL 2 TIMES DAILY
Qty: 60 CAPSULE | Refills: 5 | Status: SHIPPED | OUTPATIENT
Start: 2019-02-18 | End: 2019-03-19 | Stop reason: SDUPTHER

## 2019-02-18 RX ORDER — HYDROCHLOROTHIAZIDE 25 MG/1
TABLET ORAL
Qty: 30 TABLET | Refills: 1 | Status: SHIPPED | OUTPATIENT
Start: 2019-02-18 | End: 2020-01-02

## 2019-03-05 ENCOUNTER — TELEPHONE (OUTPATIENT)
Dept: OBSTETRICS AND GYNECOLOGY | Age: 49
End: 2019-03-05

## 2019-03-07 ENCOUNTER — TELEPHONE (OUTPATIENT)
Dept: FAMILY MEDICINE CLINIC | Facility: CLINIC | Age: 49
End: 2019-03-07

## 2019-03-07 DIAGNOSIS — E66.09 CLASS 1 OBESITY DUE TO EXCESS CALORIES WITH SERIOUS COMORBIDITY AND BODY MASS INDEX (BMI) OF 34.0 TO 34.9 IN ADULT: Primary | ICD-10-CM

## 2019-03-07 RX ORDER — PHENTERMINE HYDROCHLORIDE 37.5 MG/1
37.5 CAPSULE ORAL EVERY MORNING
Qty: 30 CAPSULE | Refills: 2 | Status: SHIPPED | OUTPATIENT
Start: 2019-03-07 | End: 2019-08-05 | Stop reason: SDUPTHER

## 2019-03-08 RX ORDER — AMITRIPTYLINE HYDROCHLORIDE 25 MG/1
25 TABLET, FILM COATED ORAL NIGHTLY
Qty: 30 TABLET | Refills: 5 | Status: SHIPPED | OUTPATIENT
Start: 2019-03-08 | End: 2019-03-26

## 2019-03-08 RX ORDER — TRAZODONE HYDROCHLORIDE 50 MG/1
TABLET ORAL
Qty: 20 TABLET | Refills: 0 | Status: SHIPPED | OUTPATIENT
Start: 2019-03-08 | End: 2019-03-26

## 2019-03-08 NOTE — PROGRESS NOTES
Ok off trintellix;  But migraines worse off topamax, though thinking more clearly;  Will wean off trazodone and try elavil.

## 2019-03-18 DIAGNOSIS — J30.9 ATOPIC RHINITIS: ICD-10-CM

## 2019-03-18 DIAGNOSIS — J45.40 MODERATE PERSISTENT ASTHMA WITHOUT COMPLICATION: ICD-10-CM

## 2019-03-18 RX ORDER — HYDROCHLOROTHIAZIDE 25 MG/1
TABLET ORAL
Qty: 30 TABLET | Refills: 5 | Status: SHIPPED | OUTPATIENT
Start: 2019-03-18 | End: 2019-03-26

## 2019-03-18 RX ORDER — MONTELUKAST SODIUM 10 MG/1
TABLET ORAL
Qty: 30 TABLET | Refills: 8 | Status: SHIPPED | OUTPATIENT
Start: 2019-03-18 | End: 2020-03-23

## 2019-03-18 RX ORDER — TRAZODONE HYDROCHLORIDE 100 MG/1
TABLET ORAL
Qty: 15 TABLET | Refills: 5 | Status: SHIPPED | OUTPATIENT
Start: 2019-03-18 | End: 2019-03-26

## 2019-03-18 RX ORDER — ALBUTEROL SULFATE 90 UG/1
AEROSOL, METERED RESPIRATORY (INHALATION)
Qty: 18 G | Refills: 0 | Status: SHIPPED | OUTPATIENT
Start: 2019-03-18 | End: 2020-01-22

## 2019-03-18 RX ORDER — TRAZODONE HYDROCHLORIDE 50 MG/1
TABLET ORAL
Qty: 20 TABLET | Refills: 0 | OUTPATIENT
Start: 2019-03-18

## 2019-03-18 RX ORDER — TOPIRAMATE 100 MG/1
TABLET, FILM COATED ORAL
Qty: 30 TABLET | Refills: 5 | Status: SHIPPED | OUTPATIENT
Start: 2019-03-18 | End: 2019-03-26

## 2019-03-19 RX ORDER — PREGABALIN 150 MG/1
150 CAPSULE ORAL 2 TIMES DAILY
Qty: 60 CAPSULE | Refills: 5 | Status: SHIPPED | OUTPATIENT
Start: 2019-03-19 | End: 2019-10-07 | Stop reason: SDUPTHER

## 2019-03-26 ENCOUNTER — OFFICE VISIT (OUTPATIENT)
Dept: FAMILY MEDICINE CLINIC | Facility: CLINIC | Age: 49
End: 2019-03-26

## 2019-03-26 VITALS
DIASTOLIC BLOOD PRESSURE: 70 MMHG | OXYGEN SATURATION: 98 % | TEMPERATURE: 97.9 F | HEART RATE: 76 BPM | WEIGHT: 228 LBS | SYSTOLIC BLOOD PRESSURE: 110 MMHG | BODY MASS INDEX: 33.65 KG/M2

## 2019-03-26 DIAGNOSIS — F41.8 DEPRESSION WITH ANXIETY: Primary | ICD-10-CM

## 2019-03-26 DIAGNOSIS — G43.009 MIGRAINE WITHOUT AURA AND WITHOUT STATUS MIGRAINOSUS, NOT INTRACTABLE: ICD-10-CM

## 2019-03-26 PROCEDURE — 99213 OFFICE O/P EST LOW 20 MIN: CPT | Performed by: FAMILY MEDICINE

## 2019-03-26 RX ORDER — AMITRIPTYLINE HYDROCHLORIDE 50 MG/1
50 TABLET, FILM COATED ORAL NIGHTLY
Qty: 30 TABLET | Refills: 5 | Status: SHIPPED | OUTPATIENT
Start: 2019-03-26 | End: 2019-11-04

## 2019-03-26 NOTE — PROGRESS NOTES
Subjective   Karen Nash is a 48 y.o. female. Presents today for   Chief Complaint   Patient presents with   • Follow-up     meds changed and hair loss and ears ringing       History of Present Illness  Patient here for f/u of anxiety, stress and migraines.  Had stop topamax, trazodone last ov as trying to cut down on meds and was not fully controlled mood.  Has hair falling out, diffuse.  Restarted phentermine since last seen as weight increased.  Last ov weaned off trintellix not covered.  Migraines frequent, mostly daily.  Mostly dull headache, back of head.  Can be very severe for 15 minutes.  Is not sleeping well.      Review of Systems   Respiratory: Negative for shortness of breath.    Cardiovascular: Negative for chest pain.   Psychiatric/Behavioral: Positive for sleep disturbance. The patient is nervous/anxious.        Patient Active Problem List   Diagnosis   • Abdominal pain   • Asthma with acute exacerbation   • Atopic rhinitis   • Anemia   • Benign essential hypertension   • Constipation   • Dyslipidemia   • Gastroesophageal reflux disease   • Migraine   • Muscle cramps   • Vocal cord dysfunction   • Female dyspareunia   • Vaginal atrophy       Social History     Socioeconomic History   • Marital status:      Spouse name: Not on file   • Number of children: Not on file   • Years of education: Not on file   • Highest education level: Not on file   Tobacco Use   • Smoking status: Former Smoker     Types: Cigarettes   • Smokeless tobacco: Never Used   Substance and Sexual Activity   • Alcohol use: Yes     Comment: SOCIAL USE   • Drug use: No   • Sexual activity: Yes     Partners: Male     Birth control/protection: None       Allergies   Allergen Reactions   • Corn-Containing Products Rash   • Penicillins    • Sulfamethoxazole-Trimethoprim Hives and Itching       Current Outpatient Medications on File Prior to Visit   Medication Sig Dispense Refill   • acetaminophen (TYLENOL) 500 MG tablet Take 500  mg by mouth Every 6 (Six) Hours As Needed for Mild Pain .     • albuterol (ACCUNEB) 1.25 MG/3ML nebulizer solution USE 1 AMPULE IN NEBULIZER EVERY SIX HOURS AS NEEDED FOR WHEEZING  60 mL 11   • ALBUTEROL SULFATE  (90 Base) MCG/ACT inhaler INHALE 2 PUFFS BY MOUTH EVERY SIX HOURS AS NEEDED for wheezing  18 g 0   • buPROPion XL (WELLBUTRIN XL) 300 MG 24 hr tablet TAKE 1 TABLET BY MOUTH ONE TIME A DAY  30 tablet 3   • CALCIUM-MAGNESIUM-ZINC PO Take  by mouth.     • Cetirizine HCl 10 MG capsule Take 10 mg by mouth Daily. 30 capsule 5   • Estradiol 10 MCG insert Insert 1 capsule into the vagina 2 (Two) Times a Week. 8 each 12   • etodolac (LODINE) 500 MG tablet Take 500 mg by mouth.     • fluticasone (FLONASE) 50 MCG/ACT nasal spray 2 sprays into each nostril Daily. 16 g 11   • folic acid (FOLVITE) 1 MG tablet Take 1 mg by mouth.     • hydrochlorothiazide (HYDRODIURIL) 25 MG tablet TAKE 1 TABLET BY MOUTH ONE TIME A DAY  30 tablet 1   • lamoTRIgine (LaMICtal) 100 MG tablet 1 po bid 60 tablet 5   • montelukast (SINGULAIR) 10 MG tablet TAKE 1 TABLET BY MOUTH AT BEDTIME  30 tablet 8   • Multiple Vitamin (MULTI VITAMIN DAILY PO) Take by mouth daily.     • omeprazole (priLOSEC) 40 MG capsule TAKE 1 CAPSULE BY MOUTH ONE TIME A DAY  30 capsule 1   • oxybutynin XL (DITROPAN-XL) 10 MG 24 hr tablet TAKE 1 TABLET BY MOUTH ONE TIME A DAY  30 tablet 5   • phentermine 37.5 MG capsule Take 1 capsule by mouth Every Morning. 30 capsule 2   • polyethylene glycol (MIRALAX) packet Take 17 g by mouth Every Night. 30 packet 5   • pregabalin (LYRICA) 150 MG capsule Take 1 capsule by mouth 2 (Two) Times a Day. 60 capsule 5   • Saline (NASOGEL) gel 1 spray each nostril twice daily 30 g 12   • SYMBICORT 160-4.5 MCG/ACT inhaler INHALE 2 PUFFS BY MOUTH TWO TIMES A DAY  10.2 g 10   • [DISCONTINUED] amitriptyline (ELAVIL) 25 MG tablet Take 1 tablet by mouth Every Night. 30 tablet 5   • [DISCONTINUED] hydrochlorothiazide (HYDRODIURIL) 25 MG  tablet TAKE 1 TABLET BY MOUTH ONE TIME A DAY  30 tablet 5   • [DISCONTINUED] topiramate (TOPAMAX) 100 MG tablet TAKE 1 TABLET BY MOUTH TWO TIMES A DAY  30 tablet 5   • [DISCONTINUED] traZODone (DESYREL) 100 MG tablet TAKE 1 TABLET BY MOUTH AT BEDTIME  15 tablet 5   • [DISCONTINUED] traZODone (DESYREL) 50 MG tablet 1.5 tab nightly x 7 d, 1 tab nightly x 7d, 1/2 tab nightly x 7d then stop 20 tablet 0     No current facility-administered medications on file prior to visit.        Objective   Vitals:    03/26/19 1013   BP: 110/70   Pulse: 76   Temp: 97.9 °F (36.6 °C)   SpO2: 98%   Weight: 103 kg (228 lb)       Physical Exam   Constitutional: She is oriented to person, place, and time. She appears well-developed and well-nourished.   Neurological: She is alert and oriented to person, place, and time.   Skin: Skin is warm and dry.   Psychiatric: Her behavior is normal. Her mood appears anxious.   Nursing note and vitals reviewed.      Assessment/Plan   Karen was seen today for follow-up.    Diagnoses and all orders for this visit:    Depression with anxiety  -     Ambulatory Referral to Neuropsychology    Migraine without aura and without status migrainosus, not intractable  -     amitriptyline (ELAVIL) 50 MG tablet; Take 1 tablet by mouth Every Night.    -patient struggling;  Will increase elavil to 50mg po nightly for migraines.  Patient asks about bipolor, will order neuropsych to evaluate further.  -d/w hair loss at length possible weigh tloss meds;  Will stay off topamax for now.  Tried reducing med burden as on several.         -Follow up: 6 weeks and prn

## 2019-04-12 ENCOUNTER — OFFICE VISIT (OUTPATIENT)
Dept: OBSTETRICS AND GYNECOLOGY | Age: 49
End: 2019-04-12

## 2019-04-12 ENCOUNTER — PROCEDURE VISIT (OUTPATIENT)
Dept: OBSTETRICS AND GYNECOLOGY | Age: 49
End: 2019-04-12

## 2019-04-12 VITALS
SYSTOLIC BLOOD PRESSURE: 126 MMHG | WEIGHT: 233 LBS | BODY MASS INDEX: 34.51 KG/M2 | HEIGHT: 69 IN | DIASTOLIC BLOOD PRESSURE: 84 MMHG

## 2019-04-12 DIAGNOSIS — N95.2 VAGINAL ATROPHY: ICD-10-CM

## 2019-04-12 DIAGNOSIS — R10.2 PELVIC PAIN: Primary | ICD-10-CM

## 2019-04-12 DIAGNOSIS — Z13.89 SCREENING FOR BLOOD OR PROTEIN IN URINE: Primary | ICD-10-CM

## 2019-04-12 DIAGNOSIS — N94.10 FEMALE DYSPAREUNIA: ICD-10-CM

## 2019-04-12 DIAGNOSIS — N39.3 STRESS INCONTINENCE OF URINE: ICD-10-CM

## 2019-04-12 LAB
BILIRUB BLD-MCNC: ABNORMAL MG/DL
CLARITY, POC: CLEAR
COLOR UR: YELLOW
GLUCOSE UR STRIP-MCNC: NEGATIVE MG/DL
KETONES UR QL: NEGATIVE
LEUKOCYTE EST, POC: ABNORMAL
NITRITE UR-MCNC: NEGATIVE MG/ML
PH UR: 6 [PH] (ref 5–8)
PROT UR STRIP-MCNC: NEGATIVE MG/DL
RBC # UR STRIP: NEGATIVE /UL
SP GR UR: 1.02 (ref 1–1.03)
UROBILINOGEN UR QL: NORMAL

## 2019-04-12 PROCEDURE — 76830 TRANSVAGINAL US NON-OB: CPT | Performed by: OBSTETRICS & GYNECOLOGY

## 2019-04-12 PROCEDURE — 99213 OFFICE O/P EST LOW 20 MIN: CPT | Performed by: OBSTETRICS & GYNECOLOGY

## 2019-04-12 RX ORDER — TRAZODONE HYDROCHLORIDE 100 MG/1
100 TABLET ORAL
Refills: 5 | COMMUNITY
Start: 2019-03-31 | End: 2019-05-07

## 2019-04-12 RX ORDER — TOPIRAMATE 100 MG/1
100 TABLET, FILM COATED ORAL 2 TIMES DAILY
Refills: 5 | COMMUNITY
Start: 2019-03-31 | End: 2019-05-07

## 2019-04-12 NOTE — PROGRESS NOTES
"Subjective   Karennoe Nash is a 48 y.o. female U/S for pelvic pain / vaginal atrophy.  TVUS today reveals 6 cm AV uterus with normal ovaries.  Reports has noticed 40% improvement in dyspareunia since starting imvexxy in December.  Advised her to continue and will f/u in 6 months.       History of Present Illness    The following portions of the patient's history were reviewed and updated as appropriate: allergies, current medications, past family history, past medical history, past social history, past surgical history and problem list.    Review of Systems   Constitutional: Negative for chills and fever.   Gastrointestinal: Negative for abdominal distention and abdominal pain.   Genitourinary: Positive for dyspareunia. Negative for dysuria, menstrual problem, pelvic pain, vaginal bleeding, vaginal discharge and vaginal pain.        + incontinence    All other systems reviewed and are negative.    /84   Ht 175.3 cm (69\")   Wt 106 kg (233 lb)   LMP  (LMP Unknown)   Breastfeeding? No   BMI 34.41 kg/m²     Objective   Physical Exam   Constitutional: She is oriented to person, place, and time. She appears well-developed and well-nourished.   Pulmonary/Chest: Effort normal.   Neurological: She is alert and oriented to person, place, and time.   Skin: Skin is warm and dry.   Psychiatric: She has a normal mood and affect. Her behavior is normal.   Nursing note and vitals reviewed.      Assessment/Plan   Karen was seen today for follow-up.    Diagnoses and all orders for this visit:    Screening for blood or protein in urine  -     POC Urinalysis Dipstick    Vaginal atrophy    Female dyspareunia    Stress incontinence of urine       Continue Imvexxy   If UI not better in 6 months will refer to Urogyn   Counseling was given to patient for the following topics: diagnostic results, instructions for management and impressions . Total time of the encounter was 20 minutes and 15 minutes was spend counseling.            "

## 2019-04-20 DIAGNOSIS — F32.2 SEVERE MAJOR DEPRESSION (HCC): ICD-10-CM

## 2019-04-20 RX ORDER — BUPROPION HYDROCHLORIDE 300 MG/1
TABLET ORAL
Qty: 30 TABLET | Refills: 2 | Status: SHIPPED | OUTPATIENT
Start: 2019-04-20 | End: 2019-05-07

## 2019-05-07 ENCOUNTER — OFFICE VISIT (OUTPATIENT)
Dept: FAMILY MEDICINE CLINIC | Facility: CLINIC | Age: 49
End: 2019-05-07

## 2019-05-07 VITALS
HEART RATE: 68 BPM | DIASTOLIC BLOOD PRESSURE: 80 MMHG | BODY MASS INDEX: 34.96 KG/M2 | SYSTOLIC BLOOD PRESSURE: 110 MMHG | WEIGHT: 236 LBS | OXYGEN SATURATION: 98 % | HEIGHT: 69 IN

## 2019-05-07 DIAGNOSIS — F41.8 DEPRESSION WITH ANXIETY: Primary | ICD-10-CM

## 2019-05-07 PROCEDURE — 99213 OFFICE O/P EST LOW 20 MIN: CPT | Performed by: FAMILY MEDICINE

## 2019-05-07 RX ORDER — BUPROPION HYDROCHLORIDE 150 MG/1
TABLET ORAL
Qty: 14 TABLET | Refills: 0 | Status: SHIPPED | OUTPATIENT
Start: 2019-05-07 | End: 2019-06-21

## 2019-05-07 RX ORDER — FLUOXETINE HYDROCHLORIDE 20 MG/1
CAPSULE ORAL
Qty: 30 CAPSULE | Refills: 5 | Status: SHIPPED | OUTPATIENT
Start: 2019-05-07 | End: 2019-06-21 | Stop reason: SDUPTHER

## 2019-05-07 RX ORDER — FLUOXETINE 10 MG/1
TABLET, FILM COATED ORAL
Qty: 14 TABLET | Refills: 0 | Status: SHIPPED | OUTPATIENT
Start: 2019-05-07 | End: 2019-06-21

## 2019-05-07 NOTE — PROGRESS NOTES
Subjective   Karen Nash is a 48 y.o. female. Presents today for   Chief Complaint   Patient presents with   • Depression   • Anxiety       Depression   Visit Type: follow-up  Patient presents with the following symptoms: anhedonia, decreased concentration, depressed mood, excessive worry, fatigue, feelings of worthlessness, hypersomnia, irritability, memory impairment, muscle tension, nervousness/anxiety, panic, thoughts of death and weight gain.  Patient is not experiencing: chest pain, insomnia, shortness of breath and suicidal ideas.  Frequency of symptoms: most days   Severity: severe   Sleep quality: fair  Nighttime awakenings: occasional  Compliance with medications:  %  Side effects:  Weight gain (felt like having memory loss and so took off topamax, however now migraines worse.  Feels forgetful.  Gains weight on medications;  WE have been trying to reduce medication burden as well since polypharmacy.  She has severe stress, cares for mom and kids)      Review of Systems   Constitutional: Positive for irritability and weight gain.   Respiratory: Negative for shortness of breath.    Psychiatric/Behavioral: Positive for decreased concentration. Negative for suicidal ideas. The patient is nervous/anxious. The patient does not have insomnia.        Patient Active Problem List   Diagnosis   • Abdominal pain   • Asthma with acute exacerbation   • Atopic rhinitis   • Anemia   • Benign essential hypertension   • Constipation   • Dyslipidemia   • Gastroesophageal reflux disease   • Migraine   • Muscle cramps   • Vocal cord dysfunction   • Female dyspareunia   • Vaginal atrophy   • Pelvic pain   • Stress incontinence of urine       Social History     Socioeconomic History   • Marital status:      Spouse name: Not on file   • Number of children: 0   • Years of education: Not on file   • Highest education level: Not on file   Tobacco Use   • Smoking status: Former Smoker     Types: Cigarettes   •  Smokeless tobacco: Never Used   Substance and Sexual Activity   • Alcohol use: Yes     Comment: SOCIAL USE   • Drug use: No   • Sexual activity: Yes     Partners: Male     Birth control/protection: None       Allergies   Allergen Reactions   • Sulfamethoxazole-Trimethoprim Hives and Itching   • Corn-Containing Products Rash   • Penicillins        Current Outpatient Medications on File Prior to Visit   Medication Sig Dispense Refill   • acetaminophen (TYLENOL) 500 MG tablet Take 500 mg by mouth Every 6 (Six) Hours As Needed for Mild Pain .     • albuterol (ACCUNEB) 1.25 MG/3ML nebulizer solution USE 1 AMPULE IN NEBULIZER EVERY SIX HOURS AS NEEDED FOR WHEEZING  60 mL 11   • ALBUTEROL SULFATE  (90 Base) MCG/ACT inhaler INHALE 2 PUFFS BY MOUTH EVERY SIX HOURS AS NEEDED for wheezing  18 g 0   • amitriptyline (ELAVIL) 50 MG tablet Take 1 tablet by mouth Every Night. 30 tablet 5   • CALCIUM-MAGNESIUM-ZINC PO Take  by mouth.     • Cetirizine HCl 10 MG capsule Take 10 mg by mouth Daily. 30 capsule 5   • Estradiol 10 MCG insert Insert 1 capsule into the vagina 2 (Two) Times a Week. 8 each 12   • etodolac (LODINE) 500 MG tablet Take 500 mg by mouth.     • fluticasone (FLONASE) 50 MCG/ACT nasal spray 2 sprays into each nostril Daily. (Patient taking differently: 2 sprays into the nostril(s) as directed by provider Daily As Needed.) 16 g 11   • folic acid (FOLVITE) 1 MG tablet Take 1 mg by mouth.     • hydrochlorothiazide (HYDRODIURIL) 25 MG tablet TAKE 1 TABLET BY MOUTH ONE TIME A DAY  30 tablet 1   • lamoTRIgine (LaMICtal) 100 MG tablet 1 po bid 60 tablet 5   • montelukast (SINGULAIR) 10 MG tablet TAKE 1 TABLET BY MOUTH AT BEDTIME  30 tablet 8   • Multiple Vitamin (MULTI VITAMIN DAILY PO) Take by mouth daily.     • omeprazole (priLOSEC) 40 MG capsule TAKE 1 CAPSULE BY MOUTH ONE TIME A DAY  30 capsule 1   • oxybutynin XL (DITROPAN-XL) 10 MG 24 hr tablet TAKE 1 TABLET BY MOUTH ONE TIME A DAY  30 tablet 5   • phentermine  "37.5 MG capsule Take 1 capsule by mouth Every Morning. 30 capsule 2   • polyethylene glycol (MIRALAX) packet Take 17 g by mouth Every Night. 30 packet 5   • pregabalin (LYRICA) 150 MG capsule Take 1 capsule by mouth 2 (Two) Times a Day. 60 capsule 5   • Saline (NASOGEL) gel 1 spray each nostril twice daily 30 g 12   • SYMBICORT 160-4.5 MCG/ACT inhaler INHALE 2 PUFFS BY MOUTH TWO TIMES A DAY  10.2 g 10   • [DISCONTINUED] buPROPion XL (WELLBUTRIN XL) 300 MG 24 hr tablet TAKE 1 TABLET BY MOUTH ONE TIME A DAY  30 tablet 2   • [DISCONTINUED] topiramate (TOPAMAX) 100 MG tablet Take 100 mg by mouth 2 (Two) Times a Day.  5   • [DISCONTINUED] traZODone (DESYREL) 100 MG tablet Take 100 mg by mouth every night at bedtime.  5     No current facility-administered medications on file prior to visit.        Objective   Vitals:    05/07/19 1112   BP: 110/80   BP Location: Left arm   Patient Position: Sitting   Cuff Size: Adult   Pulse: 68   SpO2: 98%   Weight: 107 kg (236 lb)   Height: 175.3 cm (69\")       Physical Exam   Constitutional: She is oriented to person, place, and time. She appears well-developed and well-nourished.   Neurological: She is alert and oriented to person, place, and time.   Skin: Skin is warm and dry.   Psychiatric: Her behavior is normal. Her mood appears anxious. She exhibits a depressed mood.   Nursing note and vitals reviewed.      Assessment/Plan   Karen was seen today for depression and anxiety.    Diagnoses and all orders for this visit:    Depression with anxiety  -     FLUoxetine (PROzac) 10 MG tablet; 1 po daily x 14 days, then 20mg 1 po daily  -     FLUoxetine (PROzac) 20 MG capsule; 1 po daily  -     buPROPion XL (WELLBUTRIN XL) 150 MG 24 hr tablet; When start 20mg fluoxetine, start 150mg wellbutrin (stop 300mg) and take 1 daily x 14 days then stop.    -we discussed psychology and psychiatry but declines due to prior bad experiences;  She is struggling as has VCD, asthma, RA with chronic pain, " raising 2 children with special needs and caring for Mother with several chronic conditions.  She hasn't been able to work and has always been independent and hard working.  We discussed the stressors bc does have several.  She is a wonderful Mother and works hard to help her children get through school and survive.  I d/w further medication options and will try going off wellbutrin as never really noticed a difference, will wean after start prozac.           -Follow up: 6 weeks and prn  SEEK care immediately if thoughts of self harm

## 2019-05-08 ENCOUNTER — TELEPHONE (OUTPATIENT)
Dept: FAMILY MEDICINE CLINIC | Facility: CLINIC | Age: 49
End: 2019-05-08

## 2019-05-09 NOTE — TELEPHONE ENCOUNTER
We don't take [facility] anymore?  I don't know many of the primary care physicians or what they take as Berta said to her.  THe best option is have them call [facility] patient liason or Primary are referral center at [facility] and they can help them find something.  Also, I would recommend they contact their insurance to see who takes it and they can look at reviews online.  RRJ

## 2019-06-05 RX ORDER — TRAZODONE HYDROCHLORIDE 100 MG/1
TABLET ORAL
Qty: 15 TABLET | Refills: 4 | OUTPATIENT
Start: 2019-06-05

## 2019-06-05 RX ORDER — TOPIRAMATE 100 MG/1
TABLET, FILM COATED ORAL
Qty: 30 TABLET | Refills: 4 | OUTPATIENT
Start: 2019-06-05

## 2019-06-21 ENCOUNTER — OFFICE VISIT (OUTPATIENT)
Dept: FAMILY MEDICINE CLINIC | Facility: CLINIC | Age: 49
End: 2019-06-21

## 2019-06-21 VITALS
WEIGHT: 238 LBS | HEIGHT: 69 IN | OXYGEN SATURATION: 96 % | HEART RATE: 77 BPM | SYSTOLIC BLOOD PRESSURE: 135 MMHG | TEMPERATURE: 98.1 F | DIASTOLIC BLOOD PRESSURE: 90 MMHG | BODY MASS INDEX: 35.25 KG/M2

## 2019-06-21 DIAGNOSIS — F32.2 CURRENT SEVERE EPISODE OF MAJOR DEPRESSIVE DISORDER WITHOUT PSYCHOTIC FEATURES WITHOUT PRIOR EPISODE (HCC): Primary | ICD-10-CM

## 2019-06-21 DIAGNOSIS — G44.52 NEW DAILY PERSISTENT HEADACHE: ICD-10-CM

## 2019-06-21 DIAGNOSIS — R41.3 MEMORY LOSS: ICD-10-CM

## 2019-06-21 DIAGNOSIS — F41.8 DEPRESSION WITH ANXIETY: ICD-10-CM

## 2019-06-21 PROCEDURE — 99213 OFFICE O/P EST LOW 20 MIN: CPT | Performed by: FAMILY MEDICINE

## 2019-06-21 RX ORDER — FLUOXETINE HYDROCHLORIDE 40 MG/1
CAPSULE ORAL
Qty: 30 CAPSULE | Refills: 5 | Status: SHIPPED | OUTPATIENT
Start: 2019-06-21 | End: 2019-11-04 | Stop reason: SDUPTHER

## 2019-06-21 NOTE — PROGRESS NOTES
Subjective   Karen Nash is a 49 y.o. female. Presents today for   Chief Complaint   Patient presents with   • Follow-up     one month follow up.   • Depression       Depression   Visit Type: follow-up  Patient presents with the following symptoms: anhedonia, depressed mood, dry mouth, excessive worry, fatigue, feelings of hopelessness, feelings of worthlessness, irritability, memory impairment, muscle tension, thoughts of death and weight gain.  Patient is not experiencing: suicidal ideas and suicidal planning.  Frequency of symptoms: constantly   Severity: severe   Sleep quality: non-restorative  Nighttime awakenings: several  Compliance with medications:  %  Treatment side effects: Has had weight gain with some.  Have been taking off medications as many no help.  Did have neuropsych exam, reviewed with patient and major depression.  Therapy recommended, asked about genesight testing.  Has severe headaches, hx of migreaines but diff.      Review of Systems   Constitutional: Positive for irritability and weight gain.   Psychiatric/Behavioral: Negative for suicidal ideas.       Patient Active Problem List   Diagnosis   • Abdominal pain   • Asthma with acute exacerbation   • Atopic rhinitis   • Anemia   • Benign essential hypertension   • Constipation   • Dyslipidemia   • Gastroesophageal reflux disease   • Migraine   • Muscle cramps   • Vocal cord dysfunction   • Female dyspareunia   • Vaginal atrophy   • Pelvic pain   • Stress incontinence of urine       Social History     Socioeconomic History   • Marital status:      Spouse name: Not on file   • Number of children: 0   • Years of education: Not on file   • Highest education level: Not on file   Tobacco Use   • Smoking status: Former Smoker     Types: Cigarettes   • Smokeless tobacco: Never Used   Substance and Sexual Activity   • Alcohol use: Yes     Comment: SOCIAL USE   • Drug use: No   • Sexual activity: Yes     Partners: Male     Birth  control/protection: None       Allergies   Allergen Reactions   • Sulfamethoxazole-Trimethoprim Hives and Itching   • Corn-Containing Products Rash   • Penicillins        Current Outpatient Medications on File Prior to Visit   Medication Sig Dispense Refill   • acetaminophen (TYLENOL) 500 MG tablet Take 500 mg by mouth Every 6 (Six) Hours As Needed for Mild Pain .     • albuterol (ACCUNEB) 1.25 MG/3ML nebulizer solution USE 1 AMPULE IN NEBULIZER EVERY SIX HOURS AS NEEDED FOR WHEEZING  60 mL 11   • ALBUTEROL SULFATE  (90 Base) MCG/ACT inhaler INHALE 2 PUFFS BY MOUTH EVERY SIX HOURS AS NEEDED for wheezing  18 g 0   • amitriptyline (ELAVIL) 50 MG tablet Take 1 tablet by mouth Every Night. 30 tablet 5   • AMLODIPINE BESYLATE PO Take 10 mg by mouth.     • CALCIUM-MAGNESIUM-ZINC PO Take  by mouth.     • Cetirizine HCl 10 MG capsule Take 10 mg by mouth Daily. 30 capsule 5   • Estradiol 10 MCG insert Insert 1 capsule into the vagina 2 (Two) Times a Week. 8 each 12   • fluticasone (FLONASE) 50 MCG/ACT nasal spray 2 sprays into each nostril Daily. (Patient taking differently: 2 sprays into the nostril(s) as directed by provider Daily As Needed.) 16 g 11   • folic acid (FOLVITE) 1 MG tablet Take 1 mg by mouth.     • hydrochlorothiazide (HYDRODIURIL) 25 MG tablet TAKE 1 TABLET BY MOUTH ONE TIME A DAY  30 tablet 1   • lamoTRIgine (LaMICtal) 100 MG tablet 1 po bid 60 tablet 5   • montelukast (SINGULAIR) 10 MG tablet TAKE 1 TABLET BY MOUTH AT BEDTIME  30 tablet 8   • Multiple Vitamin (MULTI VITAMIN DAILY PO) Take by mouth daily.     • omeprazole (priLOSEC) 40 MG capsule TAKE 1 CAPSULE BY MOUTH ONE TIME A DAY  30 capsule 1   • oxybutynin XL (DITROPAN-XL) 10 MG 24 hr tablet TAKE 1 TABLET BY MOUTH ONE TIME A DAY  30 tablet 5   • phentermine 37.5 MG capsule Take 1 capsule by mouth Every Morning. 30 capsule 2   • polyethylene glycol (MIRALAX) packet Take 17 g by mouth Every Night. 30 packet 5   • pregabalin (LYRICA) 150 MG  "capsule Take 1 capsule by mouth 2 (Two) Times a Day. 60 capsule 5   • Saline (NASOGEL) gel 1 spray each nostril twice daily 30 g 12   • SYMBICORT 160-4.5 MCG/ACT inhaler INHALE 2 PUFFS BY MOUTH TWO TIMES A DAY  10.2 g 10   • [DISCONTINUED] buPROPion XL (WELLBUTRIN XL) 150 MG 24 hr tablet When start 20mg fluoxetine, start 150mg wellbutrin (stop 300mg) and take 1 daily x 14 days then stop. 14 tablet 0   • [DISCONTINUED] FLUoxetine (PROzac) 20 MG capsule 1 po daily 30 capsule 5   • [DISCONTINUED] etodolac (LODINE) 500 MG tablet Take 500 mg by mouth.     • [DISCONTINUED] FLUoxetine (PROzac) 10 MG tablet 1 po daily x 14 days, then 20mg 1 po daily 14 tablet 0     No current facility-administered medications on file prior to visit.        Objective   Vitals:    06/21/19 1152   BP: 135/90   BP Location: Left arm   Patient Position: Sitting   Cuff Size: Adult   Pulse: 77   Temp: 98.1 °F (36.7 °C)   TempSrc: Oral   SpO2: 96%   Weight: 108 kg (238 lb)   Height: 175.3 cm (69\")       Physical Exam   Constitutional: She is oriented to person, place, and time. She appears well-developed and well-nourished.   Neurological: She is alert and oriented to person, place, and time.   Skin: Skin is warm and dry.   Psychiatric: Her behavior is normal. Her mood appears anxious. She exhibits a depressed mood.   Nursing note and vitals reviewed.      Assessment/Plan   Karen was seen today for follow-up and depression.    Diagnoses and all orders for this visit:    Current severe episode of major depressive disorder without psychotic features without prior episode (CMS/[facility])    Depression with anxiety  -     FLUoxetine (PROzac) 40 MG capsule; 1 po daily    New daily persistent headache  -     MRI Brain With & Without Contrast; Future    Memory loss  -     MRI Brain With & Without Contrast; Future    -check MRI of brain as not had, new onset/differnet headaches reported;    -recalcitrant depression - recommend therapy, may consider where had " neuropsych as had some past trust issues with therapists.  D/w genesight and suspect would be helpful, though concerns for costs raised.  Gave handout on it and encrouaged to contact them on coverage with her insurance.  In the interim, I will increase prozac dose to see if helps.  I also d/w at length given severity and failed to respond to typical treatment, I  Suggested she see a psychiatrist and recommend to help her.          -Follow up: 6 weeks and prn

## 2019-06-28 ENCOUNTER — APPOINTMENT (OUTPATIENT)
Dept: MRI IMAGING | Facility: HOSPITAL | Age: 49
End: 2019-06-28

## 2019-06-30 DIAGNOSIS — N39.41 URGE URINARY INCONTINENCE: ICD-10-CM

## 2019-07-01 RX ORDER — OMEPRAZOLE 40 MG/1
CAPSULE, DELAYED RELEASE ORAL
Qty: 30 CAPSULE | Refills: 4 | Status: SHIPPED | OUTPATIENT
Start: 2019-07-01 | End: 2019-12-02 | Stop reason: SDUPTHER

## 2019-07-01 RX ORDER — OXYBUTYNIN CHLORIDE 10 MG/1
TABLET, EXTENDED RELEASE ORAL
Qty: 30 TABLET | Refills: 4 | Status: SHIPPED | OUTPATIENT
Start: 2019-07-01 | End: 2020-01-07 | Stop reason: SDUPTHER

## 2019-07-05 ENCOUNTER — HOSPITAL ENCOUNTER (OUTPATIENT)
Dept: MRI IMAGING | Facility: HOSPITAL | Age: 49
Discharge: HOME | End: 2019-07-05
Admitting: FAMILY MEDICINE

## 2019-07-05 DIAGNOSIS — R41.3 MEMORY LOSS: ICD-10-CM

## 2019-07-05 DIAGNOSIS — G44.52 NEW DAILY PERSISTENT HEADACHE: ICD-10-CM

## 2019-07-05 PROCEDURE — 70553 MRI BRAIN STEM W/O & W/DYE: CPT

## 2019-07-05 PROCEDURE — 82565 ASSAY OF CREATININE: CPT

## 2019-07-05 PROCEDURE — 0 GADOBENATE DIMEGLUMINE 529 MG/ML SOLUTION: Performed by: FAMILY MEDICINE

## 2019-07-05 PROCEDURE — A9577 INJ MULTIHANCE: HCPCS | Performed by: FAMILY MEDICINE

## 2019-07-05 RX ADMIN — GADOBENATE DIMEGLUMINE 20 ML: 529 INJECTION, SOLUTION INTRAVENOUS at 07:28

## 2019-07-08 LAB — CREAT BLDA-MCNC: 0.8 MG/DL (ref 0.6–1.3)

## 2019-07-13 DIAGNOSIS — F32.2 SEVERE MAJOR DEPRESSION (HCC): ICD-10-CM

## 2019-07-15 ENCOUNTER — OUTSIDE FACILITY SERVICE (OUTPATIENT)
Dept: NEUROLOGY | Facility: CLINIC | Age: 49
End: 2019-07-15

## 2019-07-15 PROCEDURE — 95886 MUSC TEST DONE W/N TEST COMP: CPT | Performed by: PSYCHIATRY & NEUROLOGY

## 2019-07-15 PROCEDURE — 95910 NRV CNDJ TEST 7-8 STUDIES: CPT | Performed by: PSYCHIATRY & NEUROLOGY

## 2019-07-15 RX ORDER — BUPROPION HYDROCHLORIDE 300 MG/1
TABLET ORAL
Qty: 30 TABLET | Refills: 1 | OUTPATIENT
Start: 2019-07-15

## 2019-07-31 RX ORDER — PHENTERMINE HYDROCHLORIDE 37.5 MG/1
TABLET ORAL
Qty: 30 TABLET | Refills: 1 | Status: SHIPPED | OUTPATIENT
Start: 2019-07-31 | End: 2019-08-05 | Stop reason: SDUPTHER

## 2019-08-05 ENCOUNTER — OFFICE VISIT (OUTPATIENT)
Dept: FAMILY MEDICINE CLINIC | Facility: CLINIC | Age: 49
End: 2019-08-05

## 2019-08-05 VITALS
TEMPERATURE: 97.9 F | WEIGHT: 242 LBS | OXYGEN SATURATION: 98 % | BODY MASS INDEX: 35.74 KG/M2 | HEART RATE: 78 BPM | DIASTOLIC BLOOD PRESSURE: 80 MMHG | SYSTOLIC BLOOD PRESSURE: 138 MMHG

## 2019-08-05 DIAGNOSIS — E66.01 CLASS 2 SEVERE OBESITY DUE TO EXCESS CALORIES WITH SERIOUS COMORBIDITY AND BODY MASS INDEX (BMI) OF 35.0 TO 35.9 IN ADULT (HCC): ICD-10-CM

## 2019-08-05 DIAGNOSIS — F32.2 SEVERE MAJOR DEPRESSION WITHOUT PSYCHOTIC FEATURES (HCC): ICD-10-CM

## 2019-08-05 DIAGNOSIS — F33.41 RECURRENT MAJOR DEPRESSIVE DISORDER, IN PARTIAL REMISSION (HCC): Primary | ICD-10-CM

## 2019-08-05 DIAGNOSIS — G43.009 MIGRAINE WITHOUT AURA AND WITHOUT STATUS MIGRAINOSUS, NOT INTRACTABLE: ICD-10-CM

## 2019-08-05 PROCEDURE — 99214 OFFICE O/P EST MOD 30 MIN: CPT | Performed by: FAMILY MEDICINE

## 2019-08-05 RX ORDER — PHENTERMINE HYDROCHLORIDE 37.5 MG/1
37.5 TABLET ORAL EVERY MORNING
Qty: 30 TABLET | Refills: 2 | Status: SHIPPED | OUTPATIENT
Start: 2019-08-05 | End: 2020-03-23

## 2019-08-05 RX ORDER — LAMOTRIGINE 100 MG/1
TABLET ORAL
Qty: 60 TABLET | Refills: 5
Start: 2019-08-05 | End: 2019-11-04

## 2019-08-05 NOTE — PROGRESS NOTES
Subjective   Karen Nash is a 49 y.o. female. Presents today for   Chief Complaint   Patient presents with   • Follow-up     weight loss and med check   • Depression       Depression   Visit Type: follow-up  Patient presents with the following symptoms: depressed mood.  Patient is not experiencing: anhedonia, feelings of worthlessness, shortness of breath, suicidal ideas, suicidal planning and thoughts of death.  Frequency of symptoms: occasionally   Severity: mild   Sleep quality: fair  Nighttime awakenings: occasional  Compliance with medications:  %  Side effects:  Weight gain (Doing on better on prozac then others;  )  took 18 day road trip to [place] with 2 kids, Mother, pregnant niece and her BF;  Was stressful, but enjoyed.  Still off/on migraines, has worsened off topamax, stopped as issues with memory.     Review of Systems   Respiratory: Negative for shortness of breath.    Cardiovascular: Negative for chest pain.   Neurological: Positive for headaches.   Psychiatric/Behavioral: Negative for self-injury and suicidal ideas.       Patient Active Problem List   Diagnosis   • Abdominal pain   • Asthma with acute exacerbation   • Atopic rhinitis   • Anemia   • Benign essential hypertension   • Constipation   • Dyslipidemia   • Gastroesophageal reflux disease   • Migraine   • Muscle cramps   • Vocal cord dysfunction   • Female dyspareunia   • Vaginal atrophy   • Pelvic pain   • Stress incontinence of urine       Social History     Socioeconomic History   • Marital status:      Spouse name: Not on file   • Number of children: 0   • Years of education: Not on file   • Highest education level: Not on file   Tobacco Use   • Smoking status: Former Smoker     Types: Cigarettes   • Smokeless tobacco: Never Used   Substance and Sexual Activity   • Alcohol use: Yes     Comment: SOCIAL USE   • Drug use: No   • Sexual activity: Yes     Partners: Male     Birth control/protection: None       Allergies    Allergen Reactions   • Sulfamethoxazole-Trimethoprim Hives and Itching   • Corn-Containing Products Rash   • Penicillins        Current Outpatient Medications on File Prior to Visit   Medication Sig Dispense Refill   • acetaminophen (TYLENOL) 500 MG tablet Take 500 mg by mouth Every 6 (Six) Hours As Needed for Mild Pain .     • albuterol (ACCUNEB) 1.25 MG/3ML nebulizer solution USE 1 AMPULE IN NEBULIZER EVERY SIX HOURS AS NEEDED FOR WHEEZING  60 mL 11   • ALBUTEROL SULFATE  (90 Base) MCG/ACT inhaler INHALE 2 PUFFS BY MOUTH EVERY SIX HOURS AS NEEDED for wheezing  18 g 0   • amitriptyline (ELAVIL) 50 MG tablet Take 1 tablet by mouth Every Night. 30 tablet 5   • AMLODIPINE BESYLATE PO Take 10 mg by mouth.     • CALCIUM-MAGNESIUM-ZINC PO Take  by mouth.     • Cetirizine HCl 10 MG capsule Take 10 mg by mouth Daily. 30 capsule 5   • Estradiol 10 MCG insert Insert 1 capsule into the vagina 2 (Two) Times a Week. 8 each 12   • FLUoxetine (PROzac) 40 MG capsule 1 po daily 30 capsule 5   • fluticasone (FLONASE) 50 MCG/ACT nasal spray 2 sprays into each nostril Daily. (Patient taking differently: 2 sprays into the nostril(s) as directed by provider Daily As Needed.) 16 g 11   • folic acid (FOLVITE) 1 MG tablet Take 1 mg by mouth.     • hydrochlorothiazide (HYDRODIURIL) 25 MG tablet TAKE 1 TABLET BY MOUTH ONE TIME A DAY  30 tablet 1   • montelukast (SINGULAIR) 10 MG tablet TAKE 1 TABLET BY MOUTH AT BEDTIME  30 tablet 8   • Multiple Vitamin (MULTI VITAMIN DAILY PO) Take by mouth daily.     • omeprazole (priLOSEC) 40 MG capsule TAKE 1 CAPSULE BY MOUTH ONE TIME A DAY  30 capsule 4   • oxybutynin XL (DITROPAN-XL) 10 MG 24 hr tablet TAKE 1 TABLET BY MOUTH ONE TIME A DAY  30 tablet 4   • polyethylene glycol (MIRALAX) packet Take 17 g by mouth Every Night. 30 packet 5   • pregabalin (LYRICA) 150 MG capsule Take 1 capsule by mouth 2 (Two) Times a Day. 60 capsule 5   • Saline (NASOGEL) gel 1 spray each nostril twice daily 30 g  12   • SYMBICORT 160-4.5 MCG/ACT inhaler INHALE 2 PUFFS BY MOUTH TWO TIMES A DAY  10.2 g 10     No current facility-administered medications on file prior to visit.        Objective   Vitals:    08/05/19 0918   BP: 138/80   Pulse: 78   Temp: 97.9 °F (36.6 °C)   SpO2: 98%   Weight: 110 kg (242 lb)       Physical Exam   Constitutional: She is oriented to person, place, and time. She appears well-developed and well-nourished.   Neurological: She is alert and oriented to person, place, and time.   Skin: Skin is warm and dry.   Psychiatric: She has a normal mood and affect. Her behavior is normal.   Nursing note and vitals reviewed.      Assessment/Plan   Karen was seen today for follow-up and depression.    Diagnoses and all orders for this visit:    Recurrent major depressive disorder, in partial remission (CMS/HCC)    Class 2 severe obesity due to excess calories with serious comorbidity and body mass index (BMI) of 35.0 to 35.9 in adult (CMS/HCC)  -     phentermine (ADIPEX-P) 37.5 MG tablet; Take 1 tablet by mouth Every Morning.    Severe major depression without psychotic features (CMS/HCC)  -     lamoTRIgine (LaMICtal) 100 MG tablet; 1//2 am and 1 pm x 7d, 1/2 2 times daily    Migraine without aura and without status migrainosus, not intractable  -     lamoTRIgine (LaMICtal) 100 MG tablet; 1//2 am and 1 pm x 7d, 1/2 2 times daily     Mood much better on prozac, would still like to come off some medications;   Will try weaning off lamictal;  Reports if misses dose can tell;  To let me know if doesn't do well and can put back on or slow wean;  D/w getting off lyrica as can lead to weight gain, but feels has helped her pain a great deal.  Counseled on diet, exercise and weight loss;  Would like ttry continue weigh tloss medication;  Refilled;  Warned of risks including cardiovascular and can raise bp  RTC if not doing well.       -Follow up: 3 months and prn

## 2019-09-03 ENCOUNTER — TELEPHONE (OUTPATIENT)
Dept: FAMILY MEDICINE CLINIC | Facility: CLINIC | Age: 49
End: 2019-09-03

## 2019-09-18 DIAGNOSIS — J45.40 MODERATE PERSISTENT ASTHMA WITHOUT COMPLICATION: Primary | ICD-10-CM

## 2019-09-18 RX ORDER — METHYLPREDNISOLONE 4 MG/1
TABLET ORAL
Qty: 21 TABLET | Refills: 0 | Status: SHIPPED | OUTPATIENT
Start: 2019-09-18 | End: 2019-11-04

## 2019-10-08 RX ORDER — PREGABALIN 150 MG/1
CAPSULE ORAL
Qty: 60 CAPSULE | Refills: 4 | Status: SHIPPED | OUTPATIENT
Start: 2019-10-08 | End: 2019-11-04

## 2019-10-11 DIAGNOSIS — K59.09 OTHER CONSTIPATION: ICD-10-CM

## 2019-10-11 RX ORDER — POLYETHYLENE GLYCOL 3350 17 G/17G
POWDER, FOR SOLUTION ORAL
Qty: 30 PACKET | Refills: 4 | Status: SHIPPED | OUTPATIENT
Start: 2019-10-11

## 2019-10-31 ENCOUNTER — OFFICE VISIT (OUTPATIENT)
Dept: NEUROLOGY | Facility: CLINIC | Age: 49
End: 2019-10-31

## 2019-10-31 ENCOUNTER — APPOINTMENT (OUTPATIENT)
Dept: LAB | Facility: HOSPITAL | Age: 49
End: 2019-10-31

## 2019-10-31 VITALS — OXYGEN SATURATION: 98 % | HEIGHT: 68 IN | WEIGHT: 240 LBS | HEART RATE: 85 BPM | BODY MASS INDEX: 36.37 KG/M2

## 2019-10-31 DIAGNOSIS — R20.2 PARESTHESIAS: Primary | ICD-10-CM

## 2019-10-31 LAB — VIT B12 BLD-MCNC: 701 PG/ML (ref 211–946)

## 2019-10-31 PROCEDURE — 36415 COLL VENOUS BLD VENIPUNCTURE: CPT | Performed by: PSYCHIATRY & NEUROLOGY

## 2019-10-31 PROCEDURE — 82607 VITAMIN B-12: CPT | Performed by: PSYCHIATRY & NEUROLOGY

## 2019-10-31 PROCEDURE — 99243 OFF/OP CNSLTJ NEW/EST LOW 30: CPT | Performed by: PSYCHIATRY & NEUROLOGY

## 2019-10-31 NOTE — PROGRESS NOTES
Chief Complaint   Patient presents with   • Numbness     right shoulder and hurt    • Headache       Patient ID: Karen Nash is a 49 y.o. female.    HPI: Thank you for referring your patient to see is here in the neurology clinic for evaluation.  As you may know she is a 49-year-old female with history of symptoms of burning and tingling in her shoulders.  She states that she feels the sensations primarily where the shoulder meets the clavicle.  It is both sides.  She states that initially she had some tightness in her lower neck region posteriorly.  This was followed by burning-like sensations that are in the shoulder region laterally and radiating down into her fingers.  She states that it is an ongoing issue and has not improved since onset.  She denies any recent head or neck injuries.  She did have an EMG/nerve conduction study not long ago which was within normal limits.  She denies any focal weakness.  She does not have any numbness.  It is mainly the burning and tingling sensations.  She does not have any symptoms in her legs or feet.  No bowel or bladder symptoms.  No trouble swallowing no double vision or loss of vision.  No tinnitus.  There are no alleviating or exacerbating features to this.    The following portions of the patient's history were reviewed and updated as appropriate: allergies, current medications, past family history, past medical history, past social history, past surgical history and problem list.    Review of Systems   Constitutional: Positive for appetite change, diaphoresis, fatigue and unexpected weight change.   HENT: Negative for facial swelling, hearing loss and trouble swallowing.    Eyes: Positive for photophobia (during headaches ). Negative for redness and visual disturbance.   Respiratory: Negative for chest tightness, shortness of breath and wheezing.    Cardiovascular: Negative for chest pain, palpitations and leg swelling.   Gastrointestinal: Negative for abdominal  pain, nausea and vomiting.   Endocrine: Positive for polydipsia. Negative for cold intolerance and heat intolerance.   Genitourinary: Positive for enuresis and urgency.   Musculoskeletal: Positive for arthralgias, back pain, joint swelling, myalgias (shoulder pain ), neck pain and neck stiffness.   Skin: Negative for color change, rash and wound.   Allergic/Immunologic: Positive for environmental allergies and food allergies. Negative for immunocompromised state.   Neurological: Positive for numbness (both arms ) and headaches (since teenage years. sees aura at times. can occur in the front of head back and tempols). Negative for dizziness, tremors, seizures, syncope, facial asymmetry, speech difficulty, weakness and light-headedness.   Hematological: Negative for adenopathy. Does not bruise/bleed easily.   Psychiatric/Behavioral: Positive for decreased concentration and sleep disturbance. Negative for agitation, behavioral problems, confusion, dysphoric mood, hallucinations, self-injury and suicidal ideas. The patient is nervous/anxious. The patient is not hyperactive.       I reviewed and agree with the above review of systems completed by the medical assistant.    Vitals:    10/31/19 1055   Pulse: 85   SpO2: 98%       Neurologic Exam     Mental Status   Oriented to person, place, and time.   Registration: recalls 3 of 3 objects. Follows 3 step commands.   Attention: normal. Concentration: normal.   Speech: speech is normal   Level of consciousness: alert  Knowledge: consistent with education (No deficits found.).   Normal comprehension.     Cranial Nerves     CN II   Visual fields full to confrontation.     CN III, IV, VI   Pupils are equal, round, and reactive to light.  Extraocular motions are normal.   CN III: no CN III palsy  CN VI: no CN VI palsy  Nystagmus: none   Diplopia: none    CN V   Facial sensation intact.     CN VII   Facial expression full, symmetric.     CN VIII   CN VIII normal.     CN IX, X   CN  IX normal.   CN X normal.     CN XI   CN XI normal.     CN XII   CN XII normal.     Motor Exam   Muscle bulk: normal  Right arm tone: normal  Left arm tone: normal  Right leg tone: normal  Left leg tone: normal    Strength   Right neck flexion: 5/5  Left neck flexion: 5/5  Right neck extension: 5/5  Left neck extension: 5/5  Right deltoid: 5/5  Left deltoid: 5/5  Right biceps: 5/5  Left biceps: 5/5  Right triceps: 5/5  Left triceps: 5/5  Right wrist flexion: 5/5  Left wrist flexion: 5/5  Right wrist extension: 5/5  Left wrist extension: 5/5  Right interossei: 5/5  Left interossei: 5/5  Right abdominals: 5/5  Left abdominals: 5/5  Right iliopsoas: 5/5  Left iliopsoas: 5/5  Right quadriceps: 5/5  Left quadriceps: 5/5  Right hamstrin/5  Left hamstrin/5  Right glutei: 5/5  Left glutei: 5/5  Right anterior tibial: 5/5  Left anterior tibial: 5/5  Right posterior tibial: 5/5  Left posterior tibial: 5/5  Right peroneal: 5/5  Left peroneal: 5/5  Right gastroc: 5/5  Left gastroc: 5/5    Sensory Exam   Light touch normal.   Vibration normal.   Proprioception normal.   Pinprick normal.     Gait, Coordination, and Reflexes     Gait  Gait: normal    Coordination   Romberg: negative    Tremor   Resting tremor: absent  Intention tremor: absent    Reflexes   Right brachioradialis: 2+  Left brachioradialis: 2+  Right biceps: 2+  Left biceps: 2+  Right triceps: 2+  Left triceps: 2+  Right patellar: 2+  Left patellar: 2+  Right achilles: 2+  Left achilles: 2+  Right : 2+  Left : 2+Station is normal.       Physical Exam   Constitutional: She is oriented to person, place, and time. She appears well-developed. No distress.   HENT:   Head: Normocephalic and atraumatic.   Eyes: EOM are normal. Pupils are equal, round, and reactive to light.   Neck: Normal range of motion.   Cardiovascular: Normal rate, regular rhythm and normal heart sounds.   Pulmonary/Chest: Effort normal and breath sounds normal. No respiratory distress.    Abdominal: Soft. Bowel sounds are normal. She exhibits no distension. There is no tenderness.   Musculoskeletal: She exhibits no edema or deformity.   Neurological: She is oriented to person, place, and time. She has a normal Romberg Test. Gait normal.   Reflex Scores:       Tricep reflexes are 2+ on the right side and 2+ on the left side.       Bicep reflexes are 2+ on the right side and 2+ on the left side.       Brachioradialis reflexes are 2+ on the right side and 2+ on the left side.       Patellar reflexes are 2+ on the right side and 2+ on the left side.       Achilles reflexes are 2+ on the right side and 2+ on the left side.  Skin: Skin is warm. No rash noted.   Psychiatric: She has a normal mood and affect. Her speech is normal. Judgment normal.   Vitals reviewed.      Procedures    Assessment/Plan: With a normal EMG/nerve conduction study this is not likely nerve damage in the arms nor is it coming from her cervical spine.  I doubt she has a cervical syrinx which presents differently.  It is possible that she has paresthesias related to vitamin B12 deficiency.  We will check her vitamin B12 level today.  She will call for results.  We will discuss further management at that time.       Karen was seen today for numbness and headache.    Diagnoses and all orders for this visit:    Paresthesias  -     Vitamin B12           Shashi Pepe II, MD

## 2019-11-04 ENCOUNTER — OFFICE VISIT (OUTPATIENT)
Dept: FAMILY MEDICINE CLINIC | Facility: CLINIC | Age: 49
End: 2019-11-04

## 2019-11-04 VITALS
DIASTOLIC BLOOD PRESSURE: 78 MMHG | TEMPERATURE: 98.4 F | WEIGHT: 244 LBS | BODY MASS INDEX: 37.1 KG/M2 | SYSTOLIC BLOOD PRESSURE: 130 MMHG | OXYGEN SATURATION: 96 % | HEART RATE: 83 BPM

## 2019-11-04 DIAGNOSIS — E66.01 CLASS 2 SEVERE OBESITY DUE TO EXCESS CALORIES WITH SERIOUS COMORBIDITY AND BODY MASS INDEX (BMI) OF 36.0 TO 36.9 IN ADULT (HCC): ICD-10-CM

## 2019-11-04 DIAGNOSIS — Z23 FLU VACCINE NEED: ICD-10-CM

## 2019-11-04 DIAGNOSIS — M79.7 FIBROMYALGIA, PRIMARY: ICD-10-CM

## 2019-11-04 DIAGNOSIS — F41.8 DEPRESSION WITH ANXIETY: Primary | ICD-10-CM

## 2019-11-04 PROCEDURE — 90674 CCIIV4 VAC NO PRSV 0.5 ML IM: CPT | Performed by: FAMILY MEDICINE

## 2019-11-04 PROCEDURE — 90471 IMMUNIZATION ADMIN: CPT | Performed by: FAMILY MEDICINE

## 2019-11-04 PROCEDURE — 99214 OFFICE O/P EST MOD 30 MIN: CPT | Performed by: FAMILY MEDICINE

## 2019-11-04 RX ORDER — FLUOXETINE HYDROCHLORIDE 20 MG/1
20 CAPSULE ORAL DAILY
Qty: 30 CAPSULE | Refills: 5 | Status: SHIPPED | OUTPATIENT
Start: 2019-11-04 | End: 2019-11-26 | Stop reason: SDUPTHER

## 2019-11-04 RX ORDER — PREGABALIN 50 MG/1
CAPSULE ORAL
Qty: 34 CAPSULE | Refills: 0 | Status: SHIPPED | OUTPATIENT
Start: 2019-11-04 | End: 2019-11-26

## 2019-11-04 RX ORDER — FLUOXETINE HYDROCHLORIDE 40 MG/1
CAPSULE ORAL
Qty: 30 CAPSULE | Refills: 5 | Status: SHIPPED | OUTPATIENT
Start: 2019-11-04 | End: 2019-12-30 | Stop reason: SDUPTHER

## 2019-11-04 NOTE — PROGRESS NOTES
Subjective   Karen Nash is a 49 y.o. female. Presents today for   Chief Complaint   Patient presents with   • Follow-up     weight loss     Here for f/u of weight and mood;  Have been weaning off meds to see if helps with weight as polypharmacy.    Depression   Visit Type: follow-up  Patient presents with the following symptoms: anhedonia, depressed mood, feelings of worthlessness, irritability, memory impairment, muscle tension, nervousness/anxiety and thoughts of death.  Patient is not experiencing: suicidal ideas and suicidal planning.  Frequency of symptoms: most days   Severity: interfering with daily activities (depression much improved, but anxiety severe;  )   Sleep quality: poor  Nighttime awakenings: occasional  Compliance with medications:  %  Side effects:  Weight gain  Obesity   This is a chronic problem. The current episode started more than 1 year ago. The problem occurs constantly. The problem has been gradually worsening. Associated symptoms include arthralgias, fatigue and myalgias. Treatments tried: phentermine. The treatment provided mild relief.   Fibromyalgia   This is a chronic problem. The current episode started more than 1 year ago. The problem occurs intermittently. The problem has been waxing and waning. Associated symptoms include arthralgias, fatigue and myalgias. Exacerbated by: Has RA as well, sees rheumatology. Treatments tried: currently on lyrica. The treatment provided mild relief.     Saw neurology had b12 checked and normal;  Having numbness/tingling in arms;  Reports has some neck pain;   Reports had normal EMG/NCV;    Review of Systems   Constitutional: Positive for fatigue and irritability.   Musculoskeletal: Positive for arthralgias and myalgias.   Psychiatric/Behavioral: Negative for suicidal ideas. The patient is nervous/anxious.        Patient Active Problem List   Diagnosis   • Abdominal pain   • Asthma with acute exacerbation   • Atopic rhinitis   • Anemia   •  Benign essential hypertension   • Constipation   • Dyslipidemia   • Gastroesophageal reflux disease   • Migraine   • Muscle cramps   • Vocal cord dysfunction   • Female dyspareunia   • Vaginal atrophy   • Pelvic pain   • Stress incontinence of urine       Social History     Socioeconomic History   • Marital status:      Spouse name: Not on file   • Number of children: 0   • Years of education: Not on file   • Highest education level: Not on file   Tobacco Use   • Smoking status: Former Smoker     Types: Cigarettes   • Smokeless tobacco: Never Used   Substance and Sexual Activity   • Alcohol use: Yes     Alcohol/week: 0.6 oz     Types: 1 Glasses of wine per week     Comment: SOCIAL USE//caffeine coffee daily    • Drug use: No   • Sexual activity: Yes     Partners: Male     Birth control/protection: None       Allergies   Allergen Reactions   • Sulfamethoxazole-Trimethoprim Hives and Itching   • Corn-Containing Products Rash   • Penicillins        Current Outpatient Medications on File Prior to Visit   Medication Sig Dispense Refill   • acetaminophen (TYLENOL) 500 MG tablet Take 500 mg by mouth Every 6 (Six) Hours As Needed for Mild Pain .     • albuterol (ACCUNEB) 1.25 MG/3ML nebulizer solution USE 1 AMPULE IN NEBULIZER EVERY SIX HOURS AS NEEDED FOR WHEEZING  60 mL 11   • ALBUTEROL SULFATE  (90 Base) MCG/ACT inhaler INHALE 2 PUFFS BY MOUTH EVERY SIX HOURS AS NEEDED for wheezing  18 g 0   • amitriptyline (ELAVIL) 50 MG tablet Take 1 tablet by mouth Every Night. 30 tablet 5   • AMLODIPINE BESYLATE PO Take 10 mg by mouth.     • CALCIUM-MAGNESIUM-ZINC PO Take  by mouth.     • Cetirizine HCl 10 MG capsule Take 10 mg by mouth Daily. 30 capsule 5   • Estradiol 10 MCG insert Insert 1 capsule into the vagina 2 (Two) Times a Week. 8 each 12   • FLUoxetine (PROzac) 40 MG capsule 1 po daily 30 capsule 5   • fluticasone (FLONASE) 50 MCG/ACT nasal spray 2 sprays into each nostril Daily. (Patient taking differently: 2  sprays into the nostril(s) as directed by provider Daily As Needed.) 16 g 11   • folic acid (FOLVITE) 1 MG tablet Take 1 mg by mouth.     • hydrochlorothiazide (HYDRODIURIL) 25 MG tablet TAKE 1 TABLET BY MOUTH ONE TIME A DAY  30 tablet 1   • lamoTRIgine (LaMICtal) 100 MG tablet 1//2 am and 1 pm x 7d, 1/2 2 times daily 60 tablet 5   • methylPREDNISolone (MEDROL, DAVONTE,) 4 MG tablet Take as directed on package instructions. 21 tablet 0   • montelukast (SINGULAIR) 10 MG tablet TAKE 1 TABLET BY MOUTH AT BEDTIME  30 tablet 8   • Multiple Vitamin (MULTI VITAMIN DAILY PO) Take by mouth daily.     • omeprazole (priLOSEC) 40 MG capsule TAKE 1 CAPSULE BY MOUTH ONE TIME A DAY  30 capsule 4   • oxybutynin XL (DITROPAN-XL) 10 MG 24 hr tablet TAKE 1 TABLET BY MOUTH ONE TIME A DAY  30 tablet 4   • phentermine (ADIPEX-P) 37.5 MG tablet Take 1 tablet by mouth Every Morning. 30 tablet 2   • polyethylene glycol (MIRALAX) pack packet mix 1 packet into drink every night 30 packet 4   • pregabalin (LYRICA) 150 MG capsule TAKE 1 CAPSULE BY MOUTH TWO TIMES A DAY  60 capsule 4   • Saline (NASOGEL) gel 1 spray each nostril twice daily 30 g 12   • SYMBICORT 160-4.5 MCG/ACT inhaler INHALE 2 PUFFS BY MOUTH TWO TIMES A DAY  10.2 g 10     No current facility-administered medications on file prior to visit.        Objective   Vitals:    11/04/19 0944   BP: 130/78   Pulse: 83   Temp: 98.4 °F (36.9 °C)   SpO2: 96%   Weight: 111 kg (244 lb)       Physical Exam   Constitutional: She appears well-developed and well-nourished.   HENT:   Head: Normocephalic and atraumatic.   Neck: Neck supple. No JVD present. No thyromegaly present.   Cardiovascular: Normal rate, regular rhythm and normal heart sounds. Exam reveals no gallop and no friction rub.   No murmur heard.  Pulmonary/Chest: Effort normal and breath sounds normal. No respiratory distress. She has no wheezes. She has no rales.   Abdominal: Soft. Bowel sounds are normal. She exhibits no distension.  There is no tenderness. There is no rebound and no guarding.   Musculoskeletal: She exhibits no edema.   Neurological: She is alert.   Skin: Skin is warm and dry.   Psychiatric: Her behavior is normal. Her mood appears anxious.   Nursing note and vitals reviewed.    Vitamin B12   Order: [id number]   Status:  Final result   Visible to patient:  Yes (Kelsie) Next appt:  12/09/2019 at 09:15 AM in Obstetrics and Gynecology (Aparna Diaz MD) Dx:  Paresthesias   Component  Ref Range & Units 4d ago 2yr ago   Vitamin B-12  211 - 946 pg/mL 701  1,623 Abnormally high     Resulting Agency [facility]         Specimen Collected: 10/31/19 11:57 Last Resulted: 10/31/19 13:13 Lab Flowsheet Order Details View Encounter Lab and Collection Details Routing Result History               Assessment/Plan   Karen was seen today for follow-up.    Diagnoses and all orders for this visit:    Depression with anxiety  -     FLUoxetine (PROzac) 20 MG capsule; Take 1 capsule by mouth Daily. Take with 40mg cap  -     FLUoxetine (PROzac) 40 MG capsule; 1 po daily take with 20mg cap    Fibromyalgia, primary  -     pregabalin (LYRICA) 50 MG capsule; 2 po bid x 3 days, then 1po am and 2 po pm x 4 days, then 1 po bid x 3 days, then 1po nightly x 4 days then stop    Class 2 severe obesity due to excess calories with serious comorbidity and body mass index (BMI) of 36.0 to 36.9 in adult (CMS/HCC)      -already off elavil  -doing ok off lamictal;  Reports feels like bugs crawling on her if misses lyrica;  D/w can increase appetite, will try weaning off and see if helps with weight loss and lessen polypharmacy.  D/w if feels more pain, will restart.   May need back for neck.  Directed to call neurology back as directed on b12 results and next steps;  ? MRI of c-spine, d/w emg not 100% in necessarily picking up nerve root issue.       -Follow up:  8 weeks and prn

## 2019-11-26 ENCOUNTER — TELEPHONE (OUTPATIENT)
Dept: FAMILY MEDICINE CLINIC | Facility: CLINIC | Age: 49
End: 2019-11-26

## 2019-11-26 DIAGNOSIS — F41.8 DEPRESSION WITH ANXIETY: ICD-10-CM

## 2019-11-26 DIAGNOSIS — M79.7 FIBROMYALGIA, PRIMARY: Primary | ICD-10-CM

## 2019-11-26 RX ORDER — FLUOXETINE HYDROCHLORIDE 20 MG/1
20 CAPSULE ORAL DAILY
Qty: 30 CAPSULE | Refills: 5 | Status: SHIPPED | OUTPATIENT
Start: 2019-11-26 | End: 2019-12-30

## 2019-11-26 RX ORDER — PREGABALIN 150 MG/1
150 CAPSULE ORAL 2 TIMES DAILY
Qty: 60 CAPSULE | Refills: 5 | Status: SHIPPED | OUTPATIENT
Start: 2019-11-26 | End: 2020-06-17

## 2019-11-26 NOTE — TELEPHONE ENCOUNTER
Per pt, since stopping the Lyrica the nerve pain in the neck and back. She still has the numbness,tingling and pain. It wakes her up at night and hasn't been sleeping. She has been taking 2 benadryl's to help her sleep.

## 2019-11-26 NOTE — TELEPHONE ENCOUNTER
Sounds like she needs to stay on as helping.  I sent in refill for her last dose before wean.  RRJ

## 2019-12-02 RX ORDER — OMEPRAZOLE 40 MG/1
40 CAPSULE, DELAYED RELEASE ORAL DAILY
Qty: 30 CAPSULE | Refills: 4 | Status: SHIPPED | OUTPATIENT
Start: 2019-12-02 | End: 2020-01-24

## 2019-12-02 RX ORDER — FLUTICASONE PROPIONATE 50 MCG
2 SPRAY, SUSPENSION (ML) NASAL DAILY
Qty: 16 G | Refills: 11 | Status: SHIPPED | OUTPATIENT
Start: 2019-12-02 | End: 2020-06-08 | Stop reason: ALTCHOICE

## 2019-12-30 ENCOUNTER — OFFICE VISIT (OUTPATIENT)
Dept: FAMILY MEDICINE CLINIC | Facility: CLINIC | Age: 49
End: 2019-12-30

## 2019-12-30 VITALS
DIASTOLIC BLOOD PRESSURE: 86 MMHG | SYSTOLIC BLOOD PRESSURE: 116 MMHG | OXYGEN SATURATION: 97 % | BODY MASS INDEX: 36.98 KG/M2 | HEART RATE: 70 BPM | HEIGHT: 68 IN | WEIGHT: 244 LBS

## 2019-12-30 DIAGNOSIS — Z20.828 EXPOSURE TO THE FLU: ICD-10-CM

## 2019-12-30 DIAGNOSIS — F41.8 DEPRESSION WITH ANXIETY: ICD-10-CM

## 2019-12-30 DIAGNOSIS — F41.9 ANXIETY: Primary | ICD-10-CM

## 2019-12-30 PROCEDURE — 99213 OFFICE O/P EST LOW 20 MIN: CPT | Performed by: FAMILY MEDICINE

## 2019-12-30 RX ORDER — FLUOXETINE HYDROCHLORIDE 40 MG/1
80 CAPSULE ORAL DAILY
Qty: 60 CAPSULE | Refills: 5 | Status: SHIPPED | OUTPATIENT
Start: 2019-12-30 | End: 2020-08-28

## 2019-12-30 RX ORDER — OSELTAMIVIR PHOSPHATE 75 MG/1
75 CAPSULE ORAL DAILY
Qty: 10 CAPSULE | Refills: 0 | Status: SHIPPED | OUTPATIENT
Start: 2019-12-30 | End: 2020-04-07

## 2019-12-30 NOTE — PROGRESS NOTES
Subjective   Karen Nash is a 49 y.o. female. Presents today for   Chief Complaint   Patient presents with   • Anxiety   • Depression       Anxiety   Presents for follow-up visit. Symptoms include decreased concentration, depressed mood, excessive worry, irritability and nervous/anxious behavior. Patient reports no suicidal ideas. Symptoms occur most days. The severity of symptoms is severe. The quality of sleep is poor. Nighttime awakenings: occasional.     Compliance with medications is %. Treatment side effects: no side effects.   Worried as  losing weight;  Daughter had boy sneaking in and tried cutting herself, [facility].  Going to lose house soon as cannot afford payments.     Review of Systems   Constitutional: Positive for irritability.   Psychiatric/Behavioral: Positive for decreased concentration. Negative for suicidal ideas. The patient is nervous/anxious.        Patient Active Problem List   Diagnosis   • Abdominal pain   • Asthma with acute exacerbation   • Atopic rhinitis   • Anemia   • Benign essential hypertension   • Constipation   • Dyslipidemia   • Gastroesophageal reflux disease   • Migraine   • Muscle cramps   • Vocal cord dysfunction   • Female dyspareunia   • Vaginal atrophy   • Pelvic pain   • Stress incontinence of urine   • Fibromyalgia, primary       Social History     Socioeconomic History   • Marital status:      Spouse name: Not on file   • Number of children: 0   • Years of education: Not on file   • Highest education level: Not on file   Tobacco Use   • Smoking status: Former Smoker     Types: Cigarettes   • Smokeless tobacco: Never Used   Substance and Sexual Activity   • Alcohol use: Yes     Alcohol/week: 1.0 standard drinks     Types: 1 Glasses of wine per week     Comment: SOCIAL USE//caffeine coffee daily    • Drug use: No   • Sexual activity: Yes     Partners: Male     Birth control/protection: None       Allergies   Allergen Reactions   •  Sulfamethoxazole-Trimethoprim Hives and Itching   • Corn-Containing Products Rash   • Penicillins        Current Outpatient Medications on File Prior to Visit   Medication Sig Dispense Refill   • acetaminophen (TYLENOL) 500 MG tablet Take 500 mg by mouth Every 6 (Six) Hours As Needed for Mild Pain .     • albuterol (ACCUNEB) 1.25 MG/3ML nebulizer solution USE 1 AMPULE IN NEBULIZER EVERY SIX HOURS AS NEEDED FOR WHEEZING  60 mL 11   • ALBUTEROL SULFATE  (90 Base) MCG/ACT inhaler INHALE 2 PUFFS BY MOUTH EVERY SIX HOURS AS NEEDED for wheezing  18 g 0   • AMLODIPINE BESYLATE PO Take 10 mg by mouth.     • CALCIUM-MAGNESIUM-ZINC PO Take  by mouth.     • Cetirizine HCl 10 MG capsule Take 10 mg by mouth Daily. 30 capsule 5   • Estradiol 10 MCG insert Insert 1 capsule into the vagina 2 (Two) Times a Week. 8 each 12   • fluticasone (FLONASE) 50 MCG/ACT nasal spray 2 sprays into the nostril(s) as directed by provider Daily. 16 g 11   • folic acid (FOLVITE) 1 MG tablet Take 1 mg by mouth.     • hydrochlorothiazide (HYDRODIURIL) 25 MG tablet TAKE 1 TABLET BY MOUTH ONE TIME A DAY  30 tablet 1   • montelukast (SINGULAIR) 10 MG tablet TAKE 1 TABLET BY MOUTH AT BEDTIME  30 tablet 8   • Multiple Vitamin (MULTI VITAMIN DAILY PO) Take by mouth daily.     • omeprazole (priLOSEC) 40 MG capsule Take 1 capsule by mouth Daily. 30 capsule 4   • oxybutynin XL (DITROPAN-XL) 10 MG 24 hr tablet TAKE 1 TABLET BY MOUTH ONE TIME A DAY  30 tablet 4   • phentermine (ADIPEX-P) 37.5 MG tablet Take 1 tablet by mouth Every Morning. 30 tablet 2   • polyethylene glycol (MIRALAX) pack packet mix 1 packet into drink every night 30 packet 4   • pregabalin (LYRICA) 150 MG capsule Take 1 capsule by mouth 2 (Two) Times a Day. 60 capsule 5   • Saline (NASOGEL) gel 1 spray each nostril twice daily 30 g 12   • SYMBICORT 160-4.5 MCG/ACT inhaler INHALE 2 PUFFS BY MOUTH TWO TIMES A DAY  10.2 g 10   • [DISCONTINUED] FLUoxetine (PROzac) 20 MG capsule Take 1  "capsule by mouth Daily. Take with 40mg cap 30 capsule 5   • [DISCONTINUED] FLUoxetine (PROzac) 40 MG capsule 1 po daily take with 20mg cap 30 capsule 5     No current facility-administered medications on file prior to visit.        Objective   Vitals:    12/30/19 1136   BP: 116/86   BP Location: Left arm   Patient Position: Sitting   Cuff Size: Adult   Pulse: 70   SpO2: 97%   Weight: 111 kg (244 lb)   Height: 172.7 cm (68\")       Physical Exam   Constitutional: She is oriented to person, place, and time. She appears well-developed and well-nourished.   Neurological: She is alert and oriented to person, place, and time.   Skin: Skin is warm and dry.   Psychiatric: Her behavior is normal. Her mood appears anxious. She exhibits a depressed mood.   Nursing note and vitals reviewed.      Assessment/Plan   Karen was seen today for anxiety and depression.    Diagnoses and all orders for this visit:    Anxiety    Exposure to the flu  -     oseltamivir (TAMIFLU) 75 MG capsule; Take 1 capsule by mouth Daily.    Depression with anxiety  -     FLUoxetine (PROzac) 40 MG capsule; Take 2 capsules by mouth Daily.        Will increase prozac to max dose  Ok tamiflu for flu exposure       -Follow up: 8 weeks and prn  "

## 2020-01-02 RX ORDER — HYDROCHLOROTHIAZIDE 25 MG/1
TABLET ORAL
Qty: 30 TABLET | Refills: 4 | Status: SHIPPED | OUTPATIENT
Start: 2020-01-02 | End: 2020-07-27

## 2020-01-03 ENCOUNTER — TELEPHONE (OUTPATIENT)
Dept: FAMILY MEDICINE CLINIC | Facility: CLINIC | Age: 50
End: 2020-01-03

## 2020-01-06 ENCOUNTER — TELEPHONE (OUTPATIENT)
Dept: FAMILY MEDICINE CLINIC | Facility: CLINIC | Age: 50
End: 2020-01-06

## 2020-01-07 DIAGNOSIS — N39.41 URGE URINARY INCONTINENCE: ICD-10-CM

## 2020-01-07 RX ORDER — OXYBUTYNIN CHLORIDE 15 MG/1
15 TABLET, EXTENDED RELEASE ORAL DAILY
Qty: 30 TABLET | Refills: 5 | Status: SHIPPED | OUTPATIENT
Start: 2020-01-07 | End: 2020-06-08 | Stop reason: SDUPTHER

## 2020-01-10 ENCOUNTER — TELEPHONE (OUTPATIENT)
Dept: FAMILY MEDICINE CLINIC | Facility: CLINIC | Age: 50
End: 2020-01-10

## 2020-01-15 NOTE — TELEPHONE ENCOUNTER
Pt wants to know if you will write her a letter to disability for her anxiety and depression with suicidal thoughts, fibromyalgia.

## 2020-01-22 RX ORDER — ALBUTEROL SULFATE 90 UG/1
AEROSOL, METERED RESPIRATORY (INHALATION)
Qty: 18 G | Refills: 5 | Status: SHIPPED | OUTPATIENT
Start: 2020-01-22 | End: 2021-04-16 | Stop reason: SDUPTHER

## 2020-01-23 ENCOUNTER — TELEPHONE (OUTPATIENT)
Dept: FAMILY MEDICINE CLINIC | Facility: CLINIC | Age: 50
End: 2020-01-23

## 2020-01-23 PROBLEM — M06.09 RHEUMATOID ARTHRITIS OF MULTIPLE SITES WITH NEGATIVE RHEUMATOID FACTOR: Status: ACTIVE | Noted: 2020-01-23

## 2020-01-23 PROBLEM — M15.0 PRIMARY OSTEOARTHRITIS INVOLVING MULTIPLE JOINTS: Status: ACTIVE | Noted: 2020-01-23

## 2020-01-23 PROBLEM — F33.2 SEVERE EPISODE OF RECURRENT MAJOR DEPRESSIVE DISORDER, WITHOUT PSYCHOTIC FEATURES (HCC): Status: ACTIVE | Noted: 2020-01-23

## 2020-01-23 PROBLEM — F41.0 PANIC DISORDER: Status: ACTIVE | Noted: 2020-01-23

## 2020-01-23 PROBLEM — F41.1 GENERALIZED ANXIETY DISORDER: Status: ACTIVE | Noted: 2020-01-23

## 2020-01-23 PROBLEM — M15.9 PRIMARY OSTEOARTHRITIS INVOLVING MULTIPLE JOINTS: Status: ACTIVE | Noted: 2020-01-23

## 2020-01-23 PROBLEM — M05.79 RHEUMATOID ARTHRITIS INVOLVING MULTIPLE SITES WITH POSITIVE RHEUMATOID FACTOR: Status: ACTIVE | Noted: 2020-01-23

## 2020-01-24 DIAGNOSIS — G43.009 MIGRAINE WITHOUT AURA AND WITHOUT STATUS MIGRAINOSUS, NOT INTRACTABLE: Primary | ICD-10-CM

## 2020-01-24 DIAGNOSIS — K21.9 GASTROESOPHAGEAL REFLUX DISEASE WITHOUT ESOPHAGITIS: ICD-10-CM

## 2020-01-24 RX ORDER — TOPIRAMATE 25 MG/1
TABLET ORAL
Qty: 60 TABLET | Refills: 5 | Status: SHIPPED | OUTPATIENT
Start: 2020-01-24 | End: 2020-04-07 | Stop reason: SDUPTHER

## 2020-01-24 RX ORDER — PANTOPRAZOLE SODIUM 40 MG/1
40 TABLET, DELAYED RELEASE ORAL DAILY
Qty: 90 TABLET | Refills: 3 | Status: SHIPPED | OUTPATIENT
Start: 2020-01-24 | End: 2020-07-27

## 2020-01-24 NOTE — TELEPHONE ENCOUNTER
Pt said she is having a headache that never goes aware and wants to know if you will put her on topimax?  Also, her heartburn and reflux is getting so bad her meds are not helping and wants to know if there is something else she can do?  Please advise.

## 2020-01-27 DIAGNOSIS — J45.51 SEVERE PERSISTENT ASTHMA WITH ACUTE EXACERBATION: ICD-10-CM

## 2020-01-28 RX ORDER — BUDESONIDE AND FORMOTEROL FUMARATE DIHYDRATE 160; 4.5 UG/1; UG/1
AEROSOL RESPIRATORY (INHALATION)
Qty: 10.2 G | Refills: 11 | Status: SHIPPED | OUTPATIENT
Start: 2020-01-28 | End: 2020-07-17 | Stop reason: SDUPTHER

## 2020-02-07 DIAGNOSIS — N94.10 FEMALE DYSPAREUNIA: ICD-10-CM

## 2020-02-07 DIAGNOSIS — N95.2 VAGINAL ATROPHY: ICD-10-CM

## 2020-02-24 ENCOUNTER — OFFICE VISIT (OUTPATIENT)
Dept: FAMILY MEDICINE CLINIC | Facility: CLINIC | Age: 50
End: 2020-02-24

## 2020-02-24 VITALS
TEMPERATURE: 98.1 F | HEART RATE: 70 BPM | BODY MASS INDEX: 37.71 KG/M2 | OXYGEN SATURATION: 96 % | DIASTOLIC BLOOD PRESSURE: 72 MMHG | WEIGHT: 248 LBS | SYSTOLIC BLOOD PRESSURE: 110 MMHG

## 2020-02-24 DIAGNOSIS — F33.2 SEVERE EPISODE OF RECURRENT MAJOR DEPRESSIVE DISORDER, WITHOUT PSYCHOTIC FEATURES (HCC): Primary | ICD-10-CM

## 2020-02-24 PROCEDURE — 99213 OFFICE O/P EST LOW 20 MIN: CPT | Performed by: FAMILY MEDICINE

## 2020-02-24 NOTE — PROGRESS NOTES
Subjective   Karen Nash is a 49 y.o. female. Presents today for   Chief Complaint   Patient presents with   • Follow-up     depression and needs mammo scheduled       Depression   Visit Type: follow-up  Patient presents with the following symptoms: anhedonia, depressed mood, excessive worry, feelings of worthlessness, nervousness/anxiety, suicidal ideas and thoughts of death.  Patient is not experiencing: suicidal planning.  Frequency of symptoms: most days   Severity: moderate   Sleep quality: fair  Nighttime awakenings: occasional  Compliance with medications:  %  Treatment side effects: no side effects;  struggling as  with rectal cancer, has ostomy and he wants to her care for;  [employer] 2 kids special needs and Mom primary care giver, so very stressed;  Has to take care of everything.  Patient has RA, fibromyalgia and struggling.      Review of Systems   Psychiatric/Behavioral: Positive for suicidal ideas. Negative for self-injury. The patient is nervous/anxious.        Patient Active Problem List   Diagnosis   • Moderate persistent asthma without complication   • Atopic rhinitis   • Anemia   • Benign essential hypertension   • Constipation   • Dyslipidemia   • Gastroesophageal reflux disease   • Intractable migraine without aura and without status migrainosus   • Muscle cramps   • Vocal cord dysfunction   • Female dyspareunia   • Vaginal atrophy   • Pelvic pain   • Stress incontinence of urine   • Fibromyalgia, primary   • Rheumatoid arthritis of multiple sites with negative rheumatoid factor (CMS/HCC)   • Severe episode of recurrent major depressive disorder, without psychotic features (CMS/HCC)   • Panic disorder   • Generalized anxiety disorder   • Primary osteoarthritis involving multiple joints       Social History     Socioeconomic History   • Marital status:      Spouse name: Not on file   • Number of children: 0   • Years of education: Not on file   • Highest education level: Not on  file   Tobacco Use   • Smoking status: Former Smoker     Types: Cigarettes   • Smokeless tobacco: Never Used   Substance and Sexual Activity   • Alcohol use: Yes     Alcohol/week: 1.0 standard drinks     Types: 1 Glasses of wine per week     Comment: SOCIAL USE//caffeine coffee daily    • Drug use: No   • Sexual activity: Yes     Partners: Male     Birth control/protection: None       Allergies   Allergen Reactions   • Sulfamethoxazole-Trimethoprim Hives and Itching   • Corn-Containing Products Rash   • Penicillins        Current Outpatient Medications on File Prior to Visit   Medication Sig Dispense Refill   • acetaminophen (TYLENOL) 500 MG tablet Take 500 mg by mouth Every 6 (Six) Hours As Needed for Mild Pain .     • albuterol (ACCUNEB) 1.25 MG/3ML nebulizer solution USE 1 AMPULE IN NEBULIZER EVERY SIX HOURS AS NEEDED FOR WHEEZING  60 mL 11   • ALBUTEROL SULFATE  (90 Base) MCG/ACT inhaler INHALE 2 PUFFS BY MOUTH EVERY SIX HOURS AS NEEDED for wheezing  18 g 5   • AMLODIPINE BESYLATE PO Take 10 mg by mouth.     • CALCIUM-MAGNESIUM-ZINC PO Take  by mouth.     • Cetirizine HCl 10 MG capsule Take 10 mg by mouth Daily. 30 capsule 5   • Estradiol 10 MCG insert Insert 1 capsule into the vagina 2 (Two) Times a Week. 8 each 12   • FLUoxetine (PROzac) 40 MG capsule Take 2 capsules by mouth Daily. 60 capsule 5   • fluticasone (FLONASE) 50 MCG/ACT nasal spray 2 sprays into the nostril(s) as directed by provider Daily. 16 g 11   • folic acid (FOLVITE) 1 MG tablet Take 1 mg by mouth.     • hydroCHLOROthiazide (HYDRODIURIL) 25 MG tablet TAKE 1 TABLET BY MOUTH ONE TIME A DAY  30 tablet 4   • montelukast (SINGULAIR) 10 MG tablet TAKE 1 TABLET BY MOUTH AT BEDTIME  30 tablet 8   • Multiple Vitamin (MULTI VITAMIN DAILY PO) Take by mouth daily.     • oseltamivir (TAMIFLU) 75 MG capsule Take 1 capsule by mouth Daily. 10 capsule 0   • oxybutynin XL (DITROPAN XL) 15 MG 24 hr tablet Take 1 tablet by mouth Daily. 200001 30 tablet 5    • pantoprazole (PROTONIX) 40 MG EC tablet Take 1 tablet by mouth Daily. 90 tablet 3   • phentermine (ADIPEX-P) 37.5 MG tablet Take 1 tablet by mouth Every Morning. 30 tablet 2   • polyethylene glycol (MIRALAX) pack packet mix 1 packet into drink every night 30 packet 4   • pregabalin (LYRICA) 150 MG capsule Take 1 capsule by mouth 2 (Two) Times a Day. 60 capsule 5   • Saline (NASOGEL) gel 1 spray each nostril twice daily 30 g 12   • SYMBICORT 160-4.5 MCG/ACT inhaler INHALE 2 PUFFS BY MOUTH TWO TIMES A DAY  10.2 g 11   • topiramate (TOPAMAX) 25 MG tablet 1 po nightly x 3 nights x 1 po bid 60 tablet 5     No current facility-administered medications on file prior to visit.        Objective   Vitals:    02/24/20 0851   BP: 110/72   Pulse: 70   Temp: 98.1 °F (36.7 °C)   SpO2: 96%   Weight: 112 kg (248 lb)       Physical Exam   Constitutional: She is oriented to person, place, and time. She appears well-developed and well-nourished.   Neurological: She is alert and oriented to person, place, and time.   Skin: Skin is warm and dry.   Psychiatric: Her behavior is normal. Her mood appears anxious. She exhibits a depressed mood.   Nursing note and vitals reviewed.      Assessment/Plan   Karen was seen today for follow-up.    Diagnoses and all orders for this visit:    Severe episode of recurrent major depressive disorder, without psychotic features (CMS/HCC)    will continue meds;  In a bad place financially and emotionally;  Has not been able to work, encouraged to complete appeal for disability;   patient as well, not able to work and placing huge stress on family;  [facility]commend [facility] if thoughts if plans to self harm:  States cares about kids and mom too much to do this.  Will monitor very closely.       -Follow up: 8 weeks and prn

## 2020-03-20 DIAGNOSIS — E66.01 CLASS 2 SEVERE OBESITY DUE TO EXCESS CALORIES WITH SERIOUS COMORBIDITY AND BODY MASS INDEX (BMI) OF 35.0 TO 35.9 IN ADULT (HCC): ICD-10-CM

## 2020-03-20 DIAGNOSIS — J30.9 ATOPIC RHINITIS: ICD-10-CM

## 2020-03-20 DIAGNOSIS — J45.40 MODERATE PERSISTENT ASTHMA WITHOUT COMPLICATION: ICD-10-CM

## 2020-03-23 RX ORDER — PHENTERMINE HYDROCHLORIDE 37.5 MG/1
TABLET ORAL
Qty: 30 TABLET | Refills: 0 | Status: SHIPPED | OUTPATIENT
Start: 2020-03-23 | End: 2020-05-06

## 2020-03-23 RX ORDER — MONTELUKAST SODIUM 10 MG/1
TABLET ORAL
Qty: 30 TABLET | Refills: 0 | Status: SHIPPED | OUTPATIENT
Start: 2020-03-23 | End: 2020-05-06

## 2020-04-07 ENCOUNTER — OFFICE VISIT (OUTPATIENT)
Dept: FAMILY MEDICINE CLINIC | Facility: CLINIC | Age: 50
End: 2020-04-07

## 2020-04-07 VITALS
HEART RATE: 74 BPM | HEIGHT: 68 IN | SYSTOLIC BLOOD PRESSURE: 112 MMHG | WEIGHT: 248 LBS | DIASTOLIC BLOOD PRESSURE: 68 MMHG | OXYGEN SATURATION: 98 % | BODY MASS INDEX: 37.59 KG/M2

## 2020-04-07 DIAGNOSIS — G47.09 OTHER INSOMNIA: ICD-10-CM

## 2020-04-07 DIAGNOSIS — G43.009 MIGRAINE WITHOUT AURA AND WITHOUT STATUS MIGRAINOSUS, NOT INTRACTABLE: ICD-10-CM

## 2020-04-07 DIAGNOSIS — E55.9 VITAMIN D DEFICIENCY: ICD-10-CM

## 2020-04-07 DIAGNOSIS — E66.01 CLASS 2 SEVERE OBESITY DUE TO EXCESS CALORIES WITH SERIOUS COMORBIDITY AND BODY MASS INDEX (BMI) OF 36.0 TO 36.9 IN ADULT (HCC): ICD-10-CM

## 2020-04-07 DIAGNOSIS — M06.09 RHEUMATOID ARTHRITIS OF MULTIPLE SITES WITH NEGATIVE RHEUMATOID FACTOR (HCC): ICD-10-CM

## 2020-04-07 DIAGNOSIS — I10 BENIGN ESSENTIAL HYPERTENSION: ICD-10-CM

## 2020-04-07 DIAGNOSIS — Z79.899 DRUG THERAPY: ICD-10-CM

## 2020-04-07 DIAGNOSIS — M81.6 LOCALIZED OSTEOPOROSIS WITHOUT CURRENT PATHOLOGICAL FRACTURE: ICD-10-CM

## 2020-04-07 DIAGNOSIS — Z00.00 WELLNESS EXAMINATION: Primary | ICD-10-CM

## 2020-04-07 DIAGNOSIS — E78.5 DYSLIPIDEMIA: ICD-10-CM

## 2020-04-07 PROBLEM — E66.812 CLASS 2 SEVERE OBESITY DUE TO EXCESS CALORIES WITH SERIOUS COMORBIDITY AND BODY MASS INDEX (BMI) OF 36.0 TO 36.9 IN ADULT: Status: ACTIVE | Noted: 2020-04-07

## 2020-04-07 LAB
25(OH)D3+25(OH)D2 SERPL-MCNC: 37.8 NG/ML (ref 30–100)
ALBUMIN SERPL-MCNC: 4 G/DL (ref 3.5–5.2)
ALBUMIN/GLOB SERPL: 1.7 G/DL
ALP SERPL-CCNC: 95 U/L (ref 39–117)
ALT SERPL-CCNC: 16 U/L (ref 1–33)
AST SERPL-CCNC: 17 U/L (ref 1–32)
BASOPHILS # BLD AUTO: 0.06 10*3/MM3 (ref 0–0.2)
BASOPHILS NFR BLD AUTO: 0.9 % (ref 0–1.5)
BILIRUB SERPL-MCNC: 0.3 MG/DL (ref 0.2–1.2)
BUN SERPL-MCNC: 23 MG/DL (ref 6–20)
BUN/CREAT SERPL: 25.6 (ref 7–25)
CALCIUM SERPL-MCNC: 8.9 MG/DL (ref 8.6–10.5)
CHLORIDE SERPL-SCNC: 103 MMOL/L (ref 98–107)
CHOLEST SERPL-MCNC: 192 MG/DL (ref 0–200)
CO2 SERPL-SCNC: 23.4 MMOL/L (ref 22–29)
CREAT SERPL-MCNC: 0.9 MG/DL (ref 0.57–1)
CRP SERPL-MCNC: 0.16 MG/DL (ref 0–0.5)
EOSINOPHIL # BLD AUTO: 0.33 10*3/MM3 (ref 0–0.4)
EOSINOPHIL NFR BLD AUTO: 5.1 % (ref 0.3–6.2)
ERYTHROCYTE [DISTWIDTH] IN BLOOD BY AUTOMATED COUNT: 13.5 % (ref 12.3–15.4)
ERYTHROCYTE [SEDIMENTATION RATE] IN BLOOD BY WESTERGREN METHOD: 4 MM/HR (ref 0–30)
GLOBULIN SER CALC-MCNC: 2.3 GM/DL
GLUCOSE SERPL-MCNC: 89 MG/DL (ref 65–99)
HCT VFR BLD AUTO: 41.6 % (ref 34–46.6)
HDLC SERPL-MCNC: 63 MG/DL (ref 40–60)
HGB BLD-MCNC: 13.6 G/DL (ref 12–15.9)
IMM GRANULOCYTES # BLD AUTO: 0.02 10*3/MM3 (ref 0–0.05)
IMM GRANULOCYTES NFR BLD AUTO: 0.3 % (ref 0–0.5)
LDLC SERPL CALC-MCNC: 113 MG/DL (ref 0–100)
LYMPHOCYTES # BLD AUTO: 1.47 10*3/MM3 (ref 0.7–3.1)
LYMPHOCYTES NFR BLD AUTO: 22.5 % (ref 19.6–45.3)
MCH RBC QN AUTO: 29.2 PG (ref 26.6–33)
MCHC RBC AUTO-ENTMCNC: 32.7 G/DL (ref 31.5–35.7)
MCV RBC AUTO: 89.3 FL (ref 79–97)
MONOCYTES # BLD AUTO: 0.46 10*3/MM3 (ref 0.1–0.9)
MONOCYTES NFR BLD AUTO: 7.1 % (ref 5–12)
NEUTROPHILS # BLD AUTO: 4.18 10*3/MM3 (ref 1.7–7)
NEUTROPHILS NFR BLD AUTO: 64.1 % (ref 42.7–76)
NRBC BLD AUTO-RTO: 0 /100 WBC (ref 0–0.2)
PLATELET # BLD AUTO: 280 10*3/MM3 (ref 140–450)
POTASSIUM SERPL-SCNC: 4.2 MMOL/L (ref 3.5–5.2)
PROT SERPL-MCNC: 6.3 G/DL (ref 6–8.5)
RBC # BLD AUTO: 4.66 10*6/MM3 (ref 3.77–5.28)
SODIUM SERPL-SCNC: 140 MMOL/L (ref 136–145)
TRIGL SERPL-MCNC: 80 MG/DL (ref 0–150)
VLDLC SERPL CALC-MCNC: 16 MG/DL
WBC # BLD AUTO: 6.52 10*3/MM3 (ref 3.4–10.8)

## 2020-04-07 PROCEDURE — 99396 PREV VISIT EST AGE 40-64: CPT | Performed by: FAMILY MEDICINE

## 2020-04-07 RX ORDER — TRAZODONE HYDROCHLORIDE 50 MG/1
50 TABLET ORAL NIGHTLY PRN
Qty: 30 TABLET | Refills: 5 | Status: SHIPPED | OUTPATIENT
Start: 2020-04-07 | End: 2020-12-21

## 2020-04-07 RX ORDER — TOPIRAMATE 50 MG/1
50 TABLET, FILM COATED ORAL 2 TIMES DAILY
Qty: 60 TABLET | Refills: 5 | Status: SHIPPED | OUTPATIENT
Start: 2020-04-07 | End: 2020-09-18 | Stop reason: SDUPTHER

## 2020-04-07 RX ORDER — AZELASTINE HYDROCHLORIDE 0.5 MG/ML
1 SOLUTION/ DROPS OPHTHALMIC 2 TIMES DAILY PRN
Qty: 1 EACH | Refills: 12 | Status: SHIPPED | OUTPATIENT
Start: 2020-04-07 | End: 2023-01-11 | Stop reason: SDUPTHER

## 2020-04-07 NOTE — PROGRESS NOTES
Subjective   Karen Nash is a 49 y.o. female. Presents today for   Chief Complaint   Patient presents with   • Anxiety   • Depression   • Annual Exam     wellness       History of Present Illness   patient here for wellness and due for recheck of everything.  She has RA, htn and dyslipidemia;  Due for labs;  Not seen rheumatology recently or had labs.  She also has fibromyalgia along with RA, lyrica helps some with pain (tried going off to reduce polypharmacy, but more pain).  With weather changes asthma and alelrgies a little worse.  Does have VCD exacerated by her anxiety, thinks more this and worse since pandemic, but also has a lot of stress, has 2 children with speciali needs and cares for her Mother that has a lot of chronic conditions.  In addition, her  has rectal cancer and undergoing treatment.  Anxiety has been worse as a result.  Has been struggling with ongoing depression, worried going to lose home as well as cannot afford mortgage.   unable to work now and she has not been able to do so for a while.  Disability was initially denied, but she did appeal however due to pandemic all court cases are on hold.  She has tried to be active, but has to lay in bed after simple activities for quite sometime.  Trying to eat healthy, did just get food stamps.  Family started [employer] site that has helped since  has cancer. She does have SI, but no plans to carry out and feels could never do this to her children or Mother.    Review of Systems   Respiratory: Positive for choking and wheezing. Negative for shortness of breath.    Cardiovascular: Negative for chest pain and palpitations.   Neurological: Positive for headaches.   Psychiatric/Behavioral: Positive for decreased concentration, sleep disturbance and suicidal ideas. Negative for self-injury. The patient is nervous/anxious.        Patient Active Problem List   Diagnosis   • Moderate persistent asthma without complication   • Atopic  rhinitis   • Anemia   • Benign essential hypertension   • Constipation   • Dyslipidemia   • Gastroesophageal reflux disease   • Intractable migraine without aura and without status migrainosus   • Muscle cramps   • Vocal cord dysfunction   • Female dyspareunia   • Vaginal atrophy   • Pelvic pain   • Stress incontinence of urine   • Fibromyalgia, primary   • Rheumatoid arthritis of multiple sites with negative rheumatoid factor (CMS/[facility])   • Severe episode of recurrent major depressive disorder, without psychotic features (CMS/[facility])   • Panic disorder   • Generalized anxiety disorder   • Primary osteoarthritis involving multiple joints   • Class 2 severe obesity due to excess calories with serious comorbidity and body mass index (BMI) of 36.0 to 36.9 in adult (CMS/[facility])       Social History     Socioeconomic History   • Marital status:      Spouse name: Not on file   • Number of children: 0   • Years of education: Not on file   • Highest education level: Not on file   Tobacco Use   • Smoking status: Former Smoker     Packs/day: 0.10     Types: Cigarettes     Start date: 2001     Last attempt to quit: 2009     Years since quittin.2   • Smokeless tobacco: Never Used   Substance and Sexual Activity   • Alcohol use: Yes     Alcohol/week: 1.0 standard drinks     Types: 1 Glasses of wine per week     Comment: SOCIAL USE//caffeine coffee daily    • Drug use: No   • Sexual activity: Yes     Partners: Male     Birth control/protection: None       Allergies   Allergen Reactions   • Sulfamethoxazole-Trimethoprim Hives and Itching   • Corn-Containing Products Rash   • Penicillins        Current Outpatient Medications on File Prior to Visit   Medication Sig Dispense Refill   • acetaminophen (TYLENOL) 500 MG tablet Take 500 mg by mouth Every 6 (Six) Hours As Needed for Mild Pain .     • albuterol (ACCUNEB) 1.25 MG/3ML nebulizer solution USE 1 AMPULE IN NEBULIZER EVERY SIX HOURS AS NEEDED FOR WHEEZING  60 mL 11    • ALBUTEROL SULFATE  (90 Base) MCG/ACT inhaler INHALE 2 PUFFS BY MOUTH EVERY SIX HOURS AS NEEDED for wheezing  18 g 5   • AMLODIPINE BESYLATE PO Take 10 mg by mouth.     • CALCIUM-MAGNESIUM-ZINC PO Take  by mouth.     • Cetirizine HCl 10 MG capsule Take 10 mg by mouth Daily. 30 capsule 5   • Estradiol 10 MCG insert Insert 1 capsule into the vagina 2 (Two) Times a Week. 8 each 12   • FLUoxetine (PROzac) 40 MG capsule Take 2 capsules by mouth Daily. 60 capsule 5   • fluticasone (FLONASE) 50 MCG/ACT nasal spray 2 sprays into the nostril(s) as directed by provider Daily. 16 g 11   • folic acid (FOLVITE) 1 MG tablet Take 1 mg by mouth.     • hydroCHLOROthiazide (HYDRODIURIL) 25 MG tablet TAKE 1 TABLET BY MOUTH ONE TIME A DAY  30 tablet 4   • montelukast (SINGULAIR) 10 MG tablet TAKE 1 TABLET BY MOUTH AT BEDTIME  30 tablet 0   • Multiple Vitamin (MULTI VITAMIN DAILY PO) Take by mouth daily.     • oxybutynin XL (DITROPAN XL) 15 MG 24 hr tablet Take 1 tablet by mouth Daily. [id number] 30 tablet 5   • pantoprazole (PROTONIX) 40 MG EC tablet Take 1 tablet by mouth Daily. 90 tablet 3   • phentermine (ADIPEX-P) 37.5 MG tablet TAKE 1 TABLET BY MOUTH IN THE MORNING  30 tablet 0   • polyethylene glycol (MIRALAX) pack packet mix 1 packet into drink every night 30 packet 4   • pregabalin (LYRICA) 150 MG capsule Take 1 capsule by mouth 2 (Two) Times a Day. 60 capsule 5   • Saline (NASOGEL) gel 1 spray each nostril twice daily 30 g 12   • SYMBICORT 160-4.5 MCG/ACT inhaler INHALE 2 PUFFS BY MOUTH TWO TIMES A DAY  10.2 g 11   • topiramate (TOPAMAX) 25 MG tablet 1 po nightly x 3 nights x 1 po bid 60 tablet 5   • [DISCONTINUED] oseltamivir (TAMIFLU) 75 MG capsule Take 1 capsule by mouth Daily. 10 capsule 0     No current facility-administered medications on file prior to visit.        Objective   Vitals:    04/07/20 0907   BP: 112/68   BP Location: Left arm   Patient Position: Sitting   Cuff Size: Adult   Pulse: 74   SpO2: 98%  "  Weight: 112 kg (248 lb)   Height: 172.7 cm (68\")     Body mass index is 37.71 kg/m².    Physical Exam   Constitutional: She appears well-developed and well-nourished.   HENT:   Head: Normocephalic and atraumatic.   Neck: Neck supple. No JVD present. No thyromegaly present.   Cardiovascular: Normal rate, regular rhythm and normal heart sounds. Exam reveals no gallop and no friction rub.   No murmur heard.  Pulmonary/Chest: Effort normal and breath sounds normal. No respiratory distress. She has no wheezes. She has no rales.   Abdominal: Soft. Bowel sounds are normal. She exhibits no distension. There is no tenderness. There is no rebound and no guarding.   Musculoskeletal: She exhibits no edema.   Neurological: She is alert.   Skin: Skin is warm and dry.   Psychiatric: Her behavior is normal. Her mood appears anxious. She exhibits a depressed mood.   Nursing note and vitals reviewed.      Assessment/Plan   Karen was seen today for anxiety, depression and annual exam.    Diagnoses and all orders for this visit:    Wellness examination    Rheumatoid arthritis of multiple sites with negative rheumatoid factor (CMS/[facility])  -     Comprehensive Metabolic Panel  -     CBC & Differential  -     C-reactive Protein  -     Sedimentation Rate    Class 2 severe obesity due to excess calories with serious comorbidity and body mass index (BMI) of 36.0 to 36.9 in adult (CMS/HCC)    Dyslipidemia  -     Comprehensive Metabolic Panel  -     Lipid Panel    Benign essential hypertension  -     Comprehensive Metabolic Panel    Vitamin D deficiency  -     Vitamin D 25 Hydroxy    Localized osteoporosis without current pathological fracture    Drug therapy  -     Comprehensive Metabolic Panel  -     CBC & Differential  -     C-reactive Protein  -     Sedimentation Rate        -due dexa May 2020, but will need to hold off until pandemic over.  -over due for all albs as ra, htn, hld and low viatmin d with osteoporosis  -counseled on diet and " exercise  -ok increase topamax for migraines  -ok for trazodone as needed for sleep       -Follow up: 4 months and prn

## 2020-04-10 NOTE — PROGRESS NOTES
Call and mail copy of results to patient.  Kidney and liver function normal  Cholesterol borderline, just work on diet.  Inflammatory markers normal  Vitamin D normal

## 2020-05-02 ENCOUNTER — HOSPITAL ENCOUNTER (EMERGENCY)
Facility: HOSPITAL | Age: 50
Discharge: HOME | End: 2020-05-02
Attending: EMERGENCY MEDICINE | Admitting: EMERGENCY MEDICINE

## 2020-05-02 ENCOUNTER — APPOINTMENT (OUTPATIENT)
Dept: GENERAL RADIOLOGY | Facility: HOSPITAL | Age: 50
End: 2020-05-02

## 2020-05-02 VITALS
DIASTOLIC BLOOD PRESSURE: 73 MMHG | OXYGEN SATURATION: 96 % | HEART RATE: 82 BPM | TEMPERATURE: 99 F | WEIGHT: 242.3 LBS | RESPIRATION RATE: 18 BRPM | HEIGHT: 67 IN | BODY MASS INDEX: 38.03 KG/M2 | SYSTOLIC BLOOD PRESSURE: 119 MMHG

## 2020-05-02 DIAGNOSIS — F41.9 ANXIETY: ICD-10-CM

## 2020-05-02 DIAGNOSIS — R55 SYNCOPE, UNSPECIFIED SYNCOPE TYPE: Primary | ICD-10-CM

## 2020-05-02 LAB
ALBUMIN SERPL-MCNC: 4.3 G/DL (ref 3.5–5.2)
ALBUMIN/GLOB SERPL: 1.4 G/DL
ALP SERPL-CCNC: 93 U/L (ref 39–117)
ALT SERPL W P-5'-P-CCNC: 18 U/L (ref 1–33)
ANION GAP SERPL CALCULATED.3IONS-SCNC: 12.8 MMOL/L (ref 5–15)
AST SERPL-CCNC: 22 U/L (ref 1–32)
BASOPHILS # BLD AUTO: 0.04 10*3/MM3 (ref 0–0.2)
BASOPHILS NFR BLD AUTO: 0.4 % (ref 0–1.5)
BILIRUB SERPL-MCNC: 0.5 MG/DL (ref 0.2–1.2)
BUN BLD-MCNC: 21 MG/DL (ref 6–20)
BUN/CREAT SERPL: 20.4 (ref 7–25)
CALCIUM SPEC-SCNC: 9.3 MG/DL (ref 8.6–10.5)
CHLORIDE SERPL-SCNC: 105 MMOL/L (ref 98–107)
CO2 SERPL-SCNC: 23.2 MMOL/L (ref 22–29)
CREAT BLD-MCNC: 1.03 MG/DL (ref 0.57–1)
DEPRECATED RDW RBC AUTO: 45.2 FL (ref 37–54)
EOSINOPHIL # BLD AUTO: 0.11 10*3/MM3 (ref 0–0.4)
EOSINOPHIL NFR BLD AUTO: 1.2 % (ref 0.3–6.2)
ERYTHROCYTE [DISTWIDTH] IN BLOOD BY AUTOMATED COUNT: 13.8 % (ref 12.3–15.4)
GFR SERPL CREATININE-BSD FRML MDRD: 56 ML/MIN/1.73
GLOBULIN UR ELPH-MCNC: 3 GM/DL
GLUCOSE BLD-MCNC: 98 MG/DL (ref 65–99)
HCT VFR BLD AUTO: 45 % (ref 34–46.6)
HGB BLD-MCNC: 14.9 G/DL (ref 12–15.9)
IMM GRANULOCYTES # BLD AUTO: 0.03 10*3/MM3 (ref 0–0.05)
IMM GRANULOCYTES NFR BLD AUTO: 0.3 % (ref 0–0.5)
LYMPHOCYTES # BLD AUTO: 1.64 10*3/MM3 (ref 0.7–3.1)
LYMPHOCYTES NFR BLD AUTO: 18.3 % (ref 19.6–45.3)
MCH RBC QN AUTO: 29.5 PG (ref 26.6–33)
MCHC RBC AUTO-ENTMCNC: 33.1 G/DL (ref 31.5–35.7)
MCV RBC AUTO: 89.1 FL (ref 79–97)
MONOCYTES # BLD AUTO: 0.45 10*3/MM3 (ref 0.1–0.9)
MONOCYTES NFR BLD AUTO: 5 % (ref 5–12)
NEUTROPHILS # BLD AUTO: 6.68 10*3/MM3 (ref 1.7–7)
NEUTROPHILS NFR BLD AUTO: 74.8 % (ref 42.7–76)
NRBC BLD AUTO-RTO: 0 /100 WBC (ref 0–0.2)
PLATELET # BLD AUTO: 262 10*3/MM3 (ref 140–450)
PMV BLD AUTO: 10 FL (ref 6–12)
POTASSIUM BLD-SCNC: 4 MMOL/L (ref 3.5–5.2)
PROT SERPL-MCNC: 7.3 G/DL (ref 6–8.5)
RBC # BLD AUTO: 5.05 10*6/MM3 (ref 3.77–5.28)
SODIUM BLD-SCNC: 141 MMOL/L (ref 136–145)
TROPONIN T SERPL-MCNC: <0.01 NG/ML (ref 0–0.03)
WBC NRBC COR # BLD: 8.95 10*3/MM3 (ref 3.4–10.8)

## 2020-05-02 PROCEDURE — 93005 ELECTROCARDIOGRAM TRACING: CPT | Performed by: PHYSICIAN ASSISTANT

## 2020-05-02 PROCEDURE — 85025 COMPLETE CBC W/AUTO DIFF WBC: CPT | Performed by: PHYSICIAN ASSISTANT

## 2020-05-02 PROCEDURE — 93010 ELECTROCARDIOGRAM REPORT: CPT | Performed by: INTERNAL MEDICINE

## 2020-05-02 PROCEDURE — 99284 EMERGENCY DEPT VISIT MOD MDM: CPT

## 2020-05-02 PROCEDURE — 71045 X-RAY EXAM CHEST 1 VIEW: CPT

## 2020-05-02 PROCEDURE — 84484 ASSAY OF TROPONIN QUANT: CPT | Performed by: PHYSICIAN ASSISTANT

## 2020-05-02 PROCEDURE — 80053 COMPREHEN METABOLIC PANEL: CPT | Performed by: PHYSICIAN ASSISTANT

## 2020-05-02 RX ORDER — LORAZEPAM 1 MG/1
1 TABLET ORAL ONCE
Status: COMPLETED | OUTPATIENT
Start: 2020-05-02 | End: 2020-05-02

## 2020-05-02 RX ADMIN — SODIUM CHLORIDE 1000 ML: 9 INJECTION, SOLUTION INTRAVENOUS at 18:40

## 2020-05-02 RX ADMIN — LORAZEPAM 1 MG: 1 TABLET ORAL at 18:44

## 2020-05-02 NOTE — ED TRIAGE NOTES
EMS reports  called for pt that had a syncopal episode. Pt reports using albuterol breathing treatment and then passed out. Pt has a history of anxiety. Pt reports he  has cancer and [facility] .  Patient placed in a mask at triage. Triage RN also wearing a mask.

## 2020-05-02 NOTE — DISCHARGE INSTRUCTIONS
[facility]  [address] [place] [address]  Walk-in visits welcome  If you have an emergency, call 911.      Follow-up with your primary care provider in 2 days for recheck.  Return to the emergency department as needed.

## 2020-05-02 NOTE — ED PROVIDER NOTES
Pt presents to the ED c/o  passing out just prior to arrival after using a breathing treatment.  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  She denies suicidal ideation at this time.     On exam,   Awake and alert, tearful, depressed, denies SI.     Plan: ACCESS evaluation offered to patient however she declines.  She will be provided with outpatient psychiatric referral.  Return precautions were discussed.  Labs and EKG are reassuring today with no suspected cardiac etiology of her syncopal episode.     Attestation:  The DARCY and I have discussed this patient's history, physical exam, and treatment plan.  I have reviewed the documentation and personally had a face to face interaction with the patient. I affirm the documentation and agree with the treatment and plan.  The attached note describes my personal findings.        Manan Mckoy MD  05/02/20 1947

## 2020-05-02 NOTE — ED PROVIDER NOTES
EMERGENCY DEPARTMENT ENCOUNTER    Room Number:  03/03  Date seen:  5/2/2020  Time seen: 7:09 PM  PCP: Rob Bardales DO  Historian: patient      HPI:  Chief Complaint: syncope    A complete HPI/ROS/PMH/PSH/SH/FH are unobtainable due to: none    Context: Karen Nash is a 49 y.o. female who presents to the ED for evaluation of a syncopal episode.  She states that her allergies have been flaring up and she was taking a breathing treatment just prior to arrival and passed out.  She has been very depressed and anxious over her 's health, their house being in [facility].  She states she got very anxious and thinks she passed out.  She does occasionally have some tightness and shortness of breath but she relates it to her history of asthma and also states that it gets worse when she feels anxious or worried and improves after breathing treatments and when she lies down and relaxes.  She states when she gets anxious she starts stuttering and she is stuttering quite a lot right now.  She denies any suicidal ideation.  She is depressed but knows her family needs her.  He does have a history of generalized anxiety disorder, major depressive disorder and panic disorder.    No known exposure to COVID 19.    PAST MEDICAL HISTORY  Active Ambulatory Problems     Diagnosis Date Noted   • Moderate persistent asthma without complication 02/05/2016   • Atopic rhinitis 02/05/2016   • Anemia 02/05/2016   • Benign essential hypertension 02/05/2016   • Constipation 02/05/2016   • Dyslipidemia 02/05/2016   • Gastroesophageal reflux disease 02/05/2016   • Intractable migraine without aura and without status migrainosus 02/05/2016   • Muscle cramps 02/05/2016   • Vocal cord dysfunction 09/26/2016   • Female dyspareunia 12/21/2018   • Vaginal atrophy 12/21/2018   • Pelvic pain 04/12/2019   • Stress incontinence of urine 04/12/2019   • Fibromyalgia, primary 11/04/2019   • Rheumatoid arthritis of multiple sites with negative  rheumatoid factor (CMS/[facility]) 01/23/2020   • Severe episode of recurrent major depressive disorder, without psychotic features (CMS/[facility]) 01/23/2020   • Panic disorder 01/23/2020   • Generalized anxiety disorder 01/23/2020   • Primary osteoarthritis involving multiple joints 01/23/2020   • Class 2 severe obesity due to excess calories with serious comorbidity and body mass index (BMI) of 36.0 to 36.9 in adult (CMS/[facility]) 04/07/2020     Resolved Ambulatory Problems     Diagnosis Date Noted   • Abdominal pain 02/05/2016   • Acute otitis media 02/05/2016   • Acute sinusitis 02/05/2016   • Cough 02/05/2016   • Fever 02/05/2016   • Menorrhagia 02/05/2016   • Candidiasis of vagina 02/05/2016     Past Medical History:   Diagnosis Date   • Abnormal vaginal bleeding    • Allergic rhinitis    • Anxiety    • Asthma    • Breast cancer screening    • Cluster headache    • Congenital prolapsed rectum    • Depression    • Endometriosis    • Environmental allergies    • Fibroids    • Headache, tension-type    • Hyperlipidemia    • Hypertension    • Infertility, female    • Leaking of urine    • Migraine    • Muscle cramp    • Vaginal candidiasis          PAST SURGICAL HISTORY  Past Surgical History:   Procedure Laterality Date   • APPENDECTOMY      open - age 15   • COLONOSCOPY  APPROX 1996   • COLONOSCOPY N/A 7/7/2016    Procedure: COLONOSCOPY to cecum  W/  HEMORRHOID BANDING with cold biopsy polypectomy;  Surgeon: Kin Meyers MD;  Location: [facility] ENDOSCOPY;  Service:    • HEMORRHOIDECTOMY     • TONSILLECTOMY     • VAGINAL URETHRAL SEPTUM EXCISION           FAMILY HISTORY  Family History   Problem Relation Age of Onset   • Cancer Mother         SKIN   • Stroke Mother    • Diabetes Mother    • Heart disease Mother    • Parkinsonism Mother    • Arthritis Mother    • Dementia Mother    • Hypertension Mother    • Kidney disease Mother    • Neuropathy Mother    • Cancer Father         LARNYX   • Stroke Brother    • Breast cancer Neg Hx     • Ovarian cancer Neg Hx    • Uterine cancer Neg Hx    • Colon cancer Neg Hx          SOCIAL HISTORY  Social History     Socioeconomic History   • Marital status:      Spouse name: Not on file   • Number of children: 0   • Years of education: Not on file   • Highest education level: Not on file   Tobacco Use   • Smoking status: Former Smoker     Packs/day: 0.10     Types: Cigarettes     Start date: 2001     Last attempt to quit: 2009     Years since quittin.3   • Smokeless tobacco: Never Used   Substance and Sexual Activity   • Alcohol use: Yes     Alcohol/week: 1.0 standard drinks     Types: 1 Glasses of wine per week     Comment: SOCIAL USE//caffeine coffee daily    • Drug use: No   • Sexual activity: Yes     Partners: Male     Birth control/protection: None         ALLERGIES  Sulfamethoxazole-trimethoprim; Corn-containing products; and Penicillins        REVIEW OF SYSTEMS  Review of Systems   Constitutional: Negative for chills and fever.   HENT: Negative.  Negative for sore throat.         No anosmia, no dysgeusia   Eyes: Negative.    Respiratory: Positive for chest tightness and shortness of breath. Negative for cough.    Cardiovascular: Negative for chest pain.   Gastrointestinal: Negative for abdominal pain, diarrhea, nausea and vomiting.   Genitourinary: Negative.    Musculoskeletal: Negative.    Skin: Negative.    Neurological: Negative.    Psychiatric/Behavioral: Negative.             PHYSICAL EXAM  ED Triage Vitals   Temp Heart Rate Resp BP SpO2   20 1752 20 1752 20 1754 20 1752 20 1752   99 °F (37.2 °C) 77 18 96/67 100 %      Temp src Heart Rate Source Patient Position BP Location FiO2 (%)   20 1752 20 1805 20 1805 20 1805 --   Tympanic Monitor Sitting Right arm          GENERAL: anxious, tearful, not distressed  HENT: atraumatic  EYES: no scleral icterus  CV: regular rhythm, regular rate  RESPIRATORY: normal effort,  ctab  ABDOMEN: soft, nontender, nondistended  MUSCULOSKELETAL: no deformity  NEURO: alert, moves all extremities, follows commands  SKIN: warm, dry    Vital signs and nursing notes reviewed.          LAB RESULTS  Recent Results (from the past 24 hour(s))   Comprehensive Metabolic Panel    Collection Time: 05/02/20  6:38 PM   Result Value Ref Range    Glucose 98 65 - 99 mg/dL    BUN 21 (H) 6 - 20 mg/dL    Creatinine 1.03 (H) 0.57 - 1.00 mg/dL    Sodium 141 136 - 145 mmol/L    Potassium 4.0 3.5 - 5.2 mmol/L    Chloride 105 98 - 107 mmol/L    CO2 23.2 22.0 - 29.0 mmol/L    Calcium 9.3 8.6 - 10.5 mg/dL    Total Protein 7.3 6.0 - 8.5 g/dL    Albumin 4.30 3.50 - 5.20 g/dL    ALT (SGPT) 18 1 - 33 U/L    AST (SGOT) 22 1 - 32 U/L    Alkaline Phosphatase 93 39 - 117 U/L    Total Bilirubin 0.5 0.2 - 1.2 mg/dL    eGFR Non African Amer 56 (L) >60 mL/min/1.73    Globulin 3.0 gm/dL    A/G Ratio 1.4 g/dL    BUN/Creatinine Ratio 20.4 7.0 - 25.0    Anion Gap 12.8 5.0 - 15.0 mmol/L   Troponin    Collection Time: 05/02/20  6:38 PM   Result Value Ref Range    Troponin T <0.010 0.000 - 0.030 ng/mL   CBC Auto Differential    Collection Time: 05/02/20  6:38 PM   Result Value Ref Range    WBC 8.95 3.40 - 10.80 10*3/mm3    RBC 5.05 3.77 - 5.28 10*6/mm3    Hemoglobin 14.9 12.0 - 15.9 g/dL    Hematocrit 45.0 34.0 - 46.6 %    MCV 89.1 79.0 - 97.0 fL    MCH 29.5 26.6 - 33.0 pg    MCHC 33.1 31.5 - 35.7 g/dL    RDW 13.8 12.3 - 15.4 %    RDW-SD 45.2 37.0 - 54.0 fl    MPV 10.0 6.0 - 12.0 fL    Platelets 262 140 - 450 10*3/mm3    Neutrophil % 74.8 42.7 - 76.0 %    Lymphocyte % 18.3 (L) 19.6 - 45.3 %    Monocyte % 5.0 5.0 - 12.0 %    Eosinophil % 1.2 0.3 - 6.2 %    Basophil % 0.4 0.0 - 1.5 %    Immature Grans % 0.3 0.0 - 0.5 %    Neutrophils, Absolute 6.68 1.70 - 7.00 10*3/mm3    Lymphocytes, Absolute 1.64 0.70 - 3.10 10*3/mm3    Monocytes, Absolute 0.45 0.10 - 0.90 10*3/mm3    Eosinophils, Absolute 0.11 0.00 - 0.40 10*3/mm3    Basophils, Absolute  0.04 0.00 - 0.20 10*3/mm3    Immature Grans, Absolute 0.03 0.00 - 0.05 10*3/mm3    nRBC 0.0 0.0 - 0.2 /100 WBC       Ordered the above labs and reviewed the results.        RADIOLOGY  XR Chest 1 View   Final Result   Negative.       This report was finalized on 5/2/2020 7:04 PM by Dr. Abebe Rodgers M.D.                  PROCEDURES  Procedures        EKG:           EKG time: 1840  Rhythm/Rate: sinus, 84  P waves and FL: normal, normal  QRS, axis: narrow    ST and T waves: no st elevation or twave inversion, no ischemic chanes     No previous available for comparison.    Interpreted Contemporaneously by me, independently viewed          MEDICATIONS GIVEN IN ER  Medications   sodium chloride 0.9 % bolus 1,000 mL (1,000 mL Intravenous New Bag 5/2/20 1840)   LORazepam (ATIVAN) tablet 1 mg (1 mg Oral Given 5/2/20 1844)             PROGRESS AND CONSULTS    DDX includes but no limited to panic attack, dehydration, arrhythmia, anemia, neurogenic syncope    ED Course as of May 02 1949   Sat May 02, 2020   1947 Chest x-ray viewed by me on PACS and my interpretation is no acute interstitial disease and no cardiomegaly    [KA]   1947 CHART REVIEW    Office visit on 2/24/2020 for depression with PCP Dr. Bardales.  Has been noted to have rectal cancer and has an ostomy, she has 2 kids with special needs and she is the primary caregiver for everyone.  She was to continue her current plans and also referred to [facility] if she felt urgently needed.    [KA]      ED Course User Index  [KA] Rachel Walton PA        Reviewed pt's history and workup with Dr. Mckoy.  After a bedside evaluation; Dr Mckoy agrees with the plan of care      Patient was placed in face mask in first look. Patient was wearing facemask each time I entered the room and throughout our encounter. I wore protective equipment throughout this patient encounter including a face mask, eye shield and gloves. Hand hygiene was performed before donning  protective equipment and after removal when leaving the room.      MEDICAL DECISION MAKING      MDM       History the patient felt very anxious and like she was having panic attack right before passing out.  She has had some chest tightness and shortness of breath but she also has asthma, allergies have flared up, and with her anxiety she usually has tightening and dysfunction of her vocal cords with stuttering.  She is experiencing all of those things today.  There is no evidence of anemia, troponin is within normal limits, EKG is unremarkable, her vitals are unremarkable.  We have discussed follow-up with her PCP and also given her information for [facility] for walk-in psychiatric care.  She denies any suicidal or homicidal ideation.   She is agreeable with discharge.    DIAGNOSIS  Final diagnoses:   Syncope, unspecified syncope type   Anxiety         DISPOSITION  Discharge        Latest Documented Vital Signs:  As of 19:48  BP- 126/96 HR- 82 Temp- 99 °F (37.2 °C) (Tympanic) O2 sat- 98%       Rachel Walton PA  05/02/20 1947       Rachel Walton PA  05/02/20 1949

## 2020-05-06 DIAGNOSIS — E66.01 CLASS 2 SEVERE OBESITY DUE TO EXCESS CALORIES WITH SERIOUS COMORBIDITY AND BODY MASS INDEX (BMI) OF 35.0 TO 35.9 IN ADULT (HCC): ICD-10-CM

## 2020-05-06 DIAGNOSIS — J45.40 MODERATE PERSISTENT ASTHMA WITHOUT COMPLICATION: ICD-10-CM

## 2020-05-06 DIAGNOSIS — J30.9 ATOPIC RHINITIS: ICD-10-CM

## 2020-05-06 RX ORDER — PHENTERMINE HYDROCHLORIDE 37.5 MG/1
TABLET ORAL
Qty: 30 TABLET | Refills: 0 | Status: SHIPPED | OUTPATIENT
Start: 2020-05-06 | End: 2020-06-17

## 2020-05-06 RX ORDER — MONTELUKAST SODIUM 10 MG/1
TABLET ORAL
Qty: 30 TABLET | Refills: 0 | Status: SHIPPED | OUTPATIENT
Start: 2020-05-06 | End: 2020-06-17

## 2020-05-07 ENCOUNTER — OFFICE VISIT (OUTPATIENT)
Dept: FAMILY MEDICINE CLINIC | Facility: CLINIC | Age: 50
End: 2020-05-07

## 2020-05-07 VITALS
SYSTOLIC BLOOD PRESSURE: 110 MMHG | HEIGHT: 67 IN | HEART RATE: 95 BPM | BODY MASS INDEX: 38.61 KG/M2 | OXYGEN SATURATION: 97 % | WEIGHT: 246 LBS | DIASTOLIC BLOOD PRESSURE: 60 MMHG

## 2020-05-07 DIAGNOSIS — M06.09 RHEUMATOID ARTHRITIS OF MULTIPLE SITES WITH NEGATIVE RHEUMATOID FACTOR (HCC): ICD-10-CM

## 2020-05-07 DIAGNOSIS — M79.7 FIBROMYALGIA: ICD-10-CM

## 2020-05-07 DIAGNOSIS — F33.3 SEVERE EPISODE OF RECURRENT MAJOR DEPRESSIVE DISORDER, WITH PSYCHOTIC FEATURES (HCC): Primary | ICD-10-CM

## 2020-05-07 PROCEDURE — 96372 THER/PROPH/DIAG INJ SC/IM: CPT | Performed by: FAMILY MEDICINE

## 2020-05-07 PROCEDURE — 99214 OFFICE O/P EST MOD 30 MIN: CPT | Performed by: FAMILY MEDICINE

## 2020-05-07 RX ORDER — RISPERIDONE 0.5 MG/1
TABLET ORAL
Qty: 60 TABLET | Refills: 2 | Status: SHIPPED | OUTPATIENT
Start: 2020-05-07 | End: 2020-08-28

## 2020-05-07 RX ORDER — METHYLPREDNISOLONE ACETATE 80 MG/ML
80 INJECTION, SUSPENSION INTRA-ARTICULAR; INTRALESIONAL; INTRAMUSCULAR; SOFT TISSUE ONCE
Status: COMPLETED | OUTPATIENT
Start: 2020-05-07 | End: 2020-05-07

## 2020-05-07 RX ORDER — KETOROLAC TROMETHAMINE 30 MG/ML
30 INJECTION, SOLUTION INTRAMUSCULAR; INTRAVENOUS ONCE
Status: COMPLETED | OUTPATIENT
Start: 2020-05-07 | End: 2020-05-07

## 2020-05-07 RX ADMIN — METHYLPREDNISOLONE ACETATE 80 MG: 80 INJECTION, SUSPENSION INTRA-ARTICULAR; INTRALESIONAL; INTRAMUSCULAR; SOFT TISSUE at 13:48

## 2020-05-07 RX ADMIN — KETOROLAC TROMETHAMINE 30 MG: 30 INJECTION, SOLUTION INTRAMUSCULAR; INTRAVENOUS at 13:47

## 2020-05-07 NOTE — PROGRESS NOTES
Subjective   Karen Nash is a 49 y.o. female. Presents today for   Chief Complaint   Patient presents with   • Anxiety   • Depression       Patient 50 y/o female well know to me as seen for past 6 years.  She struggles with depression with anxiety and over the past few years has developed rheumatoid arthritis, fibromyalgia, has VCD and asthma that has significantly impacted her ability to work.  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  She also is caring for 2 children with special needs with challenges compounded by COVID 19 pandemic and trying to educate them at home (one has autism spectrum disorder).  IN addition to this she cares for her live in Mother as well that has several chronic issues, needs help with ADLS daily including toileting.   On top of this, unfortunately, her  prior to COVID 19 was found to have rectal cancer s/p colectomy and has ostomy bag.  He is currently under going chemo and radiation during the pandemic that has been very nerve wracking to say the least.  Her  tried to work initially (takes care of hair at NH), but he had to stop as too high risk given treatment for cancer.   She is also trying to take care of her  as well and cares/changes his ostomy bag.  As a result, they are no longer able to make it financially and at the time the pandemic hit, the bank foreclosed on their home. They are still living there as no action can be taken while self quarantine is in place state wide.  She is not sure where they will go once foreclosed on.  They have a go fund me page her sister set-up, but is not enough and her disability appeal is on hold as court system is closed and told would take 18+ months.  When staff called patient today she did not sound her normal self and told to come in today to evaluate.  She reports she was in ER after had syncopal episode.  She was changing her husbands ostomy bag  when it slipped with stool going everywhere and a tube came out.  It frightened her and was worried she just killed her , then subsequently passe ut.  Since then has been stuttering and having hard time speaking.      Depression   Visit Type: follow-up  Patient presents with the following symptoms: anhedonia, confusion, decreased concentration, depressed mood, dizziness, dry mouth, excessive worry, fatigue, feelings of hopelessness, feelings of worthlessness, hypersomnia, insomnia, memory impairment, muscle tension, nausea, nervousness/anxiety, palpitations, panic, psychomotor agitation, psychomotor retardation, restlessness, suicidal ideas, thoughts of death and weight gain.  Patient is not experiencing: suicidal planning.  Frequency of symptoms: constantly   Severity: causing significant distress   Sleep quality: non-restorative  Nighttime awakenings: several  Compliance with medications:  %  Treatment side effects: No side effects to current medications.    She also reports was to visit with Rheumatology today via video visit, but could not get the program to work.  She called their office, but told had to do the video.  Is up set and states not sure what to do as feels having RA flare as fingers, b/l symmetrical joints swollen, stiff and painful.  RElates can barely move and doesn't know how going to care for children with special needs,  battling cancer and elderly mother with all of the pain.  Review of Systems   Constitutional: Positive for weight gain.   Cardiovascular: Positive for palpitations.   Psychiatric/Behavioral: Positive for confusion, decreased concentration and suicidal ideas. The patient is nervous/anxious and has insomnia.        Patient Active Problem List   Diagnosis   • Moderate persistent asthma without complication   • Atopic rhinitis   • Anemia   • Benign essential hypertension   • Constipation   • Dyslipidemia   • Gastroesophageal reflux disease   • Intractable  migraine without aura and without status migrainosus   • Muscle cramps   • Vocal cord dysfunction   • Female dyspareunia   • Vaginal atrophy   • Pelvic pain   • Stress incontinence of urine   • Fibromyalgia, primary   • Rheumatoid arthritis of multiple sites with negative rheumatoid factor (CMS/[facility])   • Severe episode of recurrent major depressive disorder, without psychotic features (CMS/[facility])   • Panic disorder   • Generalized anxiety disorder   • Primary osteoarthritis involving multiple joints   • Class 2 severe obesity due to excess calories with serious comorbidity and body mass index (BMI) of 36.0 to 36.9 in adult (CMS/[facility])       Social History     Socioeconomic History   • Marital status:      Spouse name: Not on file   • Number of children: 0   • Years of education: Not on file   • Highest education level: Not on file   Tobacco Use   • Smoking status: Former Smoker     Packs/day: 0.10     Types: Cigarettes     Start date: 2001     Last attempt to quit: 2009     Years since quittin.3   • Smokeless tobacco: Never Used   Substance and Sexual Activity   • Alcohol use: Yes     Alcohol/week: 1.0 standard drinks     Types: 1 Glasses of wine per week     Comment: SOCIAL USE//caffeine coffee daily    • Drug use: No   • Sexual activity: Yes     Partners: Male     Birth control/protection: None       Allergies   Allergen Reactions   • Sulfamethoxazole-Trimethoprim Hives and Itching   • Corn-Containing Products Rash   • Penicillins        Current Outpatient Medications on File Prior to Visit   Medication Sig Dispense Refill   • acetaminophen (TYLENOL) 500 MG tablet Take 500 mg by mouth Every 6 (Six) Hours As Needed for Mild Pain .     • albuterol (ACCUNEB) 1.25 MG/3ML nebulizer solution USE 1 AMPULE IN NEBULIZER EVERY SIX HOURS AS NEEDED FOR WHEEZING  60 mL 11   • ALBUTEROL SULFATE  (90 Base) MCG/ACT inhaler INHALE 2 PUFFS BY MOUTH EVERY SIX HOURS AS NEEDED for wheezing  18 g 5   • AMLODIPINE  BESYLATE PO Take 10 mg by mouth.     • azelastine (OPTIVAR) 0.05 % ophthalmic solution Administer 1 drop to both eyes 2 (Two) Times a Day As Needed (eye allergies). 1 each 12   • CALCIUM-MAGNESIUM-ZINC PO Take  by mouth.     • Cetirizine HCl 10 MG capsule Take 10 mg by mouth Daily. 30 capsule 5   • Estradiol 10 MCG insert Insert 1 capsule into the vagina 2 (Two) Times a Week. 8 each 12   • FLUoxetine (PROzac) 40 MG capsule Take 2 capsules by mouth Daily. 60 capsule 5   • fluticasone (FLONASE) 50 MCG/ACT nasal spray 2 sprays into the nostril(s) as directed by provider Daily. 16 g 11   • folic acid (FOLVITE) 1 MG tablet Take 1 mg by mouth.     • hydroCHLOROthiazide (HYDRODIURIL) 25 MG tablet TAKE 1 TABLET BY MOUTH ONE TIME A DAY  30 tablet 4   • montelukast (SINGULAIR) 10 MG tablet TAKE 1 TABLET BY MOUTH AT BEDTIME  30 tablet 0   • Multiple Vitamin (MULTI VITAMIN DAILY PO) Take by mouth daily.     • oxybutynin XL (DITROPAN XL) 15 MG 24 hr tablet Take 1 tablet by mouth Daily. [id number] 30 tablet 5   • pantoprazole (PROTONIX) 40 MG EC tablet Take 1 tablet by mouth Daily. 90 tablet 3   • phentermine (ADIPEX-P) 37.5 MG tablet TAKE 1 TABLET BY MOUTH IN THE MORNING  30 tablet 0   • polyethylene glycol (MIRALAX) pack packet mix 1 packet into drink every night 30 packet 4   • pregabalin (LYRICA) 150 MG capsule Take 1 capsule by mouth 2 (Two) Times a Day. 60 capsule 5   • Saline (NASOGEL) gel 1 spray each nostril twice daily 30 g 12   • SYMBICORT 160-4.5 MCG/ACT inhaler INHALE 2 PUFFS BY MOUTH TWO TIMES A DAY  10.2 g 11   • topiramate (TOPAMAX) 50 MG tablet Take 1 tablet by mouth 2 (Two) Times a Day. 1 po nightly x 3 nights x 1 po bid 60 tablet 5   • traZODone (DESYREL) 50 MG tablet Take 1 tablet by mouth At Night As Needed for Sleep. 30 tablet 5     No current facility-administered medications on file prior to visit.        Objective   Vitals:    05/07/20 1146   BP: 110/60   BP Location: Right arm   Patient Position: Sitting  "  Cuff Size: Adult   Pulse: 95   SpO2: 97%   Weight: 112 kg (246 lb)   Height: 170.2 cm (67\")     Body mass index is 38.53 kg/m².    Physical Exam   Constitutional: She appears well-developed and well-nourished.   HENT:   Head: Normocephalic and atraumatic.   Neck: Neck supple. No JVD present. No thyromegaly present.   Cardiovascular: Normal rate, regular rhythm and normal heart sounds. Exam reveals no gallop and no friction rub.   No murmur heard.  Pulmonary/Chest: Effort normal and breath sounds normal. No respiratory distress. She has no wheezes. She has no rales.   Abdominal: Soft. Bowel sounds are normal. She exhibits no distension. There is no tenderness. There is no rebound and no guarding.   Musculoskeletal: She exhibits no edema.   Neurological: She is alert.   Skin: Skin is warm and dry.   Psychiatric: Her mood appears anxious. Her speech is rapid and/or pressured and slurred. She is slowed and withdrawn. Cognition and memory are impaired. She exhibits a depressed mood. She expresses suicidal ideation. She expresses no suicidal plans and no homicidal plans. She is inattentive.   Nursing note and vitals reviewed.      Assessment/Plan   Karen was seen today for anxiety and depression.    Diagnoses and all orders for this visit:    Severe episode of recurrent major depressive disorder, with psychotic features (CMS/HCC)  -     risperiDONE (risperDAL) 0.5 MG tablet; 1/2 PO BID X 14 DAYS THEN 1PO BID    Fibromyalgia  -     ketorolac (TORADOL) injection 30 mg  -     methylPREDNISolone acetate (DEPO-medrol) injection 80 mg    Rheumatoid arthritis of multiple sites with negative rheumatoid factor (CMS/HCC)  -     ketorolac (TORADOL) injection 30 mg  -     methylPREDNISolone acetate (DEPO-medrol) injection 80 mg    -will start mood stabilizer, patient in very bad place emotionally.  She doesn't want to go see psychiatry, psychology and declines.  I asked to please go to ER (psychiatric) hospital immediately if " thoughts of self harm.  If not doing well, to please let me know right away.  We have tried numerous medications already.  Patient promises will seek care immediately;   Reports no plans to harm herself and doesn't want to do this to her family.  We discussed her stressors and how she has done a phenomenal job getting through all of this as I cannot imagine dealing with all of the above but we will continue to work to get through this.  I will follow closely via telehealth and live visits to make sure doing ok.  For her RA flare and until can get in, will try a couple of injections to get her through the pain.         -Follow up: 1 week telephone visit and prn

## 2020-05-15 ENCOUNTER — OFFICE VISIT (OUTPATIENT)
Dept: FAMILY MEDICINE CLINIC | Facility: CLINIC | Age: 50
End: 2020-05-15

## 2020-05-15 DIAGNOSIS — F33.3 SEVERE EPISODE OF RECURRENT MAJOR DEPRESSIVE DISORDER, WITH PSYCHOTIC FEATURES (HCC): ICD-10-CM

## 2020-05-15 DIAGNOSIS — N95.1 VASOMOTOR SYMPTOMS DUE TO MENOPAUSE: Primary | ICD-10-CM

## 2020-05-15 DIAGNOSIS — Z78.0 MENOPAUSE: ICD-10-CM

## 2020-05-15 PROCEDURE — 99441 PR PHYS/QHP TELEPHONE EVALUATION 5-10 MIN: CPT | Performed by: FAMILY MEDICINE

## 2020-05-15 RX ORDER — ESTRADIOL AND NORETHINDRONE ACETATE .5; .1 MG/1; MG/1
1 TABLET ORAL DAILY
Qty: 30 TABLET | Refills: 5 | Status: SHIPPED | OUTPATIENT
Start: 2020-05-15 | End: 2021-01-03 | Stop reason: SDUPTHER

## 2020-05-15 NOTE — PROGRESS NOTES
Subjective   Karen Nash is a 49 y.o. female. Presents today for   Chief Complaint   Patient presents with   • Depression     This visit has been rescheduled as a phone visit to comply with patient safety concerns in accordance with CDC recommendations. Total time of discussion was 8 minutes.  Patient Telephone VISIT, patient gave consent to treat.      History of Present Illness  Patient f/u for severe depression, just started mood stabilizer 2 days ago as worried about side effects.  Reports today also having severe hot sweats, mood changes and has been off HRT due to no longer being able to afford.  This has impacted her as well.  She still has her uterus, no period for 2 years.  Cannot afford more doctors visit to see gyn back either.  Thinks may have missed rheumatology appt again due to challenges with the technology;  Encouraged her to call as lot's of people having issues with video visits and it's understandable as unprecedented times.  Tells me about ER visit recently, that left disoriented, wandered around hospital and laid down in lawn as confused where at though can recall events.  Review of Systems    Patient Active Problem List   Diagnosis   • Moderate persistent asthma without complication   • Atopic rhinitis   • Anemia   • Benign essential hypertension   • Constipation   • Dyslipidemia   • Gastroesophageal reflux disease   • Intractable migraine without aura and without status migrainosus   • Muscle cramps   • Vocal cord dysfunction   • Female dyspareunia   • Vaginal atrophy   • Pelvic pain   • Stress incontinence of urine   • Fibromyalgia, primary   • Rheumatoid arthritis of multiple sites with negative rheumatoid factor (CMS/HCC)   • Severe episode of recurrent major depressive disorder, without psychotic features (CMS/HCC)   • Panic disorder   • Generalized anxiety disorder   • Primary osteoarthritis involving multiple joints   • Class 2 severe obesity due to excess calories with serious  comorbidity and body mass index (BMI) of 36.0 to 36.9 in adult (CMS/[facility])       Social History     Socioeconomic History   • Marital status:      Spouse name: Not on file   • Number of children: 0   • Years of education: Not on file   • Highest education level: Not on file   Tobacco Use   • Smoking status: Former Smoker     Packs/day: 0.10     Types: Cigarettes     Start date: 2001     Last attempt to quit: 2009     Years since quittin.3   • Smokeless tobacco: Never Used   Substance and Sexual Activity   • Alcohol use: Yes     Alcohol/week: 1.0 standard drinks     Types: 1 Glasses of wine per week     Comment: SOCIAL USE//caffeine coffee daily    • Drug use: No   • Sexual activity: Yes     Partners: Male     Birth control/protection: None       Allergies   Allergen Reactions   • Sulfamethoxazole-Trimethoprim Hives and Itching   • Corn-Containing Products Rash   • Penicillins        Current Outpatient Medications on File Prior to Visit   Medication Sig Dispense Refill   • acetaminophen (TYLENOL) 500 MG tablet Take 500 mg by mouth Every 6 (Six) Hours As Needed for Mild Pain .     • albuterol (ACCUNEB) 1.25 MG/3ML nebulizer solution USE 1 AMPULE IN NEBULIZER EVERY SIX HOURS AS NEEDED FOR WHEEZING  60 mL 11   • ALBUTEROL SULFATE  (90 Base) MCG/ACT inhaler INHALE 2 PUFFS BY MOUTH EVERY SIX HOURS AS NEEDED for wheezing  18 g 5   • AMLODIPINE BESYLATE PO Take 10 mg by mouth.     • azelastine (OPTIVAR) 0.05 % ophthalmic solution Administer 1 drop to both eyes 2 (Two) Times a Day As Needed (eye allergies). 1 each 12   • CALCIUM-MAGNESIUM-ZINC PO Take  by mouth.     • Cetirizine HCl 10 MG capsule Take 10 mg by mouth Daily. 30 capsule 5   • FLUoxetine (PROzac) 40 MG capsule Take 2 capsules by mouth Daily. 60 capsule 5   • fluticasone (FLONASE) 50 MCG/ACT nasal spray 2 sprays into the nostril(s) as directed by provider Daily. 16 g 11   • folic acid (FOLVITE) 1 MG tablet Take 1 mg by mouth.     •  hydroCHLOROthiazide (HYDRODIURIL) 25 MG tablet TAKE 1 TABLET BY MOUTH ONE TIME A DAY  30 tablet 4   • montelukast (SINGULAIR) 10 MG tablet TAKE 1 TABLET BY MOUTH AT BEDTIME  30 tablet 0   • Multiple Vitamin (MULTI VITAMIN DAILY PO) Take by mouth daily.     • oxybutynin XL (DITROPAN XL) 15 MG 24 hr tablet Take 1 tablet by mouth Daily. [id number] 30 tablet 5   • pantoprazole (PROTONIX) 40 MG EC tablet Take 1 tablet by mouth Daily. 90 tablet 3   • phentermine (ADIPEX-P) 37.5 MG tablet TAKE 1 TABLET BY MOUTH IN THE MORNING  30 tablet 0   • polyethylene glycol (MIRALAX) pack packet mix 1 packet into drink every night 30 packet 4   • pregabalin (LYRICA) 150 MG capsule Take 1 capsule by mouth 2 (Two) Times a Day. 60 capsule 5   • risperiDONE (risperDAL) 0.5 MG tablet 1/2 PO BID X 14 DAYS THEN 1PO BID 60 tablet 2   • Saline (NASOGEL) gel 1 spray each nostril twice daily 30 g 12   • SYMBICORT 160-4.5 MCG/ACT inhaler INHALE 2 PUFFS BY MOUTH TWO TIMES A DAY  10.2 g 11   • topiramate (TOPAMAX) 50 MG tablet Take 1 tablet by mouth 2 (Two) Times a Day. 1 po nightly x 3 nights x 1 po bid 60 tablet 5   • traZODone (DESYREL) 50 MG tablet Take 1 tablet by mouth At Night As Needed for Sleep. 30 tablet 5   • [DISCONTINUED] Estradiol 10 MCG insert Insert 1 capsule into the vagina 2 (Two) Times a Week. 8 each 12     No current facility-administered medications on file prior to visit.        Objective   There were no vitals filed for this visit.  There is no height or weight on file to calculate BMI.    Physical Exam   Psychiatric: Her speech is normal. Thought content normal. Her affect is blunt. She is slowed and withdrawn. She exhibits a depressed mood.       Assessment/Plan   Karen was seen today for depression.    Diagnoses and all orders for this visit:    Vasomotor symptoms due to menopause  -     Estradiol-Norethindrone Acet 0.5-0.1 MG per tablet; Take 1 tablet by mouth Daily.    Menopause  -     Estradiol-Norethindrone Acet 0.5-0.1 MG  per tablet; Take 1 tablet by mouth Daily.    Severe episode of recurrent major depressive disorder, with psychotic features (CMS/HCC)    -continue risperdal - seek care immediately if any thoughts of self harm.  -will try adding back HRT, hopefully covered as may be contributing to struggles;  Aware of risks;  No hx of dvt; non-smoker;  Will need progesterone as still intact uterus         -Follow up: 2 weeks and prn

## 2020-05-20 ENCOUNTER — TELEPHONE (OUTPATIENT)
Dept: FAMILY MEDICINE CLINIC | Facility: CLINIC | Age: 50
End: 2020-05-20

## 2020-05-21 NOTE — TELEPHONE ENCOUNTER
It's a self referral.  On back of her insurance card does it have a mental health or behavioral health line?  Usually can call to find names of those covered and can help find when know.

## 2020-05-29 ENCOUNTER — OFFICE VISIT (OUTPATIENT)
Dept: FAMILY MEDICINE CLINIC | Facility: CLINIC | Age: 50
End: 2020-05-29

## 2020-05-29 DIAGNOSIS — F33.2 SEVERE EPISODE OF RECURRENT MAJOR DEPRESSIVE DISORDER, WITHOUT PSYCHOTIC FEATURES (HCC): Primary | ICD-10-CM

## 2020-05-29 PROCEDURE — 99441 PR PHYS/QHP TELEPHONE EVALUATION 5-10 MIN: CPT | Performed by: FAMILY MEDICINE

## 2020-06-08 ENCOUNTER — TELEPHONE (OUTPATIENT)
Dept: FAMILY MEDICINE CLINIC | Facility: CLINIC | Age: 50
End: 2020-06-08

## 2020-06-08 DIAGNOSIS — N39.41 URGE URINARY INCONTINENCE: ICD-10-CM

## 2020-06-08 RX ORDER — OXYBUTYNIN CHLORIDE 15 MG/1
15 TABLET, EXTENDED RELEASE ORAL 2 TIMES DAILY
Qty: 60 TABLET | Refills: 5 | Status: SHIPPED | OUTPATIENT
Start: 2020-06-08 | End: 2020-06-30

## 2020-06-08 RX ORDER — AZELASTINE HYDROCHLORIDE, FLUTICASONE PROPIONATE 137; 50 UG/1; UG/1
1 SPRAY, METERED NASAL 2 TIMES DAILY
Qty: 1 BOTTLE | Refills: 5 | Status: SHIPPED | OUTPATIENT
Start: 2020-06-08 | End: 2023-01-11 | Stop reason: SDUPTHER

## 2020-06-08 NOTE — TELEPHONE ENCOUNTER
Pt said she is still taking flonase and zyrtec.     She is aware and I sent oxybutinin 15mg bid in to pharmacy.

## 2020-06-08 NOTE — TELEPHONE ENCOUNTER
Ok, stop flonase and start dymista 1 spray per nostril twice daily (this has flonase in it and another ingredient).  Dymista went generic, so hoping will be coverd.

## 2020-06-08 NOTE — TELEPHONE ENCOUNTER
oxybutinin 15mg can change from 1 daily to 1 po bid.  Singulair is max dose.  Is she still taking zyrtec and flonase?

## 2020-06-16 DIAGNOSIS — J45.40 MODERATE PERSISTENT ASTHMA WITHOUT COMPLICATION: ICD-10-CM

## 2020-06-16 DIAGNOSIS — M79.7 FIBROMYALGIA, PRIMARY: ICD-10-CM

## 2020-06-16 DIAGNOSIS — J30.9 ATOPIC RHINITIS: ICD-10-CM

## 2020-06-16 DIAGNOSIS — E66.01 CLASS 2 SEVERE OBESITY DUE TO EXCESS CALORIES WITH SERIOUS COMORBIDITY AND BODY MASS INDEX (BMI) OF 35.0 TO 35.9 IN ADULT (HCC): ICD-10-CM

## 2020-06-17 RX ORDER — PHENTERMINE HYDROCHLORIDE 37.5 MG/1
TABLET ORAL
Qty: 30 TABLET | Refills: 0 | Status: SHIPPED | OUTPATIENT
Start: 2020-06-17 | End: 2020-07-27

## 2020-06-17 RX ORDER — PREGABALIN 150 MG/1
CAPSULE ORAL
Qty: 60 CAPSULE | Refills: 0 | Status: SHIPPED | OUTPATIENT
Start: 2020-06-17 | End: 2020-07-27

## 2020-06-17 RX ORDER — MONTELUKAST SODIUM 10 MG/1
TABLET ORAL
Qty: 30 TABLET | Refills: 0 | Status: SHIPPED | OUTPATIENT
Start: 2020-06-17 | End: 2020-07-27

## 2020-06-26 DIAGNOSIS — N30.90 BLADDER INFECTION: Primary | ICD-10-CM

## 2020-06-26 DIAGNOSIS — M79.7 FIBROMYALGIA, PRIMARY: ICD-10-CM

## 2020-06-26 LAB
BILIRUB BLD-MCNC: NEGATIVE MG/DL
CLARITY, POC: CLEAR
COLOR UR: YELLOW
GLUCOSE UR STRIP-MCNC: NEGATIVE MG/DL
KETONES UR QL: NEGATIVE
LEUKOCYTE EST, POC: ABNORMAL
NITRITE UR-MCNC: NEGATIVE MG/ML
PH UR: 6 [PH] (ref 5–8)
PROT UR STRIP-MCNC: NEGATIVE MG/DL
RBC # UR STRIP: NEGATIVE /UL
SP GR UR: 1.02 (ref 1–1.03)
UROBILINOGEN UR QL: NORMAL

## 2020-06-26 PROCEDURE — 81003 URINALYSIS AUTO W/O SCOPE: CPT | Performed by: FAMILY MEDICINE

## 2020-06-26 RX ORDER — NITROFURANTOIN 25; 75 MG/1; MG/1
100 CAPSULE ORAL 2 TIMES DAILY
Qty: 14 CAPSULE | Refills: 0 | Status: SHIPPED | OUTPATIENT
Start: 2020-06-26 | End: 2020-07-27

## 2020-06-26 RX ORDER — PREGABALIN 150 MG/1
CAPSULE ORAL
Qty: 60 CAPSULE | Refills: 0 | OUTPATIENT
Start: 2020-06-26

## 2020-06-28 LAB
BACTERIA UR CULT: NO GROWTH
BACTERIA UR CULT: NORMAL

## 2020-06-30 DIAGNOSIS — R13.14 PHARYNGOESOPHAGEAL DYSPHAGIA: Primary | ICD-10-CM

## 2020-06-30 DIAGNOSIS — N32.81 OAB (OVERACTIVE BLADDER): ICD-10-CM

## 2020-06-30 RX ORDER — SOLIFENACIN SUCCINATE 10 MG/1
10 TABLET, FILM COATED ORAL DAILY
Qty: 30 TABLET | Refills: 5 | Status: SHIPPED | OUTPATIENT
Start: 2020-06-30 | End: 2020-07-26

## 2020-07-08 ENCOUNTER — TRANSCRIBE ORDERS (OUTPATIENT)
Dept: SLEEP MEDICINE | Facility: HOSPITAL | Age: 50
End: 2020-07-08

## 2020-07-08 DIAGNOSIS — Z01.818 OTHER SPECIFIED PRE-OPERATIVE EXAMINATION: Primary | ICD-10-CM

## 2020-07-10 ENCOUNTER — LAB (OUTPATIENT)
Dept: LAB | Facility: HOSPITAL | Age: 50
End: 2020-07-10

## 2020-07-10 ENCOUNTER — TELEPHONE (OUTPATIENT)
Dept: FAMILY MEDICINE CLINIC | Facility: CLINIC | Age: 50
End: 2020-07-10

## 2020-07-10 DIAGNOSIS — Z01.818 OTHER SPECIFIED PRE-OPERATIVE EXAMINATION: ICD-10-CM

## 2020-07-10 PROCEDURE — U0004 COV-19 TEST NON-CDC HGH THRU: HCPCS

## 2020-07-10 PROCEDURE — C9803 HOPD COVID-19 SPEC COLLECT: HCPCS

## 2020-07-10 NOTE — TELEPHONE ENCOUNTER
[facility] is asking for the notes on why pt is having swallow study done. I can't find them but thought there was an office note or telephone note about this. Please update last office note or advise.

## 2020-07-11 LAB
REF LAB TEST METHOD: NORMAL
SARS-COV-2 RNA RESP QL NAA+PROBE: NOT DETECTED

## 2020-07-13 ENCOUNTER — HOSPITAL ENCOUNTER (OUTPATIENT)
Dept: GENERAL RADIOLOGY | Facility: HOSPITAL | Age: 50
Discharge: HOME | End: 2020-07-13
Admitting: FAMILY MEDICINE

## 2020-07-13 DIAGNOSIS — R13.14 PHARYNGOESOPHAGEAL DYSPHAGIA: ICD-10-CM

## 2020-07-13 PROCEDURE — 92611 MOTION FLUOROSCOPY/SWALLOW: CPT

## 2020-07-13 PROCEDURE — 74230 X-RAY XM SWLNG FUNCJ C+: CPT

## 2020-07-13 RX ADMIN — BARIUM SULFATE 55 ML: 0.81 POWDER, FOR SUSPENSION ORAL at 09:56

## 2020-07-13 RX ADMIN — BARIUM SULFATE 4 ML: 980 POWDER, FOR SUSPENSION ORAL at 09:56

## 2020-07-13 RX ADMIN — BARIUM SULFATE 50 ML: 400 SUSPENSION ORAL at 09:56

## 2020-07-13 NOTE — MBS/VFSS/FEES
Outpatient Speech Language Pathology   Adult Swallow Initial Evaluation  [facility]     Patient Name: Karen Nash  : 1970  MRN: [id number]  Today's Date: 2020         Visit Date: 2020   Patient Active Problem List   Diagnosis   • Moderate persistent asthma without complication   • Atopic rhinitis   • Anemia   • Benign essential hypertension   • Constipation   • Dyslipidemia   • Gastroesophageal reflux disease   • Intractable migraine without aura and without status migrainosus   • Muscle cramps   • Vocal cord dysfunction   • Female dyspareunia   • Vaginal atrophy   • Pelvic pain   • Stress incontinence of urine   • Fibromyalgia, primary   • Rheumatoid arthritis of multiple sites with negative rheumatoid factor (CMS/[facility])   • Severe episode of recurrent major depressive disorder, without psychotic features (CMS/[facility])   • Panic disorder   • Generalized anxiety disorder   • Primary osteoarthritis involving multiple joints   • Class 2 severe obesity due to excess calories with serious comorbidity and body mass index (BMI) of 36.0 to 36.9 in adult (CMS/[facility])        Past Medical History:   Diagnosis Date   • Abdominal pain    • Abnormal vaginal bleeding    • Allergic rhinitis    • Anemia    • Anxiety    • Asthma    • Breast cancer screening    • Cluster headache    • Congenital prolapsed rectum    • Constipation    • Depression    • Dyslipidemia    • Endometriosis    • Environmental allergies    • Fibroids     uterine   • Fibromyalgia, primary    • Gastroesophageal reflux disease    • Headache, tension-type    • Hyperlipidemia    • Hypertension    • Infertility, female    • Leaking of urine    • Menorrhagia    • Migraine    • Muscle cramp    • Vaginal candidiasis         Past Surgical History:   Procedure Laterality Date   • APPENDECTOMY      open - age 15   • COLONOSCOPY  APPROX    • COLONOSCOPY N/A 2016    Procedure: COLONOSCOPY to cecum  W/  HEMORRHOID BANDING with cold biopsy polypectomy;   "Surgeon: Kin Meyers MD;  Location:  [facility];  Service:    • HEMORRHOIDECTOMY     • TONSILLECTOMY     • VAGINAL URETHRAL SEPTUM EXCISION           Visit Dx:     ICD-10-CM ICD-9-CM   1. Pharyngoesophageal dysphagia R13.14 787.24           SLP Adult Swallow Evaluation     Row Name 07/13/20 0930       Rehab Evaluation    Document Type  evaluation  -    Patient Effort  excellent  -       General Information    Patient Profile Reviewed  yes  -    Pertinent History Of Current Problem  Pt c/o obstruction with PO at the upper chest level for \"a couple of years\", but acutely worse. Pt feels like her L side of throat \"isn't working\". Difficulty with mostly solids, but sometimes liquids. Obstruction does not occur with every meal.   -    Current Method of Nutrition  regular textures;thin liquids  -    Precautions/Limitations, Vision  WFL;for purposes of eval  -    Precautions/Limitations, Hearing  WFL;for purposes of eval  -    Prior Level of Function-Communication  WFL  -    Prior Level of Function-Swallowing  esophageal concerns  -    Plans/Goals Discussed with  patient;agreed upon  [facility]    Barriers to Rehab  none identified  -    Patient's Goals for Discharge  patient did not state  -       MBS/VFSS Interpretation    VFSS Summary  Patient presents with a functional oropharyngeal swallow. Prominent cricopharyngeus observed. Transient, shallow penetration with nectar via cup. No penetration appreciated with nectar via straw. Transient, deep penetration occured ocassionally with thins via cup. Consistent, deep penetration with thins via straw. Trace backflow to the pyriforms with puree, patient did not sensate. Rotary mastication with mechanical soft and regular. Spillage past the valleculae with mech soft. No penetration observed. Mild moderate pharyngeal residue after the swallow with regular. No pharyngeal residue observed when patient utilized a liquid wash during bolus preparation. An " esophageal scan revealed dysmotility and question of spasms, which could correlate with patient's symptoms. Patient may benefit from a dedicated esophageal study.   -       SLP Communication to Radiology    Summary Statement  Patient presents with a functional oropharyngeal swallow. Prominent cricopharyngeus observed. Transient penetration with thins and nectar. No significant pharyngeal residue post swallow with solids.  An esophageal scan revealed dysmotility and question of spasms, which could correlate with patient's symptoms. Patient may benefit from a dedicated esophageal study.   -       Clinical Impression    SLP Swallowing Diagnosis  functional oral phase;functional pharyngeal phase  -    Swallow Criteria for Skilled Therapeutic Interventions Met  no problems identified which require skilled intervention  -       Recommendations    Therapy Frequency (Swallow)  evaluation only  -    SLP Diet Recommendation  regular textures;thin liquids  -    Recommended Precautions and Strategies  upright posture during/after eating  -    SLP Rec. for Method of Medication Administration  meds whole;as tolerated  -    Monitor for Signs of Aspiration  yes;notify SLP if any concerns  -    Demonstrates Need for Referral to Another Service  dedicated esophageal assessment  -      User Key  (r) = Recorded By, (t) = Taken By, (c) = Cosigned By    Initials Name Provider Type     Allie Santamaria MS CCC-SLP Speech and Language Pathologist                        OP SLP Education     Row Name 07/13/20 1314       Education    Barriers to Learning  No barriers identified  -    Education Provided  Described results of evaluation  -    Assessed  Learning needs;Learning motivation;Learning preferences  [facility]    Learning Motivation  Strong  -    Learning Method  Explanation;Other (comment) Reviewed images from VFSS with patient following assessment  -    Teaching Response  Verbalized understanding  -      User  Key  (r) = Recorded By, (t) = Taken By, (c) = Cosigned By    Initials Name Effective Dates     Hina Allie GAN MS CCC-SLP 03/07/18 -               OP SLP Assessment/Plan - 07/13/20 1314        SLP Assessment    Functional Problems  Swallowing   -    Impact on Function: Swallowing  Impact on social aspects of eating   -    Clinical Impression: Swallowing  WNL   -SH       SLP Plan    Frequency  NA   -SH    Duration  NA   -    Plan Comments  NA   -SH      User Key  (r) = Recorded By, (t) = Taken By, (c) = Cosigned By    Initials Name Provider Type     Hina Allie GAN MS CCC-SLP Speech and Language Pathologist              SLP Outcome Measures (last 72 hours)      SLP Outcome Measures     Row Name 07/13/20 1300             SLP Outcome Measures    Outcome Measure Used?  Adult NOMS  -         Adult FCM Scores    FCM Chosen  Swallowing  -      Swallowing FCM Score  7  -SH        User Key  (r) = Recorded By, (t) = Taken By, (c) = Cosigned By    Initials Name Effective Dates     Hina Allie GAN MS CCC-SLP 03/07/18 -                Time Calculation:   SLP Start Time: 0900    Therapy Charges for Today     Code Description Service Date Service Provider Modifiers Qty    81658646089 HC ST MOTION FLUORO EVAL SWALLOW 4 7/13/2020 Allie Santamaria MS CCC-SLP GN 1                   Allie Santamaria MS CCC-SLP  7/13/2020

## 2020-07-13 NOTE — TELEPHONE ENCOUNTER
She called one day complaining for choking on liquids, solids and pills.  I was asked about this verbally and said add swallow study to evaluate.  It looks like message was not generated for the call.

## 2020-07-17 DIAGNOSIS — J45.51 SEVERE PERSISTENT ASTHMA WITH ACUTE EXACERBATION: ICD-10-CM

## 2020-07-17 DIAGNOSIS — T17.908A ASPIRATION INTO AIRWAY, INITIAL ENCOUNTER: ICD-10-CM

## 2020-07-17 DIAGNOSIS — K22.4 ESOPHAGEAL DYSMOTILITY: Primary | ICD-10-CM

## 2020-07-17 RX ORDER — BUDESONIDE AND FORMOTEROL FUMARATE DIHYDRATE 160; 4.5 UG/1; UG/1
2 AEROSOL RESPIRATORY (INHALATION) 2 TIMES DAILY
Qty: 10.2 G | Refills: 11 | Status: SHIPPED | OUTPATIENT
Start: 2020-07-17 | End: 2021-04-16 | Stop reason: SDUPTHER

## 2020-07-17 NOTE — PROGRESS NOTES
Call results to patient.  The patient does aspirate and possible her esophagus has dysmotility (doesn't move food down appropriately).  I put in an order to evaluate her esophagus and refer to GI specialist.  This likely causing a lot of her symptoms.

## 2020-07-22 ENCOUNTER — TRANSCRIBE ORDERS (OUTPATIENT)
Dept: ADMINISTRATIVE | Facility: HOSPITAL | Age: 50
End: 2020-07-22

## 2020-07-22 DIAGNOSIS — Z01.818 OTHER SPECIFIED PRE-OPERATIVE EXAMINATION: Primary | ICD-10-CM

## 2020-07-24 ENCOUNTER — TRANSCRIBE ORDERS (OUTPATIENT)
Dept: SLEEP MEDICINE | Facility: HOSPITAL | Age: 50
End: 2020-07-24

## 2020-07-24 DIAGNOSIS — Z01.818 OTHER SPECIFIED PRE-OPERATIVE EXAMINATION: Primary | ICD-10-CM

## 2020-07-26 ENCOUNTER — TELEPHONE (OUTPATIENT)
Dept: FAMILY MEDICINE CLINIC | Facility: CLINIC | Age: 50
End: 2020-07-26

## 2020-07-26 DIAGNOSIS — M79.7 FIBROMYALGIA, PRIMARY: ICD-10-CM

## 2020-07-26 DIAGNOSIS — N32.81 OAB (OVERACTIVE BLADDER): Primary | ICD-10-CM

## 2020-07-26 DIAGNOSIS — E66.01 CLASS 2 SEVERE OBESITY DUE TO EXCESS CALORIES WITH SERIOUS COMORBIDITY AND BODY MASS INDEX (BMI) OF 35.0 TO 35.9 IN ADULT (HCC): ICD-10-CM

## 2020-07-26 DIAGNOSIS — J45.40 MODERATE PERSISTENT ASTHMA WITHOUT COMPLICATION: ICD-10-CM

## 2020-07-26 DIAGNOSIS — J30.9 ATOPIC RHINITIS: ICD-10-CM

## 2020-07-26 RX ORDER — TROSPIUM CHLORIDE 20 MG/1
20 TABLET, FILM COATED ORAL 2 TIMES DAILY
Qty: 60 TABLET | Refills: 5 | Status: SHIPPED | OUTPATIENT
Start: 2020-07-26 | End: 2020-11-10 | Stop reason: SDUPTHER

## 2020-07-27 ENCOUNTER — OFFICE VISIT (OUTPATIENT)
Dept: GASTROENTEROLOGY | Facility: CLINIC | Age: 50
End: 2020-07-27

## 2020-07-27 ENCOUNTER — LAB (OUTPATIENT)
Dept: LAB | Facility: HOSPITAL | Age: 50
End: 2020-07-27

## 2020-07-27 VITALS — WEIGHT: 238.8 LBS | BODY MASS INDEX: 37.48 KG/M2 | HEIGHT: 67 IN

## 2020-07-27 DIAGNOSIS — Z01.818 OTHER SPECIFIED PRE-OPERATIVE EXAMINATION: ICD-10-CM

## 2020-07-27 DIAGNOSIS — R13.19 ESOPHAGEAL DYSPHAGIA: Primary | ICD-10-CM

## 2020-07-27 DIAGNOSIS — R10.13 DYSPEPSIA: ICD-10-CM

## 2020-07-27 PROCEDURE — U0004 COV-19 TEST NON-CDC HGH THRU: HCPCS

## 2020-07-27 PROCEDURE — C9803 HOPD COVID-19 SPEC COLLECT: HCPCS

## 2020-07-27 PROCEDURE — 99214 OFFICE O/P EST MOD 30 MIN: CPT | Performed by: NURSE PRACTITIONER

## 2020-07-27 RX ORDER — PANTOPRAZOLE SODIUM 40 MG/1
40 TABLET, DELAYED RELEASE ORAL 2 TIMES DAILY
Qty: 180 TABLET | Refills: 3 | Status: SHIPPED | OUTPATIENT
Start: 2020-07-27 | End: 2020-08-03 | Stop reason: SDUPTHER

## 2020-07-27 RX ORDER — PREGABALIN 150 MG/1
CAPSULE ORAL
Qty: 60 CAPSULE | Refills: 0 | Status: SHIPPED | OUTPATIENT
Start: 2020-07-27 | End: 2020-08-28

## 2020-07-27 RX ORDER — PHENTERMINE HYDROCHLORIDE 37.5 MG/1
TABLET ORAL
Qty: 30 TABLET | Refills: 0 | Status: SHIPPED | OUTPATIENT
Start: 2020-07-27 | End: 2020-10-13

## 2020-07-27 RX ORDER — MONTELUKAST SODIUM 10 MG/1
TABLET ORAL
Qty: 30 TABLET | Refills: 0 | Status: SHIPPED | OUTPATIENT
Start: 2020-07-27 | End: 2020-08-28

## 2020-07-27 RX ORDER — HYDROCHLOROTHIAZIDE 25 MG/1
TABLET ORAL
Qty: 30 TABLET | Refills: 0 | Status: SHIPPED | OUTPATIENT
Start: 2020-07-27 | End: 2020-08-28

## 2020-07-27 NOTE — PROGRESS NOTES
"Chief Complaint   Patient presents with   • Difficulty Swallowing   • Heartburn     HPI    Karen Nash is a  50 y.o. female here to establish care as a new (new over 3-year) patient for complaints of dysphasia and heartburn.  Patient previously seen by Dr. Lazar in 2016 for rectal bleeding.  This patient has a history of hemorrhoidectomy.  Patient follows with Dr. Jane Meyers.    Currently she is struggling with dysphagia occurs several times throughout the week.  Drinks water for relief, denies regurgitation.  Onset approximately 2 months ago.  Increased dyspeptic symptoms with onset of swallowing difficulties as well.  Patient recently transitioned from omeprazole to pantoprazole with no change in symptoms.  She is currently taking pantoprazole 40 mg once daily which she takes prior to going to bed.  She adheres to an antireflux diet.  No nausea, vomiting, early satiety.  Her appetite is good.  Her weight is stable.    Reviewed the recent VFSS:    \"Patient presents with a functional oropharyngeal swallow. Prominent cricopharyngeus observed. Transient, shallow penetration with nectar via cup. No penetration appreciated with nectar via straw. Transient, deep penetration occured ocassionally with thins via cup. Consistent, deep penetration with thins via straw. Trace backflow to the pyriforms with puree, patient did not sensate. Rotary mastication with mechanical soft and regular. Spillage past the valleculae with mech soft. No penetration observed. Mild moderate pharyngeal residue after the swallow with regular. No pharyngeal residue observed when patient utilized a liquid wash during bolus preparation. An esophageal scan revealed dysmotility and question of spasms, which could correlate with patient's symptoms. Patient may benefit from a dedicated esophageal study.\"    Patient scheduled for an esophagram this week.    No diarrhea, constipation, or rectal bleeding.    Reviewed colonoscopy dated 7/7/2016 " performed by Dr. Meyers with polyps and internal hemorrhoids which were banded.  Polyps were benign/hyperplastic.  Past Medical History:   Diagnosis Date   • Abdominal pain    • Abnormal vaginal bleeding    • Allergic rhinitis    • Anemia    • Anxiety    • Asthma    • Breast cancer screening    • Cluster headache    • Congenital prolapsed rectum    • Constipation    • Depression    • Dyslipidemia    • Endometriosis    • Environmental allergies    • Fibroids     uterine   • Fibromyalgia, primary    • Gastroesophageal reflux disease    • Headache, tension-type    • Hyperlipidemia    • Hypertension    • Infertility, female    • Leaking of urine    • Menorrhagia    • Migraine    • Muscle cramp    • Vaginal candidiasis        Past Surgical History:   Procedure Laterality Date   • APPENDECTOMY      open - age 15   • COLONOSCOPY  APPROX 1996   • COLONOSCOPY N/A 7/7/2016    Procedure: COLONOSCOPY to cecum  W/  HEMORRHOID BANDING with cold biopsy polypectomy;  Surgeon: Kin Meyers MD;  Location: [facility];  Service:    • HEMORRHOIDECTOMY     • TONSILLECTOMY     • VAGINAL URETHRAL SEPTUM EXCISION         Scheduled Meds:  Outpatient Encounter Medications as of 7/27/2020   Medication Sig Dispense Refill   • acetaminophen (TYLENOL) 500 MG tablet Take 500 mg by mouth Every 6 (Six) Hours As Needed for Mild Pain .     • albuterol (ACCUNEB) 1.25 MG/3ML nebulizer solution USE 1 AMPULE IN NEBULIZER EVERY SIX HOURS AS NEEDED FOR WHEEZING  60 mL 11   • ALBUTEROL SULFATE  (90 Base) MCG/ACT inhaler INHALE 2 PUFFS BY MOUTH EVERY SIX HOURS AS NEEDED for wheezing  18 g 5   • azelastine (OPTIVAR) 0.05 % ophthalmic solution Administer 1 drop to both eyes 2 (Two) Times a Day As Needed (eye allergies). 1 each 12   • Azelastine-Fluticasone 137-50 MCG/ACT suspension 1 spray into the nostril(s) as directed by provider 2 (Two) Times a Day. 1 bottle 5   • budesonide-formoterol (Symbicort) 160-4.5 MCG/ACT inhaler Inhale 2 puffs 2  (Two) Times a Day. 10.2 g 11   • CALCIUM-MAGNESIUM-ZINC PO Take  by mouth.     • Cetirizine HCl 10 MG capsule Take 10 mg by mouth Daily. 30 capsule 5   • Estradiol-Norethindrone Acet 0.5-0.1 MG per tablet Take 1 tablet by mouth Daily. 30 tablet 5   • FLUoxetine (PROzac) 40 MG capsule Take 2 capsules by mouth Daily. 60 capsule 5   • folic acid (FOLVITE) 1 MG tablet Take 1 mg by mouth.     • hydroCHLOROthiazide (HYDRODIURIL) 25 MG tablet TAKE 1 TABLET BY MOUTH ONE TIME A DAY  30 tablet 4   • montelukast (SINGULAIR) 10 MG tablet TAKE 1 TABLET BY MOUTH AT BEDTIME  30 tablet 0   • Multiple Vitamin (MULTI VITAMIN DAILY PO) Take by mouth daily.     • phentermine (ADIPEX-P) 37.5 MG tablet TAKE 1 TABLET BY MOUTH IN THE MORNING  30 tablet 0   • polyethylene glycol (MIRALAX) pack packet mix 1 packet into drink every night 30 packet 4   • pregabalin (LYRICA) 150 MG capsule TAKE 1 CAPSULE BY MOUTH TWO TIMES A DAY  60 capsule 0   • risperiDONE (risperDAL) 0.5 MG tablet 1/2 PO BID X 14 DAYS THEN 1PO BID 60 tablet 2   • Saline (NASOGEL) gel 1 spray each nostril twice daily 30 g 12   • topiramate (TOPAMAX) 50 MG tablet Take 1 tablet by mouth 2 (Two) Times a Day. 1 po nightly x 3 nights x 1 po bid 60 tablet 5   • traZODone (DESYREL) 50 MG tablet Take 1 tablet by mouth At Night As Needed for Sleep. 30 tablet 5   • trospium (SANCTURA) 20 MG tablet Take 1 tablet by mouth 2 (Two) Times a Day. 60 tablet 5   • [DISCONTINUED] pantoprazole (PROTONIX) 40 MG EC tablet Take 1 tablet by mouth Daily. 90 tablet 3   • AMLODIPINE BESYLATE PO Take 10 mg by mouth.     • pantoprazole (PROTONIX) 40 MG EC tablet Take 1 tablet by mouth 2 (Two) Times a Day. 180 tablet 3   • [DISCONTINUED] nitrofurantoin, macrocrystal-monohydrate, (Macrobid) 100 MG capsule Take 1 capsule by mouth 2 (Two) Times a Day. 14 capsule 0     No facility-administered encounter medications on file as of 7/27/2020.        Continuous Infusions:  No current facility-administered  medications for this visit.     PRN Meds:.    Allergies   Allergen Reactions   • Sulfamethoxazole-Trimethoprim Hives and Itching   • Corn-Containing Products Rash   • Penicillins        Social History     Socioeconomic History   • Marital status:      Spouse name: Not on file   • Number of children: 0   • Years of education: Not on file   • Highest education level: Not on file   Tobacco Use   • Smoking status: Former Smoker     Packs/day: 0.10     Types: Cigarettes     Start date: 2001     Last attempt to quit: 2009     Years since quittin.5   • Smokeless tobacco: Never Used   Substance and Sexual Activity   • Alcohol use: Yes     Alcohol/week: 1.0 standard drinks     Types: 1 Glasses of wine per week     Comment: SOCIAL USE//caffeine coffee daily    • Drug use: No   • Sexual activity: Yes     Partners: Male     Birth control/protection: None       Family History   Problem Relation Age of Onset   • Cancer Mother         SKIN   • Stroke Mother    • Diabetes Mother    • Heart disease Mother    • Parkinsonism Mother    • Arthritis Mother    • Dementia Mother    • Hypertension Mother    • Kidney disease Mother    • Neuropathy Mother    • Cancer Father         LARNYX   • Stroke Brother    • Breast cancer Neg Hx    • Ovarian cancer Neg Hx    • Uterine cancer Neg Hx    • Colon cancer Neg Hx        Review of Systems   Constitutional: Negative for activity change, appetite change, fatigue, fever and unexpected weight change.   HENT: Positive for trouble swallowing.    Eyes: Negative for discharge and itching.   Respiratory: Negative for apnea, cough, choking, chest tightness, shortness of breath and wheezing.    Cardiovascular: Negative for chest pain, palpitations and leg swelling.   Gastrointestinal: Negative for abdominal distention, abdominal pain, anal bleeding, blood in stool, constipation, diarrhea, nausea, rectal pain and vomiting.        + Dyspepsia   Endocrine: Negative for cold intolerance and  heat intolerance.   Genitourinary: Negative for difficulty urinating and dysuria.   Musculoskeletal: Negative for arthralgias and back pain.   Skin: Negative for color change and pallor.   Allergic/Immunologic: Negative for environmental allergies and food allergies.   Neurological: Negative for dizziness and headaches.   Hematological: Negative for adenopathy.   Psychiatric/Behavioral: Negative for agitation and confusion.       Vitals:       Physical Exam   Constitutional: She is oriented to person, place, and time. She appears well-developed and well-nourished.   HENT:   Head: Normocephalic.   Eyes: Pupils are equal, round, and reactive to light.   Neck: Normal range of motion.   Cardiovascular: Normal rate, regular rhythm and normal heart sounds.   Pulmonary/Chest: Effort normal and breath sounds normal. No respiratory distress. She has no wheezes.   Abdominal: Soft. Bowel sounds are normal. She exhibits no distension and no mass. There is no tenderness. There is no guarding. No hernia.   Musculoskeletal: Normal range of motion.   Neurological: She is alert and oriented to person, place, and time.   Skin: Skin is warm and dry. Capillary refill takes less than 2 seconds.   Psychiatric: She has a normal mood and affect. Her behavior is normal.     No radiology results for the last 7 days    Karen was seen today for difficulty swallowing and heartburn.    Diagnoses and all orders for this visit:    Esophageal dysphagia  -     Case Request; Standing  -     Case Request    Dyspepsia  -     Case Request; Standing  -     Case Request    Other orders  -     pantoprazole (PROTONIX) 40 MG EC tablet; Take 1 tablet by mouth 2 (Two) Times a Day.    Assessment/plan    Pleasant 50-year-old female seen today to establish care as a new patient (new over 3) for complaints of dysphagia onset approximately 2 months ago.  Reviewed recent VFSS which demonstrated possible dysmotility versus spasm.  Patient is scheduled for esophagram  this week.  Being forward recommend increasing pantoprazole to 40 mg twice daily.  Complete esophagram.  Arrange EGD with Dr. Lazar for further evaluation. Differential diagnosis for the dysphagia includes GERD, peptic stricture, ring/band, eosinophilic esophagitis, or esophageal dysmotility or hypersensitivity, malignancy.   Patient plans to follow with Dr. Jane Meyers for her screening colonoscopies.  Further recommendations and follow-up pending the aforementioned work-up.

## 2020-07-28 LAB
REF LAB TEST METHOD: NORMAL
SARS-COV-2 RNA RESP QL NAA+PROBE: NOT DETECTED

## 2020-07-29 ENCOUNTER — HOSPITAL ENCOUNTER (OUTPATIENT)
Dept: GENERAL RADIOLOGY | Facility: HOSPITAL | Age: 50
Discharge: HOME | End: 2020-07-29
Admitting: FAMILY MEDICINE

## 2020-07-29 DIAGNOSIS — K22.4 ESOPHAGEAL DYSMOTILITY: ICD-10-CM

## 2020-07-29 PROCEDURE — 74220 X-RAY XM ESOPHAGUS 1CNTRST: CPT

## 2020-07-29 RX ADMIN — ANTACID/ANTIFLATULENT 1 TABLET: 380; 550; 10; 10 GRANULE, EFFERVESCENT ORAL at 08:40

## 2020-07-29 RX ADMIN — BARIUM SULFATE 183 ML: 960 POWDER, FOR SUSPENSION ORAL at 08:40

## 2020-07-29 RX ADMIN — BARIUM SULFATE 135 ML: 980 POWDER, FOR SUSPENSION ORAL at 08:40

## 2020-08-03 DIAGNOSIS — K22.4 ESOPHAGEAL DYSMOTILITY: Primary | ICD-10-CM

## 2020-08-03 DIAGNOSIS — K21.00 GASTROESOPHAGEAL REFLUX DISEASE WITH ESOPHAGITIS: ICD-10-CM

## 2020-08-03 RX ORDER — PANTOPRAZOLE SODIUM 40 MG/1
40 TABLET, DELAYED RELEASE ORAL 2 TIMES DAILY
Qty: 180 TABLET | Refills: 3 | Status: SHIPPED | OUTPATIENT
Start: 2020-08-03 | End: 2021-04-16 | Stop reason: SDUPTHER

## 2020-08-27 DIAGNOSIS — J30.9 ATOPIC RHINITIS: ICD-10-CM

## 2020-08-27 DIAGNOSIS — J45.40 MODERATE PERSISTENT ASTHMA WITHOUT COMPLICATION: ICD-10-CM

## 2020-08-27 DIAGNOSIS — M79.7 FIBROMYALGIA, PRIMARY: ICD-10-CM

## 2020-08-27 DIAGNOSIS — F33.3 SEVERE EPISODE OF RECURRENT MAJOR DEPRESSIVE DISORDER, WITH PSYCHOTIC FEATURES (HCC): ICD-10-CM

## 2020-08-27 DIAGNOSIS — F41.8 DEPRESSION WITH ANXIETY: ICD-10-CM

## 2020-08-28 RX ORDER — RISPERIDONE 0.5 MG/1
TABLET ORAL
Qty: 60 TABLET | Refills: 0 | Status: SHIPPED | OUTPATIENT
Start: 2020-08-28 | End: 2020-10-13

## 2020-08-28 RX ORDER — MONTELUKAST SODIUM 10 MG/1
TABLET ORAL
Qty: 30 TABLET | Refills: 0 | Status: SHIPPED | OUTPATIENT
Start: 2020-08-28 | End: 2020-10-13

## 2020-08-28 RX ORDER — FLUOXETINE HYDROCHLORIDE 40 MG/1
CAPSULE ORAL
Qty: 60 CAPSULE | Refills: 0 | Status: SHIPPED | OUTPATIENT
Start: 2020-08-28 | End: 2020-10-13

## 2020-08-28 RX ORDER — PREGABALIN 150 MG/1
CAPSULE ORAL
Qty: 60 CAPSULE | Refills: 0 | Status: SHIPPED | OUTPATIENT
Start: 2020-08-28 | End: 2020-10-13

## 2020-08-28 RX ORDER — HYDROCHLOROTHIAZIDE 25 MG/1
TABLET ORAL
Qty: 30 TABLET | Refills: 0 | Status: SHIPPED | OUTPATIENT
Start: 2020-08-28 | End: 2020-10-13

## 2020-09-18 DIAGNOSIS — R11.0 CHRONIC NAUSEA: Primary | ICD-10-CM

## 2020-09-18 DIAGNOSIS — G43.009 MIGRAINE WITHOUT AURA AND WITHOUT STATUS MIGRAINOSUS, NOT INTRACTABLE: ICD-10-CM

## 2020-09-18 RX ORDER — TOPIRAMATE 100 MG/1
100 TABLET, FILM COATED ORAL 2 TIMES DAILY
Qty: 60 TABLET | Refills: 5 | Status: SHIPPED | OUTPATIENT
Start: 2020-09-18 | End: 2021-03-04 | Stop reason: SDUPTHER

## 2020-09-21 ENCOUNTER — HOSPITAL ENCOUNTER (EMERGENCY)
Facility: HOSPITAL | Age: 50
Discharge: HOME | End: 2020-09-21
Attending: EMERGENCY MEDICINE | Admitting: EMERGENCY MEDICINE

## 2020-09-21 ENCOUNTER — TELEPHONE (OUTPATIENT)
Dept: GASTROENTEROLOGY | Facility: CLINIC | Age: 50
End: 2020-09-21

## 2020-09-21 ENCOUNTER — TRANSCRIBE ORDERS (OUTPATIENT)
Dept: SLEEP MEDICINE | Facility: HOSPITAL | Age: 50
End: 2020-09-21

## 2020-09-21 ENCOUNTER — TELEPHONE (OUTPATIENT)
Dept: FAMILY MEDICINE CLINIC | Facility: CLINIC | Age: 50
End: 2020-09-21

## 2020-09-21 VITALS
HEART RATE: 80 BPM | BODY MASS INDEX: 38.25 KG/M2 | RESPIRATION RATE: 16 BRPM | TEMPERATURE: 96.8 F | DIASTOLIC BLOOD PRESSURE: 89 MMHG | SYSTOLIC BLOOD PRESSURE: 109 MMHG | HEIGHT: 66 IN | OXYGEN SATURATION: 96 % | WEIGHT: 238 LBS

## 2020-09-21 DIAGNOSIS — Z01.818 OTHER SPECIFIED PRE-OPERATIVE EXAMINATION: Primary | ICD-10-CM

## 2020-09-21 DIAGNOSIS — R13.10 DYSPHAGIA, UNSPECIFIED TYPE: Primary | ICD-10-CM

## 2020-09-21 PROBLEM — R10.13 DYSPEPSIA: Status: ACTIVE | Noted: 2020-09-21

## 2020-09-21 PROBLEM — R13.19 ESOPHAGEAL DYSPHAGIA: Status: ACTIVE | Noted: 2020-09-21

## 2020-09-21 LAB
ALBUMIN SERPL-MCNC: 4.3 G/DL (ref 3.5–5.2)
ALBUMIN/GLOB SERPL: 1.4 G/DL
ALP SERPL-CCNC: 89 U/L (ref 39–117)
ALT SERPL W P-5'-P-CCNC: 17 U/L (ref 1–33)
ANION GAP SERPL CALCULATED.3IONS-SCNC: 10.9 MMOL/L (ref 5–15)
AST SERPL-CCNC: 22 U/L (ref 1–32)
BASOPHILS # BLD AUTO: 0.05 10*3/MM3 (ref 0–0.2)
BASOPHILS NFR BLD AUTO: 0.7 % (ref 0–1.5)
BILIRUB SERPL-MCNC: 0.6 MG/DL (ref 0–1.2)
BUN SERPL-MCNC: 12 MG/DL (ref 6–20)
BUN/CREAT SERPL: 13.2 (ref 7–25)
CALCIUM SPEC-SCNC: 9.6 MG/DL (ref 8.6–10.5)
CHLORIDE SERPL-SCNC: 103 MMOL/L (ref 98–107)
CO2 SERPL-SCNC: 24.1 MMOL/L (ref 22–29)
CREAT SERPL-MCNC: 0.91 MG/DL (ref 0.57–1)
DEPRECATED RDW RBC AUTO: 43.4 FL (ref 37–54)
EOSINOPHIL # BLD AUTO: 0.09 10*3/MM3 (ref 0–0.4)
EOSINOPHIL NFR BLD AUTO: 1.3 % (ref 0.3–6.2)
ERYTHROCYTE [DISTWIDTH] IN BLOOD BY AUTOMATED COUNT: 12.8 % (ref 12.3–15.4)
GFR SERPL CREATININE-BSD FRML MDRD: 65 ML/MIN/1.73
GLOBULIN UR ELPH-MCNC: 3 GM/DL
GLUCOSE SERPL-MCNC: 89 MG/DL (ref 65–99)
HCT VFR BLD AUTO: 44.5 % (ref 34–46.6)
HGB BLD-MCNC: 14.4 G/DL (ref 12–15.9)
IMM GRANULOCYTES # BLD AUTO: 0.02 10*3/MM3 (ref 0–0.05)
IMM GRANULOCYTES NFR BLD AUTO: 0.3 % (ref 0–0.5)
LIPASE SERPL-CCNC: 21 U/L (ref 13–60)
LYMPHOCYTES # BLD AUTO: 1.56 10*3/MM3 (ref 0.7–3.1)
LYMPHOCYTES NFR BLD AUTO: 21.8 % (ref 19.6–45.3)
MCH RBC QN AUTO: 29.5 PG (ref 26.6–33)
MCHC RBC AUTO-ENTMCNC: 32.4 G/DL (ref 31.5–35.7)
MCV RBC AUTO: 91.2 FL (ref 79–97)
MONOCYTES # BLD AUTO: 0.54 10*3/MM3 (ref 0.1–0.9)
MONOCYTES NFR BLD AUTO: 7.5 % (ref 5–12)
NEUTROPHILS NFR BLD AUTO: 4.9 10*3/MM3 (ref 1.7–7)
NEUTROPHILS NFR BLD AUTO: 68.4 % (ref 42.7–76)
NRBC BLD AUTO-RTO: 0 /100 WBC (ref 0–0.2)
PLATELET # BLD AUTO: 252 10*3/MM3 (ref 140–450)
PMV BLD AUTO: 10.3 FL (ref 6–12)
POTASSIUM SERPL-SCNC: 3.4 MMOL/L (ref 3.5–5.2)
PROT SERPL-MCNC: 7.3 G/DL (ref 6–8.5)
RBC # BLD AUTO: 4.88 10*6/MM3 (ref 3.77–5.28)
SODIUM SERPL-SCNC: 138 MMOL/L (ref 136–145)
WBC # BLD AUTO: 7.16 10*3/MM3 (ref 3.4–10.8)

## 2020-09-21 PROCEDURE — 96374 THER/PROPH/DIAG INJ IV PUSH: CPT

## 2020-09-21 PROCEDURE — 99284 EMERGENCY DEPT VISIT MOD MDM: CPT

## 2020-09-21 PROCEDURE — 80053 COMPREHEN METABOLIC PANEL: CPT | Performed by: EMERGENCY MEDICINE

## 2020-09-21 PROCEDURE — 83690 ASSAY OF LIPASE: CPT | Performed by: EMERGENCY MEDICINE

## 2020-09-21 PROCEDURE — 85025 COMPLETE CBC W/AUTO DIFF WBC: CPT | Performed by: EMERGENCY MEDICINE

## 2020-09-21 RX ORDER — ALUMINA, MAGNESIA, AND SIMETHICONE 2400; 2400; 240 MG/30ML; MG/30ML; MG/30ML
15 SUSPENSION ORAL ONCE
Status: COMPLETED | OUTPATIENT
Start: 2020-09-21 | End: 2020-09-21

## 2020-09-21 RX ORDER — SUCRALFATE 1 G/1
1 TABLET ORAL 4 TIMES DAILY
Qty: 60 TABLET | Refills: 0 | Status: SHIPPED | OUTPATIENT
Start: 2020-09-21

## 2020-09-21 RX ORDER — LIDOCAINE HYDROCHLORIDE 20 MG/ML
15 SOLUTION OROPHARYNGEAL ONCE
Status: COMPLETED | OUTPATIENT
Start: 2020-09-21 | End: 2020-09-21

## 2020-09-21 RX ORDER — FAMOTIDINE 10 MG/ML
20 INJECTION, SOLUTION INTRAVENOUS ONCE
Status: COMPLETED | OUTPATIENT
Start: 2020-09-21 | End: 2020-09-21

## 2020-09-21 RX ORDER — SODIUM CHLORIDE 0.9 % (FLUSH) 0.9 %
10 SYRINGE (ML) INJECTION AS NEEDED
Status: DISCONTINUED | OUTPATIENT
Start: 2020-09-21 | End: 2020-09-21 | Stop reason: HOSPADM

## 2020-09-21 RX ADMIN — FAMOTIDINE 20 MG: 10 INJECTION INTRAVENOUS at 17:28

## 2020-09-21 RX ADMIN — LIDOCAINE HYDROCHLORIDE 15 ML: 20 SOLUTION ORAL; TOPICAL at 17:29

## 2020-09-21 RX ADMIN — ALUMINUM HYDROXIDE, MAGNESIUM HYDROXIDE, AND DIMETHICONE 15 ML: 400; 400; 40 SUSPENSION ORAL at 17:29

## 2020-09-21 NOTE — ED PROVIDER NOTES
EMERGENCY DEPARTMENT ENCOUNTER    Room Number:  12/12  Date of encounter:  9/21/2020  PCP: Rob Bardales DO  Historian: Patient      HPI:  Chief Complaint: Trouble swallowing  A complete HPI/ROS/PMH/PSH/SH/FH are unobtainable due to: Nothing    Context: Karen Nash is a 50 y.o. female who presents to the ED c/o trouble swallowing now for several weeks.  From review of the past medical records in epic, I see the patient is been seen by her PCP several times for this.  She recently had an esophagram which showed some esophageal dysphasia, and she is scheduled to see GI for an EGD at the end of this month.    She states that for the most part her symptoms have been intermittent, and she would go to clear liquids for a day or 2 and her symptoms would improve.    She said that mainly she has trouble swallowing solid foods, but at times she has trouble swallowing liquids.  Over the last 2 days, she said that it is progressed to the point where she could not keep down anything including liquids.  She said that she was just gagging, had a sensation of fullness, and was gagging up secretions.    She has no fever, no abdominal pain, no diarrhea, no shortness of breath, and she has no other complaints.      PAST MEDICAL HISTORY  Active Ambulatory Problems     Diagnosis Date Noted   • Moderate persistent asthma without complication 02/05/2016   • Atopic rhinitis 02/05/2016   • Anemia 02/05/2016   • Benign essential hypertension 02/05/2016   • Constipation 02/05/2016   • Dyslipidemia 02/05/2016   • Gastroesophageal reflux disease 02/05/2016   • Intractable migraine without aura and without status migrainosus 02/05/2016   • Muscle cramps 02/05/2016   • Vocal cord dysfunction 09/26/2016   • Female dyspareunia 12/21/2018   • Vaginal atrophy 12/21/2018   • Pelvic pain 04/12/2019   • Stress incontinence of urine 04/12/2019   • Fibromyalgia, primary 11/04/2019   • Rheumatoid arthritis of multiple sites with negative rheumatoid  factor (CMS/HCC) 01/23/2020   • Severe episode of recurrent major depressive disorder, without psychotic features (CMS/HCC) 01/23/2020   • Panic disorder 01/23/2020   • Generalized anxiety disorder 01/23/2020   • Primary osteoarthritis involving multiple joints 01/23/2020   • Class 2 severe obesity due to excess calories with serious comorbidity and body mass index (BMI) of 36.0 to 36.9 in adult (CMS/HCC) 04/07/2020   • Esophageal dysphagia 09/21/2020   • Dyspepsia 09/21/2020     Resolved Ambulatory Problems     Diagnosis Date Noted   • Abdominal pain 02/05/2016   • Acute otitis media 02/05/2016   • Acute sinusitis 02/05/2016   • Cough 02/05/2016   • Fever 02/05/2016   • Menorrhagia 02/05/2016   • Candidiasis of vagina 02/05/2016     Past Medical History:   Diagnosis Date   • Abnormal vaginal bleeding    • Allergic rhinitis    • Anxiety    • Asthma    • Breast cancer screening    • Cluster headache    • Congenital prolapsed rectum    • Depression    • Endometriosis    • Environmental allergies    • Fibroids    • Headache, tension-type    • Hyperlipidemia    • Hypertension    • Infertility, female    • Leaking of urine    • Migraine    • Muscle cramp    • Vaginal candidiasis          PAST SURGICAL HISTORY  Past Surgical History:   Procedure Laterality Date   • APPENDECTOMY      open - age 15   • COLONOSCOPY  APPROX 1996   • COLONOSCOPY N/A 7/7/2016    Procedure: COLONOSCOPY to cecum  W/  HEMORRHOID BANDING with cold biopsy polypectomy;  Surgeon: Kin Meyers MD;  Location: [facility];  Service:    • HEMORRHOIDECTOMY     • TONSILLECTOMY     • VAGINAL URETHRAL SEPTUM EXCISION           FAMILY HISTORY  Family History   Problem Relation Age of Onset   • Cancer Mother         SKIN   • Stroke Mother    • Diabetes Mother    • Heart disease Mother    • Parkinsonism Mother    • Arthritis Mother    • Dementia Mother    • Hypertension Mother    • Kidney disease Mother    • Neuropathy Mother    • Cancer Father          HEIDY   • Stroke Brother    • Breast cancer Neg Hx    • Ovarian cancer Neg Hx    • Uterine cancer Neg Hx    • Colon cancer Neg Hx          SOCIAL HISTORY  Social History     Socioeconomic History   • Marital status:      Spouse name: Not on file   • Number of children: 0   • Years of education: Not on file   • Highest education level: Not on file   Tobacco Use   • Smoking status: Former Smoker     Packs/day: 0.10     Types: Cigarettes     Start date: 2001     Quit date: 2009     Years since quittin.7   • Smokeless tobacco: Never Used   Substance and Sexual Activity   • Alcohol use: Yes     Alcohol/week: 1.0 standard drinks     Types: 1 Glasses of wine per week     Comment: SOCIAL USE//caffeine coffee daily    • Drug use: No   • Sexual activity: Yes     Partners: Male     Birth control/protection: None         ALLERGIES  Sulfamethoxazole-trimethoprim, Corn-containing products, and Penicillins        REVIEW OF SYSTEMS  Review of Systems     All systems reviewed and negative except for those discussed in HPI.       PHYSICAL EXAM    I have reviewed the triage vital signs and nursing notes.    ED Triage Vitals [20 1430]   Temp Heart Rate Resp BP SpO2   96.8 °F (36 °C) 105 16 -- 96 %      Temp src Heart Rate Source Patient Position BP Location FiO2 (%)   Tympanic Monitor -- -- --       Physical Exam  GENERAL: Awake and alert, appears anxious  HENT: nares patent, mucous membranes moist, trachea midline, no thyromegaly is appreciated, she appears to tolerate secretions.  There is no intraoral swelling  EYES: no scleral icterus  CV: Sinus tachycardia  RESPIRATORY: normal effort, CTA bilaterally  ABDOMEN: soft, nontender palpation, bowel sounds present  MUSCULOSKELETAL: no deformity  NEURO: alert, moves all extremities, follows commands  SKIN: warm, dry        LAB RESULTS  Recent Results (from the past 24 hour(s))   Comprehensive Metabolic Panel    Collection Time: 20  5:29 PM    Specimen:  Blood   Result Value Ref Range    Glucose 89 65 - 99 mg/dL    BUN 12 6 - 20 mg/dL    Creatinine 0.91 0.57 - 1.00 mg/dL    Sodium 138 136 - 145 mmol/L    Potassium 3.4 (L) 3.5 - 5.2 mmol/L    Chloride 103 98 - 107 mmol/L    CO2 24.1 22.0 - 29.0 mmol/L    Calcium 9.6 8.6 - 10.5 mg/dL    Total Protein 7.3 6.0 - 8.5 g/dL    Albumin 4.30 3.50 - 5.20 g/dL    ALT (SGPT) 17 1 - 33 U/L    AST (SGOT) 22 1 - 32 U/L    Alkaline Phosphatase 89 39 - 117 U/L    Total Bilirubin 0.6 0.0 - 1.2 mg/dL    eGFR Non African Amer 65 >60 mL/min/1.73    Globulin 3.0 gm/dL    A/G Ratio 1.4 g/dL    BUN/Creatinine Ratio 13.2 7.0 - 25.0    Anion Gap 10.9 5.0 - 15.0 mmol/L   Lipase    Collection Time: 09/21/20  5:29 PM    Specimen: Blood   Result Value Ref Range    Lipase 21 13 - 60 U/L   CBC Auto Differential    Collection Time: 09/21/20  5:29 PM    Specimen: Blood   Result Value Ref Range    WBC 7.16 3.40 - 10.80 10*3/mm3    RBC 4.88 3.77 - 5.28 10*6/mm3    Hemoglobin 14.4 12.0 - 15.9 g/dL    Hematocrit 44.5 34.0 - 46.6 %    MCV 91.2 79.0 - 97.0 fL    MCH 29.5 26.6 - 33.0 pg    MCHC 32.4 31.5 - 35.7 g/dL    RDW 12.8 12.3 - 15.4 %    RDW-SD 43.4 37.0 - 54.0 fl    MPV 10.3 6.0 - 12.0 fL    Platelets 252 140 - 450 10*3/mm3    Neutrophil % 68.4 42.7 - 76.0 %    Lymphocyte % 21.8 19.6 - 45.3 %    Monocyte % 7.5 5.0 - 12.0 %    Eosinophil % 1.3 0.3 - 6.2 %    Basophil % 0.7 0.0 - 1.5 %    Immature Grans % 0.3 0.0 - 0.5 %    Neutrophils, Absolute 4.90 1.70 - 7.00 10*3/mm3    Lymphocytes, Absolute 1.56 0.70 - 3.10 10*3/mm3    Monocytes, Absolute 0.54 0.10 - 0.90 10*3/mm3    Eosinophils, Absolute 0.09 0.00 - 0.40 10*3/mm3    Basophils, Absolute 0.05 0.00 - 0.20 10*3/mm3    Immature Grans, Absolute 0.02 0.00 - 0.05 10*3/mm3    nRBC 0.0 0.0 - 0.2 /100 WBC       Ordered the above labs and independently reviewed the results.        RADIOLOGY  No Radiology Exams Resulted Within Past 24 Hours    I ordered the above noted radiological studies. Reviewed by me  and discussed with radiologist.  See dictation for official radiology interpretation.      PROCEDURES    Procedures      MEDICATIONS GIVEN IN ER    Medications   aluminum-magnesium hydroxide-simethicone (MAALOX MAX) 400-400-40 MG/5ML suspension 15 mL (15 mL Oral Given 9/21/20 1729)   Lidocaine Viscous HCl (XYLOCAINE) 2 % mouth solution 15 mL (15 mL Mouth/Throat Given 9/21/20 1729)   famotidine (PEPCID) injection 20 mg (20 mg Intravenous Given 9/21/20 1728)         PROGRESS, DATA ANALYSIS, CONSULTS, AND MEDICAL DECISION MAKING    All labs have been independently reviewed by me.  All radiology studies have been reviewed by me and discussed with radiologist dictating the report.   EKG's independently viewed and interpreted by me.  Discussion below represents my analysis of pertinent findings related to patient's condition, differential diagnosis, treatment plan and final disposition.        ED Course as of Sep 21 2306   Mon Sep 21, 2020   2306 CBC, chemistry and lipase normal    [DP]   2306 Patient received a GI cocktail, Pepcid, and a sublingual Levsin.    [DP]   2306 After all those medicines were administered, she said she felt better.  She was easily tolerating clears without the sensation of getting stuck.    [DP]   2306 I will prescribe her some more these meds, and have her continue with the outpatient follow-up plan as scheduled.  Have also encouraged her to go to a clear liquid diet for the next day or 2 and progress as tolerated.    [DP]      ED Course User Index  [DP] Ray Garzon MD           PPE: Both the patient and I wore a surgical mask throughout the entire patient encounter. I wore protective goggles.     AS OF 23:06 EDT VITALS:    BP - 109/89  HR - 80  TEMP - 96.8 °F (36 °C) (Tympanic)  O2 SATS - 96%        DIAGNOSIS  Final diagnoses:   Dysphagia, unspecified type         DISPOSITION  Discharge           Ray Garzon MD  09/21/20 2306

## 2020-09-21 NOTE — ED NOTES
Pt given Pepsi to see if tolerating PO fluids. Pt has drank about 1/3 of Pepsi and has tolerated well/ kept it down. Pt wearing mask. This RN wearing mask, goggles, gown and gloves during encounter. Hand hygiene performed before and during entering room.       Karla Barry, RN  09/21/20 5101

## 2020-09-21 NOTE — TELEPHONE ENCOUNTER
Pt said she can't even swallow water. Pt spoke with GI and they told her since she can't swallow water for her to go ER. Pt is going to go to ER.

## 2020-09-21 NOTE — TELEPHONE ENCOUNTER
Patient called to get scheduled for EGD  I scheduled her on 9-30-20 but she is having a lot of trouble swallowing . She is not eating any solid foods . She tried applesauce and could not get it down said she drank water today and it went down but throat feels like its on fire /burning and then her stomach felt like someone bunched her . Please advise

## 2020-09-21 NOTE — TELEPHONE ENCOUNTER
Called patient back says liquids are getting stuck and coming back up I advised her to go to the ER

## 2020-09-21 NOTE — ED TRIAGE NOTES
She can't swallow not even water.  this has been going on since July.  She has surgery scheduled on the 30th    Patient was placed in face mask during first look triage.  Patient was wearing a face mask throughout encounter.  I wore personal protective equipment throughout the encounter.  Hand hygiene was performed before and after patient encounter.

## 2020-09-21 NOTE — TELEPHONE ENCOUNTER
It looks like they just added her to endoscopy schedule for 9/30/20.   For now keep to soft foods, avoid thick pieces of meat especially and continue stomach pill.  Keep upright for at least 30 minutes after eat and take sips of liquid after each bite.  Make sure small bites only.

## 2020-09-28 ENCOUNTER — LAB (OUTPATIENT)
Dept: LAB | Facility: HOSPITAL | Age: 50
End: 2020-09-28

## 2020-09-28 DIAGNOSIS — Z01.818 OTHER SPECIFIED PRE-OPERATIVE EXAMINATION: ICD-10-CM

## 2020-09-28 PROCEDURE — U0004 COV-19 TEST NON-CDC HGH THRU: HCPCS

## 2020-09-28 PROCEDURE — C9803 HOPD COVID-19 SPEC COLLECT: HCPCS

## 2020-09-29 LAB — SARS-COV-2 RNA RESP QL NAA+PROBE: NOT DETECTED

## 2020-09-30 ENCOUNTER — ANESTHESIA (OUTPATIENT)
Dept: GASTROENTEROLOGY | Facility: HOSPITAL | Age: 50
End: 2020-09-30

## 2020-09-30 ENCOUNTER — ANESTHESIA EVENT (OUTPATIENT)
Dept: GASTROENTEROLOGY | Facility: HOSPITAL | Age: 50
End: 2020-09-30

## 2020-09-30 ENCOUNTER — HOSPITAL ENCOUNTER (OUTPATIENT)
Facility: HOSPITAL | Age: 50
Setting detail: HOSPITAL OUTPATIENT SURGERY
Discharge: HOME | End: 2020-09-30
Attending: INTERNAL MEDICINE | Admitting: INTERNAL MEDICINE

## 2020-09-30 VITALS
DIASTOLIC BLOOD PRESSURE: 106 MMHG | HEIGHT: 68 IN | WEIGHT: 227 LBS | HEART RATE: 68 BPM | BODY MASS INDEX: 34.4 KG/M2 | SYSTOLIC BLOOD PRESSURE: 136 MMHG | RESPIRATION RATE: 16 BRPM | OXYGEN SATURATION: 99 %

## 2020-09-30 DIAGNOSIS — R10.13 DYSPEPSIA: ICD-10-CM

## 2020-09-30 DIAGNOSIS — R13.19 ESOPHAGEAL DYSPHAGIA: ICD-10-CM

## 2020-09-30 PROCEDURE — 25010000002 MIDAZOLAM PER 1 MG: Performed by: ANESTHESIOLOGY

## 2020-09-30 PROCEDURE — 25010000002 PROPOFOL 10 MG/ML EMULSION: Performed by: NURSE ANESTHETIST, CERTIFIED REGISTERED

## 2020-09-30 PROCEDURE — 88305 TISSUE EXAM BY PATHOLOGIST: CPT | Performed by: INTERNAL MEDICINE

## 2020-09-30 PROCEDURE — 43239 EGD BIOPSY SINGLE/MULTIPLE: CPT | Performed by: INTERNAL MEDICINE

## 2020-09-30 RX ORDER — MIDAZOLAM HYDROCHLORIDE 1 MG/ML
1 INJECTION INTRAMUSCULAR; INTRAVENOUS
Status: DISCONTINUED | OUTPATIENT
Start: 2020-09-30 | End: 2020-09-30 | Stop reason: HOSPADM

## 2020-09-30 RX ORDER — SODIUM CHLORIDE, SODIUM LACTATE, POTASSIUM CHLORIDE, CALCIUM CHLORIDE 600; 310; 30; 20 MG/100ML; MG/100ML; MG/100ML; MG/100ML
1000 INJECTION, SOLUTION INTRAVENOUS CONTINUOUS
Status: DISCONTINUED | OUTPATIENT
Start: 2020-09-30 | End: 2020-09-30 | Stop reason: HOSPADM

## 2020-09-30 RX ORDER — GLYCOPYRROLATE 0.2 MG/ML
INJECTION INTRAMUSCULAR; INTRAVENOUS AS NEEDED
Status: DISCONTINUED | OUTPATIENT
Start: 2020-09-30 | End: 2020-09-30 | Stop reason: SURG

## 2020-09-30 RX ORDER — PROPOFOL 10 MG/ML
VIAL (ML) INTRAVENOUS CONTINUOUS PRN
Status: DISCONTINUED | OUTPATIENT
Start: 2020-09-30 | End: 2020-09-30 | Stop reason: SURG

## 2020-09-30 RX ORDER — LIDOCAINE HYDROCHLORIDE 20 MG/ML
INJECTION, SOLUTION INFILTRATION; PERINEURAL AS NEEDED
Status: DISCONTINUED | OUTPATIENT
Start: 2020-09-30 | End: 2020-09-30 | Stop reason: SURG

## 2020-09-30 RX ADMIN — SODIUM CHLORIDE, POTASSIUM CHLORIDE, SODIUM LACTATE AND CALCIUM CHLORIDE 1000 ML: 600; 310; 30; 20 INJECTION, SOLUTION INTRAVENOUS at 13:48

## 2020-09-30 RX ADMIN — LIDOCAINE HYDROCHLORIDE 60 MG: 20 INJECTION, SOLUTION INFILTRATION; PERINEURAL at 14:43

## 2020-09-30 RX ADMIN — MIDAZOLAM 1 MG: 1 INJECTION INTRAMUSCULAR; INTRAVENOUS at 13:51

## 2020-09-30 RX ADMIN — GLYCOPYRROLATE 0.2 MG: 0.2 INJECTION INTRAMUSCULAR; INTRAVENOUS at 14:42

## 2020-09-30 RX ADMIN — PROPOFOL 180 MCG/KG/MIN: 10 INJECTION, EMULSION INTRAVENOUS at 14:43

## 2020-09-30 NOTE — ANESTHESIA PREPROCEDURE EVALUATION
Anesthesia Evaluation     Patient summary reviewed and Nursing notes reviewed   NPO Solid Status: > 8 hours  NPO Liquid Status: > 4 hours           Airway   Mallampati: I  TM distance: >3 FB  Neck ROM: full  no difficulty expected  Dental - normal exam     Pulmonary - normal exam   (+) a smoker Former, asthma,  Cardiovascular - normal exam    (+) hypertension well controlled, hyperlipidemia,       Neuro/Psych  (+) headaches, psychiatric history Depression,     GI/Hepatic/Renal/Endo    (+) obesity, morbid obesity, GERD,      Musculoskeletal     (+) myalgias,   Abdominal  - normal exam   Substance History      OB/GYN          Other   arthritis,                        Anesthesia Plan    ASA 3     MAC     intravenous induction     Anesthetic plan, all risks, benefits, and alternatives have been provided, discussed and informed consent has been obtained with: patient.

## 2020-10-01 LAB
CYTO UR: NORMAL
LAB AP CASE REPORT: NORMAL
PATH REPORT.FINAL DX SPEC: NORMAL
PATH REPORT.GROSS SPEC: NORMAL

## 2020-10-06 ENCOUNTER — TELEPHONE (OUTPATIENT)
Dept: FAMILY MEDICINE CLINIC | Facility: CLINIC | Age: 50
End: 2020-10-06

## 2020-10-06 NOTE — TELEPHONE ENCOUNTER
Patient called and stated that she needed to schedule a Post-Operation visit with Dr. Bardales after having a ESOPHAGOGASTRODUODENOSCOPY on 09/30/2020. Patient was disconnected before I was able to ask more.    Please advise.  [phone]

## 2020-10-13 DIAGNOSIS — M79.7 FIBROMYALGIA, PRIMARY: ICD-10-CM

## 2020-10-13 DIAGNOSIS — J45.40 MODERATE PERSISTENT ASTHMA WITHOUT COMPLICATION: ICD-10-CM

## 2020-10-13 DIAGNOSIS — J30.9 ATOPIC RHINITIS: ICD-10-CM

## 2020-10-13 DIAGNOSIS — E66.01 CLASS 2 SEVERE OBESITY DUE TO EXCESS CALORIES WITH SERIOUS COMORBIDITY AND BODY MASS INDEX (BMI) OF 35.0 TO 35.9 IN ADULT (HCC): ICD-10-CM

## 2020-10-13 DIAGNOSIS — F41.8 DEPRESSION WITH ANXIETY: ICD-10-CM

## 2020-10-13 DIAGNOSIS — F33.3 SEVERE EPISODE OF RECURRENT MAJOR DEPRESSIVE DISORDER, WITH PSYCHOTIC FEATURES (HCC): ICD-10-CM

## 2020-10-13 RX ORDER — RISPERIDONE 0.5 MG/1
TABLET ORAL
Qty: 60 TABLET | Refills: 0 | Status: SHIPPED | OUTPATIENT
Start: 2020-10-13 | End: 2020-10-22 | Stop reason: SDUPTHER

## 2020-10-13 RX ORDER — MONTELUKAST SODIUM 10 MG/1
TABLET ORAL
Qty: 30 TABLET | Refills: 0 | Status: SHIPPED | OUTPATIENT
Start: 2020-10-13 | End: 2020-11-18 | Stop reason: SDUPTHER

## 2020-10-13 RX ORDER — HYDROCHLOROTHIAZIDE 25 MG/1
TABLET ORAL
Qty: 30 TABLET | Refills: 0 | Status: SHIPPED | OUTPATIENT
Start: 2020-10-13 | End: 2020-11-10 | Stop reason: SDUPTHER

## 2020-10-13 RX ORDER — PHENTERMINE HYDROCHLORIDE 37.5 MG/1
TABLET ORAL
Qty: 30 TABLET | Refills: 0 | Status: SHIPPED | OUTPATIENT
Start: 2020-10-13 | End: 2020-11-10 | Stop reason: SDUPTHER

## 2020-10-13 RX ORDER — PREGABALIN 150 MG/1
CAPSULE ORAL
Qty: 60 CAPSULE | Refills: 0 | Status: SHIPPED | OUTPATIENT
Start: 2020-10-13 | End: 2020-11-18 | Stop reason: SDUPTHER

## 2020-10-13 RX ORDER — FLUOXETINE HYDROCHLORIDE 40 MG/1
CAPSULE ORAL
Qty: 60 CAPSULE | Refills: 0 | Status: SHIPPED | OUTPATIENT
Start: 2020-10-13 | End: 2020-11-10 | Stop reason: SDUPTHER

## 2020-10-14 ENCOUNTER — TELEPHONE (OUTPATIENT)
Dept: FAMILY MEDICINE CLINIC | Facility: CLINIC | Age: 50
End: 2020-10-14

## 2020-10-14 NOTE — TELEPHONE ENCOUNTER
Hub staff attempted to follow warm transfer process and was unsuccessful     Caller: Karen Nash    Relationship to patient: Self    Best call back number:[phone]   Patient is needing: RETURNING CALL BACK TO [facility]

## 2020-10-22 ENCOUNTER — OFFICE VISIT (OUTPATIENT)
Dept: FAMILY MEDICINE CLINIC | Facility: CLINIC | Age: 50
End: 2020-10-22

## 2020-10-22 VITALS
SYSTOLIC BLOOD PRESSURE: 122 MMHG | HEART RATE: 73 BPM | OXYGEN SATURATION: 99 % | BODY MASS INDEX: 36.07 KG/M2 | DIASTOLIC BLOOD PRESSURE: 74 MMHG | WEIGHT: 238 LBS | HEIGHT: 68 IN

## 2020-10-22 DIAGNOSIS — Z23 FLU VACCINE NEED: ICD-10-CM

## 2020-10-22 DIAGNOSIS — N39.0 ACUTE UTI: Primary | ICD-10-CM

## 2020-10-22 DIAGNOSIS — M25.562 ACUTE PAIN OF LEFT KNEE: ICD-10-CM

## 2020-10-22 DIAGNOSIS — F33.3 SEVERE EPISODE OF RECURRENT MAJOR DEPRESSIVE DISORDER, WITH PSYCHOTIC FEATURES (HCC): ICD-10-CM

## 2020-10-22 DIAGNOSIS — F33.2 SEVERE EPISODE OF RECURRENT MAJOR DEPRESSIVE DISORDER, WITHOUT PSYCHOTIC FEATURES (HCC): ICD-10-CM

## 2020-10-22 DIAGNOSIS — R39.9 UTI SYMPTOMS: ICD-10-CM

## 2020-10-22 LAB
BILIRUB BLD-MCNC: NEGATIVE MG/DL
CLARITY, POC: ABNORMAL
COLOR UR: YELLOW
GLUCOSE UR STRIP-MCNC: NEGATIVE MG/DL
KETONES UR QL: NEGATIVE
LEUKOCYTE EST, POC: NEGATIVE
NITRITE UR-MCNC: NEGATIVE MG/ML
PH UR: 7 [PH] (ref 5–8)
PROT UR STRIP-MCNC: NEGATIVE MG/DL
RBC # UR STRIP: NEGATIVE /UL
SP GR UR: 1.02 (ref 1–1.03)
UROBILINOGEN UR QL: NORMAL

## 2020-10-22 PROCEDURE — 99214 OFFICE O/P EST MOD 30 MIN: CPT | Performed by: FAMILY MEDICINE

## 2020-10-22 PROCEDURE — 90686 IIV4 VACC NO PRSV 0.5 ML IM: CPT | Performed by: FAMILY MEDICINE

## 2020-10-22 PROCEDURE — 90471 IMMUNIZATION ADMIN: CPT | Performed by: FAMILY MEDICINE

## 2020-10-22 RX ORDER — RISPERIDONE 1 MG/1
1 TABLET ORAL 2 TIMES DAILY
Qty: 60 TABLET | Refills: 5 | Status: SHIPPED | OUTPATIENT
Start: 2020-10-22 | End: 2021-01-21

## 2020-10-22 RX ORDER — DOXYCYCLINE HYCLATE 100 MG/1
100 CAPSULE ORAL 2 TIMES DAILY
Qty: 14 CAPSULE | Refills: 0 | Status: SHIPPED | OUTPATIENT
Start: 2020-10-22 | End: 2020-11-10

## 2020-10-22 RX ORDER — METHYLPREDNISOLONE 4 MG/1
TABLET ORAL
Qty: 21 TABLET | Refills: 0 | Status: SHIPPED | OUTPATIENT
Start: 2020-10-22 | End: 2020-11-10

## 2020-10-22 NOTE — PROGRESS NOTES
Subjective   Karen Nash is a 50 y.o. female. Presents today for   Chief Complaint   Patient presents with   • Insomnia   • Urinary Tract Infection       Urinary Tract Infection   This is a new problem. The current episode started in the past 7 days. The problem has been waxing and waning. The quality of the pain is described as burning. The patient is experiencing no pain. There has been no fever. Associated symptoms include frequency and urgency. Pertinent negatives include no chills, flank pain, nausea or vomiting. She has tried nothing for the symptoms. The treatment provided no relief. Her past medical history is significant for recurrent UTIs.   Knee Pain   The incident occurred more than 1 week ago. The incident occurred at home. There was no injury mechanism. The pain is present in the left knee. The pain is moderate. The pain has been intermittent since onset. Pertinent negatives include no tingling. The symptoms are aggravated by movement and weight bearing. She has tried nothing for the symptoms. The treatment provided no relief.   Depression  Visit Type: follow-up  Patient presents with the following symptoms: anhedonia, depressed mood, excessive worry, feelings of worthlessness and insomnia.  Patient is not experiencing: shortness of breath, suicidal ideas and suicidal planning.  Frequency of symptoms: most days   Severity: moderate   Sleep quality: non-restorative  Nighttime awakenings: several  Compliance with medications:  %  Treatment side effects: urinary frequency waking up;  most rescent addition of risperdal has helped greatly.      Review of Systems   Constitutional: Negative for chills.   Respiratory: Negative for shortness of breath.    Gastrointestinal: Negative for nausea and vomiting.   Genitourinary: Positive for frequency and urgency. Negative for flank pain.   Neurological: Negative for tingling.   Psychiatric/Behavioral: Negative for suicidal ideas. The patient has insomnia.         Patient Active Problem List   Diagnosis   • Moderate persistent asthma without complication   • Atopic rhinitis   • Anemia   • Benign essential hypertension   • Constipation   • Dyslipidemia   • Gastroesophageal reflux disease   • Intractable migraine without aura and without status migrainosus   • Muscle cramps   • Vocal cord dysfunction   • Female dyspareunia   • Vaginal atrophy   • Pelvic pain   • Stress incontinence of urine   • Fibromyalgia, primary   • Rheumatoid arthritis of multiple sites with negative rheumatoid factor (CMS/[facility])   • Severe episode of recurrent major depressive disorder, without psychotic features (CMS/[facility])   • Panic disorder   • Generalized anxiety disorder   • Primary osteoarthritis involving multiple joints   • Class 2 severe obesity due to excess calories with serious comorbidity and body mass index (BMI) of 36.0 to 36.9 in adult (CMS/[facility])   • Esophageal dysphagia   • Dyspepsia       Social History     Socioeconomic History   • Marital status:      Spouse name: Not on file   • Number of children: 0   • Years of education: Not on file   • Highest education level: Not on file   Tobacco Use   • Smoking status: Former Smoker     Packs/day: 0.10     Types: Cigarettes     Start date: 2001     Quit date: 2009     Years since quittin.8   • Smokeless tobacco: Never Used   Substance and Sexual Activity   • Alcohol use: Yes     Alcohol/week: 1.0 standard drinks     Types: 1 Glasses of wine per week     Comment: SOCIAL USE//caffeine coffee daily    • Drug use: No   • Sexual activity: Yes     Partners: Male     Birth control/protection: None       Allergies   Allergen Reactions   • Sulfamethoxazole-Trimethoprim Hives and Itching   • Corn-Containing Products Rash   • Penicillins        Current Outpatient Medications on File Prior to Visit   Medication Sig Dispense Refill   • acetaminophen (TYLENOL) 500 MG tablet Take 500 mg by mouth Every 6 (Six) Hours As Needed for Mild Pain  .     • albuterol (ACCUNEB) 1.25 MG/3ML nebulizer solution USE 1 AMPULE IN NEBULIZER EVERY SIX HOURS AS NEEDED FOR WHEEZING  60 mL 11   • ALBUTEROL SULFATE  (90 Base) MCG/ACT inhaler INHALE 2 PUFFS BY MOUTH EVERY SIX HOURS AS NEEDED for wheezing  18 g 5   • AMLODIPINE BESYLATE PO Take 10 mg by mouth.     • azelastine (OPTIVAR) 0.05 % ophthalmic solution Administer 1 drop to both eyes 2 (Two) Times a Day As Needed (eye allergies). 1 each 12   • Azelastine-Fluticasone 137-50 MCG/ACT suspension 1 spray into the nostril(s) as directed by provider 2 (Two) Times a Day. 1 bottle 5   • budesonide-formoterol (Symbicort) 160-4.5 MCG/ACT inhaler Inhale 2 puffs 2 (Two) Times a Day. 10.2 g 11   • CALCIUM-MAGNESIUM-ZINC PO Take  by mouth.     • Cetirizine HCl 10 MG capsule Take 10 mg by mouth Daily. 30 capsule 5   • Estradiol-Norethindrone Acet 0.5-0.1 MG per tablet Take 1 tablet by mouth Daily. 30 tablet 5   • FLUoxetine (PROzac) 40 MG capsule TAKE 2 CAPSULES BY MOUTH ONE TIME A DAY  60 capsule 0   • folic acid (FOLVITE) 1 MG tablet Take 1 mg by mouth.     • hydroCHLOROthiazide (HYDRODIURIL) 25 MG tablet TAKE 1 TABLET BY MOUTH ONE TIME A DAY  30 tablet 0   • hyoscyamine (LEVSIN) 0.125 MG SL tablet Take 1 tablet by mouth Every 4 (Four) Hours As Needed for Cramping. 30 tablet 0   • montelukast (SINGULAIR) 10 MG tablet TAKE 1 TABLET BY MOUTH AT BEDTIME  30 tablet 0   • Multiple Vitamin (MULTI VITAMIN DAILY PO) Take by mouth daily.     • pantoprazole (PROTONIX) 40 MG EC tablet Take 1 tablet by mouth 2 (Two) Times a Day. 180 tablet 3   • phentermine (ADIPEX-P) 37.5 MG tablet TAKE 1 TABLET BY MOUTH IN THE MORNING  30 tablet 0   • polyethylene glycol (MIRALAX) pack packet mix 1 packet into drink every night 30 packet 4   • pregabalin (LYRICA) 150 MG capsule TAKE 1 CAPSULE BY MOUTH TWO TIMES A DAY  60 capsule 0   • Saline (NASOGEL) gel 1 spray each nostril twice daily 30 g 12   • sucralfate (CARAFATE) 1 g tablet Take 1 tablet by  "mouth 4 (Four) Times a Day. 60 tablet 0   • topiramate (TOPAMAX) 100 MG tablet Take 1 tablet by mouth 2 (Two) Times a Day. 1 po nightly x 3 nights x 1 po bid 60 tablet 5   • traZODone (DESYREL) 50 MG tablet Take 1 tablet by mouth At Night As Needed for Sleep. 30 tablet 5   • trospium (SANCTURA) 20 MG tablet Take 1 tablet by mouth 2 (Two) Times a Day. 60 tablet 5   • [DISCONTINUED] risperiDONE (risperDAL) 0.5 MG tablet TAKE 1/2 TABLET BY MOUTH TWO TIMES A DAY FOR 14 DAYS then 1 tablet twice a day 60 tablet 0     No current facility-administered medications on file prior to visit.        Objective   Vitals:    10/22/20 1232   BP: 122/74   BP Location: Right arm   Patient Position: Sitting   Cuff Size: Adult   Pulse: 73   SpO2: 99%   Weight: 108 kg (238 lb)   Height: 172.7 cm (68\")     Body mass index is 36.19 kg/m².    Physical Exam  Vitals signs and nursing note reviewed.   Constitutional:       Appearance: Normal appearance. She is not toxic-appearing or diaphoretic.   HENT:      Head: Normocephalic and atraumatic.   Neck:      Musculoskeletal: Neck supple.   Musculoskeletal:      Right knee: She exhibits abnormal patellar mobility. She exhibits no LCL laxity and no MCL laxity. No tenderness found. No medial joint line, no lateral joint line, no MCL, no LCL and no patellar tendon tenderness noted.      Left knee: She exhibits abnormal patellar mobility. She exhibits no LCL laxity and no MCL laxity. Tenderness found. Medial joint line tenderness noted. No lateral joint line, no MCL, no LCL and no patellar tendon tenderness noted.   Skin:     General: Skin is warm and dry.      Capillary Refill: Capillary refill takes less than 2 seconds.   Neurological:      Mental Status: She is alert.   Psychiatric:         Mood and Affect: Affect is flat.         Speech: Speech normal.         Behavior: Behavior normal.       Contains abnormal data POC Urinalysis Dipstick  Order: [id number]  Status:  Final result   Visible to " patient:  No (not released) Next appt:  None Dx:  UTI symptoms  Specimen Information: Urine        Component   Ref Range & Units 12:50 3mo ago   Color   Yellow, Straw, Dark Yellow, Tiffany Yellow  Yellow    Clarity, UA   Clear CloudyAbnormal   Clear    Glucose, UA   Negative, 1000 mg/dL (3+) mg/dL Negative  Negative    Bilirubin   Negative Negative  Negative    Ketones, UA   Negative Negative  Negative    Specific Gravity    1.005 - 1.030 1.025  1.025    Blood, UA   Negative Negative  Negative    pH, Urine   5.0 - 8.0 7.0  6.0    Protein, POC   Negative mg/dL Negative  Negative    Urobilinogen, UA   Normal Normal  Normal    Leukocytes   Negative Negative  TraceAbnormal     Nitrite, UA   Negative Negative  Negative    Resulting Agency [facility]         Specimen Collected: 10/22/20 12:50 Last Resulted: 10/22/20 12:50 Lab Flowsheet Order Details View Encounter Lab and Collection Details Routing Result History             Assessment/Plan   Diagnoses and all orders for this visit:    1. Acute UTI (Primary)  -     doxycycline (VIBRAMYCIN) 100 MG capsule; Take 1 capsule by mouth 2 (Two) Times a Day.  Dispense: 14 capsule; Refill: 0    2. Severe episode of recurrent major depressive disorder, without psychotic features (CMS/HCC)  -     risperiDONE (risperDAL) 1 MG tablet; Take 1 tablet by mouth 2 (Two) Times a Day.  Dispense: 60 tablet; Refill: 5    3. UTI symptoms  -     POC Urinalysis Dipstick  -     Urine Culture - Urine, Urine, Clean Catch    4. Acute pain of left knee  -     methylPREDNISolone (MEDROL) 4 MG dose pack; Take as directed on package instructions.  Dispense: 21 tablet; Refill: 0    5. Flu vaccine need  -     Fluarix Quad >6 Months ([id number])    6. Severe episode of recurrent major depressive disorder, with psychotic features (CMS/HCC)  -     risperiDONE (risperDAL) 1 MG tablet; Take 1 tablet by mouth 2 (Two) Times a Day.  Dispense: 60 tablet;  Refill: 5    d/w and will increase mood stabilizer, will need tomonitor closely as possible interaction with fluoxetine  Urinary frequency - send cx;  Consider changing hctz, but d/w weak diuretic  Knee pain has some b/l crepitus, ? Lateral trackign;  Has pain over anserine bursa on left, will try steroid pack, but hold for 1 week as just had flu injection  Counseled on swallow study and reviewed EGD;  Had suggestion of mild dysmotility on esophagram, recommend take small frequent bites, remain up right while eating and for 30 minutes after.         -Follow up: 3 months and prn

## 2020-10-24 LAB
BACTERIA UR CULT: NORMAL
BACTERIA UR CULT: NORMAL

## 2020-11-02 DIAGNOSIS — Z20.822 EXPOSURE TO COVID-19 VIRUS: Primary | ICD-10-CM

## 2020-11-10 DIAGNOSIS — F41.8 DEPRESSION WITH ANXIETY: ICD-10-CM

## 2020-11-10 DIAGNOSIS — N32.81 OAB (OVERACTIVE BLADDER): ICD-10-CM

## 2020-11-10 DIAGNOSIS — E66.01 CLASS 2 SEVERE OBESITY DUE TO EXCESS CALORIES WITH SERIOUS COMORBIDITY AND BODY MASS INDEX (BMI) OF 35.0 TO 35.9 IN ADULT (HCC): ICD-10-CM

## 2020-11-10 RX ORDER — HYDROXYCHLOROQUINE SULFATE 200 MG/1
400 TABLET, FILM COATED ORAL DAILY
Qty: 60 TABLET | Refills: 5 | Status: CANCELLED | OUTPATIENT
Start: 2020-11-10 | End: 2021-11-05

## 2020-11-10 RX ORDER — FLUOXETINE HYDROCHLORIDE 40 MG/1
80 CAPSULE ORAL DAILY
Qty: 60 CAPSULE | Refills: 5 | Status: SHIPPED | OUTPATIENT
Start: 2020-11-10 | End: 2021-03-04 | Stop reason: SDUPTHER

## 2020-11-10 RX ORDER — HYDROCHLOROTHIAZIDE 25 MG/1
25 TABLET ORAL DAILY
Qty: 30 TABLET | Refills: 5 | Status: SHIPPED | OUTPATIENT
Start: 2020-11-10 | End: 2021-03-04 | Stop reason: SDUPTHER

## 2020-11-10 RX ORDER — HYDROXYCHLOROQUINE SULFATE 200 MG/1
200 TABLET, FILM COATED ORAL DAILY
Qty: 30 TABLET | Refills: 12 | Status: SHIPPED | OUTPATIENT
Start: 2020-11-10 | End: 2020-11-19 | Stop reason: SDUPTHER

## 2020-11-10 RX ORDER — TROSPIUM CHLORIDE 20 MG/1
20 TABLET, FILM COATED ORAL 2 TIMES DAILY
Qty: 60 TABLET | Refills: 5 | Status: SHIPPED | OUTPATIENT
Start: 2020-11-10 | End: 2021-03-04 | Stop reason: SDUPTHER

## 2020-11-10 RX ORDER — PHENTERMINE HYDROCHLORIDE 37.5 MG/1
37.5 TABLET ORAL EVERY MORNING
Qty: 30 TABLET | Refills: 5 | Status: SHIPPED | OUTPATIENT
Start: 2020-11-10 | End: 2021-03-04 | Stop reason: SDUPTHER

## 2020-11-11 ENCOUNTER — TELEPHONE (OUTPATIENT)
Dept: FAMILY MEDICINE CLINIC | Facility: CLINIC | Age: 50
End: 2020-11-11

## 2020-11-11 NOTE — TELEPHONE ENCOUNTER
Patient called in and stated she has a question about medication for Reema .           Patient call back [phone]

## 2020-11-18 DIAGNOSIS — J45.40 MODERATE PERSISTENT ASTHMA WITHOUT COMPLICATION: ICD-10-CM

## 2020-11-18 DIAGNOSIS — M79.7 FIBROMYALGIA, PRIMARY: ICD-10-CM

## 2020-11-18 DIAGNOSIS — J30.9 ATOPIC RHINITIS: ICD-10-CM

## 2020-11-18 DIAGNOSIS — M06.09 RHEUMATOID ARTHRITIS OF MULTIPLE SITES WITH NEGATIVE RHEUMATOID FACTOR (HCC): Primary | ICD-10-CM

## 2020-11-18 RX ORDER — HYDROXYCHLOROQUINE SULFATE 200 MG/1
400 TABLET, FILM COATED ORAL DAILY
Qty: 60 TABLET | Refills: 11 | Status: CANCELLED | OUTPATIENT
Start: 2020-11-18 | End: 2021-11-13

## 2020-11-18 NOTE — TELEPHONE ENCOUNTER
PATIENT IS CALLING BACK BECAUSE SHE THINKS THE DIRECTIONS ON DOSAGE SHE IS TO TAKE IS WRONG. PLEASE CALL HER BACK TO CLARIFY.    ROSALIND #: [phone]

## 2020-11-19 RX ORDER — MONTELUKAST SODIUM 10 MG/1
10 TABLET ORAL
Qty: 30 TABLET | Refills: 5 | Status: SHIPPED | OUTPATIENT
Start: 2020-11-19 | End: 2021-01-21

## 2020-11-19 RX ORDER — HYDROXYCHLOROQUINE SULFATE 200 MG/1
200 TABLET, FILM COATED ORAL DAILY
Qty: 30 TABLET | Refills: 12 | Status: SHIPPED | OUTPATIENT
Start: 2020-11-19 | End: 2021-03-04 | Stop reason: SDUPTHER

## 2020-11-19 RX ORDER — PREGABALIN 150 MG/1
150 CAPSULE ORAL 2 TIMES DAILY
Qty: 60 CAPSULE | Refills: 5 | Status: SHIPPED | OUTPATIENT
Start: 2020-11-19 | End: 2021-03-04 | Stop reason: SDUPTHER

## 2020-12-08 ENCOUNTER — TELEPHONE (OUTPATIENT)
Dept: GASTROENTEROLOGY | Facility: CLINIC | Age: 50
End: 2020-12-08

## 2020-12-10 NOTE — TELEPHONE ENCOUNTER
Patient called, advised as per Dr. Lazar's note. She verb understanding and is agreeable to the plan.   F/u appointment scheduled for 01/20/21 at 0915.

## 2020-12-19 DIAGNOSIS — G47.09 OTHER INSOMNIA: ICD-10-CM

## 2020-12-21 RX ORDER — TRAZODONE HYDROCHLORIDE 50 MG/1
TABLET ORAL
Qty: 30 TABLET | Refills: 5 | Status: SHIPPED | OUTPATIENT
Start: 2020-12-21 | End: 2021-07-08 | Stop reason: SDUPTHER

## 2020-12-31 DIAGNOSIS — Z78.0 MENOPAUSE: ICD-10-CM

## 2020-12-31 DIAGNOSIS — N95.1 VASOMOTOR SYMPTOMS DUE TO MENOPAUSE: ICD-10-CM

## 2021-01-03 RX ORDER — ESTRADIOL AND NORETHINDRONE ACETATE .5; .1 MG/1; MG/1
TABLET ORAL
Qty: 28 TABLET | Refills: 0 | Status: SHIPPED | OUTPATIENT
Start: 2021-01-03 | End: 2021-02-03

## 2021-01-20 ENCOUNTER — OFFICE VISIT (OUTPATIENT)
Dept: GASTROENTEROLOGY | Facility: CLINIC | Age: 51
End: 2021-01-20

## 2021-01-20 VITALS — BODY MASS INDEX: 33.44 KG/M2 | HEIGHT: 70 IN | WEIGHT: 233.6 LBS | TEMPERATURE: 97 F

## 2021-01-20 DIAGNOSIS — K29.00 ACUTE SUPERFICIAL GASTRITIS WITHOUT HEMORRHAGE: ICD-10-CM

## 2021-01-20 DIAGNOSIS — Z86.010 PERSONAL HISTORY OF COLONIC POLYPS: ICD-10-CM

## 2021-01-20 DIAGNOSIS — R13.19 ESOPHAGEAL DYSPHAGIA: Primary | ICD-10-CM

## 2021-01-20 DIAGNOSIS — D13.1 FUNDIC GLAND POLYPS OF STOMACH, BENIGN: ICD-10-CM

## 2021-01-20 PROCEDURE — 99214 OFFICE O/P EST MOD 30 MIN: CPT | Performed by: NURSE PRACTITIONER

## 2021-01-20 NOTE — PROGRESS NOTES
Chief Complaint   Patient presents with   • Follow-up     HPI    Karen Nash is a  50 y.o. female here for a follow up visit for dysphagia and heartburn.  This patient follows with Dr. Lazar known to me.  He status post endoscopic evaluation for her symptoms on 9/3/2020 which I reviewed as follows:    EGD w/ normal esophagus, multiple fundic gland polyps, gastritis.  Patient was recommended to use 1-2 SL altos prior to meals.    On visit today she does report improvement in symptoms with twice daily dosing PPI therapy and daily alkaloids.  She still having feelings of esophageal dysphagia about once a week with occasional regurgitation made worse with overeating or bending over shortly after meals.  She denies nausea, vomiting, or weight loss.  Her appetite is good.    No diarrhea, constipation, rectal bleeding.    Reviewed colonoscopy dated 7/7/2016 performed by Dr. Meyers with polyps and internal hemorrhoids which were banded.  Polyps were benign/hyperplastic.  Pathology was benign.      Past Medical History:   Diagnosis Date   • Abdominal pain    • Abnormal vaginal bleeding    • Allergic rhinitis    • Anemia    • Anxiety    • Asthma    • Breast cancer screening    • Cluster headache    • Congenital prolapsed rectum    • Constipation    • Depression    • Dyslipidemia    • Endometriosis    • Environmental allergies    • Fibroids     uterine   • Fibromyalgia, primary    • Gastroesophageal reflux disease    • Headache, tension-type    • Hyperlipidemia    • Hypertension    • Infertility, female    • Leaking of urine    • Menorrhagia    • Migraine    • Muscle cramp    • Vaginal candidiasis        Past Surgical History:   Procedure Laterality Date   • APPENDECTOMY      open - age 15   • COLONOSCOPY  APPROX 1996   • COLONOSCOPY N/A 7/7/2016    Procedure: COLONOSCOPY to cecum  W/  HEMORRHOID BANDING with cold biopsy polypectomy;  Surgeon: Kin Meyers MD;  Location: [facility] ENDOSCOPY;  Service:    • ENDOSCOPY N/A  9/30/2020    Procedure: ESOPHAGOGASTRODUODENOSCOPY WITH BIOPSIES;  Surgeon: Michele Lazar MD;  Location: [facility] ENDOSCOPY;  Service: Gastroenterology;  Laterality: N/A;  pre: dysphagia  post: gastritis, gastric polyps and hiatal hernia   • HEMORRHOIDECTOMY     • TONSILLECTOMY     • VAGINAL URETHRAL SEPTUM EXCISION         Scheduled Meds:  Outpatient Encounter Medications as of 1/20/2021   Medication Sig Dispense Refill   • acetaminophen (TYLENOL) 500 MG tablet Take 500 mg by mouth Every 6 (Six) Hours As Needed for Mild Pain .     • albuterol (ACCUNEB) 1.25 MG/3ML nebulizer solution USE 1 AMPULE IN NEBULIZER EVERY SIX HOURS AS NEEDED FOR WHEEZING  60 mL 11   • ALBUTEROL SULFATE  (90 Base) MCG/ACT inhaler INHALE 2 PUFFS BY MOUTH EVERY SIX HOURS AS NEEDED for wheezing  18 g 5   • AMLODIPINE BESYLATE PO Take 10 mg by mouth.     • azelastine (OPTIVAR) 0.05 % ophthalmic solution Administer 1 drop to both eyes 2 (Two) Times a Day As Needed (eye allergies). 1 each 12   • Azelastine-Fluticasone 137-50 MCG/ACT suspension 1 spray into the nostril(s) as directed by provider 2 (Two) Times a Day. 1 bottle 5   • budesonide-formoterol (Symbicort) 160-4.5 MCG/ACT inhaler Inhale 2 puffs 2 (Two) Times a Day. 10.2 g 11   • CALCIUM-MAGNESIUM-ZINC PO Take  by mouth.     • Cetirizine HCl 10 MG capsule Take 10 mg by mouth Daily. 30 capsule 5   • Estradiol-Norethindrone Acet 0.5-0.1 MG per tablet TAKE ONE TABLET BY MOUTH DAILY 28 tablet 0   • FLUoxetine (PROzac) 40 MG capsule Take 2 capsules by mouth Daily. 60 capsule 5   • folic acid (FOLVITE) 1 MG tablet Take 1 mg by mouth.     • hydroCHLOROthiazide (HYDRODIURIL) 25 MG tablet Take 1 tablet by mouth Daily. 200001 30 tablet 5   • hydroxychloroquine (PLAQUENIL) 200 MG tablet Take 1 tablet by mouth Daily. Indications: Rheumatoid Arthritis 30 tablet 12   • hyoscyamine (LEVSIN) 0.125 MG SL tablet Take 1 tablet by mouth Every 4 (Four) Hours As Needed for Cramping. 30 tablet 0   •  montelukast (SINGULAIR) 10 MG tablet Take 1 tablet by mouth every night at bedtime. 30 tablet 5   • Multiple Vitamin (MULTI VITAMIN DAILY PO) Take by mouth daily.     • pantoprazole (PROTONIX) 40 MG EC tablet Take 1 tablet by mouth 2 (Two) Times a Day. 180 tablet 3   • phentermine (ADIPEX-P) 37.5 MG tablet Take 1 tablet by mouth Every Morning. 30 tablet 5   • polyethylene glycol (MIRALAX) pack packet mix 1 packet into drink every night 30 packet 4   • pregabalin (LYRICA) 150 MG capsule Take 1 capsule by mouth 2 (Two) Times a Day. 60 capsule 5   • risperiDONE (risperDAL) 1 MG tablet Take 1 tablet by mouth 2 (Two) Times a Day. 60 tablet 5   • Saline (NASOGEL) gel 1 spray each nostril twice daily 30 g 12   • sucralfate (CARAFATE) 1 g tablet Take 1 tablet by mouth 4 (Four) Times a Day. 60 tablet 0   • topiramate (TOPAMAX) 100 MG tablet Take 1 tablet by mouth 2 (Two) Times a Day. 1 po nightly x 3 nights x 1 po bid 60 tablet 5   • traZODone (DESYREL) 50 MG tablet TAKE ONE TABLET BY MOUTH ONCE NIGHTLY AS NEEDED FOR SLEEP 30 tablet 5   • trospium (SANCTURA) 20 MG tablet Take 1 tablet by mouth 2 (Two) Times a Day. 60 tablet 5   • [DISCONTINUED] Estradiol-Norethindrone Acet 0.5-0.1 MG per tablet Take 1 tablet by mouth Daily. 30 tablet 5     No facility-administered encounter medications on file as of 2021.        Continuous Infusions:No current facility-administered medications for this visit.       PRN Meds:.    Allergies   Allergen Reactions   • Sulfamethoxazole-Trimethoprim Hives and Itching   • Corn-Containing Products Rash   • Penicillins        Social History     Socioeconomic History   • Marital status:      Spouse name: Not on file   • Number of children: 0   • Years of education: Not on file   • Highest education level: Not on file   Tobacco Use   • Smoking status: Former Smoker     Packs/day: 0.10     Types: Cigarettes     Start date: 2001     Quit date: 2009     Years since quittin.0   •  Smokeless tobacco: Never Used   Substance and Sexual Activity   • Alcohol use: Yes     Alcohol/week: 1.0 standard drinks     Types: 1 Glasses of wine per week     Comment: SOCIAL USE//caffeine coffee daily    • Drug use: No   • Sexual activity: Yes     Partners: Male     Birth control/protection: None       Family History   Problem Relation Age of Onset   • Cancer Mother         SKIN   • Stroke Mother    • Diabetes Mother    • Heart disease Mother    • Parkinsonism Mother    • Arthritis Mother    • Dementia Mother    • Hypertension Mother    • Kidney disease Mother    • Neuropathy Mother    • Cancer Father         LARNYX   • Stroke Brother    • Breast cancer Neg Hx    • Ovarian cancer Neg Hx    • Uterine cancer Neg Hx    • Colon cancer Neg Hx        Review of Systems   Constitutional: Negative for activity change, appetite change, fatigue, fever and unexpected weight change.   HENT: Positive for trouble swallowing.    Respiratory: Negative for apnea, cough, choking, chest tightness, shortness of breath and wheezing.    Cardiovascular: Negative for chest pain, palpitations and leg swelling.   Gastrointestinal: Negative for abdominal distention, abdominal pain, anal bleeding, blood in stool, constipation, diarrhea, nausea, rectal pain and vomiting.       Vitals:    01/20/21 0912   Temp: 97 °F (36.1 °C)       Physical Exam  Constitutional:       Appearance: She is well-developed.   Cardiovascular:      Rate and Rhythm: Normal rate and regular rhythm.      Heart sounds: Normal heart sounds.   Pulmonary:      Effort: Pulmonary effort is normal. No respiratory distress.      Breath sounds: Normal breath sounds. No wheezing.   Abdominal:      General: Bowel sounds are normal. There is no distension.      Palpations: Abdomen is soft. There is no mass.      Tenderness: There is no abdominal tenderness. There is no guarding.      Hernia: No hernia is present.   Skin:     General: Skin is warm and dry.   Neurological:       Mental Status: She is alert and oriented to person, place, and time.   Psychiatric:         Behavior: Behavior normal.       Assessment    Esophageal dysphagia  Gastritis  Fundic gland polyps, benign  Personal history of colon polyps    Plan    Pleasant 50-year-old female seen today in follow-up status post endoscopic evaluation for symptoms of esophageal dysphagia demonstrating gastritis and fundic gland polyps.  Pathology was benign.  I reviewed procedural findings with the patient in great detail and all questions were answered.  Recent esophagram also showed some questionable mild dysmotility.  She does feel improved with Altoid's and twice daily dosing PPI therapy if still having residual symptoms as mentioned above.  We discussed the benefits of an esophageal manometry moving forward and patient is agreeable.  Continue PPI therapy for now.  Continue deltoids.  Further recommendations pending manometry results.    Patient follows with Dr. Meyers for screening colonoscopies.

## 2021-01-21 ENCOUNTER — OFFICE VISIT (OUTPATIENT)
Dept: FAMILY MEDICINE CLINIC | Facility: CLINIC | Age: 51
End: 2021-01-21

## 2021-01-21 VITALS
HEART RATE: 71 BPM | WEIGHT: 232 LBS | BODY MASS INDEX: 35.16 KG/M2 | SYSTOLIC BLOOD PRESSURE: 120 MMHG | DIASTOLIC BLOOD PRESSURE: 70 MMHG | HEIGHT: 68 IN | OXYGEN SATURATION: 98 %

## 2021-01-21 DIAGNOSIS — J45.40 MODERATE PERSISTENT ASTHMA WITHOUT COMPLICATION: ICD-10-CM

## 2021-01-21 DIAGNOSIS — K29.50 OTHER CHRONIC GASTRITIS WITHOUT HEMORRHAGE: ICD-10-CM

## 2021-01-21 DIAGNOSIS — R13.14 PHARYNGOESOPHAGEAL DYSPHAGIA: ICD-10-CM

## 2021-01-21 DIAGNOSIS — K22.4 ESOPHAGEAL DYSMOTILITY: ICD-10-CM

## 2021-01-21 DIAGNOSIS — Z79.899 DRUG THERAPY: ICD-10-CM

## 2021-01-21 DIAGNOSIS — F33.2 SEVERE EPISODE OF RECURRENT MAJOR DEPRESSIVE DISORDER, WITHOUT PSYCHOTIC FEATURES (HCC): Primary | ICD-10-CM

## 2021-01-21 DIAGNOSIS — M79.7 FIBROMYALGIA: ICD-10-CM

## 2021-01-21 DIAGNOSIS — E78.5 DYSLIPIDEMIA: ICD-10-CM

## 2021-01-21 DIAGNOSIS — M06.09 RHEUMATOID ARTHRITIS OF MULTIPLE SITES WITH NEGATIVE RHEUMATOID FACTOR (HCC): ICD-10-CM

## 2021-01-21 PROCEDURE — 99214 OFFICE O/P EST MOD 30 MIN: CPT | Performed by: FAMILY MEDICINE

## 2021-01-21 RX ORDER — RISPERIDONE 0.5 MG/1
TABLET ORAL
Qty: 21 TABLET | Refills: 0 | Status: SHIPPED | OUTPATIENT
Start: 2021-01-21 | End: 2021-01-26

## 2021-01-21 RX ORDER — OLANZAPINE 5 MG/1
TABLET ORAL
Qty: 30 TABLET | Refills: 5 | Status: SHIPPED | OUTPATIENT
Start: 2021-01-21 | End: 2021-04-21 | Stop reason: SDUPTHER

## 2021-01-21 NOTE — PROGRESS NOTES
Subjective   Karen Nash is a 50 y.o. female. Presents today for   Chief Complaint   Patient presents with   • Anxiety   • Depression   • Rheumatoid Arthritis   • Asthma       Anxiety  Presents for follow-up visit. Symptoms include depressed mood, excessive worry, nervous/anxious behavior and shortness of breath. Patient reports no chest pain. Symptoms occur most days. The severity of symptoms is moderate. The quality of sleep is non-restorative. Nighttime awakenings: occasional.     Her past medical history is significant for asthma and depression. Compliance with medications is %.   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  Frequency of symptoms: most days   Severity: severe   Sleep quality: fair  Nighttime awakenings: occasional  Patient has a history of: asthma  Compliance with medications:  %  Treatment side effects: seeing therapy, hasn't opened up completely but feels helps.    Rheumatoid Arthritis  This is a chronic problem. The current episode started more than 1 year ago. The problem occurs intermittently. The problem has been waxing and waning. Associated symptoms include arthralgias, coughing, fatigue, joint swelling and myalgias. Pertinent negatives include no chest pain. Associated symptoms comments: Has also fibromyalgia;  Hurts constantly. Treatments tried: on hydroxychlor. The treatment provided mild relief.   Asthma  She complains of cough, frequent throat clearing, shortness of breath and wheezing. This is a chronic problem. The current episode started more than 1 year ago. The problem occurs intermittently. The problem has been waxing and waning. Associated symptoms include dyspnea on exertion  and myalgias. Pertinent negatives include no chest pain. Her symptoms are alleviated by beta-agonist, steroid inhaler and leukotriene antagonist. She reports moderate improvement on treatment. Her past medical history is significant for asthma. Past medical history comments: Has also VCD as well..   GI  Saw APRN for GI, ordered manometry as still bad gerd symptoms and feelings of dysphagia;  Esopogram noted mild dysmotility;   EGD noted gastritis.      Review of Systems   Constitutional: Positive for fatigue.   Respiratory: Positive for cough, shortness of breath and wheezing.    Cardiovascular: Positive for dyspnea on exertion. Negative for chest pain.   Musculoskeletal: Positive for arthralgias, joint swelling and myalgias.   Psychiatric/Behavioral: The patient is nervous/anxious.        Patient Active Problem List   Diagnosis   • Moderate persistent asthma without complication   • Atopic rhinitis   • Anemia   • Benign essential hypertension   • Constipation   • Dyslipidemia   • Gastroesophageal reflux disease   • Intractable migraine without aura and without status migrainosus   • Muscle cramps   • Vocal cord dysfunction   • Female dyspareunia   • Vaginal atrophy   • Pelvic pain   • Stress incontinence of urine   • Fibromyalgia, primary   • Rheumatoid arthritis of multiple sites with negative rheumatoid factor (CMS/HCC)   • Severe episode of recurrent major depressive disorder, without psychotic features (CMS/HCC)   • Panic disorder   • Generalized anxiety disorder   • Primary osteoarthritis involving multiple joints   • Class 2 severe obesity due to excess calories with serious comorbidity and body mass index (BMI) of 36.0 to 36.9 in adult (CMS/HCC)   • Esophageal dysphagia   • Dyspepsia       Social History     Socioeconomic History   • Marital status:      Spouse name: Not on file   • Number of children: 0   • Years of education: Not on file   • Highest education level: Not on file   Tobacco Use   •  Smoking status: Former Smoker     Packs/day: 0.10     Types: Cigarettes     Start date: 2001     Quit date: 2009     Years since quittin.0   • Smokeless tobacco: Never Used   Substance and Sexual Activity   • Alcohol use: Yes     Alcohol/week: 1.0 standard drinks     Types: 1 Glasses of wine per week     Comment: SOCIAL USE//caffeine coffee daily    • Drug use: No   • Sexual activity: Yes     Partners: Male     Birth control/protection: None       Allergies   Allergen Reactions   • Sulfamethoxazole-Trimethoprim Hives and Itching   • Corn-Containing Products Rash   • Penicillins        Current Outpatient Medications on File Prior to Visit   Medication Sig Dispense Refill   • acetaminophen (TYLENOL) 500 MG tablet Take 500 mg by mouth Every 6 (Six) Hours As Needed for Mild Pain .     • albuterol (ACCUNEB) 1.25 MG/3ML nebulizer solution USE 1 AMPULE IN NEBULIZER EVERY SIX HOURS AS NEEDED FOR WHEEZING  60 mL 11   • ALBUTEROL SULFATE  (90 Base) MCG/ACT inhaler INHALE 2 PUFFS BY MOUTH EVERY SIX HOURS AS NEEDED for wheezing  18 g 5   • AMLODIPINE BESYLATE PO Take 10 mg by mouth.     • azelastine (OPTIVAR) 0.05 % ophthalmic solution Administer 1 drop to both eyes 2 (Two) Times a Day As Needed (eye allergies). 1 each 12   • Azelastine-Fluticasone 137-50 MCG/ACT suspension 1 spray into the nostril(s) as directed by provider 2 (Two) Times a Day. 1 bottle 5   • budesonide-formoterol (Symbicort) 160-4.5 MCG/ACT inhaler Inhale 2 puffs 2 (Two) Times a Day. 10.2 g 11   • CALCIUM-MAGNESIUM-ZINC PO Take  by mouth.     • Cetirizine HCl 10 MG capsule Take 10 mg by mouth Daily. 30 capsule 5   • Estradiol-Norethindrone Acet 0.5-0.1 MG per tablet TAKE ONE TABLET BY MOUTH DAILY 28 tablet 0   • FLUoxetine (PROzac) 40 MG capsule Take 2 capsules by mouth Daily. 60 capsule 5   • folic acid (FOLVITE) 1 MG tablet Take 1 mg by mouth.     • hydroCHLOROthiazide (HYDRODIURIL) 25 MG tablet Take 1 tablet by mouth Daily.   "tablet 5   • hydroxychloroquine (PLAQUENIL) 200 MG tablet Take 1 tablet by mouth Daily. Indications: Rheumatoid Arthritis 30 tablet 12   • hyoscyamine (LEVSIN) 0.125 MG SL tablet Take 1 tablet by mouth Every 4 (Four) Hours As Needed for Cramping. 30 tablet 0   • Multiple Vitamin (MULTI VITAMIN DAILY PO) Take by mouth daily.     • pantoprazole (PROTONIX) 40 MG EC tablet Take 1 tablet by mouth 2 (Two) Times a Day. 180 tablet 3   • phentermine (ADIPEX-P) 37.5 MG tablet Take 1 tablet by mouth Every Morning. 30 tablet 5   • polyethylene glycol (MIRALAX) pack packet mix 1 packet into drink every night 30 packet 4   • pregabalin (LYRICA) 150 MG capsule Take 1 capsule by mouth 2 (Two) Times a Day. 60 capsule 5   • Saline (NASOGEL) gel 1 spray each nostril twice daily 30 g 12   • sucralfate (CARAFATE) 1 g tablet Take 1 tablet by mouth 4 (Four) Times a Day. 60 tablet 0   • topiramate (TOPAMAX) 100 MG tablet Take 1 tablet by mouth 2 (Two) Times a Day. 1 po nightly x 3 nights x 1 po bid 60 tablet 5   • traZODone (DESYREL) 50 MG tablet TAKE ONE TABLET BY MOUTH ONCE NIGHTLY AS NEEDED FOR SLEEP 30 tablet 5   • trospium (SANCTURA) 20 MG tablet Take 1 tablet by mouth 2 (Two) Times a Day. 60 tablet 5   • [DISCONTINUED] montelukast (SINGULAIR) 10 MG tablet Take 1 tablet by mouth every night at bedtime. 30 tablet 5   • [DISCONTINUED] risperiDONE (risperDAL) 1 MG tablet Take 1 tablet by mouth 2 (Two) Times a Day. 60 tablet 5   • [DISCONTINUED] Estradiol-Norethindrone Acet 0.5-0.1 MG per tablet Take 1 tablet by mouth Daily. 30 tablet 5     No current facility-administered medications on file prior to visit.        Objective   Vitals:    01/21/21 0843   BP: 120/70   Pulse: 71   SpO2: 98%   Weight: 105 kg (232 lb)   Height: 172.7 cm (68\")     Body mass index is 35.28 kg/m².    Physical Exam  Vitals signs and nursing note reviewed.   Constitutional:       Appearance: She is well-developed.   HENT:      Head: Normocephalic and atraumatic. "   Neck:      Musculoskeletal: Neck supple.      Thyroid: No thyromegaly.      Vascular: No JVD.   Cardiovascular:      Rate and Rhythm: Normal rate and regular rhythm.      Heart sounds: Normal heart sounds. No murmur. No friction rub. No gallop.    Pulmonary:      Effort: Pulmonary effort is normal. No respiratory distress.      Breath sounds: Normal breath sounds. No wheezing or rales.   Abdominal:      General: Bowel sounds are normal. There is no distension.      Palpations: Abdomen is soft.      Tenderness: There is no abdominal tenderness. There is no guarding or rebound.   Skin:     General: Skin is warm and dry.   Neurological:      Mental Status: She is alert.   Psychiatric:         Mood and Affect: Mood is depressed.         Behavior: Behavior normal.         Thought Content: Thought content does not include suicidal plan.         Assessment/Plan   Diagnoses and all orders for this visit:    1. Severe episode of recurrent major depressive disorder, without psychotic features (CMS/HCC) (Primary)  -     risperiDONE (risperDAL) 0.5 MG tablet; 1 po bid x 7d, then 1/2 po bid x 7d then stop and start olanzepine  Dispense: 21 tablet; Refill: 0  -     OLANZapine (ZyPREXA) 5 MG tablet; Once off risperdone start olanzapine 1/2 po daily x 14d, then 1 po daily  Dispense: 30 tablet; Refill: 5    2. Rheumatoid arthritis of multiple sites with negative rheumatoid factor (CMS/HCC)    3. Moderate persistent asthma without complication    4. Pharyngoesophageal dysphagia    5. Esophageal dysmotility    6. Fibromyalgia    7. Drug therapy  -     CBC & Differential    8. Dyslipidemia  -     Comprehensive Metabolic Panel  -     Lipid Panel    9. Other chronic gastritis without hemorrhage    -will wean off of risperdone and once off start zyprexa as depression not in remission;  Anxiety doing better  -RA - f/u with rheumatology as doing  -due check lpids  -f/u with GI as doing;  Continue ppi, to have manometry but patient stating  she doesn't want to do another procedure as can live with what feeling         -Follow up: 3 months and prn

## 2021-01-22 LAB
ALBUMIN SERPL-MCNC: 4.2 G/DL (ref 3.5–5.2)
ALBUMIN/GLOB SERPL: 1.8 G/DL
ALP SERPL-CCNC: 86 U/L (ref 39–117)
ALT SERPL-CCNC: 13 U/L (ref 1–33)
AST SERPL-CCNC: 20 U/L (ref 1–32)
BASOPHILS # BLD AUTO: 0.04 10*3/MM3 (ref 0–0.2)
BASOPHILS NFR BLD AUTO: 0.5 % (ref 0–1.5)
BILIRUB SERPL-MCNC: 0.3 MG/DL (ref 0–1.2)
BUN SERPL-MCNC: 14 MG/DL (ref 6–20)
BUN/CREAT SERPL: 15.2 (ref 7–25)
CALCIUM SERPL-MCNC: 9.3 MG/DL (ref 8.6–10.5)
CHLORIDE SERPL-SCNC: 107 MMOL/L (ref 98–107)
CHOLEST SERPL-MCNC: 199 MG/DL (ref 0–200)
CO2 SERPL-SCNC: 25.3 MMOL/L (ref 22–29)
CREAT SERPL-MCNC: 0.92 MG/DL (ref 0.57–1)
EOSINOPHIL # BLD AUTO: 0.14 10*3/MM3 (ref 0–0.4)
EOSINOPHIL NFR BLD AUTO: 1.9 % (ref 0.3–6.2)
ERYTHROCYTE [DISTWIDTH] IN BLOOD BY AUTOMATED COUNT: 12.8 % (ref 12.3–15.4)
GLOBULIN SER CALC-MCNC: 2.4 GM/DL
GLUCOSE SERPL-MCNC: 84 MG/DL (ref 65–99)
HCT VFR BLD AUTO: 46 % (ref 34–46.6)
HDLC SERPL-MCNC: 54 MG/DL (ref 40–60)
HGB BLD-MCNC: 15.3 G/DL (ref 12–15.9)
IMM GRANULOCYTES # BLD AUTO: 0.03 10*3/MM3 (ref 0–0.05)
IMM GRANULOCYTES NFR BLD AUTO: 0.4 % (ref 0–0.5)
LDLC SERPL CALC-MCNC: 110 MG/DL (ref 0–100)
LYMPHOCYTES # BLD AUTO: 1.39 10*3/MM3 (ref 0.7–3.1)
LYMPHOCYTES NFR BLD AUTO: 18.8 % (ref 19.6–45.3)
MCH RBC QN AUTO: 29.1 PG (ref 26.6–33)
MCHC RBC AUTO-ENTMCNC: 33.3 G/DL (ref 31.5–35.7)
MCV RBC AUTO: 87.5 FL (ref 79–97)
MONOCYTES # BLD AUTO: 0.48 10*3/MM3 (ref 0.1–0.9)
MONOCYTES NFR BLD AUTO: 6.5 % (ref 5–12)
NEUTROPHILS # BLD AUTO: 5.32 10*3/MM3 (ref 1.7–7)
NEUTROPHILS NFR BLD AUTO: 71.9 % (ref 42.7–76)
NRBC BLD AUTO-RTO: 0 /100 WBC (ref 0–0.2)
PLATELET # BLD AUTO: 307 10*3/MM3 (ref 140–450)
POTASSIUM SERPL-SCNC: 3.9 MMOL/L (ref 3.5–5.2)
PROT SERPL-MCNC: 6.6 G/DL (ref 6–8.5)
RBC # BLD AUTO: 5.26 10*6/MM3 (ref 3.77–5.28)
SODIUM SERPL-SCNC: 140 MMOL/L (ref 136–145)
TRIGL SERPL-MCNC: 205 MG/DL (ref 0–150)
VLDLC SERPL CALC-MCNC: 35 MG/DL (ref 5–40)
WBC # BLD AUTO: 7.4 10*3/MM3 (ref 3.4–10.8)

## 2021-01-23 NOTE — PROGRESS NOTES
Call and mail copy of results to patient.  Cholesterol adequate control  Blood counts, kdiney and liver function normal

## 2021-01-26 DIAGNOSIS — J45.40 MODERATE PERSISTENT ASTHMA WITHOUT COMPLICATION: Primary | ICD-10-CM

## 2021-01-26 RX ORDER — ZAFIRLUKAST 20 MG/1
20 TABLET, FILM COATED ORAL 2 TIMES DAILY
Qty: 60 TABLET | Refills: 12 | Status: SHIPPED | OUTPATIENT
Start: 2021-01-26 | End: 2021-04-16 | Stop reason: SDUPTHER

## 2021-02-03 DIAGNOSIS — Z78.0 MENOPAUSE: ICD-10-CM

## 2021-02-03 DIAGNOSIS — N95.1 VASOMOTOR SYMPTOMS DUE TO MENOPAUSE: ICD-10-CM

## 2021-02-03 RX ORDER — ESTRADIOL AND NORETHINDRONE ACETATE .5; .1 MG/1; MG/1
TABLET ORAL
Qty: 28 TABLET | Refills: 11 | Status: SHIPPED | OUTPATIENT
Start: 2021-02-03 | End: 2022-03-21

## 2021-02-03 RX ORDER — ESTRADIOL AND NORETHINDRONE ACETATE .5; .1 MG/1; MG/1
TABLET ORAL
Qty: 28 TABLET | Refills: 11 | Status: SHIPPED | OUTPATIENT
Start: 2021-02-03 | End: 2022-05-29

## 2021-02-10 ENCOUNTER — TELEPHONE (OUTPATIENT)
Dept: FAMILY MEDICINE CLINIC | Facility: CLINIC | Age: 51
End: 2021-02-10

## 2021-02-10 NOTE — TELEPHONE ENCOUNTER
PT CALLED REQUESTING A CALL BACK FROM [facility]. SHE DECLINED TO GIVE MORE INFORMATION, STATING IT IS PRIVATE.    SHE CAN BE REACHED AT [phone]

## 2021-03-04 DIAGNOSIS — G47.09 OTHER INSOMNIA: ICD-10-CM

## 2021-03-04 DIAGNOSIS — M79.7 FIBROMYALGIA, PRIMARY: ICD-10-CM

## 2021-03-04 DIAGNOSIS — E66.01 CLASS 2 SEVERE OBESITY DUE TO EXCESS CALORIES WITH SERIOUS COMORBIDITY AND BODY MASS INDEX (BMI) OF 35.0 TO 35.9 IN ADULT (HCC): ICD-10-CM

## 2021-03-04 DIAGNOSIS — N32.81 OAB (OVERACTIVE BLADDER): ICD-10-CM

## 2021-03-04 DIAGNOSIS — F41.8 DEPRESSION WITH ANXIETY: ICD-10-CM

## 2021-03-04 DIAGNOSIS — G43.009 MIGRAINE WITHOUT AURA AND WITHOUT STATUS MIGRAINOSUS, NOT INTRACTABLE: ICD-10-CM

## 2021-03-06 RX ORDER — HYDROXYCHLOROQUINE SULFATE 200 MG/1
200 TABLET, FILM COATED ORAL DAILY
Qty: 30 TABLET | Refills: 12 | Status: SHIPPED | OUTPATIENT
Start: 2021-03-06 | End: 2022-03-06

## 2021-03-06 RX ORDER — FOLIC ACID 1 MG/1
1 TABLET ORAL DAILY
Qty: 30 TABLET | Refills: 11 | Status: SHIPPED | OUTPATIENT
Start: 2021-03-06 | End: 2022-03-09

## 2021-03-06 RX ORDER — PHENTERMINE HYDROCHLORIDE 37.5 MG/1
37.5 TABLET ORAL EVERY MORNING
Qty: 30 TABLET | Refills: 5 | Status: SHIPPED | OUTPATIENT
Start: 2021-03-06 | End: 2021-10-18

## 2021-03-06 RX ORDER — TROSPIUM CHLORIDE 20 MG/1
20 TABLET, FILM COATED ORAL 2 TIMES DAILY
Qty: 60 TABLET | Refills: 5 | Status: SHIPPED | OUTPATIENT
Start: 2021-03-06 | End: 2022-02-15

## 2021-03-06 RX ORDER — TOPIRAMATE 100 MG/1
100 TABLET, FILM COATED ORAL 2 TIMES DAILY
Qty: 60 TABLET | Refills: 5 | Status: SHIPPED | OUTPATIENT
Start: 2021-03-06 | End: 2021-11-01

## 2021-03-06 RX ORDER — PREGABALIN 150 MG/1
150 CAPSULE ORAL 2 TIMES DAILY
Qty: 60 CAPSULE | Refills: 5 | Status: SHIPPED | OUTPATIENT
Start: 2021-03-06 | End: 2021-09-13

## 2021-03-06 RX ORDER — FLUOXETINE HYDROCHLORIDE 40 MG/1
80 CAPSULE ORAL DAILY
Qty: 60 CAPSULE | Refills: 5 | Status: SHIPPED | OUTPATIENT
Start: 2021-03-06 | End: 2021-11-01

## 2021-03-06 RX ORDER — HYDROCHLOROTHIAZIDE 25 MG/1
25 TABLET ORAL DAILY
Qty: 30 TABLET | Refills: 5 | Status: SHIPPED | OUTPATIENT
Start: 2021-03-06 | End: 2021-09-02

## 2021-03-22 ENCOUNTER — TELEPHONE (OUTPATIENT)
Dept: FAMILY MEDICINE CLINIC | Facility: CLINIC | Age: 51
End: 2021-03-22

## 2021-03-22 NOTE — TELEPHONE ENCOUNTER
Caller: Karen Nash    Relationship: Self    Best call back number: [phone]    What form or medical record are you requesting: DISABILITY    Additional notes: PATIENT CALLED AND STATED THAT SHE DROPPED OFF DISABILITY PAPERWORK LAST WEEK AND WANTED TO CHECK ON THE STATUS OF COMPLETION. PATIENT REQUEST A CALLBACK.

## 2021-03-24 ENCOUNTER — BULK ORDERING (OUTPATIENT)
Dept: CASE MANAGEMENT | Facility: OTHER | Age: 51
End: 2021-03-24

## 2021-03-24 DIAGNOSIS — Z23 IMMUNIZATION DUE: ICD-10-CM

## 2021-04-16 DIAGNOSIS — K21.00 GASTROESOPHAGEAL REFLUX DISEASE WITH ESOPHAGITIS: ICD-10-CM

## 2021-04-16 DIAGNOSIS — J45.51 SEVERE PERSISTENT ASTHMA WITH ACUTE EXACERBATION: ICD-10-CM

## 2021-04-16 DIAGNOSIS — J45.40 MODERATE PERSISTENT ASTHMA WITHOUT COMPLICATION: ICD-10-CM

## 2021-04-16 DIAGNOSIS — K22.4 ESOPHAGEAL DYSMOTILITY: ICD-10-CM

## 2021-04-16 RX ORDER — ALBUTEROL SULFATE 90 UG/1
2 AEROSOL, METERED RESPIRATORY (INHALATION) EVERY 6 HOURS PRN
Qty: 18 G | Refills: 5 | Status: SHIPPED | OUTPATIENT
Start: 2021-04-16 | End: 2021-07-08 | Stop reason: SDUPTHER

## 2021-04-16 RX ORDER — BUDESONIDE AND FORMOTEROL FUMARATE DIHYDRATE 160; 4.5 UG/1; UG/1
2 AEROSOL RESPIRATORY (INHALATION) 2 TIMES DAILY
Qty: 10.2 G | Refills: 11 | Status: SHIPPED | OUTPATIENT
Start: 2021-04-16 | End: 2021-07-08 | Stop reason: SDUPTHER

## 2021-04-16 RX ORDER — ZAFIRLUKAST 20 MG/1
20 TABLET, FILM COATED ORAL 2 TIMES DAILY
Qty: 60 TABLET | Refills: 12 | Status: SHIPPED | OUTPATIENT
Start: 2021-04-16

## 2021-04-16 RX ORDER — PANTOPRAZOLE SODIUM 40 MG/1
40 TABLET, DELAYED RELEASE ORAL 2 TIMES DAILY
Qty: 180 TABLET | Refills: 3 | Status: SHIPPED | OUTPATIENT
Start: 2021-04-16 | End: 2022-05-04

## 2021-04-21 ENCOUNTER — TELEMEDICINE (OUTPATIENT)
Dept: FAMILY MEDICINE CLINIC | Facility: CLINIC | Age: 51
End: 2021-04-21

## 2021-04-21 DIAGNOSIS — F33.2 SEVERE EPISODE OF RECURRENT MAJOR DEPRESSIVE DISORDER, WITHOUT PSYCHOTIC FEATURES (HCC): ICD-10-CM

## 2021-04-21 PROCEDURE — 99442 PR PHYS/QHP TELEPHONE EVALUATION 11-20 MIN: CPT | Performed by: FAMILY MEDICINE

## 2021-04-21 RX ORDER — OLANZAPINE 10 MG/1
TABLET ORAL
Qty: 30 TABLET | Refills: 5 | Status: SHIPPED | OUTPATIENT
Start: 2021-04-21 | End: 2021-11-01

## 2021-04-21 NOTE — PROGRESS NOTES
Subjective   Karen Nash is a 50 y.o. female. Presents today for No chief complaint on file.    This visit has been rescheduled as a phone visit to comply with patient safety concerns in accordance with CDC recommendations. Total time of discussion was 18 minutes.  Patient Telephone VISIT, patient gave consent to treat.      History of Present Illness  Patient follow-up for depression;  Doing better on zyprexa, risperdone off now;   In quarantine as son has covid 19, no symptoms;  To call if develops as candidate for BAM.    No thoughts self harm  Review of Systems    Patient Active Problem List   Diagnosis   • Moderate persistent asthma without complication   • Atopic rhinitis   • Anemia   • Benign essential hypertension   • Constipation   • Dyslipidemia   • Gastroesophageal reflux disease   • Intractable migraine without aura and without status migrainosus   • Muscle cramps   • Vocal cord dysfunction   • Female dyspareunia   • Vaginal atrophy   • Pelvic pain   • Stress incontinence of urine   • Fibromyalgia, primary   • Rheumatoid arthritis of multiple sites with negative rheumatoid factor (CMS/HCC)   • Severe episode of recurrent major depressive disorder, without psychotic features (CMS/[facility])   • Panic disorder   • Generalized anxiety disorder   • Primary osteoarthritis involving multiple joints   • Class 2 severe obesity due to excess calories with serious comorbidity and body mass index (BMI) of 36.0 to 36.9 in adult (CMS/[facility])   • Esophageal dysphagia   • Dyspepsia       Social History     Socioeconomic History   • Marital status:      Spouse name: Not on file   • Number of children: 0   • Years of education: Not on file   • Highest education level: Not on file   Tobacco Use   • Smoking status: Former Smoker     Packs/day: 0.10     Types: Cigarettes     Start date: 2001     Quit date: 2009     Years since quittin.3   • Smokeless tobacco: Never Used   Substance and Sexual Activity   •  Alcohol use: Yes     Alcohol/week: 1.0 standard drinks     Types: 1 Glasses of wine per week     Comment: SOCIAL USE//caffeine coffee daily    • Drug use: No   • Sexual activity: Yes     Partners: Male     Birth control/protection: None       Allergies   Allergen Reactions   • Sulfamethoxazole-Trimethoprim Hives and Itching   • Corn-Containing Products Rash   • Penicillins        Current Outpatient Medications on File Prior to Visit   Medication Sig Dispense Refill   • acetaminophen (TYLENOL) 500 MG tablet Take 500 mg by mouth Every 6 (Six) Hours As Needed for Mild Pain .     • albuterol (ACCUNEB) 1.25 MG/3ML nebulizer solution USE 1 AMPULE IN NEBULIZER EVERY SIX HOURS AS NEEDED FOR WHEEZING  60 mL 11   • albuterol sulfate  (90 Base) MCG/ACT inhaler Inhale 2 puffs Every 6 (Six) Hours As Needed for Wheezing. 18 g 5   • AMLODIPINE BESYLATE PO Take 10 mg by mouth.     • azelastine (OPTIVAR) 0.05 % ophthalmic solution Administer 1 drop to both eyes 2 (Two) Times a Day As Needed (eye allergies). 1 each 12   • Azelastine-Fluticasone 137-50 MCG/ACT suspension 1 spray into the nostril(s) as directed by provider 2 (Two) Times a Day. 1 bottle 5   • budesonide-formoterol (Symbicort) 160-4.5 MCG/ACT inhaler Inhale 2 puffs 2 (Two) Times a Day. 10.2 g 11   • CALCIUM-MAGNESIUM-ZINC PO Take  by mouth.     • Cetirizine HCl 10 MG capsule Take 10 mg by mouth Daily. 30 capsule 5   • Estradiol-Norethindrone Acet 0.5-0.1 MG per tablet TAKE ONE TABLET BY MOUTH DAILY 28 tablet 11   • Estradiol-Norethindrone Acet 0.5-0.1 MG per tablet TAKE ONE TABLET BY MOUTH DAILY 28 tablet 11   • FLUoxetine (PROzac) 40 MG capsule Take 2 capsules by mouth Daily. 60 capsule 5   • folic acid (FOLVITE) 1 MG tablet Take 1 tablet by mouth Daily. 30 tablet 11   • hydroCHLOROthiazide (HYDRODIURIL) 25 MG tablet Take 1 tablet by mouth Daily. 200001 30 tablet 5   • hydroxychloroquine (PLAQUENIL) 200 MG tablet Take 1 tablet by mouth Daily. Indications:  Rheumatoid Arthritis 30 tablet 12   • hyoscyamine (LEVSIN) 0.125 MG SL tablet Take 1 tablet by mouth Every 4 (Four) Hours As Needed for Cramping. 30 tablet 0   • Multiple Vitamin (MULTI VITAMIN DAILY PO) Take by mouth daily.     • OLANZapine (ZyPREXA) 5 MG tablet Once off risperdone start olanzapine 1/2 po daily x 14d, then 1 po daily 30 tablet 5   • pantoprazole (PROTONIX) 40 MG EC tablet Take 1 tablet by mouth 2 (Two) Times a Day. 180 tablet 3   • phentermine (ADIPEX-P) 37.5 MG tablet Take 1 tablet by mouth Every Morning. 30 tablet 5   • polyethylene glycol (MIRALAX) pack packet mix 1 packet into drink every night 30 packet 4   • pregabalin (LYRICA) 150 MG capsule Take 1 capsule by mouth 2 (Two) Times a Day. 60 capsule 5   • Saline (NASOGEL) gel 1 spray each nostril twice daily 30 g 12   • sucralfate (CARAFATE) 1 g tablet Take 1 tablet by mouth 4 (Four) Times a Day. 60 tablet 0   • topiramate (TOPAMAX) 100 MG tablet Take 1 tablet by mouth 2 (Two) Times a Day. 1 po nightly x 3 nights x 1 po bid 60 tablet 5   • traZODone (DESYREL) 50 MG tablet TAKE ONE TABLET BY MOUTH ONCE NIGHTLY AS NEEDED FOR SLEEP 30 tablet 5   • trospium (SANCTURA) 20 MG tablet Take 1 tablet by mouth 2 (Two) Times a Day. 60 tablet 5   • zafirlukast (ACCOLATE) 20 MG tablet Take 1 tablet by mouth 2 (Two) Times a Day. 60 tablet 12   • [DISCONTINUED] Estradiol-Norethindrone Acet 0.5-0.1 MG per tablet Take 1 tablet by mouth Daily. 30 tablet 5     No current facility-administered medications on file prior to visit.       Objective   There were no vitals filed for this visit.  There is no height or weight on file to calculate BMI.    Physical Exam  Psychiatric:         Behavior: Behavior normal.         Thought Content: Thought content normal.         Judgment: Judgment normal.         Assessment/Plan   Diagnoses and all orders for this visit:    1. Severe episode of recurrent major depressive disorder, without psychotic features (CMS/HCC)  -      OLANZapine (ZyPREXA) 10 MG tablet; 1 po daily  Dispense: 30 tablet; Refill: 5    increase olanzapine to 10mg from 5mg;  Call or seek care if not doing well  Scheduled for covid 19 vaccine and for  as well 4/28/21.  Will have to cancel if develops covid 19         -Follow up: 2 to 3 months and prn

## 2021-04-21 NOTE — PATIENT INSTRUCTIONS
TIME TO VACCINATE FOR COVID 19  WEBSITE:  [email or web address]  We are currently in the 1A and 1B phase (see chart). If you are over age 70, you are now eligible for COVID vaccination. You can register directly for an appointment via our healthcare partners [phone]):  [facility]  Call [phone]  [facility]  [email or web address]  [email or web address];  Or you can text [employer] ([phone]) COVID and register via your smart phone.  • Do not sign up via multiple sites. Due to available supply, appointments are limited.  • If you have health insurance, including Medicare, you must bring your insurance card. There is no cost to you, regardless of your insurance status. All sites operate by appointment only and will require proof of age (such as a valid state identification) on arrival.  Sign up for our Vaccine Interest List - This is an information form to keep you in the know about where we are with the vaccine process. This form will not sign you up to receive the vaccine, but it will help you stay informed so you know when you will get it. Sign up now! (GO TO WEBSITE FOR FORM)    [facility] is now scheduling COVID-19 vaccinations for   Phases 1A and 1B of [place].  Phase 1A is meant for healthcare personnel, employed in the state [place] [facility]. In phase 1B we are focusing on those age 70 and over. Because initial supplies are limited, we are prioritizing administration based on guidance from the CDC and [place] state authorities. If you meet the current criteria, you may schedule an appointment to be vaccinated online. COVID Vaccines are only approved for people 16 years of age or older. All minors must be accompanied by a parent or guardian.  You DO NOT need a [employer]hart account to schedule your appointment. However, having a [employer]hart account will allow you  flexibility when rescheduling an appointment. Sign up for [employer].    Click this link to scheduled a vaccine:  [email or web address]    Other places to find to schedule vaccine:  [email or web address]

## 2021-04-28 ENCOUNTER — IMMUNIZATION (OUTPATIENT)
Dept: VACCINE CLINIC | Facility: HOSPITAL | Age: 51
End: 2021-04-28

## 2021-04-28 PROCEDURE — 0001A: CPT | Performed by: INTERNAL MEDICINE

## 2021-04-28 PROCEDURE — 91300 HC SARSCOV02 VAC 30MCG/0.3ML IM: CPT | Performed by: INTERNAL MEDICINE

## 2021-05-19 ENCOUNTER — IMMUNIZATION (OUTPATIENT)
Dept: VACCINE CLINIC | Facility: HOSPITAL | Age: 51
End: 2021-05-19

## 2021-05-19 PROCEDURE — 0002A: CPT | Performed by: INTERNAL MEDICINE

## 2021-05-19 PROCEDURE — 91300 HC SARSCOV02 VAC 30MCG/0.3ML IM: CPT | Performed by: INTERNAL MEDICINE

## 2021-06-21 RX ORDER — TRAZODONE HYDROCHLORIDE 100 MG/1
TABLET ORAL
Qty: 30 TABLET | Refills: 0 | OUTPATIENT
Start: 2021-06-21

## 2021-07-08 DIAGNOSIS — G47.09 OTHER INSOMNIA: ICD-10-CM

## 2021-07-08 DIAGNOSIS — J45.51 SEVERE PERSISTENT ASTHMA WITH ACUTE EXACERBATION: ICD-10-CM

## 2021-07-08 RX ORDER — BUDESONIDE AND FORMOTEROL FUMARATE DIHYDRATE 160; 4.5 UG/1; UG/1
2 AEROSOL RESPIRATORY (INHALATION) 2 TIMES DAILY
Qty: 10.2 G | Refills: 11 | Status: SHIPPED | OUTPATIENT
Start: 2021-07-08 | End: 2023-01-11 | Stop reason: SDUPTHER

## 2021-07-08 RX ORDER — ALBUTEROL SULFATE 90 UG/1
2 AEROSOL, METERED RESPIRATORY (INHALATION) EVERY 6 HOURS PRN
Qty: 18 G | Refills: 5 | Status: SHIPPED | OUTPATIENT
Start: 2021-07-08 | End: 2023-01-11 | Stop reason: SDUPTHER

## 2021-07-08 RX ORDER — TRAZODONE HYDROCHLORIDE 50 MG/1
50 TABLET ORAL NIGHTLY PRN
Qty: 30 TABLET | Refills: 5 | Status: SHIPPED | OUTPATIENT
Start: 2021-07-08 | End: 2022-02-10

## 2021-07-08 NOTE — TELEPHONE ENCOUNTER
traZODone (DESYREL) 50 MG tablet   albuterol sulfate  (90 Base) MCG/ACT inhaler   budesonide-formoterol (Symbicort) 160-4.5 MCG/ACT inhaler   [facility] NEW PRESCRIPTIONS CALLED IN

## 2021-09-02 RX ORDER — HYDROCHLOROTHIAZIDE 25 MG/1
TABLET ORAL
Qty: 30 TABLET | Refills: 9 | Status: SHIPPED | OUTPATIENT
Start: 2021-09-02 | End: 2022-09-15

## 2021-09-13 DIAGNOSIS — M79.7 FIBROMYALGIA, PRIMARY: ICD-10-CM

## 2021-09-13 RX ORDER — PREGABALIN 150 MG/1
CAPSULE ORAL
Qty: 60 CAPSULE | Refills: 0 | Status: SHIPPED | OUTPATIENT
Start: 2021-09-13 | End: 2021-10-18

## 2021-10-15 DIAGNOSIS — E66.01 CLASS 2 SEVERE OBESITY DUE TO EXCESS CALORIES WITH SERIOUS COMORBIDITY AND BODY MASS INDEX (BMI) OF 35.0 TO 35.9 IN ADULT (HCC): ICD-10-CM

## 2021-10-15 DIAGNOSIS — M79.7 FIBROMYALGIA, PRIMARY: ICD-10-CM

## 2021-10-18 RX ORDER — PREGABALIN 150 MG/1
CAPSULE ORAL
Qty: 60 CAPSULE | Refills: 0 | Status: SHIPPED | OUTPATIENT
Start: 2021-10-18 | End: 2022-01-03

## 2021-10-18 RX ORDER — PHENTERMINE HYDROCHLORIDE 37.5 MG/1
TABLET ORAL
Qty: 30 TABLET | Refills: 0 | Status: SHIPPED | OUTPATIENT
Start: 2021-10-18 | End: 2022-04-29

## 2021-11-01 DIAGNOSIS — F41.8 DEPRESSION WITH ANXIETY: ICD-10-CM

## 2021-11-01 DIAGNOSIS — F33.2 SEVERE EPISODE OF RECURRENT MAJOR DEPRESSIVE DISORDER, WITHOUT PSYCHOTIC FEATURES (HCC): ICD-10-CM

## 2021-11-01 DIAGNOSIS — G43.009 MIGRAINE WITHOUT AURA AND WITHOUT STATUS MIGRAINOSUS, NOT INTRACTABLE: ICD-10-CM

## 2021-11-01 RX ORDER — OLANZAPINE 10 MG/1
TABLET ORAL
Qty: 90 TABLET | Refills: 0 | Status: SHIPPED | OUTPATIENT
Start: 2021-11-01 | End: 2021-12-01

## 2021-11-01 RX ORDER — TOPIRAMATE 100 MG/1
TABLET, FILM COATED ORAL
Qty: 180 TABLET | Refills: 0 | Status: SHIPPED | OUTPATIENT
Start: 2021-11-01 | End: 2021-12-01

## 2021-11-01 RX ORDER — FLUOXETINE HYDROCHLORIDE 40 MG/1
CAPSULE ORAL
Qty: 180 CAPSULE | Refills: 0 | Status: SHIPPED | OUTPATIENT
Start: 2021-11-01 | End: 2021-12-01

## 2021-11-13 DIAGNOSIS — E66.01 CLASS 2 SEVERE OBESITY DUE TO EXCESS CALORIES WITH SERIOUS COMORBIDITY AND BODY MASS INDEX (BMI) OF 35.0 TO 35.9 IN ADULT (HCC): ICD-10-CM

## 2021-11-15 RX ORDER — PHENTERMINE HYDROCHLORIDE 37.5 MG/1
TABLET ORAL
Qty: 30 TABLET | Refills: 0 | OUTPATIENT
Start: 2021-11-15

## 2021-12-01 DIAGNOSIS — F41.8 DEPRESSION WITH ANXIETY: ICD-10-CM

## 2021-12-01 DIAGNOSIS — G43.009 MIGRAINE WITHOUT AURA AND WITHOUT STATUS MIGRAINOSUS, NOT INTRACTABLE: ICD-10-CM

## 2021-12-01 DIAGNOSIS — F33.2 SEVERE EPISODE OF RECURRENT MAJOR DEPRESSIVE DISORDER, WITHOUT PSYCHOTIC FEATURES (HCC): ICD-10-CM

## 2021-12-01 RX ORDER — OLANZAPINE 10 MG/1
10 TABLET ORAL NIGHTLY
Qty: 30 TABLET | Refills: 0 | Status: SHIPPED | OUTPATIENT
Start: 2021-12-01 | End: 2022-03-24

## 2021-12-01 RX ORDER — TOPIRAMATE 100 MG/1
100 TABLET, FILM COATED ORAL 2 TIMES DAILY
Qty: 60 TABLET | Refills: 0 | Status: SHIPPED | OUTPATIENT
Start: 2021-12-01 | End: 2022-04-29

## 2021-12-01 RX ORDER — FLUOXETINE HYDROCHLORIDE 40 MG/1
80 CAPSULE ORAL DAILY
Qty: 60 CAPSULE | Refills: 0 | Status: SHIPPED | OUTPATIENT
Start: 2021-12-01 | End: 2022-04-28

## 2021-12-07 DIAGNOSIS — M79.7 FIBROMYALGIA, PRIMARY: ICD-10-CM

## 2021-12-07 RX ORDER — PREGABALIN 150 MG/1
CAPSULE ORAL
Qty: 60 CAPSULE | Refills: 0 | OUTPATIENT
Start: 2021-12-07

## 2022-01-03 DIAGNOSIS — M79.7 FIBROMYALGIA, PRIMARY: ICD-10-CM

## 2022-01-03 RX ORDER — PREGABALIN 150 MG/1
CAPSULE ORAL
Qty: 60 CAPSULE | Refills: 0 | Status: SHIPPED | OUTPATIENT
Start: 2022-01-03 | End: 2022-04-06 | Stop reason: SDUPTHER

## 2022-02-01 DIAGNOSIS — N32.81 OAB (OVERACTIVE BLADDER): ICD-10-CM

## 2022-02-01 DIAGNOSIS — E66.01 CLASS 2 SEVERE OBESITY DUE TO EXCESS CALORIES WITH SERIOUS COMORBIDITY AND BODY MASS INDEX (BMI) OF 35.0 TO 35.9 IN ADULT: ICD-10-CM

## 2022-02-01 RX ORDER — TROSPIUM CHLORIDE 20 MG/1
TABLET, FILM COATED ORAL
Qty: 60 TABLET | Refills: 0 | OUTPATIENT
Start: 2022-02-01

## 2022-02-01 RX ORDER — PHENTERMINE HYDROCHLORIDE 37.5 MG/1
TABLET ORAL
Qty: 30 TABLET | Refills: 0 | OUTPATIENT
Start: 2022-02-01

## 2022-02-09 DIAGNOSIS — G47.09 OTHER INSOMNIA: ICD-10-CM

## 2022-02-10 RX ORDER — TRAZODONE HYDROCHLORIDE 50 MG/1
50 TABLET ORAL NIGHTLY PRN
Qty: 30 TABLET | Refills: 0 | Status: SHIPPED | OUTPATIENT
Start: 2022-02-10 | End: 2022-03-10

## 2022-02-11 RX ORDER — ALBUTEROL SULFATE 1.25 MG/3ML
1 SOLUTION RESPIRATORY (INHALATION) EVERY 4 HOURS PRN
Qty: 60 ML | Refills: 11 | Status: SHIPPED | OUTPATIENT
Start: 2022-02-11 | End: 2022-02-14 | Stop reason: SDUPTHER

## 2022-02-14 DIAGNOSIS — J45.40 MODERATE PERSISTENT ASTHMA WITHOUT COMPLICATION: Primary | ICD-10-CM

## 2022-02-14 RX ORDER — ALBUTEROL SULFATE 1.25 MG/3ML
1 SOLUTION RESPIRATORY (INHALATION) EVERY 4 HOURS PRN
Qty: 60 ML | Refills: 11 | Status: SHIPPED | OUTPATIENT
Start: 2022-02-14 | End: 2022-07-07 | Stop reason: SDUPTHER

## 2022-02-15 DIAGNOSIS — N32.81 OAB (OVERACTIVE BLADDER): ICD-10-CM

## 2022-02-15 RX ORDER — TROSPIUM CHLORIDE 20 MG/1
TABLET, FILM COATED ORAL
Qty: 60 TABLET | Refills: 0 | Status: SHIPPED | OUTPATIENT
Start: 2022-02-15 | End: 2022-03-24

## 2022-02-22 RX ORDER — ESTRADIOL AND NORETHINDRONE ACETATE .5; .1 MG/1; MG/1
1 TABLET ORAL DAILY
Qty: 28 TABLET | Refills: 0 | Status: SHIPPED | OUTPATIENT
Start: 2022-02-22 | End: 2022-03-21

## 2022-03-09 DIAGNOSIS — G47.09 OTHER INSOMNIA: ICD-10-CM

## 2022-03-09 DIAGNOSIS — M79.7 FIBROMYALGIA, PRIMARY: ICD-10-CM

## 2022-03-09 DIAGNOSIS — E66.01 CLASS 2 SEVERE OBESITY DUE TO EXCESS CALORIES WITH SERIOUS COMORBIDITY AND BODY MASS INDEX (BMI) OF 35.0 TO 35.9 IN ADULT: ICD-10-CM

## 2022-03-09 RX ORDER — PREGABALIN 150 MG/1
CAPSULE ORAL
Qty: 60 CAPSULE | Refills: 0 | OUTPATIENT
Start: 2022-03-09

## 2022-03-09 RX ORDER — PHENTERMINE HYDROCHLORIDE 37.5 MG/1
TABLET ORAL
Qty: 30 TABLET | Refills: 0 | OUTPATIENT
Start: 2022-03-09

## 2022-03-09 RX ORDER — FOLIC ACID 1 MG/1
1000 TABLET ORAL DAILY
Qty: 15 TABLET | Refills: 0 | Status: SHIPPED | OUTPATIENT
Start: 2022-03-09 | End: 2022-03-24

## 2022-03-10 RX ORDER — TRAZODONE HYDROCHLORIDE 50 MG/1
50 TABLET ORAL NIGHTLY PRN
Qty: 15 TABLET | Refills: 0 | Status: SHIPPED | OUTPATIENT
Start: 2022-03-10 | End: 2022-04-01

## 2022-03-20 DIAGNOSIS — Z78.0 MENOPAUSE: ICD-10-CM

## 2022-03-20 DIAGNOSIS — N95.1 VASOMOTOR SYMPTOMS DUE TO MENOPAUSE: ICD-10-CM

## 2022-03-21 RX ORDER — ESTRADIOL AND NORETHINDRONE ACETATE .5; .1 MG/1; MG/1
1 TABLET ORAL DAILY
Qty: 28 TABLET | Refills: 0 | Status: SHIPPED | OUTPATIENT
Start: 2022-03-21 | End: 2022-04-06 | Stop reason: SDUPTHER

## 2022-03-21 RX ORDER — ESTRADIOL AND NORETHINDRONE ACETATE .5; .1 MG/1; MG/1
TABLET ORAL
Qty: 28 TABLET | Refills: 0 | OUTPATIENT
Start: 2022-03-21

## 2022-03-24 DIAGNOSIS — F33.2 SEVERE EPISODE OF RECURRENT MAJOR DEPRESSIVE DISORDER, WITHOUT PSYCHOTIC FEATURES: ICD-10-CM

## 2022-03-24 DIAGNOSIS — N32.81 OAB (OVERACTIVE BLADDER): ICD-10-CM

## 2022-03-24 DIAGNOSIS — E66.01 CLASS 2 SEVERE OBESITY DUE TO EXCESS CALORIES WITH SERIOUS COMORBIDITY AND BODY MASS INDEX (BMI) OF 35.0 TO 35.9 IN ADULT: ICD-10-CM

## 2022-03-24 DIAGNOSIS — M79.7 FIBROMYALGIA, PRIMARY: ICD-10-CM

## 2022-03-24 RX ORDER — FOLIC ACID 1 MG/1
TABLET ORAL
Qty: 30 TABLET | Refills: 0 | Status: SHIPPED | OUTPATIENT
Start: 2022-03-24 | End: 2022-04-29

## 2022-03-24 RX ORDER — TROSPIUM CHLORIDE 20 MG/1
TABLET, FILM COATED ORAL
Qty: 60 TABLET | Refills: 0 | Status: SHIPPED | OUTPATIENT
Start: 2022-03-24 | End: 2022-04-29

## 2022-03-24 RX ORDER — OLANZAPINE 10 MG/1
TABLET ORAL
Qty: 90 TABLET | Refills: 0 | Status: SHIPPED | OUTPATIENT
Start: 2022-03-24 | End: 2022-05-04

## 2022-03-24 RX ORDER — PREGABALIN 150 MG/1
CAPSULE ORAL
Qty: 60 CAPSULE | Refills: 0 | OUTPATIENT
Start: 2022-03-24

## 2022-03-24 RX ORDER — PHENTERMINE HYDROCHLORIDE 37.5 MG/1
TABLET ORAL
Qty: 30 TABLET | Refills: 0 | OUTPATIENT
Start: 2022-03-24

## 2022-04-01 DIAGNOSIS — G47.09 OTHER INSOMNIA: ICD-10-CM

## 2022-04-01 RX ORDER — TRAZODONE HYDROCHLORIDE 50 MG/1
50 TABLET ORAL NIGHTLY PRN
Qty: 15 TABLET | Refills: 0 | Status: SHIPPED | OUTPATIENT
Start: 2022-04-01 | End: 2022-04-18

## 2022-04-01 RX ORDER — ESTRADIOL AND NORETHINDRONE ACETATE .5; .1 MG/1; MG/1
TABLET ORAL
Qty: 28 TABLET | Refills: 0 | Status: SHIPPED | OUTPATIENT
Start: 2022-04-01 | End: 2022-04-06 | Stop reason: SDUPTHER

## 2022-04-06 ENCOUNTER — OFFICE VISIT (OUTPATIENT)
Dept: FAMILY MEDICINE CLINIC | Facility: CLINIC | Age: 52
End: 2022-04-06

## 2022-04-06 VITALS
DIASTOLIC BLOOD PRESSURE: 72 MMHG | HEART RATE: 92 BPM | HEIGHT: 68 IN | WEIGHT: 293 LBS | SYSTOLIC BLOOD PRESSURE: 110 MMHG | BODY MASS INDEX: 44.41 KG/M2 | OXYGEN SATURATION: 98 %

## 2022-04-06 DIAGNOSIS — E66.01 MORBID (SEVERE) OBESITY DUE TO EXCESS CALORIES: ICD-10-CM

## 2022-04-06 DIAGNOSIS — Z00.00 WELLNESS EXAMINATION: Primary | ICD-10-CM

## 2022-04-06 DIAGNOSIS — M79.7 FIBROMYALGIA, PRIMARY: ICD-10-CM

## 2022-04-06 DIAGNOSIS — R53.83 OTHER FATIGUE: ICD-10-CM

## 2022-04-06 DIAGNOSIS — Z12.31 ENCOUNTER FOR SCREENING MAMMOGRAM FOR MALIGNANT NEOPLASM OF BREAST: ICD-10-CM

## 2022-04-06 PROBLEM — E66.812 CLASS 2 SEVERE OBESITY DUE TO EXCESS CALORIES WITH SERIOUS COMORBIDITY AND BODY MASS INDEX (BMI) OF 36.0 TO 36.9 IN ADULT: Status: RESOLVED | Noted: 2020-04-07 | Resolved: 2022-04-06

## 2022-04-06 PROCEDURE — 99396 PREV VISIT EST AGE 40-64: CPT | Performed by: FAMILY MEDICINE

## 2022-04-06 RX ORDER — PREGABALIN 150 MG/1
150 CAPSULE ORAL 2 TIMES DAILY
Qty: 180 CAPSULE | Refills: 1 | Status: SHIPPED | OUTPATIENT
Start: 2022-04-06 | End: 2022-11-07

## 2022-04-06 NOTE — PROGRESS NOTES
Subjective   Karen Nash is a 51 y.o. female. Presents today for   Chief Complaint   Patient presents with   • Annual Exam     Wellness     • Anxiety   • Depression       History of Present Illness  Patient here for wellness exam;  Has depression and on medicaiotns;  Has RA and fibromyalgia;  Seeing rheumatology and manages;  Due labs;  Has worsening fatigue;      Review of Systems   Constitutional: Positive for fatigue.   Respiratory: Negative for shortness of breath.    Cardiovascular: Negative for chest pain.   Gastrointestinal: Negative for abdominal pain.       Patient Active Problem List   Diagnosis   • Moderate persistent asthma without complication   • Atopic rhinitis   • Anemia   • Benign essential hypertension   • Constipation   • Dyslipidemia   • Gastroesophageal reflux disease   • Intractable migraine without aura and without status migrainosus   • Muscle cramps   • Vocal cord dysfunction   • Female dyspareunia   • Vaginal atrophy   • Pelvic pain   • Stress incontinence of urine   • Fibromyalgia, primary   • Rheumatoid arthritis of multiple sites with negative rheumatoid factor ([facility])   • Severe episode of recurrent major depressive disorder, without psychotic features ([facility])   • Panic disorder   • Generalized anxiety disorder   • Primary osteoarthritis involving multiple joints   • Class 2 severe obesity due to excess calories with serious comorbidity and body mass index (BMI) of 36.0 to 36.9 in adult ([facility])   • Esophageal dysphagia   • Dyspepsia       Social History     Socioeconomic History   • Marital status:    • Number of children: 0   Tobacco Use   • Smoking status: Former Smoker     Packs/day: 0.10     Types: Cigarettes     Start date: 2001     Quit date: 2009     Years since quittin.2   • Smokeless tobacco: Never Used   Substance and Sexual Activity   • Alcohol use: Yes     Alcohol/week: 1.0 standard drink     Types: 1 Glasses of wine per week     Comment: SOCIAL USE//caffeine  coffee daily    • Drug use: No   • Sexual activity: Yes     Partners: Male     Birth control/protection: None       Allergies   Allergen Reactions   • Sulfamethoxazole-Trimethoprim Hives and Itching   • Corn-Containing Products Rash   • Penicillins        Current Outpatient Medications on File Prior to Visit   Medication Sig Dispense Refill   • acetaminophen (TYLENOL) 500 MG tablet Take 500 mg by mouth Every 6 (Six) Hours As Needed for Mild Pain .     • albuterol (ACCUNEB) 1.25 MG/3ML nebulizer solution Take 3 mL by nebulization Every 4 (Four) Hours As Needed for Wheezing. Dx.  J45.40 60 mL 11   • albuterol sulfate  (90 Base) MCG/ACT inhaler Inhale 2 puffs Every 6 (Six) Hours As Needed for Wheezing. 18 g 5   • AMLODIPINE BESYLATE PO Take 10 mg by mouth.     • azelastine (OPTIVAR) 0.05 % ophthalmic solution Administer 1 drop to both eyes 2 (Two) Times a Day As Needed (eye allergies). 1 each 12   • Azelastine-Fluticasone 137-50 MCG/ACT suspension 1 spray into the nostril(s) as directed by provider 2 (Two) Times a Day. 1 bottle 5   • budesonide-formoterol (Symbicort) 160-4.5 MCG/ACT inhaler Inhale 2 puffs 2 (Two) Times a Day. 10.2 g 11   • CALCIUM-MAGNESIUM-ZINC PO Take  by mouth.     • Cetirizine HCl 10 MG capsule Take 10 mg by mouth Daily. 30 capsule 5   • Estradiol-Norethindrone Acet 0.5-0.1 MG per tablet TAKE ONE TABLET BY MOUTH DAILY 28 tablet 11   • FLUoxetine (PROzac) 40 MG capsule Take 2 capsules by mouth Daily. PLEASE CALL THE OFFICE FOR AN APPT 60 capsule 0   • folic acid (FOLVITE) 1 MG tablet TAKE 1 TABLET BY MOUTH EVERY DAY 30 tablet 0   • hydroCHLOROthiazide (HYDRODIURIL) 25 MG tablet TAKE 1 TABLET BY MOUTH EVERY DAY  30 tablet 9   • hyoscyamine (LEVSIN) 0.125 MG SL tablet Take 1 tablet by mouth Every 4 (Four) Hours As Needed for Cramping. 30 tablet 0   • Multiple Vitamin (MULTI VITAMIN DAILY PO) Take by mouth daily.     • OLANZapine (zyPREXA) 10 MG tablet TAKE 1 TABLET BY MOUTH EVERY DAY 90 tablet  "0   • pantoprazole (PROTONIX) 40 MG EC tablet Take 1 tablet by mouth 2 (Two) Times a Day. 180 tablet 3   • polyethylene glycol (MIRALAX) pack packet mix 1 packet into drink every night 30 packet 4   • Saline (NASOGEL) gel 1 spray each nostril twice daily 30 g 12   • sucralfate (CARAFATE) 1 g tablet Take 1 tablet by mouth 4 (Four) Times a Day. 60 tablet 0   • topiramate (TOPAMAX) 100 MG tablet Take 1 tablet by mouth 2 (Two) Times a Day. PLEASE CALL THE OFFICE FOR AN APPT 60 tablet 0   • traZODone (DESYREL) 50 MG tablet take 1 tablet by mouth at night as needed for sleep. please call the office for an appt 15 tablet 0   • trospium (SANCTURA) 20 MG tablet TAKE 1 TABLET BY MOUTH TWO TIMES A DAY 60 tablet 0   • zafirlukast (ACCOLATE) 20 MG tablet Take 1 tablet by mouth 2 (Two) Times a Day. 60 tablet 12   • phentermine (ADIPEX-P) 37.5 MG tablet TAKE 1 TABLET BY MOUTH IN THE MORNING 30 tablet 0   • pregabalin (LYRICA) 150 MG capsule TAKE 1 CAPSULE BY MOUTH TWO TIMES A DAY 60 capsule 0   • [DISCONTINUED] Estradiol-Norethindrone Acet 0.5-0.1 MG per tablet Take 1 tablet by mouth Daily. PLEASE CALL THE OFFICE FOR AN APPT 28 tablet 0   • [DISCONTINUED] Estradiol-Norethindrone Acet 0.5-0.1 MG per tablet TAKE 1 TABLET BY MOUTH EVERY DAY. PLEASE CALL THE OFFICE FOR AN APPT 28 tablet 0     No current facility-administered medications on file prior to visit.       Objective   Vitals:    04/06/22 0841   BP: 110/72   Pulse: 92   SpO2: 98%   Weight: 134 kg (295 lb)   Height: 172.7 cm (68\")     Body mass index is 44.85 kg/m².    Physical Exam  Vitals and nursing note reviewed.   Constitutional:       Appearance: She is well-developed.   HENT:      Head: Normocephalic and atraumatic.   Neck:      Thyroid: No thyromegaly.      Vascular: No JVD.   Cardiovascular:      Rate and Rhythm: Normal rate and regular rhythm.      Heart sounds: Normal heart sounds. No murmur heard.    No friction rub. No gallop.   Pulmonary:      Effort: Pulmonary " effort is normal. No respiratory distress.      Breath sounds: Normal breath sounds. No wheezing or rales.   Abdominal:      General: Bowel sounds are normal. There is no distension.      Palpations: Abdomen is soft.      Tenderness: There is no abdominal tenderness. There is no guarding or rebound.   Musculoskeletal:      Cervical back: Neck supple.   Skin:     General: Skin is warm and dry.   Neurological:      Mental Status: She is alert.   Psychiatric:         Behavior: Behavior normal.         Assessment/Plan   Diagnoses and all orders for this visit:    1. Wellness examination (Primary)      2. Morbid (severe) obesity due to excess calories (HCC)  -     Comprehensive Metabolic Panel  -     Lipid Panel    3. Other fatigue  -     Ambulatory Referral to Sleep Medicine    patient with morbid obesity;  Counseled on diet and exercise;  On on weight loss meds,  May need change as not losing on this;   Is on mood stabilizer and may be adding to this.   Given wait and fatigue recommend sleep study   Preventative:  Up date mammo;  Pap with APRN  Had normal dexa in past       -Follow up: 3 months PAP with APRN and prn

## 2022-04-06 NOTE — PATIENT INSTRUCTIONS
"\"8-1-5-Almost None!\"  Healthy Habits Start Early    EAT 5 OR MORE SERVINGS OF VEGETABLES AND FRUITS EVERY DAY.    Help Karen get three vegetables and two fruits each day. Red, green, yellow, orange...encourage them to try all the colors so they can enjoy different flavors and get more vitamins.    How can I help Karen do this?  ---------------------------------------------  -BE PATIENT WITH Karen, remember it may take 10 times before they start to like new food. So, start with small bites and just keep trying.  -Serve at least one vegetable or fruit at every meal. Even try two. Remember, portions do not have to be as big as you think.  -Encourage eating fruits and vegetables instead of drinking them..it's a better way to get fiber and vitamins..so limit the amount of juice to 1/2 cup per day for children 1-6 years and one cup per day for children 7-18 years of age. Try using 1/2 part water and 1/2 part juice.    Spend less than two hours per day watching television and other screen media. Screen media includes video games, movies and computer use for entertainment.    How can I help Karen do this?  -Turn off the TV at dinner. Dinner is the best time to hang out with your kids and just talk, learn about their day, and tell them about your day. Your kids have a lot to learn from you and dinner is a great time to share.  "

## 2022-04-18 DIAGNOSIS — G47.09 OTHER INSOMNIA: ICD-10-CM

## 2022-04-18 RX ORDER — TRAZODONE HYDROCHLORIDE 50 MG/1
50 TABLET ORAL NIGHTLY PRN
Qty: 30 TABLET | Refills: 5 | Status: SHIPPED | OUTPATIENT
Start: 2022-04-18 | End: 2023-01-09

## 2022-04-27 DIAGNOSIS — F41.8 DEPRESSION WITH ANXIETY: ICD-10-CM

## 2022-04-28 DIAGNOSIS — G43.009 MIGRAINE WITHOUT AURA AND WITHOUT STATUS MIGRAINOSUS, NOT INTRACTABLE: ICD-10-CM

## 2022-04-28 DIAGNOSIS — N32.81 OAB (OVERACTIVE BLADDER): ICD-10-CM

## 2022-04-28 DIAGNOSIS — E66.01 CLASS 2 SEVERE OBESITY DUE TO EXCESS CALORIES WITH SERIOUS COMORBIDITY AND BODY MASS INDEX (BMI) OF 35.0 TO 35.9 IN ADULT: ICD-10-CM

## 2022-04-28 DIAGNOSIS — F41.8 DEPRESSION WITH ANXIETY: ICD-10-CM

## 2022-04-28 RX ORDER — FLUOXETINE HYDROCHLORIDE 40 MG/1
CAPSULE ORAL
Qty: 60 CAPSULE | Refills: 0 | Status: SHIPPED | OUTPATIENT
Start: 2022-04-28 | End: 2022-04-29

## 2022-04-29 RX ORDER — TOPIRAMATE 100 MG/1
TABLET, FILM COATED ORAL
Qty: 60 TABLET | Refills: 12 | Status: SHIPPED | OUTPATIENT
Start: 2022-04-29

## 2022-04-29 RX ORDER — FLUOXETINE HYDROCHLORIDE 40 MG/1
CAPSULE ORAL
Qty: 60 CAPSULE | Refills: 12 | Status: SHIPPED | OUTPATIENT
Start: 2022-04-29

## 2022-04-29 RX ORDER — PHENTERMINE HYDROCHLORIDE 37.5 MG/1
TABLET ORAL
Qty: 30 TABLET | Refills: 2 | Status: SHIPPED | OUTPATIENT
Start: 2022-04-29 | End: 2022-10-06

## 2022-04-29 RX ORDER — TROSPIUM CHLORIDE 20 MG/1
TABLET, FILM COATED ORAL
Qty: 60 TABLET | Refills: 12 | Status: SHIPPED | OUTPATIENT
Start: 2022-04-29

## 2022-04-29 RX ORDER — FOLIC ACID 1 MG/1
TABLET ORAL
Qty: 30 TABLET | Refills: 12 | Status: SHIPPED | OUTPATIENT
Start: 2022-04-29

## 2022-05-04 DIAGNOSIS — K22.4 ESOPHAGEAL DYSMOTILITY: ICD-10-CM

## 2022-05-04 DIAGNOSIS — K21.00 GASTROESOPHAGEAL REFLUX DISEASE WITH ESOPHAGITIS: ICD-10-CM

## 2022-05-04 DIAGNOSIS — F33.2 SEVERE EPISODE OF RECURRENT MAJOR DEPRESSIVE DISORDER, WITHOUT PSYCHOTIC FEATURES: ICD-10-CM

## 2022-05-04 RX ORDER — OLANZAPINE 10 MG/1
TABLET ORAL
Qty: 30 TABLET | Refills: 0 | Status: SHIPPED | OUTPATIENT
Start: 2022-05-04 | End: 2022-07-12

## 2022-05-04 RX ORDER — PANTOPRAZOLE SODIUM 40 MG/1
TABLET, DELAYED RELEASE ORAL
Qty: 180 TABLET | Refills: 0 | Status: SHIPPED | OUTPATIENT
Start: 2022-05-04 | End: 2022-06-06

## 2022-05-28 DIAGNOSIS — Z78.0 MENOPAUSE: ICD-10-CM

## 2022-05-28 DIAGNOSIS — N95.1 VASOMOTOR SYMPTOMS DUE TO MENOPAUSE: ICD-10-CM

## 2022-05-29 RX ORDER — ESTRADIOL AND NORETHINDRONE ACETATE .5; .1 MG/1; MG/1
TABLET ORAL
Qty: 28 TABLET | Refills: 0 | Status: SHIPPED | OUTPATIENT
Start: 2022-05-29 | End: 2022-06-07

## 2022-06-05 DIAGNOSIS — K21.00 GASTROESOPHAGEAL REFLUX DISEASE WITH ESOPHAGITIS: ICD-10-CM

## 2022-06-05 DIAGNOSIS — K22.4 ESOPHAGEAL DYSMOTILITY: ICD-10-CM

## 2022-06-06 RX ORDER — PANTOPRAZOLE SODIUM 40 MG/1
TABLET, DELAYED RELEASE ORAL
Qty: 180 TABLET | Refills: 0 | Status: SHIPPED | OUTPATIENT
Start: 2022-06-06 | End: 2022-08-01

## 2022-06-07 DIAGNOSIS — Z78.0 MENOPAUSE: ICD-10-CM

## 2022-06-07 DIAGNOSIS — N95.1 VASOMOTOR SYMPTOMS DUE TO MENOPAUSE: ICD-10-CM

## 2022-06-07 RX ORDER — ESTRADIOL AND NORETHINDRONE ACETATE .5; .1 MG/1; MG/1
TABLET ORAL
Qty: 28 TABLET | Refills: 0 | Status: SHIPPED | OUTPATIENT
Start: 2022-06-07 | End: 2022-07-25

## 2022-07-07 ENCOUNTER — OFFICE VISIT (OUTPATIENT)
Dept: FAMILY MEDICINE CLINIC | Facility: CLINIC | Age: 52
End: 2022-07-07

## 2022-07-07 VITALS
DIASTOLIC BLOOD PRESSURE: 72 MMHG | WEIGHT: 284 LBS | HEIGHT: 68 IN | HEART RATE: 73 BPM | OXYGEN SATURATION: 99 % | BODY MASS INDEX: 43.04 KG/M2 | SYSTOLIC BLOOD PRESSURE: 124 MMHG

## 2022-07-07 DIAGNOSIS — M06.09 RHEUMATOID ARTHRITIS OF MULTIPLE SITES WITH NEGATIVE RHEUMATOID FACTOR: ICD-10-CM

## 2022-07-07 DIAGNOSIS — F41.1 GENERALIZED ANXIETY DISORDER: ICD-10-CM

## 2022-07-07 DIAGNOSIS — E78.5 DYSLIPIDEMIA: ICD-10-CM

## 2022-07-07 DIAGNOSIS — J45.40 MODERATE PERSISTENT ASTHMA WITHOUT COMPLICATION: ICD-10-CM

## 2022-07-07 DIAGNOSIS — I10 BENIGN ESSENTIAL HYPERTENSION: Primary | ICD-10-CM

## 2022-07-07 DIAGNOSIS — Z11.59 ENCOUNTER FOR HEPATITIS C SCREENING TEST FOR LOW RISK PATIENT: ICD-10-CM

## 2022-07-07 DIAGNOSIS — F33.0 MILD EPISODE OF RECURRENT MAJOR DEPRESSIVE DISORDER: ICD-10-CM

## 2022-07-07 DIAGNOSIS — Z12.31 ENCOUNTER FOR SCREENING MAMMOGRAM FOR BREAST CANCER: ICD-10-CM

## 2022-07-07 PROCEDURE — 99214 OFFICE O/P EST MOD 30 MIN: CPT | Performed by: FAMILY MEDICINE

## 2022-07-07 RX ORDER — ALBUTEROL SULFATE 1.25 MG/3ML
1 SOLUTION RESPIRATORY (INHALATION) EVERY 4 HOURS PRN
Qty: 120 ML | Refills: 11 | Status: SHIPPED | OUTPATIENT
Start: 2022-07-07

## 2022-07-07 NOTE — PROGRESS NOTES
Subjective   Karen Nash is a 52 y.o. female. Presents today for   Chief Complaint   Patient presents with   • Depression   • Hyperlipidemia   • Hypertension   • Anxiety   • Asthma   • Arthritis       Depression  Visit Type: follow-up  Patient presents with the following symptoms: depressed mood and nervousness/anxiety.  Patient is not experiencing: feelings of worthlessness and shortness of breath.  Frequency of symptoms: occasionally   Severity: mild   Sleep quality: fair  Nighttime awakenings: occasional  Patient has a history of: asthma  Compliance with medications:  %        Hyperlipidemia  This is a chronic problem. The current episode started more than 1 year ago. Recent lipid tests were reviewed and are normal. Pertinent negatives include no chest pain or shortness of breath. Current antihyperlipidemic treatment includes diet change.   Hypertension  This is a chronic problem. The current episode started more than 1 year ago. The problem is unchanged. The problem is controlled. Associated symptoms include anxiety. Pertinent negatives include no chest pain, PND or shortness of breath. The current treatment provides moderate improvement.   Anxiety  Presents for follow-up visit. Symptoms include depressed mood and nervous/anxious behavior. Patient reports no chest pain or shortness of breath. Symptoms occur occasionally.     Her past medical history is significant for asthma and depression. Compliance with medications is %.   Asthma  She complains of cough and wheezing. There is no difficulty breathing or shortness of breath. This is a chronic problem. Associated symptoms include nasal congestion. Pertinent negatives include no chest pain, orthopnea or PND. Her symptoms are alleviated by beta-agonist and steroid inhaler. She reports moderate improvement on treatment. Her past medical history is significant for asthma.   Arthritis  Presents for follow-up visit. She complains of pain. The symptoms  have been stable. Affected location: sees rheumatpology for RA.       Review of Systems   Respiratory: Positive for cough and wheezing. Negative for shortness of breath.    Cardiovascular: Negative for chest pain and PND.   Musculoskeletal: Positive for arthritis.   Psychiatric/Behavioral: The patient is nervous/anxious.        Patient Active Problem List   Diagnosis   • Moderate persistent asthma without complication   • Atopic rhinitis   • Anemia   • Benign essential hypertension   • Constipation   • Dyslipidemia   • Gastroesophageal reflux disease   • Intractable migraine without aura and without status migrainosus   • Muscle cramps   • Vocal cord dysfunction   • Female dyspareunia   • Vaginal atrophy   • Pelvic pain   • Stress incontinence of urine   • Fibromyalgia, primary   • Rheumatoid arthritis of multiple sites with negative rheumatoid factor (HCC)   • Severe episode of recurrent major depressive disorder, without psychotic features ([facility])   • Panic disorder   • Generalized anxiety disorder   • Primary osteoarthritis involving multiple joints   • Esophageal dysphagia   • Dyspepsia       Social History     Socioeconomic History   • Marital status:    • Number of children: 0   Tobacco Use   • Smoking status: Former Smoker     Packs/day: 0.10     Types: Cigarettes     Start date: 2001     Quit date: 2009     Years since quittin.5   • Smokeless tobacco: Never Used   Substance and Sexual Activity   • Alcohol use: Yes     Alcohol/week: 1.0 standard drink     Types: 1 Glasses of wine per week     Comment: SOCIAL USE//caffeine coffee daily    • Drug use: No   • Sexual activity: Yes     Partners: Male     Birth control/protection: None       Allergies   Allergen Reactions   • Sulfamethoxazole-Trimethoprim Hives and Itching   • Corn-Containing Products Rash   • Penicillins        Current Outpatient Medications on File Prior to Visit   Medication Sig Dispense Refill   • acetaminophen (TYLENOL) 500  MG tablet Take 500 mg by mouth Every 6 (Six) Hours As Needed for Mild Pain .     • albuterol (ACCUNEB) 1.25 MG/3ML nebulizer solution Take 3 mL by nebulization Every 4 (Four) Hours As Needed for Wheezing. Dx.  J45.40 60 mL 11   • albuterol sulfate  (90 Base) MCG/ACT inhaler Inhale 2 puffs Every 6 (Six) Hours As Needed for Wheezing. 18 g 5   • AMLODIPINE BESYLATE PO Take 10 mg by mouth.     • azelastine (OPTIVAR) 0.05 % ophthalmic solution Administer 1 drop to both eyes 2 (Two) Times a Day As Needed (eye allergies). 1 each 12   • Azelastine-Fluticasone 137-50 MCG/ACT suspension 1 spray into the nostril(s) as directed by provider 2 (Two) Times a Day. 1 bottle 5   • budesonide-formoterol (Symbicort) 160-4.5 MCG/ACT inhaler Inhale 2 puffs 2 (Two) Times a Day. 10.2 g 11   • CALCIUM-MAGNESIUM-ZINC PO Take  by mouth.     • Cetirizine HCl 10 MG capsule Take 10 mg by mouth Daily. 30 capsule 5   • Estradiol-Norethindrone Acet 0.5-0.1 MG per tablet TAKE 1 TABLET BY MOUTH EVERY DAY. PLEASE CALL THE OFFICE FOR AN APPT 28 tablet 0   • FLUoxetine (PROzac) 40 MG capsule TAKE 2 CAPSULES BY MOUTH EVERY DAY. PLEASE CALL THE OFFICE FOR AN APPOINTMENT 60 capsule 12   • folic acid (FOLVITE) 1 MG tablet TAKE 1 TABLET BY MOUTH EVERY DAY 30 tablet 12   • hydroCHLOROthiazide (HYDRODIURIL) 25 MG tablet TAKE 1 TABLET BY MOUTH EVERY DAY  30 tablet 9   • hyoscyamine (LEVSIN) 0.125 MG SL tablet Take 1 tablet by mouth Every 4 (Four) Hours As Needed for Cramping. 30 tablet 0   • Multiple Vitamin (MULTI VITAMIN DAILY PO) Take by mouth daily.     • OLANZapine (zyPREXA) 10 MG tablet TAKE 1 TABLET BY MOUTH EVERY DAY AT NIGHT. CALL OFFICE FOR AN APPOINTMENT 30 tablet 0   • pantoprazole (PROTONIX) 40 MG EC tablet TAKE 1 TABLET BY MOUTH TWO TIMES A  tablet 0   • phentermine (ADIPEX-P) 37.5 MG tablet TAKE 1 TABLET BY MOUTH IN THE MORNING 30 tablet 2   • polyethylene glycol (MIRALAX) pack packet mix 1 packet into drink every night 30 packet 4  "  • pregabalin (LYRICA) 150 MG capsule Take 1 capsule by mouth 2 (Two) Times a Day. 180 capsule 1   • Saline (NASOGEL) gel 1 spray each nostril twice daily 30 g 12   • sucralfate (CARAFATE) 1 g tablet Take 1 tablet by mouth 4 (Four) Times a Day. 60 tablet 0   • topiramate (TOPAMAX) 100 MG tablet TAKE 1 TABLET BY MOUTH TWO TIMES A DAY *please call the office for an appointment* 60 tablet 12   • traZODone (DESYREL) 50 MG tablet Take 1 tablet by mouth At Night As Needed for Sleep. 30 tablet 5   • trospium (SANCTURA) 20 MG tablet TAKE 1 TABLET BY MOUTH TWO TIMES A DAY 60 tablet 12   • zafirlukast (ACCOLATE) 20 MG tablet Take 1 tablet by mouth 2 (Two) Times a Day. 60 tablet 12     No current facility-administered medications on file prior to visit.       Objective   Vitals:    07/07/22 0907   BP: 124/72   Pulse: 73   SpO2: 99%   Weight: 129 kg (284 lb)   Height: 172.7 cm (68\")     Body mass index is 43.18 kg/m².    Physical Exam  Vitals and nursing note reviewed.   Constitutional:       Appearance: She is well-developed.   HENT:      Head: Normocephalic and atraumatic.   Neck:      Thyroid: No thyromegaly.      Vascular: No JVD.   Cardiovascular:      Rate and Rhythm: Normal rate and regular rhythm.      Heart sounds: Normal heart sounds. No murmur heard.    No friction rub. No gallop.   Pulmonary:      Effort: Pulmonary effort is normal. No respiratory distress.      Breath sounds: Normal breath sounds. No wheezing or rales.   Abdominal:      General: Bowel sounds are normal. There is no distension.      Palpations: Abdomen is soft.      Tenderness: There is no abdominal tenderness. There is no guarding or rebound.   Musculoskeletal:      Cervical back: Neck supple.   Skin:     General: Skin is warm and dry.   Neurological:      Mental Status: She is alert.   Psychiatric:         Behavior: Behavior normal.         Assessment & Plan   Diagnoses and all orders for this visit:    1. Benign essential hypertension " (Primary)    2. Moderate persistent asthma without complication  -     albuterol (ACCUNEB) 1.25 MG/3ML nebulizer solution; Take 3 mL by nebulization Every 4 (Four) Hours As Needed for Wheezing. Dx.  J45.40  Dispense: 120 mL; Refill: 11    3. Dyslipidemia  -     Comprehensive Metabolic Panel  -     Lipid Panel    4. Rheumatoid arthritis of multiple sites with negative rheumatoid factor (HCC)    5. Generalized anxiety disorder    6. Mild episode of recurrent major depressive disorder (HCC)    7. Encounter for hepatitis C screening test for low risk patient  -     Hepatitis C Antibody    8. Encounter for screening mammogram for breast cancer  -     Mammo Screening Bilateral With CAD; Future    -hypertension - controlled, continue medications  -no symbicort samples, gave several trelegy samples 1 puff daily; continue allergy meds, for most part controlled now  -paz and depression stable  -RA - coninue see rheumatology  -HLD - recheck lipids.  -due mammo - ordered as never scheduled.   Due pap, prefers female arrange with [facility] in 3 months  -due hep c screen         -Follow up: 3 months pap with APRN

## 2022-07-11 ENCOUNTER — TRANSCRIBE ORDERS (OUTPATIENT)
Dept: FAMILY MEDICINE CLINIC | Facility: CLINIC | Age: 52
End: 2022-07-11

## 2022-07-11 DIAGNOSIS — Z12.31 ENCOUNTER FOR SCREENING MAMMOGRAM FOR BREAST CANCER: Primary | ICD-10-CM

## 2022-07-11 DIAGNOSIS — F33.2 SEVERE EPISODE OF RECURRENT MAJOR DEPRESSIVE DISORDER, WITHOUT PSYCHOTIC FEATURES: ICD-10-CM

## 2022-07-12 RX ORDER — OLANZAPINE 10 MG/1
TABLET ORAL
Qty: 90 TABLET | Refills: 5 | Status: SHIPPED | OUTPATIENT
Start: 2022-07-12

## 2022-07-25 DIAGNOSIS — Z78.0 MENOPAUSE: ICD-10-CM

## 2022-07-25 DIAGNOSIS — N95.1 VASOMOTOR SYMPTOMS DUE TO MENOPAUSE: ICD-10-CM

## 2022-07-25 RX ORDER — ESTRADIOL AND NORETHINDRONE ACETATE .5; .1 MG/1; MG/1
TABLET ORAL
Qty: 28 TABLET | Refills: 5 | Status: SHIPPED | OUTPATIENT
Start: 2022-07-25 | End: 2023-01-21

## 2022-07-27 ENCOUNTER — OFFICE VISIT (OUTPATIENT)
Dept: SLEEP MEDICINE | Facility: HOSPITAL | Age: 52
End: 2022-07-27

## 2022-07-27 VITALS
HEART RATE: 77 BPM | DIASTOLIC BLOOD PRESSURE: 77 MMHG | SYSTOLIC BLOOD PRESSURE: 126 MMHG | BODY MASS INDEX: 43.65 KG/M2 | WEIGHT: 288 LBS | HEIGHT: 68 IN | OXYGEN SATURATION: 95 %

## 2022-07-27 DIAGNOSIS — R53.83 OTHER FATIGUE: ICD-10-CM

## 2022-07-27 DIAGNOSIS — G47.10 HYPERSOMNIA WITH SLEEP APNEA: Primary | ICD-10-CM

## 2022-07-27 DIAGNOSIS — G47.30 HYPERSOMNIA WITH SLEEP APNEA: Primary | ICD-10-CM

## 2022-07-27 DIAGNOSIS — E66.01 CLASS 3 SEVERE OBESITY DUE TO EXCESS CALORIES WITH SERIOUS COMORBIDITY AND BODY MASS INDEX (BMI) OF 40.0 TO 44.9 IN ADULT: ICD-10-CM

## 2022-07-27 PROCEDURE — G0463 HOSPITAL OUTPT CLINIC VISIT: HCPCS

## 2022-07-27 PROCEDURE — 99204 OFFICE O/P NEW MOD 45 MIN: CPT | Performed by: INTERNAL MEDICINE

## 2022-08-01 DIAGNOSIS — K22.4 ESOPHAGEAL DYSMOTILITY: ICD-10-CM

## 2022-08-01 DIAGNOSIS — K21.00 GASTROESOPHAGEAL REFLUX DISEASE WITH ESOPHAGITIS: ICD-10-CM

## 2022-08-01 PROBLEM — G47.30 HYPERSOMNIA WITH SLEEP APNEA: Status: ACTIVE | Noted: 2022-08-01

## 2022-08-01 PROBLEM — G47.10 HYPERSOMNIA WITH SLEEP APNEA: Status: ACTIVE | Noted: 2022-08-01

## 2022-08-01 PROBLEM — E66.813 CLASS 3 SEVERE OBESITY DUE TO EXCESS CALORIES WITH SERIOUS COMORBIDITY AND BODY MASS INDEX (BMI) OF 40.0 TO 44.9 IN ADULT: Status: ACTIVE | Noted: 2020-04-07

## 2022-08-01 RX ORDER — PANTOPRAZOLE SODIUM 40 MG/1
TABLET, DELAYED RELEASE ORAL
Qty: 180 TABLET | Refills: 0 | Status: SHIPPED | OUTPATIENT
Start: 2022-08-01 | End: 2022-12-05

## 2022-09-06 ENCOUNTER — HOSPITAL ENCOUNTER (OUTPATIENT)
Dept: SLEEP MEDICINE | Facility: HOSPITAL | Age: 52
Discharge: HOME | End: 2022-09-06
Admitting: INTERNAL MEDICINE

## 2022-09-06 DIAGNOSIS — G47.10 HYPERSOMNIA WITH SLEEP APNEA: ICD-10-CM

## 2022-09-06 DIAGNOSIS — G47.30 HYPERSOMNIA WITH SLEEP APNEA: ICD-10-CM

## 2022-09-06 PROCEDURE — 95806 SLEEP STUDY UNATT&RESP EFFT: CPT

## 2022-09-06 PROCEDURE — 95806 SLEEP STUDY UNATT&RESP EFFT: CPT | Performed by: INTERNAL MEDICINE

## 2022-09-15 RX ORDER — HYDROCHLOROTHIAZIDE 25 MG/1
TABLET ORAL
Qty: 30 TABLET | Refills: 3 | Status: SHIPPED | OUTPATIENT
Start: 2022-09-15 | End: 2023-02-07

## 2022-09-27 ENCOUNTER — TELEPHONE (OUTPATIENT)
Dept: SLEEP MEDICINE | Facility: HOSPITAL | Age: 52
End: 2022-09-27

## 2022-09-27 DIAGNOSIS — G47.10 HYPERSOMNIA WITH SLEEP APNEA: Primary | ICD-10-CM

## 2022-09-27 DIAGNOSIS — G47.30 HYPERSOMNIA WITH SLEEP APNEA: Primary | ICD-10-CM

## 2022-09-27 NOTE — TELEPHONE ENCOUNTER
Spoke with patient about results and is willing to proceed with in lab sleep study . Scheduled 1/2/2023

## 2022-09-28 ENCOUNTER — TELEPHONE (OUTPATIENT)
Dept: FAMILY MEDICINE CLINIC | Facility: CLINIC | Age: 52
End: 2022-09-28

## 2022-09-28 DIAGNOSIS — F41.1 GAD (GENERALIZED ANXIETY DISORDER): Primary | ICD-10-CM

## 2022-09-28 RX ORDER — HYDROXYZINE HYDROCHLORIDE 10 MG/1
10 TABLET, FILM COATED ORAL 3 TIMES DAILY PRN
Qty: 45 TABLET | Refills: 2 | Status: SHIPPED | OUTPATIENT
Start: 2022-09-28 | End: 2023-03-07

## 2022-09-28 NOTE — TELEPHONE ENCOUNTER
Spoke with dennise informed of meds sent to pharmacy and to let us know when [facility] gets out of [facility]

## 2022-09-30 ENCOUNTER — APPOINTMENT (OUTPATIENT)
Dept: SLEEP MEDICINE | Facility: HOSPITAL | Age: 52
End: 2022-09-30

## 2022-10-03 ENCOUNTER — APPOINTMENT (OUTPATIENT)
Dept: MAMMOGRAPHY | Facility: HOSPITAL | Age: 52
End: 2022-10-03

## 2022-10-06 DIAGNOSIS — E66.01 CLASS 2 SEVERE OBESITY DUE TO EXCESS CALORIES WITH SERIOUS COMORBIDITY AND BODY MASS INDEX (BMI) OF 35.0 TO 35.9 IN ADULT: ICD-10-CM

## 2022-10-06 RX ORDER — PHENTERMINE HYDROCHLORIDE 37.5 MG/1
TABLET ORAL
Qty: 30 TABLET | Refills: 0 | Status: SHIPPED | OUTPATIENT
Start: 2022-10-06 | End: 2022-11-07

## 2022-10-12 ENCOUNTER — TELEPHONE (OUTPATIENT)
Dept: FAMILY MEDICINE CLINIC | Facility: CLINIC | Age: 52
End: 2022-10-12

## 2022-10-12 ENCOUNTER — OFFICE VISIT (OUTPATIENT)
Dept: FAMILY MEDICINE CLINIC | Facility: CLINIC | Age: 52
End: 2022-10-12

## 2022-10-12 VITALS
BODY MASS INDEX: 43.5 KG/M2 | WEIGHT: 287 LBS | HEIGHT: 68 IN | OXYGEN SATURATION: 98 % | SYSTOLIC BLOOD PRESSURE: 110 MMHG | DIASTOLIC BLOOD PRESSURE: 86 MMHG | HEART RATE: 84 BPM

## 2022-10-12 DIAGNOSIS — E66.01 MORBID (SEVERE) OBESITY DUE TO EXCESS CALORIES: ICD-10-CM

## 2022-10-12 DIAGNOSIS — J45.40 MODERATE PERSISTENT ASTHMA WITHOUT COMPLICATION: Primary | ICD-10-CM

## 2022-10-12 DIAGNOSIS — F32.4 MAJOR DEPRESSIVE DISORDER WITH SINGLE EPISODE, IN PARTIAL REMISSION: ICD-10-CM

## 2022-10-12 PROCEDURE — 99214 OFFICE O/P EST MOD 30 MIN: CPT | Performed by: FAMILY MEDICINE

## 2022-10-12 NOTE — PROGRESS NOTES
Subjective   Karen Nash is a 52 y.o. female. Presents today for   Chief Complaint   Patient presents with   • Anxiety   • Depression   • Asthma       History of Present Illness  Patient here for f/u of depression, anxiety and asthma;  Mood stable, not in remission;  No thoughts self harm;  Has chronic pain due to RA and fibromyalgia that makes challenging to be active;  She has ashtma, on inhalers that help;  Stable at the moment.  No f/c;    Has been struggling to lose weight down 8 lbs since April.  Review of Systems   Constitutional: Negative for chills and fever.   Respiratory: Negative for shortness of breath and wheezing.    Cardiovascular: Negative for chest pain.       Patient Active Problem List   Diagnosis   • Moderate persistent asthma without complication   • Atopic rhinitis   • Anemia   • Benign essential hypertension   • Constipation   • Dyslipidemia   • Gastroesophageal reflux disease   • Intractable migraine without aura and without status migrainosus   • Muscle cramps   • Vocal cord dysfunction   • Female dyspareunia   • Vaginal atrophy   • Pelvic pain   • Stress incontinence of urine   • Fibromyalgia, primary   • Rheumatoid arthritis of multiple sites with negative rheumatoid factor ([facility])   • Severe episode of recurrent major depressive disorder, without psychotic features ([facility])   • Panic disorder   • Generalized anxiety disorder   • Primary osteoarthritis involving multiple joints   • Class 3 severe obesity due to excess calories with serious comorbidity and body mass index (BMI) of 40.0 to 44.9 in adult ([facility])   • Esophageal dysphagia   • Dyspepsia   • Hypersomnia with sleep apnea       Social History     Socioeconomic History   • Marital status:    • Number of children: 0   Tobacco Use   • Smoking status: Former     Packs/day: 0.10     Types: Cigarettes     Start date: 2001     Quit date: 2009     Years since quittin.8   • Smokeless tobacco: Never   Substance and Sexual  Activity   • Alcohol use: Yes     Alcohol/week: 1.0 standard drink     Types: 1 Glasses of wine per week     Comment: SOCIAL USE//caffeine coffee daily    • Drug use: No   • Sexual activity: Yes     Partners: Male     Birth control/protection: None       Allergies   Allergen Reactions   • Sulfamethoxazole-Trimethoprim Hives and Itching   • Corn-Containing Products Rash   • Penicillins        Current Outpatient Medications on File Prior to Visit   Medication Sig Dispense Refill   • acetaminophen (TYLENOL) 500 MG tablet Take 500 mg by mouth Every 6 (Six) Hours As Needed for Mild Pain .     • albuterol (ACCUNEB) 1.25 MG/3ML nebulizer solution Take 3 mL by nebulization Every 4 (Four) Hours As Needed for Wheezing. Dx.  J45.40 120 mL 11   • albuterol sulfate  (90 Base) MCG/ACT inhaler Inhale 2 puffs Every 6 (Six) Hours As Needed for Wheezing. 18 g 5   • AMLODIPINE BESYLATE PO Take 10 mg by mouth.     • azelastine (OPTIVAR) 0.05 % ophthalmic solution Administer 1 drop to both eyes 2 (Two) Times a Day As Needed (eye allergies). 1 each 12   • Azelastine-Fluticasone 137-50 MCG/ACT suspension 1 spray into the nostril(s) as directed by provider 2 (Two) Times a Day. 1 bottle 5   • budesonide-formoterol (Symbicort) 160-4.5 MCG/ACT inhaler Inhale 2 puffs 2 (Two) Times a Day. 10.2 g 11   • CALCIUM-MAGNESIUM-ZINC PO Take  by mouth.     • Cetirizine HCl 10 MG capsule Take 10 mg by mouth Daily. 30 capsule 5   • Estradiol-Norethindrone Acet 0.5-0.1 MG per tablet TAKE 1 TABLET BY MOUTH EVERY DAY. PLEASE CALL THE OFFICE FOR AN APPT 28 tablet 5   • FLUoxetine (PROzac) 40 MG capsule TAKE 2 CAPSULES BY MOUTH EVERY DAY. PLEASE CALL THE OFFICE FOR AN APPOINTMENT 60 capsule 12   • folic acid (FOLVITE) 1 MG tablet TAKE 1 TABLET BY MOUTH EVERY DAY 30 tablet 12   • hydroCHLOROthiazide (HYDRODIURIL) 25 MG tablet TAKE 1 TABLET BY MOUTH EVERY DAY 30 tablet 3   • hydrOXYzine (ATARAX) 10 MG tablet Take 1 tablet by mouth 3 (Three) Times a Day As  "Needed for Anxiety. 45 tablet 2   • hyoscyamine (LEVSIN) 0.125 MG SL tablet Take 1 tablet by mouth Every 4 (Four) Hours As Needed for Cramping. 30 tablet 0   • Multiple Vitamin (MULTI VITAMIN DAILY PO) Take by mouth daily.     • OLANZapine (zyPREXA) 10 MG tablet TAKE 1 TABLET BY MOUTH EVERY DAY 90 tablet 5   • pantoprazole (PROTONIX) 40 MG EC tablet TAKE 1 TABLET BY MOUTH TWO TIMES A  tablet 0   • phentermine (ADIPEX-P) 37.5 MG tablet TAKE 1 TABLET BY MOUTH IN THE MORNING 30 tablet 0   • polyethylene glycol (MIRALAX) pack packet mix 1 packet into drink every night 30 packet 4   • pregabalin (LYRICA) 150 MG capsule Take 1 capsule by mouth 2 (Two) Times a Day. 180 capsule 1   • Saline (NASOGEL) gel 1 spray each nostril twice daily 30 g 12   • sucralfate (CARAFATE) 1 g tablet Take 1 tablet by mouth 4 (Four) Times a Day. 60 tablet 0   • topiramate (TOPAMAX) 100 MG tablet TAKE 1 TABLET BY MOUTH TWO TIMES A DAY *please call the office for an appointment* 60 tablet 12   • traZODone (DESYREL) 50 MG tablet Take 1 tablet by mouth At Night As Needed for Sleep. 30 tablet 5   • trospium (SANCTURA) 20 MG tablet TAKE 1 TABLET BY MOUTH TWO TIMES A DAY 60 tablet 12   • zafirlukast (ACCOLATE) 20 MG tablet Take 1 tablet by mouth 2 (Two) Times a Day. 60 tablet 12     No current facility-administered medications on file prior to visit.       Objective   Vitals:    10/12/22 0851   BP: 110/86   Pulse: 84   SpO2: 98%   Weight: 130 kg (287 lb)   Height: 172.7 cm (68\")     Body mass index is 43.64 kg/m².    Physical Exam  Vitals and nursing note reviewed.   Constitutional:       Appearance: She is well-developed.   HENT:      Head: Normocephalic and atraumatic.   Neck:      Thyroid: No thyromegaly.      Vascular: No JVD.   Cardiovascular:      Rate and Rhythm: Normal rate and regular rhythm.      Heart sounds: Normal heart sounds. No murmur heard.    No friction rub. No gallop.   Pulmonary:      Effort: Pulmonary effort is normal. No " respiratory distress.      Breath sounds: Normal breath sounds. No wheezing or rales.   Abdominal:      General: Bowel sounds are normal. There is no distension.      Palpations: Abdomen is soft.      Tenderness: There is no abdominal tenderness. There is no guarding or rebound.   Musculoskeletal:      Cervical back: Neck supple.   Skin:     General: Skin is warm and dry.   Neurological:      Mental Status: She is alert.   Psychiatric:         Behavior: Behavior normal.         Assessment & Plan   Diagnoses and all orders for this visit:    1. Moderate persistent asthma without complication (Primary)    2. Morbid (severe) obesity due to excess calories (HCC)  -     Liraglutide (SAXENDA) 18 MG/3ML injection pen; Inject 0.6mg under skin daily for week one, THEN 1.2mg daily for week two, THEN 1.8mg daily for week three, then 2.4mg daily for week four.  Dispense: 3 mL; Refill: 0  -     Liraglutide (SAXENDA) 18 MG/3ML injection pen; Inject 3 mg under the skin into the appropriate area as directed Daily.  Dispense: 15 mL; Refill: 5    3. Major depressive disorder with single episode, in partial remission (HCC)    leigh lsee if can get saxenda covered to help  Asthma controlled continue inhalers  Depression stable, will continue medications         -Follow up: 3 months and prn

## 2022-10-17 DIAGNOSIS — M15.9 PRIMARY OSTEOARTHRITIS INVOLVING MULTIPLE JOINTS: Primary | ICD-10-CM

## 2022-10-19 NOTE — TELEPHONE ENCOUNTER
PER SCHEDULING HUB, REFERRAL CANNOT BE COMPLETED. NEEDS CLARIFICATION; INTERNAL REFERRAL FOR M15.9 (ICD-10-CM) - Primary osteoarthritis involving multiple joints - REFERRING, PLEASE CLARIFY WHAT JOINTS PATIENT IS NEEDING EVALUATED     NOTES FROM VISIT NOT COMPLETED/SIGNED

## 2022-11-07 DIAGNOSIS — M79.7 FIBROMYALGIA, PRIMARY: ICD-10-CM

## 2022-11-07 DIAGNOSIS — E66.01 CLASS 2 SEVERE OBESITY DUE TO EXCESS CALORIES WITH SERIOUS COMORBIDITY AND BODY MASS INDEX (BMI) OF 35.0 TO 35.9 IN ADULT: ICD-10-CM

## 2022-11-07 RX ORDER — PREGABALIN 150 MG/1
CAPSULE ORAL
Qty: 180 CAPSULE | Refills: 0 | Status: SHIPPED | OUTPATIENT
Start: 2022-11-07 | End: 2023-01-21

## 2022-11-07 RX ORDER — PHENTERMINE HYDROCHLORIDE 37.5 MG/1
TABLET ORAL
Qty: 30 TABLET | Refills: 0 | Status: SHIPPED | OUTPATIENT
Start: 2022-11-07 | End: 2023-01-21

## 2022-12-05 DIAGNOSIS — K22.4 ESOPHAGEAL DYSMOTILITY: ICD-10-CM

## 2022-12-05 DIAGNOSIS — K21.00 GASTROESOPHAGEAL REFLUX DISEASE WITH ESOPHAGITIS: ICD-10-CM

## 2022-12-05 RX ORDER — PANTOPRAZOLE SODIUM 40 MG/1
TABLET, DELAYED RELEASE ORAL
Qty: 180 TABLET | Refills: 0 | Status: SHIPPED | OUTPATIENT
Start: 2022-12-05 | End: 2023-02-07

## 2022-12-06 ENCOUNTER — TELEPHONE (OUTPATIENT)
Dept: ORTHOPEDIC SURGERY | Facility: CLINIC | Age: 52
End: 2022-12-06

## 2023-01-08 DIAGNOSIS — G47.09 OTHER INSOMNIA: ICD-10-CM

## 2023-01-09 RX ORDER — TRAZODONE HYDROCHLORIDE 50 MG/1
50 TABLET ORAL NIGHTLY PRN
Qty: 30 TABLET | Refills: 11 | Status: SHIPPED | OUTPATIENT
Start: 2023-01-09

## 2023-01-11 ENCOUNTER — OFFICE VISIT (OUTPATIENT)
Dept: FAMILY MEDICINE CLINIC | Facility: CLINIC | Age: 53
End: 2023-01-11
Payer: MEDICARE

## 2023-01-11 VITALS
SYSTOLIC BLOOD PRESSURE: 114 MMHG | HEIGHT: 68 IN | HEART RATE: 88 BPM | BODY MASS INDEX: 44.41 KG/M2 | OXYGEN SATURATION: 95 % | DIASTOLIC BLOOD PRESSURE: 86 MMHG | WEIGHT: 293 LBS

## 2023-01-11 DIAGNOSIS — I10 BENIGN ESSENTIAL HYPERTENSION: ICD-10-CM

## 2023-01-11 DIAGNOSIS — F32.4 MAJOR DEPRESSIVE DISORDER WITH SINGLE EPISODE, IN PARTIAL REMISSION: ICD-10-CM

## 2023-01-11 DIAGNOSIS — J45.40 MODERATE PERSISTENT ASTHMA WITHOUT COMPLICATION: ICD-10-CM

## 2023-01-11 DIAGNOSIS — Z00.00 MEDICARE ANNUAL WELLNESS VISIT, SUBSEQUENT: Primary | ICD-10-CM

## 2023-01-11 DIAGNOSIS — M79.7 FIBROMYALGIA, PRIMARY: ICD-10-CM

## 2023-01-11 DIAGNOSIS — E78.5 DYSLIPIDEMIA: ICD-10-CM

## 2023-01-11 DIAGNOSIS — E66.01 MORBID (SEVERE) OBESITY DUE TO EXCESS CALORIES: ICD-10-CM

## 2023-01-11 DIAGNOSIS — M06.09 RHEUMATOID ARTHRITIS OF MULTIPLE SITES WITH NEGATIVE RHEUMATOID FACTOR: ICD-10-CM

## 2023-01-11 DIAGNOSIS — J45.51 SEVERE PERSISTENT ASTHMA WITH ACUTE EXACERBATION: ICD-10-CM

## 2023-01-11 PROCEDURE — G0439 PPPS, SUBSEQ VISIT: HCPCS | Performed by: FAMILY MEDICINE

## 2023-01-11 PROCEDURE — 1160F RVW MEDS BY RX/DR IN RCRD: CPT | Performed by: FAMILY MEDICINE

## 2023-01-11 PROCEDURE — 1126F AMNT PAIN NOTED NONE PRSNT: CPT | Performed by: FAMILY MEDICINE

## 2023-01-11 PROCEDURE — 1159F MED LIST DOCD IN RCRD: CPT | Performed by: FAMILY MEDICINE

## 2023-01-11 PROCEDURE — 1170F FXNL STATUS ASSESSED: CPT | Performed by: FAMILY MEDICINE

## 2023-01-11 PROCEDURE — 1125F AMNT PAIN NOTED PAIN PRSNT: CPT | Performed by: FAMILY MEDICINE

## 2023-01-11 RX ORDER — AZELASTINE HYDROCHLORIDE 0.5 MG/ML
1 SOLUTION/ DROPS OPHTHALMIC 2 TIMES DAILY PRN
Qty: 1 EACH | Refills: 12 | Status: SHIPPED | OUTPATIENT
Start: 2023-01-11

## 2023-01-11 RX ORDER — BUSPIRONE HYDROCHLORIDE 10 MG/1
10 TABLET ORAL 3 TIMES DAILY
COMMUNITY

## 2023-01-11 RX ORDER — OXYBUTYNIN CHLORIDE 5 MG/1
5 TABLET, EXTENDED RELEASE ORAL DAILY
COMMUNITY

## 2023-01-11 RX ORDER — SEMAGLUTIDE 1 MG/.5ML
1 INJECTION, SOLUTION SUBCUTANEOUS WEEKLY
Qty: 2 ML | Refills: 5 | Status: SHIPPED | OUTPATIENT
Start: 2023-01-11

## 2023-01-11 RX ORDER — ALBUTEROL SULFATE 90 UG/1
2 AEROSOL, METERED RESPIRATORY (INHALATION) EVERY 6 HOURS PRN
Qty: 18 G | Refills: 5 | Status: SHIPPED | OUTPATIENT
Start: 2023-01-11

## 2023-01-11 RX ORDER — AZELASTINE HYDROCHLORIDE, FLUTICASONE PROPIONATE 137; 50 UG/1; UG/1
1 SPRAY, METERED NASAL 2 TIMES DAILY
Qty: 23 G | Refills: 12 | Status: SHIPPED | OUTPATIENT
Start: 2023-01-11

## 2023-01-11 RX ORDER — SEMAGLUTIDE 0.25 MG/.5ML
0.25 INJECTION, SOLUTION SUBCUTANEOUS WEEKLY
Qty: 2 ML | Refills: 0 | Status: SHIPPED | OUTPATIENT
Start: 2023-01-11

## 2023-01-11 RX ORDER — SEMAGLUTIDE 0.5 MG/.5ML
0.5 INJECTION, SOLUTION SUBCUTANEOUS WEEKLY
Qty: 2 ML | Refills: 0 | Status: SHIPPED | OUTPATIENT
Start: 2023-01-11

## 2023-01-11 RX ORDER — BUDESONIDE AND FORMOTEROL FUMARATE DIHYDRATE 160; 4.5 UG/1; UG/1
2 AEROSOL RESPIRATORY (INHALATION) 2 TIMES DAILY
Qty: 10.2 G | Refills: 11 | Status: SHIPPED | OUTPATIENT
Start: 2023-01-11

## 2023-01-11 RX ORDER — HYDROXYCHLOROQUINE SULFATE 200 MG/1
400 TABLET, FILM COATED ORAL DAILY
COMMUNITY
Start: 2022-11-07

## 2023-01-11 NOTE — PROGRESS NOTES
QUICK REFERENCE INFORMATION:  The ABCs of the Annual Wellness Visit    Subsequent Medicare Wellness Visit    HEALTH RISK ASSESSMENT    1970    Recent Hospitalizations:  No hospitalization(s) within the last year..        Current Medical Providers:  Patient Care Team:  Rob Bardales DO as PCP - General        Smoking Status:  Social History     Tobacco Use   Smoking Status Former   • Packs/day: 0.10   • Types: Cigarettes   • Start date: 2001   • Quit date: 2009   • Years since quittin.0   Smokeless Tobacco Never       Alcohol Consumption:  Social History     Substance and Sexual Activity   Alcohol Use Yes   • Alcohol/week: 1.0 standard drink   • Types: 1 Glasses of wine per week    Comment: SOCIAL USE//caffeine coffee daily        Depression Screen:   PHQ-2/PHQ-9 Depression Screening 2023   Retired Total Score -   Little Interest or Pleasure in Doing Things 0-->not at all   Feeling Down, Depressed or Hopeless 0-->not at all   PHQ-9: Brief Depression Severity Measure Score 0       Health Habits and Functional and Cognitive Screening:  Functional & Cognitive Status 2023   Do you have difficulty preparing food and eating? No   Do you have difficulty bathing yourself, getting dressed or grooming yourself? No   Do you have difficulty using the toilet? No   Do you have difficulty moving around from place to place? No   Do you have trouble with steps or getting out of a bed or a chair? No   Current Diet Well Balanced Diet   Dental Exam Not up to date   Eye Exam Not up to date   Exercise (times per week) 5 times per week   Current Exercises Include Walking   Do you need help using the phone?  No   Are you deaf or do you have serious difficulty hearing?  No   Do you need help with transportation? No   Do you need help shopping? No   Do you need help preparing meals?  No   Do you need help with housework?  No   Do you need help with laundry? No   Do you need help taking your medications? No    Do you need help managing money? No   Do you ever drive or ride in a car without wearing a seat belt? No   Have you felt unusual stress, anger or loneliness in the last month? No   Who do you live with? Spouse   If you need help, do you have trouble finding someone available to you? No   Have you been bothered in the last four weeks by sexual problems? No   Do you have difficulty concentrating, remembering or making decisions? No           Does the patient have evidence of cognitive impairment? No    Aspirin use counseling: Does not need ASA (and currently is not on it)      Recent Lab Results:  CMP:  Lab Results   Component Value Date    BUN 14 01/21/2021    CREATININE 0.92 01/21/2021    EGFRIFNONA 64 01/21/2021    EGFRIFAFRI 78 01/21/2021    BCR 15.2 01/21/2021     01/21/2021    K 3.9 01/21/2021    CO2 25.3 01/21/2021    CALCIUM 9.3 01/21/2021    PROTENTOTREF 6.6 01/21/2021    ALBUMIN 4.20 01/21/2021    LABGLOBREF 2.4 01/21/2021    LABIL2 1.8 01/21/2021    BILITOT 0.3 01/21/2021    ALKPHOS 86 01/21/2021    AST 41 (H) 01/10/2023    ALT 47 01/10/2023     Lipid Panel:  Lab Results   Component Value Date    TRIG 205 (H) 01/21/2021    HDL 54 01/21/2021    VLDL 35 01/21/2021     HbA1c:       Visual Acuity:  No results found.    Age-appropriate Screening Schedule:  Refer to the list below for future screening recommendations based on patient's age, sex and/or medical conditions. Orders for these recommended tests are listed in the plan section. The patient has been provided with a written plan.    Health Maintenance   Topic Date Due   • MAMMOGRAM  09/13/2019   • PAP SMEAR  12/21/2021   • LIPID PANEL  01/21/2022   • INFLUENZA VACCINE  08/01/2022   • TDAP/TD VACCINES (2 - Td or Tdap) 08/11/2026   • DXA SCAN  Discontinued        Subjective   History of Present Illness    Karen Nash is a 52 y.o. female who presents for an Subsequent Wellness Visit.  Patient 53 yo female with asthma, htn, hld, RA and fibromyalgia -  was losing weight but gained,f yvan oral and saxenda not covered;   She has severe joint pain, has RA thatis doing ok but hard time losing weight;   D/w bariatric as canddiate and will see if wegovy covered under medicaid.   Needs inhalers;  Bp/lipids ok;  Depression doing ok    The following portions of the patient's history were reviewed and updated as appropriate: allergies, current medications, past family history, past medical history, past social history, past surgical history and problem list.    Outpatient Medications Prior to Visit   Medication Sig Dispense Refill   • acetaminophen (TYLENOL) 500 MG tablet Take 500 mg by mouth Every 6 (Six) Hours As Needed for Mild Pain .     • albuterol (ACCUNEB) 1.25 MG/3ML nebulizer solution Take 3 mL by nebulization Every 4 (Four) Hours As Needed for Wheezing. Dx.  J45.40 120 mL 11   • albuterol sulfate  (90 Base) MCG/ACT inhaler Inhale 2 puffs Every 6 (Six) Hours As Needed for Wheezing. 18 g 5   • AMLODIPINE BESYLATE PO Take 10 mg by mouth.     • azelastine (OPTIVAR) 0.05 % ophthalmic solution Administer 1 drop to both eyes 2 (Two) Times a Day As Needed (eye allergies). 1 each 12   • Azelastine-Fluticasone 137-50 MCG/ACT suspension 1 spray into the nostril(s) as directed by provider 2 (Two) Times a Day. 1 bottle 5   • budesonide-formoterol (Symbicort) 160-4.5 MCG/ACT inhaler Inhale 2 puffs 2 (Two) Times a Day. 10.2 g 11   • busPIRone (BUSPAR) 10 MG tablet Take 10 mg by mouth 3 (Three) Times a Day.     • CALCIUM-MAGNESIUM-ZINC PO Take  by mouth.     • Cetirizine HCl 10 MG capsule Take 10 mg by mouth Daily. 30 capsule 5   • Estradiol-Norethindrone Acet 0.5-0.1 MG per tablet TAKE 1 TABLET BY MOUTH EVERY DAY. PLEASE CALL THE OFFICE FOR AN APPT 28 tablet 5   • FLUoxetine (PROzac) 40 MG capsule TAKE 2 CAPSULES BY MOUTH EVERY DAY. PLEASE CALL THE OFFICE FOR AN APPOINTMENT 60 capsule 12   • folic acid (FOLVITE) 1 MG tablet TAKE 1 TABLET BY MOUTH EVERY DAY 30 tablet 12    • hydroCHLOROthiazide (HYDRODIURIL) 25 MG tablet TAKE 1 TABLET BY MOUTH EVERY DAY 30 tablet 3   • hydroxychloroquine (PLAQUENIL) 200 MG tablet Take 400 mg by mouth Daily.     • hydrOXYzine (ATARAX) 10 MG tablet Take 1 tablet by mouth 3 (Three) Times a Day As Needed for Anxiety. 45 tablet 2   • hyoscyamine (LEVSIN) 0.125 MG SL tablet Take 1 tablet by mouth Every 4 (Four) Hours As Needed for Cramping. 30 tablet 0   • Multiple Vitamin (MULTI VITAMIN DAILY PO) Take by mouth daily.     • OLANZapine (zyPREXA) 10 MG tablet TAKE 1 TABLET BY MOUTH EVERY DAY 90 tablet 5   • oxybutynin XL (DITROPAN-XL) 5 MG 24 hr tablet Take 5 mg by mouth Daily.     • pantoprazole (PROTONIX) 40 MG EC tablet TAKE 1 TABLET BY MOUTH TWO TIMES A  tablet 0   • phentermine (ADIPEX-P) 37.5 MG tablet TAKE 1 TABLET BY MOUTH IN THE MORNING 30 tablet 0   • polyethylene glycol (MIRALAX) pack packet mix 1 packet into drink every night 30 packet 4   • pregabalin (LYRICA) 150 MG capsule TAKE 1 CAPSULE BY MOUTH TWO TIMES A  capsule 0   • Saline (NASOGEL) gel 1 spray each nostril twice daily 30 g 12   • sucralfate (CARAFATE) 1 g tablet Take 1 tablet by mouth 4 (Four) Times a Day. 60 tablet 0   • topiramate (TOPAMAX) 100 MG tablet TAKE 1 TABLET BY MOUTH TWO TIMES A DAY *please call the office for an appointment* 60 tablet 12   • traZODone (DESYREL) 50 MG tablet TAKE 1 TABLET BY MOUTH AT NIGHT AS NEEDED FOR SLEEP 30 tablet 11   • trospium (SANCTURA) 20 MG tablet TAKE 1 TABLET BY MOUTH TWO TIMES A DAY 60 tablet 12   • zafirlukast (ACCOLATE) 20 MG tablet Take 1 tablet by mouth 2 (Two) Times a Day. 60 tablet 12   • Liraglutide (SAXENDA) 18 MG/3ML injection pen Inject 0.6mg under skin daily for week one, THEN 1.2mg daily for week two, THEN 1.8mg daily for week three, then 2.4mg daily for week four. 3 mL 0   • Liraglutide (SAXENDA) 18 MG/3ML injection pen Inject 3 mg under the skin into the appropriate area as directed Daily. 15 mL 5     No  facility-administered medications prior to visit.       Patient Active Problem List   Diagnosis   • Moderate persistent asthma without complication   • Atopic rhinitis   • Anemia   • Benign essential hypertension   • Constipation   • Dyslipidemia   • Gastroesophageal reflux disease   • Intractable migraine without aura and without status migrainosus   • Muscle cramps   • Vocal cord dysfunction   • Female dyspareunia   • Vaginal atrophy   • Pelvic pain   • Stress incontinence of urine   • Fibromyalgia, primary   • Rheumatoid arthritis of multiple sites with negative rheumatoid factor ([facility])   • Severe episode of recurrent major depressive disorder, without psychotic features ([facility])   • Panic disorder   • Generalized anxiety disorder   • Primary osteoarthritis involving multiple joints   • Class 3 severe obesity due to excess calories with serious comorbidity and body mass index (BMI) of 40.0 to 44.9 in adult ([facility])   • Esophageal dysphagia   • Dyspepsia   • Hypersomnia with sleep apnea       Advance Care Planning:  ACP discussion was held with the patient during this visit. Patient does not have an advance directive, information provided.    Identification of Risk Factors:  Risk factors include: Obesity/Overweight .    Review of Systems   Constitutional: Positive for fatigue. Negative for fever.   Respiratory: Positive for shortness of breath.    Cardiovascular: Negative for chest pain.   Gastrointestinal: Negative for abdominal pain.   Musculoskeletal: Positive for arthralgias, back pain and myalgias.       Compared to one year ago, the patient feels her physical health is worse.  Compared to one year ago, the patient feels her mental health is the same.    Objective     Physical Exam  Vitals and nursing note reviewed.   Constitutional:       Appearance: Normal appearance. She is not toxic-appearing or diaphoretic.   HENT:      Head: Normocephalic and atraumatic.   Musculoskeletal:      Cervical back: Neck supple.  "  Skin:     General: Skin is warm and dry.      Capillary Refill: Capillary refill takes less than 2 seconds.   Neurological:      Mental Status: She is alert.   Psychiatric:         Mood and Affect: Mood normal.         Behavior: Behavior normal.         Vitals:    01/11/23 0804   BP: 114/86   Pulse: 88   SpO2: 95%   Weight: 134 kg (296 lb)   Height: 172.7 cm (68\")   PainSc: 0-No pain     Body mass index is 45.01 kg/m².    Class 3 Severe Obesity (BMI >=40). Obesity-related health conditions include the following: obstructive sleep apnea, hypertension and dyslipidemias. Obesity is worsening. BMI is is above average; BMI management plan is completed. We discussed portion control and increasing exercise.      Assessment & Plan   Patient Self-Management and Personalized Health Advice  The patient has been provided with information about: diet and exercise and preventive services including:   · Annual Wellness Visit (AWV).    Visit Diagnoses:  No diagnosis found.    No orders of the defined types were placed in this encounter.      Outpatient Encounter Medications as of 1/11/2023   Medication Sig Dispense Refill   • acetaminophen (TYLENOL) 500 MG tablet Take 500 mg by mouth Every 6 (Six) Hours As Needed for Mild Pain .     • albuterol (ACCUNEB) 1.25 MG/3ML nebulizer solution Take 3 mL by nebulization Every 4 (Four) Hours As Needed for Wheezing. Dx.  J45.40 120 mL 11   • albuterol sulfate  (90 Base) MCG/ACT inhaler Inhale 2 puffs Every 6 (Six) Hours As Needed for Wheezing. 18 g 5   • AMLODIPINE BESYLATE PO Take 10 mg by mouth.     • azelastine (OPTIVAR) 0.05 % ophthalmic solution Administer 1 drop to both eyes 2 (Two) Times a Day As Needed (eye allergies). 1 each 12   • Azelastine-Fluticasone 137-50 MCG/ACT suspension 1 spray into the nostril(s) as directed by provider 2 (Two) Times a Day. 1 bottle 5   • budesonide-formoterol (Symbicort) 160-4.5 MCG/ACT inhaler Inhale 2 puffs 2 (Two) Times a Day. 10.2 g 11   • " busPIRone (BUSPAR) 10 MG tablet Take 10 mg by mouth 3 (Three) Times a Day.     • CALCIUM-MAGNESIUM-ZINC PO Take  by mouth.     • Cetirizine HCl 10 MG capsule Take 10 mg by mouth Daily. 30 capsule 5   • Estradiol-Norethindrone Acet 0.5-0.1 MG per tablet TAKE 1 TABLET BY MOUTH EVERY DAY. PLEASE CALL THE OFFICE FOR AN APPT 28 tablet 5   • FLUoxetine (PROzac) 40 MG capsule TAKE 2 CAPSULES BY MOUTH EVERY DAY. PLEASE CALL THE OFFICE FOR AN APPOINTMENT 60 capsule 12   • folic acid (FOLVITE) 1 MG tablet TAKE 1 TABLET BY MOUTH EVERY DAY 30 tablet 12   • hydroCHLOROthiazide (HYDRODIURIL) 25 MG tablet TAKE 1 TABLET BY MOUTH EVERY DAY 30 tablet 3   • hydroxychloroquine (PLAQUENIL) 200 MG tablet Take 400 mg by mouth Daily.     • hydrOXYzine (ATARAX) 10 MG tablet Take 1 tablet by mouth 3 (Three) Times a Day As Needed for Anxiety. 45 tablet 2   • hyoscyamine (LEVSIN) 0.125 MG SL tablet Take 1 tablet by mouth Every 4 (Four) Hours As Needed for Cramping. 30 tablet 0   • Multiple Vitamin (MULTI VITAMIN DAILY PO) Take by mouth daily.     • OLANZapine (zyPREXA) 10 MG tablet TAKE 1 TABLET BY MOUTH EVERY DAY 90 tablet 5   • oxybutynin XL (DITROPAN-XL) 5 MG 24 hr tablet Take 5 mg by mouth Daily.     • pantoprazole (PROTONIX) 40 MG EC tablet TAKE 1 TABLET BY MOUTH TWO TIMES A  tablet 0   • phentermine (ADIPEX-P) 37.5 MG tablet TAKE 1 TABLET BY MOUTH IN THE MORNING 30 tablet 0   • polyethylene glycol (MIRALAX) pack packet mix 1 packet into drink every night 30 packet 4   • pregabalin (LYRICA) 150 MG capsule TAKE 1 CAPSULE BY MOUTH TWO TIMES A  capsule 0   • Saline (NASOGEL) gel 1 spray each nostril twice daily 30 g 12   • sucralfate (CARAFATE) 1 g tablet Take 1 tablet by mouth 4 (Four) Times a Day. 60 tablet 0   • topiramate (TOPAMAX) 100 MG tablet TAKE 1 TABLET BY MOUTH TWO TIMES A DAY *please call the office for an appointment* 60 tablet 12   • traZODone (DESYREL) 50 MG tablet TAKE 1 TABLET BY MOUTH AT NIGHT AS NEEDED FOR  SLEEP 30 tablet 11   • trospium (SANCTURA) 20 MG tablet TAKE 1 TABLET BY MOUTH TWO TIMES A DAY 60 tablet 12   • zafirlukast (ACCOLATE) 20 MG tablet Take 1 tablet by mouth 2 (Two) Times a Day. 60 tablet 12   • [DISCONTINUED] Liraglutide (SAXENDA) 18 MG/3ML injection pen Inject 0.6mg under skin daily for week one, THEN 1.2mg daily for week two, THEN 1.8mg daily for week three, then 2.4mg daily for week four. 3 mL 0   • [DISCONTINUED] Liraglutide (SAXENDA) 18 MG/3ML injection pen Inject 3 mg under the skin into the appropriate area as directed Daily. 15 mL 5     No facility-administered encounter medications on file as of 1/11/2023.       Reviewed use of high risk medication in the elderly: yes  Reviewed for potential of harmful drug interactions in the elderly: yes    Follow Up:  No follow-ups on file.     An After Visit Summary and PPPS with all of these plans were given to the patient.           ++++++++++++++++++++++++++++++++++++++++++++++++++++++++++++++++++

## 2023-01-11 NOTE — PATIENT INSTRUCTIONS
Advance Care Planning and Advance Directives     You make decisions on a daily basis - decisions about where you want to live, your career, your home, your life. Perhaps one of the most important decisions you face is your choice for future medical care. Take time to talk with your family and your healthcare team and start planning today.  Advance Care Planning is a process that can help you:  Understand possible future healthcare decisions in light of your own experiences  Reflect on those decision in light of your goals and values  Discuss your decisions with those closest to you and the healthcare professionals that care for you  Make a plan by creating a document that reflects your wishes    Surrogate Decision Maker  In the event of a medical emergency, which has left you unable to communicate or to make your own decisions, you would need someone to make decisions for you.  It is important to discuss your preferences for medical treatment with this person while you are in good health.     Qualities of a surrogate decision maker:  Willing to take on this role and responsibility  Knows what you want for future medical care  Willing to follow your wishes even if they don't agree with them  Able to make difficult medical decisions under stressful circumstances    Advance Directives  These are legal documents you can create that will guide your healthcare team and decision maker(s) when needed. These documents can be stored in the electronic medical record.    Living Will - a legal document to guide your care if you have a terminal condition or a serious illness and are unable to communicate. States vary by statute in document names/types, but most forms may include one or more of the following:        -  Directions regarding life-prolonging treatments        -  Directions regarding artificially provided nutrition/hydration        -  Choosing a healthcare decision maker        -  Direction regarding organ/tissue  "donation    Durable Power of  for Healthcare - this document names an -in-fact to make medical decisions for you, but it may also allow this person to make personal and financial decisions for you. Please seek the advice of an  if you need this type of document.    **Advance Directives are not required and no one may discriminate against you if you do not sign one.    Medical Orders  Many states allow specific forms/orders signed by your physician to record your wishes for medical treatment in your current state of health. This form, signed in personal communication with your physician, addresses resuscitation and other medical interventions that you may or may not want.      For more information or to schedule a time with a [facility] Advance Care Planning Facilitator contact: [facility] or call [phone] and someone will contact you directly.\"4-4-2-Almost None!\"  Healthy Habits Start Early    EAT 5 OR MORE SERVINGS OF VEGETABLES AND FRUITS EVERY DAY.    Help Karen get three vegetables and two fruits each day. Red, green, yellow, orange...encourage them to try all the colors so they can enjoy different flavors and get more vitamins.    How can I help Karen do this?  ---------------------------------------------  -BE PATIENT WITH Karen, remember it may take 10 times before they start to like new food. So, start with small bites and just keep trying.  -Serve at least one vegetable or fruit at every meal. Even try two. Remember, portions do not have to be as big as you think.  -Encourage eating fruits and vegetables instead of drinking them..it's a better way to get fiber and vitamins..so limit the amount of juice to 1/2 cup per day for children 1-6 years and one cup per day for children 7-18 years of age. Try using 1/2 part water and 1/2 part juice.    Spend less than two hours per day watching television and other screen media. Screen media includes video games, movies and " computer use for entertainment.    How can I help Karen do this?  -Turn off the TV at dinner. Dinner is the best time to hang out with your kids and just talk, learn about their day, and tell them about your day. Your kids have a lot to learn from you and dinner is a great time to share.

## 2023-01-16 ENCOUNTER — TELEPHONE (OUTPATIENT)
Dept: FAMILY MEDICINE CLINIC | Facility: CLINIC | Age: 53
End: 2023-01-16
Payer: MEDICARE

## 2023-01-16 DIAGNOSIS — E88.81 METABOLIC SYNDROME: Primary | ICD-10-CM

## 2023-01-16 DIAGNOSIS — E66.01 MORBID (SEVERE) OBESITY DUE TO EXCESS CALORIES: ICD-10-CM

## 2023-01-16 RX ORDER — SEMAGLUTIDE 1.34 MG/ML
INJECTION, SOLUTION SUBCUTANEOUS
Qty: 1.5 ML | Refills: 0 | Status: SHIPPED | OUTPATIENT
Start: 2023-01-16

## 2023-01-16 NOTE — TELEPHONE ENCOUNTER
Wegovy was rejected.   I sent in starter pen for ozempic the diabetic version as medicare might cover this.  ).25mg weekly x 2 weeks, then 0.5mg weekly x 4 weeks then go to 1mg pen (call prior to finishing starter pen for refill).  Let know if not covered. RRJ

## 2023-01-20 DIAGNOSIS — Z78.0 MENOPAUSE: ICD-10-CM

## 2023-01-20 DIAGNOSIS — N95.1 VASOMOTOR SYMPTOMS DUE TO MENOPAUSE: ICD-10-CM

## 2023-01-21 DIAGNOSIS — E66.01 CLASS 2 SEVERE OBESITY DUE TO EXCESS CALORIES WITH SERIOUS COMORBIDITY AND BODY MASS INDEX (BMI) OF 35.0 TO 35.9 IN ADULT: ICD-10-CM

## 2023-01-21 DIAGNOSIS — M79.7 FIBROMYALGIA, PRIMARY: ICD-10-CM

## 2023-01-21 RX ORDER — PREGABALIN 150 MG/1
CAPSULE ORAL
Qty: 180 CAPSULE | Refills: 1 | Status: SHIPPED | OUTPATIENT
Start: 2023-01-21

## 2023-01-21 RX ORDER — ESTRADIOL AND NORETHINDRONE ACETATE .5; .1 MG/1; MG/1
TABLET ORAL
Qty: 28 TABLET | Refills: 0 | Status: SHIPPED | OUTPATIENT
Start: 2023-01-21 | End: 2023-02-22

## 2023-01-21 RX ORDER — PHENTERMINE HYDROCHLORIDE 37.5 MG/1
TABLET ORAL
Qty: 30 TABLET | Refills: 2 | Status: SHIPPED | OUTPATIENT
Start: 2023-01-21

## 2023-02-07 DIAGNOSIS — K22.4 ESOPHAGEAL DYSMOTILITY: ICD-10-CM

## 2023-02-07 DIAGNOSIS — K21.00 GASTROESOPHAGEAL REFLUX DISEASE WITH ESOPHAGITIS: ICD-10-CM

## 2023-02-07 RX ORDER — HYDROCHLOROTHIAZIDE 25 MG/1
TABLET ORAL
Qty: 30 TABLET | Refills: 5 | Status: SHIPPED | OUTPATIENT
Start: 2023-02-07

## 2023-02-07 RX ORDER — PANTOPRAZOLE SODIUM 40 MG/1
TABLET, DELAYED RELEASE ORAL
Qty: 180 TABLET | Refills: 1 | Status: SHIPPED | OUTPATIENT
Start: 2023-02-07

## 2023-02-22 DIAGNOSIS — N95.1 VASOMOTOR SYMPTOMS DUE TO MENOPAUSE: ICD-10-CM

## 2023-02-22 DIAGNOSIS — Z78.0 MENOPAUSE: ICD-10-CM

## 2023-02-22 RX ORDER — ESTRADIOL AND NORETHINDRONE ACETATE .5; .1 MG/1; MG/1
TABLET ORAL
Qty: 28 TABLET | Refills: 0 | Status: SHIPPED | OUTPATIENT
Start: 2023-02-22 | End: 2023-03-21

## 2023-03-07 DIAGNOSIS — F41.1 GAD (GENERALIZED ANXIETY DISORDER): ICD-10-CM

## 2023-03-07 RX ORDER — HYDROXYZINE HYDROCHLORIDE 10 MG/1
10 TABLET, FILM COATED ORAL 3 TIMES DAILY PRN
Qty: 45 TABLET | Refills: 5 | Status: SHIPPED | OUTPATIENT
Start: 2023-03-07

## 2023-03-21 DIAGNOSIS — N95.1 VASOMOTOR SYMPTOMS DUE TO MENOPAUSE: ICD-10-CM

## 2023-03-21 DIAGNOSIS — Z78.0 MENOPAUSE: ICD-10-CM

## 2023-03-21 RX ORDER — ESTRADIOL AND NORETHINDRONE ACETATE .5; .1 MG/1; MG/1
1 TABLET ORAL DAILY
Qty: 28 TABLET | Refills: 5 | Status: SHIPPED | OUTPATIENT
Start: 2023-03-21

## 2023-05-15 ENCOUNTER — OFFICE VISIT (OUTPATIENT)
Dept: FAMILY MEDICINE CLINIC | Facility: CLINIC | Age: 53
End: 2023-05-15
Payer: MEDICARE

## 2023-05-15 ENCOUNTER — PATIENT ROUNDING (BHMG ONLY) (OUTPATIENT)
Dept: FAMILY MEDICINE CLINIC | Facility: CLINIC | Age: 53
End: 2023-05-15

## 2023-05-15 VITALS
HEIGHT: 68 IN | WEIGHT: 293 LBS | HEART RATE: 83 BPM | BODY MASS INDEX: 44.41 KG/M2 | OXYGEN SATURATION: 97 % | DIASTOLIC BLOOD PRESSURE: 78 MMHG | SYSTOLIC BLOOD PRESSURE: 112 MMHG

## 2023-05-15 DIAGNOSIS — G43.009 MIGRAINE WITHOUT AURA AND WITHOUT STATUS MIGRAINOSUS, NOT INTRACTABLE: ICD-10-CM

## 2023-05-15 DIAGNOSIS — Z12.31 ENCOUNTER FOR SCREENING MAMMOGRAM FOR BREAST CANCER: Primary | ICD-10-CM

## 2023-05-15 DIAGNOSIS — E66.01 CLASS 2 SEVERE OBESITY DUE TO EXCESS CALORIES WITH SERIOUS COMORBIDITY AND BODY MASS INDEX (BMI) OF 35.0 TO 35.9 IN ADULT: ICD-10-CM

## 2023-05-15 DIAGNOSIS — M79.7 FIBROMYALGIA, PRIMARY: ICD-10-CM

## 2023-05-15 DIAGNOSIS — K22.4 ESOPHAGEAL DYSMOTILITY: ICD-10-CM

## 2023-05-15 DIAGNOSIS — G47.09 OTHER INSOMNIA: ICD-10-CM

## 2023-05-15 DIAGNOSIS — F33.2 SEVERE EPISODE OF RECURRENT MAJOR DEPRESSIVE DISORDER, WITHOUT PSYCHOTIC FEATURES: ICD-10-CM

## 2023-05-15 DIAGNOSIS — F41.1 GAD (GENERALIZED ANXIETY DISORDER): ICD-10-CM

## 2023-05-15 DIAGNOSIS — J45.40 MODERATE PERSISTENT ASTHMA WITHOUT COMPLICATION: ICD-10-CM

## 2023-05-15 DIAGNOSIS — K21.00 GASTROESOPHAGEAL REFLUX DISEASE WITH ESOPHAGITIS: ICD-10-CM

## 2023-05-15 DIAGNOSIS — F32.4 MAJOR DEPRESSIVE DISORDER WITH SINGLE EPISODE, IN PARTIAL REMISSION: ICD-10-CM

## 2023-05-15 DIAGNOSIS — J45.51 SEVERE PERSISTENT ASTHMA WITH ACUTE EXACERBATION: ICD-10-CM

## 2023-05-15 RX ORDER — OXYBUTYNIN CHLORIDE 5 MG/1
5 TABLET, EXTENDED RELEASE ORAL DAILY
Qty: 90 TABLET | Refills: 3 | Status: SHIPPED | OUTPATIENT
Start: 2023-05-15

## 2023-05-15 RX ORDER — PREGABALIN 150 MG/1
150 CAPSULE ORAL 2 TIMES DAILY
Qty: 180 CAPSULE | Refills: 1 | Status: SHIPPED | OUTPATIENT
Start: 2023-05-15

## 2023-05-15 RX ORDER — TOPIRAMATE 100 MG/1
100 TABLET, FILM COATED ORAL 2 TIMES DAILY
Qty: 180 TABLET | Refills: 3 | Status: SHIPPED | OUTPATIENT
Start: 2023-05-15

## 2023-05-15 RX ORDER — HYDROCHLOROTHIAZIDE 25 MG/1
25 TABLET ORAL DAILY
Qty: 90 TABLET | Refills: 3 | Status: SHIPPED | OUTPATIENT
Start: 2023-05-15

## 2023-05-15 RX ORDER — PANTOPRAZOLE SODIUM 40 MG/1
40 TABLET, DELAYED RELEASE ORAL 2 TIMES DAILY
Qty: 180 TABLET | Refills: 3 | Status: SHIPPED | OUTPATIENT
Start: 2023-05-15

## 2023-05-15 RX ORDER — HYDROXYZINE HYDROCHLORIDE 10 MG/1
10 TABLET, FILM COATED ORAL 3 TIMES DAILY PRN
Qty: 90 TABLET | Refills: 3 | Status: SHIPPED | OUTPATIENT
Start: 2023-05-15

## 2023-05-15 RX ORDER — FLUOXETINE HYDROCHLORIDE 40 MG/1
80 CAPSULE ORAL DAILY
Qty: 180 CAPSULE | Refills: 3 | Status: SHIPPED | OUTPATIENT
Start: 2023-05-15

## 2023-05-15 RX ORDER — ALBUTEROL SULFATE 90 UG/1
2 AEROSOL, METERED RESPIRATORY (INHALATION) EVERY 6 HOURS PRN
Qty: 18 G | Refills: 5 | Status: SHIPPED | OUTPATIENT
Start: 2023-05-15

## 2023-05-15 RX ORDER — AMLODIPINE BESYLATE 10 MG/1
10 TABLET ORAL DAILY
Qty: 90 TABLET | Refills: 3 | Status: SHIPPED | OUTPATIENT
Start: 2023-05-15

## 2023-05-15 RX ORDER — FOLIC ACID 1 MG/1
1000 TABLET ORAL DAILY
Qty: 90 TABLET | Refills: 3 | Status: SHIPPED | OUTPATIENT
Start: 2023-05-15

## 2023-05-15 RX ORDER — HYDROXYCHLOROQUINE SULFATE 200 MG/1
TABLET, FILM COATED ORAL
Qty: 180 TABLET | Refills: 3 | Status: SHIPPED | OUTPATIENT
Start: 2023-05-15 | End: 2030-05-15

## 2023-05-15 RX ORDER — BUSPIRONE HYDROCHLORIDE 10 MG/1
10 TABLET ORAL 3 TIMES DAILY
Qty: 270 TABLET | Refills: 3 | Status: SHIPPED | OUTPATIENT
Start: 2023-05-15

## 2023-05-15 RX ORDER — BUDESONIDE AND FORMOTEROL FUMARATE DIHYDRATE 160; 4.5 UG/1; UG/1
2 AEROSOL RESPIRATORY (INHALATION) 2 TIMES DAILY
Qty: 10.2 G | Refills: 11 | Status: SHIPPED | OUTPATIENT
Start: 2023-05-15

## 2023-05-15 RX ORDER — AZELASTINE HYDROCHLORIDE, FLUTICASONE PROPIONATE 137; 50 UG/1; UG/1
1 SPRAY, METERED NASAL 2 TIMES DAILY
Qty: 23 G | Refills: 12 | Status: SHIPPED | OUTPATIENT
Start: 2023-05-15

## 2023-05-15 RX ORDER — PHENTERMINE HYDROCHLORIDE 37.5 MG/1
37.5 TABLET ORAL EVERY MORNING
Qty: 90 TABLET | Refills: 0 | Status: SHIPPED | OUTPATIENT
Start: 2023-05-15

## 2023-05-15 RX ORDER — TRAZODONE HYDROCHLORIDE 50 MG/1
50 TABLET ORAL NIGHTLY PRN
Qty: 90 TABLET | Refills: 3 | Status: SHIPPED | OUTPATIENT
Start: 2023-05-15

## 2023-05-15 RX ORDER — OLANZAPINE 10 MG/1
10 TABLET ORAL DAILY
Qty: 90 TABLET | Refills: 3 | Status: SHIPPED | OUTPATIENT
Start: 2023-05-15

## 2023-05-15 RX ORDER — ALBUTEROL SULFATE 1.25 MG/3ML
1 SOLUTION RESPIRATORY (INHALATION) EVERY 4 HOURS PRN
Qty: 120 ML | Refills: 11 | Status: SHIPPED | OUTPATIENT
Start: 2023-05-15

## 2023-05-15 NOTE — PROGRESS NOTES
A [employer] message has been sent to the patient for PATIENT ROUNDING with [facility].

## 2023-05-15 NOTE — PROGRESS NOTES
Subjective   Karen Nash is a 52 y.o. female. Presents today for   Chief Complaint   Patient presents with   • Anxiety   • Depression   • Hypertension   • Rheumatoid Arthritis   • Asthma       History of Present Illness  Patient 53 y/o with RA, asthma, htn, paz and depression;  Mood stable on meds;  RA painful but staying ok;  Has chornic fibromyalgia pain, lyrica with relief;  On weight loss meds that maintain, but unable get off; wegovy low dose helped but ran out and not covered.   Was sent information on weight loss, will fill out and see if can.  Migraines ok on topamax;   gerd stable on bid ppi;      We tlke about weight loss  Review of Systems   Constitutional: Positive for fatigue.   Respiratory: Positive for shortness of breath.    Cardiovascular: Negative for chest pain.   Gastrointestinal: Negative for abdominal pain.   Musculoskeletal: Positive for arthralgias, back pain, gait problem, joint swelling and myalgias.       Patient Active Problem List   Diagnosis   • Moderate persistent asthma without complication   • Atopic rhinitis   • Anemia   • Benign essential hypertension   • Constipation   • Dyslipidemia   • Gastroesophageal reflux disease   • Intractable migraine without aura and without status migrainosus   • Muscle cramps   • Vocal cord dysfunction   • Female dyspareunia   • Vaginal atrophy   • Pelvic pain   • Stress incontinence of urine   • Fibromyalgia, primary   • Rheumatoid arthritis of multiple sites with negative rheumatoid factor   • Severe episode of recurrent major depressive disorder, without psychotic features   • Panic disorder   • Generalized anxiety disorder   • Primary osteoarthritis involving multiple joints   • Class 3 severe obesity due to excess calories with serious comorbidity and body mass index (BMI) of 40.0 to 44.9 in adult   • Esophageal dysphagia   • Dyspepsia   • Hypersomnia with sleep apnea       Social History     Socioeconomic History   • Marital status:    •  Number of children: 0   Tobacco Use   • Smoking status: Former     Packs/day: 0.10     Types: Cigarettes     Start date: 2001     Quit date: 2009     Years since quittin.3   • Smokeless tobacco: Never   Substance and Sexual Activity   • Alcohol use: Yes     Alcohol/week: 1.0 standard drink     Types: 1 Glasses of wine per week     Comment: SOCIAL USE//caffeine coffee daily    • Drug use: No   • Sexual activity: Yes     Partners: Male     Birth control/protection: None       Allergies   Allergen Reactions   • Sulfamethoxazole-Trimethoprim Hives and Itching   • Corn-Containing Products Rash   • Penicillins        Current Outpatient Medications on File Prior to Visit   Medication Sig Dispense Refill   • acetaminophen (TYLENOL) 500 MG tablet Take 1 tablet by mouth Every 6 (Six) Hours As Needed for Mild Pain.     • azelastine (OPTIVAR) 0.05 % ophthalmic solution Administer 1 drop to both eyes 2 (Two) Times a Day As Needed (eye allergies). 1 each 12   • CALCIUM-MAGNESIUM-ZINC PO Take  by mouth.     • Cetirizine HCl 10 MG capsule Take 10 mg by mouth Daily. 30 capsule 5   • Estradiol-Norethindrone Acet 0.5-0.1 MG per tablet Take 1 tablet by mouth Daily. 28 tablet 5   • hyoscyamine (LEVSIN) 0.125 MG SL tablet Take 1 tablet by mouth Every 4 (Four) Hours As Needed for Cramping. 30 tablet 0   • Multiple Vitamin (MULTI VITAMIN DAILY PO) Take by mouth daily.     • polyethylene glycol (MIRALAX) pack packet mix 1 packet into drink every night 30 packet 4   • Saline (NASOGEL) gel 1 spray each nostril twice daily 30 g 12   • [DISCONTINUED] albuterol (ACCUNEB) 1.25 MG/3ML nebulizer solution Take 3 mL by nebulization Every 4 (Four) Hours As Needed for Wheezing. Dx.  J45.40 120 mL 11   • [DISCONTINUED] albuterol sulfate  (90 Base) MCG/ACT inhaler Inhale 2 puffs Every 6 (Six) Hours As Needed for Wheezing. 18 g 5   • [DISCONTINUED] AMLODIPINE BESYLATE PO Take 10 mg by mouth.     • [DISCONTINUED] Azelastine-Fluticasone  137-50 MCG/ACT suspension 1 spray into the nostril(s) as directed by provider 2 (Two) Times a Day. 23 g 12   • [DISCONTINUED] budesonide-formoterol (Symbicort) 160-4.5 MCG/ACT inhaler Inhale 2 puffs 2 (Two) Times a Day. 10.2 g 11   • [DISCONTINUED] busPIRone (BUSPAR) 10 MG tablet Take 1 tablet by mouth 3 (Three) Times a Day.     • [DISCONTINUED] FLUoxetine (PROzac) 40 MG capsule TAKE 2 CAPSULES BY MOUTH EVERY DAY. PLEASE CALL THE OFFICE FOR AN APPOINTMENT 60 capsule 12   • [DISCONTINUED] folic acid (FOLVITE) 1 MG tablet TAKE 1 TABLET BY MOUTH EVERY DAY 30 tablet 12   • [DISCONTINUED] hydroCHLOROthiazide (HYDRODIURIL) 25 MG tablet TAKE 1 TABLET BY MOUTH EVERY DAY 30 tablet 5   • [DISCONTINUED] hydroxychloroquine (PLAQUENIL) 200 MG tablet Take 2 tablets by mouth Daily.     • [DISCONTINUED] hydrOXYzine (ATARAX) 10 MG tablet Take 1 tablet by mouth 3 (Three) Times a Day As Needed for Anxiety. 45 tablet 5   • [DISCONTINUED] OLANZapine (zyPREXA) 10 MG tablet TAKE 1 TABLET BY MOUTH EVERY DAY 90 tablet 5   • [DISCONTINUED] oxybutynin XL (DITROPAN-XL) 5 MG 24 hr tablet Take 1 tablet by mouth Daily.     • [DISCONTINUED] pantoprazole (PROTONIX) 40 MG EC tablet TAKE 1 TABLET BY MOUTH TWO TIMES A  tablet 1   • [DISCONTINUED] phentermine (ADIPEX-P) 37.5 MG tablet TAKE 1 TABLET BY MOUTH IN THE MORNING 30 tablet 2   • [DISCONTINUED] pregabalin (LYRICA) 150 MG capsule TAKE 1 CAPSULE BY MOUTH TWO TIMES A  capsule 1   • [DISCONTINUED] sucralfate (CARAFATE) 1 g tablet Take 1 tablet by mouth 4 (Four) Times a Day. 60 tablet 0   • [DISCONTINUED] topiramate (TOPAMAX) 100 MG tablet TAKE 1 TABLET BY MOUTH TWO TIMES A DAY *please call the office for an appointment* 60 tablet 12   • [DISCONTINUED] traZODone (DESYREL) 50 MG tablet TAKE 1 TABLET BY MOUTH AT NIGHT AS NEEDED FOR SLEEP 30 tablet 11   • [DISCONTINUED] trospium (SANCTURA) 20 MG tablet TAKE 1 TABLET BY MOUTH TWO TIMES A DAY 60 tablet 12   • [DISCONTINUED] zafirlukast  "(ACCOLATE) 20 MG tablet Take 1 tablet by mouth 2 (Two) Times a Day. 60 tablet 12   • Semaglutide,0.25 or 0.5MG/DOS, (Ozempic, 0.25 or 0.5 MG/DOSE,) 2 MG/1.5ML solution pen-injector 0.25mg subq weekly x 2 weeks, then 0.5mg weekly x 4 weeks then go to 1mg weekly pen (call before run out for refill) (Patient not taking: Reported on 5/15/2023) 1.5 mL 0   • [DISCONTINUED] Semaglutide-Weight Management (Wegovy) 0.25 MG/0.5ML solution auto-injector Inject 0.25 mg under the skin into the appropriate area as directed 1 (One) Time Per Week. X 4 weeks then go 0.5mg (Patient not taking: Reported on 5/15/2023) 2 mL 0   • [DISCONTINUED] Semaglutide-Weight Management (Wegovy) 0.5 MG/0.5ML solution auto-injector Inject 0.5 mg under the skin into the appropriate area as directed 1 (One) Time Per Week. X 4 weeks (Patient not taking: Reported on 5/15/2023) 2 mL 0   • [DISCONTINUED] Semaglutide-Weight Management (Wegovy) 1 MG/0.5ML solution auto-injector Inject 1 mg under the skin into the appropriate area as directed 1 (One) Time Per Week. (Patient not taking: Reported on 5/15/2023) 2 mL 5     No current facility-administered medications on file prior to visit.       Objective   Vitals:    05/15/23 0914   BP: 112/78   Pulse: 83   SpO2: 97%   Weight: 134 kg (296 lb)   Height: 172.7 cm (68\")     Body mass index is 45.01 kg/m².    Physical Exam  Vitals and nursing note reviewed.   Constitutional:       Appearance: She is well-developed.   HENT:      Head: Normocephalic and atraumatic.   Neck:      Thyroid: No thyromegaly.      Vascular: No JVD.   Cardiovascular:      Rate and Rhythm: Normal rate and regular rhythm.      Heart sounds: Normal heart sounds. No murmur heard.    No friction rub. No gallop.   Pulmonary:      Effort: Pulmonary effort is normal. No respiratory distress.      Breath sounds: Normal breath sounds. No wheezing or rales.   Abdominal:      General: Bowel sounds are normal. There is no distension.      Palpations: " Abdomen is soft.      Tenderness: There is no abdominal tenderness. There is no guarding or rebound.   Musculoskeletal:      Cervical back: Neck supple.   Skin:     General: Skin is warm and dry.   Neurological:      Mental Status: She is alert.   Psychiatric:         Behavior: Behavior normal.     Order: [id number]   suggestion  Result Information displayed in this report will not trend and may not trigger automated decision support.     Component   Ref Range & Units 4 mo ago   Creatinine   0.55 - 1.02 mg/dL 0.72    Est GFR by Clearance   >60 /1.73 m2 >60    Comment: (note)   Results do not take into account body mass.  Valid for patients 18   to 70 years of age.   Estimated GFR if -American   >60 /1.73 m2 >60    Comment: (note)   Results do not take into account body mass.  Valid for patients 18   to 70 years of age.   Specimen Collected: 01/10/23 08:40 Last Resulted: 01/10/23 13:26   Received From: [facility]  Result Received: 01/11/23 08:00     View Encounter      Received Information   Contains abnormal data AST  Order: [id number]   Collected 1/10/2023 08:40     Specimen Information: Fresh Frozen Plasma   Specimen Comment: Specimen type and source: Plasma, Blood       Component  Ref Range & Units 4 mo ago   AST (SGOT)  5 - 34 U/L 41 High          View Full Report                ALT  Order: [id number]   Collected 1/10/2023 08:40     Specimen Information: Fresh Frozen Plasma   Specimen Comment: Specimen type and source: Plasma, Blood       Component  Ref Range & Units 4 mo ago   ALT (SGPT)  0 - 55 U/L 47         View Full Report                 Contains abnormal data C-REACTIVE PROTEIN  Order: [id number]   suggestion  Result Information displayed in this report will not trend and may not trigger automated decision support.     Component   Ref Range & Units 4 mo ago   C-Reactive Protein   <0.5 mg/dL 0.8 High     Specimen Collected: 01/10/23 08:40 Last Resulted: 01/10/23 13:26   Received From: [facility]  Result Received: 01/11/23 08:00     View Encounter      Received Information   Contains abnormal data CBC AND DIFFERENTIAL  Order: [id number]   Collected 1/10/2023 08:40     Specimen Comment: Specimen type and source: Whole Blood, Blood       Component  Ref Range & Units 4 mo ago   WBC  4.5 - 11.0 10*3/uL 8.07    RBC  4.0 - 5.2 10*6/uL 5.02    Hemoglobin  12.0 - 16.0 g/dL 14.8    Hematocrit  36.0 - 46.0 % 46.8 High     MCV  80.0 - 100.0 fL 93.2    MCH  26.0 - 34.0 pg 29.5    MCHC  31.0 - 37.0 g/dL 31.6    RDW  12.0 - 16.8 % 13.9    Platelets  140 - 440 10*3/uL 264    MPV  8.4 - 12.4 fL 10.8    Differential Type  (arb'U) Hospital CBC w/AutoDiff    Neutrophil Rel %  45 - 80 % 68.9    Lymphocyte Rel %  15 - 50 % 20.4    Monocyte Rel %  0 - 15 % 5.1    Eosinophil %  0 - 7 % 4.2    Basophil Rel %  0 - 2 % 0.9    Immature Grans %  0.0 - 1.0 % 0.5    nRBC  0 /100(WBC) 0    Neutrophils Absolute  2.0 - 8.8 10*3/uL 5.56    Lymphocytes Absolute  0.7 - 5.5 10*3/uL 1.65    Monocytes Absolute  0.0 - 1.7 10*3/uL 0.41    Eosinophils Absolute  0.0 - 0.8 10*3/uL 0.34    Basophils Absolute  0.0 - 0.2 10*3/uL 0.07    Immature Grans, Absolute  0.00 - 0.10 10*3/uL 0.04         View Full Report                SEDIMENTATION RATE  Order: [id number]   suggestion  Result Information displayed in this report will not trend and may not trigger automated decision support.     Component   Ref Range & Units 4 mo ago   Erythrocyte Sedimentation Rate   0 - 20 mm/Hr 20    Specimen Collected: 01/10/23 08:40 Last Resulted: 01/10/23 13:54   Received From: [facility]  Result Received: 01/11/23 08:00     View Encounter      Received Information      Assessment & Plan   Problems Addressed this Visit        Mental Health    Severe episode of recurrent major depressive disorder, without psychotic features    Relevant Medications    phentermine (ADIPEX-P) 37.5 MG tablet    busPIRone (BUSPAR) 10 MG tablet    FLUoxetine (PROzac) 40 MG capsule     hydrOXYzine (ATARAX) 10 MG tablet    OLANZapine (zyPREXA) 10 MG tablet    traZODone (DESYREL) 50 MG tablet       Musculoskeletal and Injuries    Fibromyalgia, primary    Relevant Medications    pregabalin (LYRICA) 150 MG capsule       Pulmonary and Pneumonias    Moderate persistent asthma without complication    Relevant Medications    albuterol (ACCUNEB) 1.25 MG/3ML nebulizer solution    albuterol sulfate  (90 Base) MCG/ACT inhaler    budesonide-formoterol (Symbicort) 160-4.5 MCG/ACT inhaler   Other Visit Diagnoses     Encounter for screening mammogram for breast cancer    -  Primary    Relevant Orders    Mammo Screening Bilateral With CAD    Class 2 severe obesity due to excess calories with serious comorbidity and body mass index (BMI) of 35.0 to 35.9 in adult        Relevant Medications    phentermine (ADIPEX-P) 37.5 MG tablet    Severe persistent asthma with acute exacerbation        Relevant Medications    albuterol (ACCUNEB) 1.25 MG/3ML nebulizer solution    albuterol sulfate  (90 Base) MCG/ACT inhaler    budesonide-formoterol (Symbicort) 160-4.5 MCG/ACT inhaler    Major depressive disorder with single episode, in partial remission        Relevant Medications    phentermine (ADIPEX-P) 37.5 MG tablet    busPIRone (BUSPAR) 10 MG tablet    FLUoxetine (PROzac) 40 MG capsule    hydrOXYzine (ATARAX) 10 MG tablet    OLANZapine (zyPREXA) 10 MG tablet    traZODone (DESYREL) 50 MG tablet    JONATHAN (generalized anxiety disorder)        Relevant Medications    phentermine (ADIPEX-P) 37.5 MG tablet    busPIRone (BUSPAR) 10 MG tablet    FLUoxetine (PROzac) 40 MG capsule    hydrOXYzine (ATARAX) 10 MG tablet    OLANZapine (zyPREXA) 10 MG tablet    traZODone (DESYREL) 50 MG tablet    Esophageal dysmotility        Relevant Medications    pantoprazole (PROTONIX) 40 MG EC tablet    Gastroesophageal reflux disease with esophagitis        Relevant Medications    pantoprazole (PROTONIX) 40 MG EC tablet    Migraine  without aura and without status migrainosus, not intractable        Relevant Medications    FLUoxetine (PROzac) 40 MG capsule    pregabalin (LYRICA) 150 MG capsule    topiramate (TOPAMAX) 100 MG tablet    traZODone (DESYREL) 50 MG tablet    amLODIPine (NORVASC) 10 MG tablet    Other insomnia        Relevant Medications    traZODone (DESYREL) 50 MG tablet      Diagnoses       Codes Comments    Encounter for screening mammogram for breast cancer    -  Primary ICD-10-CM: Z12.31  ICD-9-CM: V76.12     Class 2 severe obesity due to excess calories with serious comorbidity and body mass index (BMI) of 35.0 to 35.9 in adult     ICD-10-CM: E66.01, Z68.35  ICD-9-CM: 278.01, V85.35     Moderate persistent asthma without complication     ICD-10-CM: J45.40  ICD-9-CM: 493.90     Severe persistent asthma with acute exacerbation     ICD-10-CM: J45.51  ICD-9-CM: 493.92     Major depressive disorder with single episode, in partial remission     ICD-10-CM: F32.4  ICD-9-CM: 296.25     JONATHAN (generalized anxiety disorder)     ICD-10-CM: F41.1  ICD-9-CM: 300.02     Severe episode of recurrent major depressive disorder, without psychotic features     ICD-10-CM: F33.2  ICD-9-CM: 296.33     Esophageal dysmotility     ICD-10-CM: K22.4  ICD-9-CM: 530.5     Gastroesophageal reflux disease with esophagitis     ICD-10-CM: K21.00  ICD-9-CM: 530.11     Fibromyalgia, primary     ICD-10-CM: M79.7  ICD-9-CM: 729.1     Migraine without aura and without status migrainosus, not intractable     ICD-10-CM: G43.009  ICD-9-CM: 346.10     Other insomnia     ICD-10-CM: G47.09  ICD-9-CM: 780.52         Discussed weight loss as strugglign to lose with her chronic pain and breathing isseus;  Has tried several diets and trying walk  RA see rheum as directed  Fibromyalgia - continue lyrica  Jonathan/depression - stable, continue meds  Asthma - continue inhalers, controlled on meds  Refilled sleep meds  -hypertension - controlled, continue medications  Check cmp and lipid  profile next ov  Overdue mammogram, [facility]         Class 3 Severe Obesity (BMI >=40). Obesity-related health conditions include the following: hypertension and dyslipidemias. Obesity is unchanged. BMI is is above average; BMI management plan is completed. We discussed portion control and increasing exercise.     -Follow up: 4 mnonths

## 2023-05-18 ENCOUNTER — TRANSCRIBE ORDERS (OUTPATIENT)
Dept: ADMINISTRATIVE | Facility: HOSPITAL | Age: 53
End: 2023-05-18
Payer: MEDICARE

## 2023-05-18 DIAGNOSIS — Z12.31 ENCOUNTER FOR SCREENING MAMMOGRAM FOR BREAST CANCER: Primary | ICD-10-CM

## 2023-05-26 ENCOUNTER — HOSPITAL ENCOUNTER (OUTPATIENT)
Dept: MAMMOGRAPHY | Facility: HOSPITAL | Age: 53
Discharge: HOME | End: 2023-05-26
Admitting: FAMILY MEDICINE
Payer: MEDICARE

## 2023-05-26 DIAGNOSIS — Z12.31 ENCOUNTER FOR SCREENING MAMMOGRAM FOR BREAST CANCER: ICD-10-CM

## 2023-05-26 PROCEDURE — 77067 SCR MAMMO BI INCL CAD: CPT

## 2023-05-26 PROCEDURE — 77063 BREAST TOMOSYNTHESIS BI: CPT

## 2023-06-12 ENCOUNTER — TELEPHONE (OUTPATIENT)
Dept: FAMILY MEDICINE CLINIC | Facility: CLINIC | Age: 53
End: 2023-06-12
Payer: MEDICARE

## 2023-06-12 NOTE — TELEPHONE ENCOUNTER
Patient calling to schedule hospital follow up. Seen in ER at [facility] and released same day 6/9/23.  Pt fell and hit face. No broken bones. Face swollen. Eyes swollen shut.  Needs hospital follow up and is now having headaches.    Please call to schedule  # [phone]  Pt is free anytime. First available but has trouble seeing to drive and  will need to bring in.

## 2023-06-15 ENCOUNTER — PATIENT ROUNDING (BHMG ONLY) (OUTPATIENT)
Dept: FAMILY MEDICINE CLINIC | Facility: CLINIC | Age: 53
End: 2023-06-15

## 2023-06-15 NOTE — PROGRESS NOTES
A Md7 message has been sent to the patient for PATIENT ROUNDING with [facility].

## 2023-08-15 DIAGNOSIS — E66.01 CLASS 2 SEVERE OBESITY DUE TO EXCESS CALORIES WITH SERIOUS COMORBIDITY AND BODY MASS INDEX (BMI) OF 35.0 TO 35.9 IN ADULT: ICD-10-CM

## 2023-08-16 RX ORDER — PHENTERMINE HYDROCHLORIDE 37.5 MG/1
TABLET ORAL
Qty: 90 TABLET | Refills: 0 | Status: SHIPPED | OUTPATIENT
Start: 2023-08-16

## 2023-09-15 ENCOUNTER — OFFICE VISIT (OUTPATIENT)
Dept: FAMILY MEDICINE CLINIC | Facility: CLINIC | Age: 53
End: 2023-09-15
Payer: MEDICARE

## 2023-09-15 VITALS
HEIGHT: 69 IN | SYSTOLIC BLOOD PRESSURE: 122 MMHG | BODY MASS INDEX: 41.77 KG/M2 | HEART RATE: 82 BPM | WEIGHT: 282 LBS | DIASTOLIC BLOOD PRESSURE: 84 MMHG | RESPIRATION RATE: 18 BRPM | OXYGEN SATURATION: 96 %

## 2023-09-15 DIAGNOSIS — M75.21 BICEPS TENDINITIS OF RIGHT UPPER EXTREMITY: ICD-10-CM

## 2023-09-15 DIAGNOSIS — I10 BENIGN ESSENTIAL HYPERTENSION: ICD-10-CM

## 2023-09-15 DIAGNOSIS — J45.40 MODERATE PERSISTENT ASTHMA WITHOUT COMPLICATION: ICD-10-CM

## 2023-09-15 DIAGNOSIS — F33.1 MODERATE EPISODE OF RECURRENT MAJOR DEPRESSIVE DISORDER: Primary | ICD-10-CM

## 2023-09-15 PROCEDURE — 99214 OFFICE O/P EST MOD 30 MIN: CPT | Performed by: FAMILY MEDICINE

## 2023-09-15 PROCEDURE — 3074F SYST BP LT 130 MM HG: CPT | Performed by: FAMILY MEDICINE

## 2023-09-15 PROCEDURE — 3079F DIAST BP 80-89 MM HG: CPT | Performed by: FAMILY MEDICINE

## 2023-09-15 RX ORDER — METHYLPREDNISOLONE 4 MG/1
TABLET ORAL
Qty: 21 TABLET | Refills: 0 | Status: SHIPPED | OUTPATIENT
Start: 2023-09-15

## 2023-09-15 RX ORDER — OLANZAPINE 20 MG/1
20 TABLET ORAL DAILY
Qty: 90 TABLET | Refills: 3 | Status: SHIPPED | OUTPATIENT
Start: 2023-09-15

## 2023-09-15 NOTE — PROGRESS NOTES
Subjective   Karen Nash is a 53 y.o. female. Presents today for   Chief Complaint   Patient presents with    Fibromyalgia    Shoulder Pain     C/o rt shoulder pain    Fall    Depression       Fibromyalgia  This is a chronic problem. Associated symptoms include myalgias. Pertinent negatives include no numbness. Treatments tried: has also RA, sees rheumatology.   Anxiety  Presents for follow-up visit. Symptoms include decreased concentration and depressed mood (not in remission). Patient reports no shortness of breath or suicidal ideas. Symptoms occur most days. The severity of symptoms is moderate. The quality of sleep is fair. Nighttime awakenings: occasional.     Her past medical history is significant for asthma. Compliance with medications is %. Treatment side effects: genesight testing completed.   Hypertension  This is a chronic problem. Associated symptoms include anxiety. Pertinent negatives include no shortness of breath. The current treatment provides moderate improvement.   Asthma  There is no shortness of breath, sputum production or wheezing. This is a chronic problem. Associated symptoms include myalgias. Her symptoms are aggravated by any activity, occupational exposure and change in weather. Her symptoms are alleviated by beta-agonist and steroid inhaler. Her past medical history is significant for asthma.   Fall  The accident occurred More than 1 week ago. The point of impact was the right shoulder. The pain is moderate. Pertinent negatives include no numbness or tingling. She has tried rest for the symptoms. The treatment provided no relief.     Review of Systems   Respiratory:  Negative for sputum production, shortness of breath and wheezing.    Musculoskeletal:  Positive for myalgias.   Neurological:  Negative for tingling and numbness.   Psychiatric/Behavioral:  Positive for decreased concentration. Negative for suicidal ideas.      Patient Active Problem List   Diagnosis    Moderate  persistent asthma without complication    Atopic rhinitis    Anemia    Benign essential hypertension    Constipation    Dyslipidemia    Gastroesophageal reflux disease    Intractable migraine without aura and without status migrainosus    Muscle cramps    Vocal cord dysfunction    Female dyspareunia    Vaginal atrophy    Pelvic pain    Stress incontinence of urine    Fibromyalgia, primary    Rheumatoid arthritis of multiple sites with negative rheumatoid factor    Severe episode of recurrent major depressive disorder, without psychotic features    Panic disorder    Generalized anxiety disorder    Primary osteoarthritis involving multiple joints    Class 3 severe obesity due to excess calories with serious comorbidity and body mass index (BMI) of 40.0 to 44.9 in adult    Esophageal dysphagia    Dyspepsia    Hypersomnia with sleep apnea       Social History     Socioeconomic History    Marital status:     Number of children: 0   Tobacco Use    Smoking status: Former     Packs/day: 0.10     Types: Cigarettes     Start date: 2001     Quit date: 2009     Years since quittin.7    Smokeless tobacco: Never   Vaping Use    Vaping Use: Never used   Substance and Sexual Activity    Alcohol use: Yes     Alcohol/week: 1.0 standard drink     Types: 1 Glasses of wine per week     Comment: SOCIAL USE//caffeine coffee daily     Drug use: No    Sexual activity: Yes     Partners: Male     Birth control/protection: None       Allergies   Allergen Reactions    Sulfamethoxazole-Trimethoprim Hives and Itching    Corn-Containing Products Rash    Penicillins     Bacitracin Rash       Current Outpatient Medications on File Prior to Visit   Medication Sig Dispense Refill    acetaminophen (TYLENOL) 500 MG tablet Take 1 tablet by mouth Every 6 (Six) Hours As Needed for Mild Pain.      albuterol (ACCUNEB) 1.25 MG/3ML nebulizer solution Take 3 mL by nebulization Every 4 (Four) Hours As Needed for Wheezing. Dx.  J45.40 120 mL  11    albuterol sulfate  (90 Base) MCG/ACT inhaler Inhale 2 puffs Every 6 (Six) Hours As Needed for Wheezing. 18 g 5    amLODIPine (NORVASC) 10 MG tablet Take 1 tablet by mouth Daily. 90 tablet 3    azelastine (OPTIVAR) 0.05 % ophthalmic solution Administer 1 drop to both eyes 2 (Two) Times a Day As Needed (eye allergies). 1 each 12    Azelastine-Fluticasone 137-50 MCG/ACT suspension 1 spray into the nostril(s) as directed by provider 2 (Two) Times a Day. 23 g 12    budesonide-formoterol (Symbicort) 160-4.5 MCG/ACT inhaler Inhale 2 puffs 2 (Two) Times a Day. 10.2 g 11    busPIRone (BUSPAR) 10 MG tablet Take 1 tablet by mouth 3 (Three) Times a Day. 270 tablet 3    CALCIUM-MAGNESIUM-ZINC PO Take  by mouth.      Cetirizine HCl 10 MG capsule Take 10 mg by mouth Daily. 30 capsule 5    Estradiol-Norethindrone Acet 0.5-0.1 MG per tablet Take 1 tablet by mouth Daily. 28 tablet 5    fluconazole (Diflucan) 150 MG tablet Take 1 tablet by mouth Daily. 3 tablet 0    FLUoxetine (PROzac) 40 MG capsule Take 2 capsules by mouth Daily. 180 capsule 3    folic acid (FOLVITE) 1 MG tablet Take 1 tablet by mouth Daily. 90 tablet 3    hydroCHLOROthiazide (HYDRODIURIL) 25 MG tablet Take 1 tablet by mouth Daily. 90 tablet 3    hydroxychloroquine (PLAQUENIL) 200 MG tablet 2 po daily  Indications: Rheumatoid Arthritis 180 tablet 3    hydrOXYzine (ATARAX) 10 MG tablet Take 1 tablet by mouth 3 (Three) Times a Day As Needed for Anxiety. 90 tablet 3    hyoscyamine (LEVSIN) 0.125 MG SL tablet Take 1 tablet by mouth Every 4 (Four) Hours As Needed for Cramping. 30 tablet 0    Multiple Vitamin (MULTI VITAMIN DAILY PO) Take by mouth daily.      nystatin (MYCOSTATIN) 100,000 unit/mL suspension 5 ml Swish and spit 4 times daily 400 mL 0    pantoprazole (PROTONIX) 40 MG EC tablet Take 1 tablet by mouth 2 (Two) Times a Day. 180 tablet 3    phentermine (ADIPEX-P) 37.5 MG tablet TAKE 1 TABLET BY MOUTH IN THE MORNING 90 tablet 0    polyethylene glycol  "(MIRALAX) pack packet mix 1 packet into drink every night 30 packet 4    pregabalin (LYRICA) 150 MG capsule Take 1 capsule by mouth 2 (Two) Times a Day. 180 capsule 1    Saline (NASOGEL) gel 1 spray each nostril twice daily 30 g 12    Semaglutide,0.25 or 0.5MG/DOS, (Ozempic, 0.25 or 0.5 MG/DOSE,) 2 MG/1.5ML solution pen-injector 0.25mg subq weekly x 2 weeks, then 0.5mg weekly x 4 weeks then go to 1mg weekly pen (call before run out for refill) 1.5 mL 0    solifenacin (VESIcare) 10 MG tablet Take 1 tablet by mouth Daily. 30 tablet 5    topiramate (TOPAMAX) 100 MG tablet Take 1 tablet by mouth 2 (Two) Times a Day. 180 tablet 3    traZODone (DESYREL) 50 MG tablet Take 1 tablet by mouth At Night As Needed for Sleep. 90 tablet 3    [DISCONTINUED] OLANZapine (zyPREXA) 10 MG tablet Take 1 tablet by mouth Daily. 90 tablet 3     No current facility-administered medications on file prior to visit.       Objective   Vitals:    09/15/23 1125   BP: 122/84   Pulse: 82   Resp: 18   SpO2: 96%   Weight: 128 kg (282 lb)   Height: 175.3 cm (69\")   PainSc: 0-No pain     Body mass index is 41.64 kg/m².    Physical Exam  Vitals and nursing note reviewed.   Constitutional:       Appearance: Normal appearance. She is not toxic-appearing or diaphoretic.   HENT:      Head: Normocephalic and atraumatic.   Musculoskeletal:      Right shoulder: Tenderness present. Normal strength.      Left shoulder: Normal strength.      Cervical back: Neck supple.      Comments: +biceps tendon pain with testing  Neg drop arm   Skin:     General: Skin is warm and dry.      Capillary Refill: Capillary refill takes less than 2 seconds.   Neurological:      Mental Status: She is alert.   Psychiatric:         Mood and Affect: Mood normal.         Behavior: Behavior normal.       Assessment & Plan   Diagnoses and all orders for this visit:    1. Moderate episode of recurrent major depressive disorder (Primary)  -     OLANZapine (zyPREXA) 20 MG tablet; Take 1 tablet " by mouth Daily.  Dispense: 90 tablet; Refill: 3    2. Biceps tendinitis of right upper extremity  -     methylPREDNISolone (MEDROL) 4 MG dose pack; Take as directed on package instructions.  Dispense: 21 tablet; Refill: 0    3. Benign essential hypertension    4. Moderate persistent asthma without complication           On genesight testing rapid metabolizer for olanzepine and fluoxetine with higher doses needed.  On 80mg fluoxetine and will continue;  depression not in remission, will go up on olanzapine to 20mg and see if higher dose helps;  will contineu same regimen as not as profoundly depressed as before.   Try steroid pack for shoulder/biceps   -hypertension - controlled, continue medications  Asthma controlled, cotinue inhalers  Fibromyalgia - will work on mood and increase mood stabilizer.        -Follow up: 3 months and prn

## 2023-11-27 DIAGNOSIS — E66.01 CLASS 2 SEVERE OBESITY DUE TO EXCESS CALORIES WITH SERIOUS COMORBIDITY AND BODY MASS INDEX (BMI) OF 35.0 TO 35.9 IN ADULT: ICD-10-CM

## 2023-11-28 RX ORDER — PHENTERMINE HYDROCHLORIDE 37.5 MG/1
TABLET ORAL
Qty: 90 TABLET | Refills: 0 | Status: SHIPPED | OUTPATIENT
Start: 2023-11-28

## 2023-11-28 NOTE — TELEPHONE ENCOUNTER
Pt requesting refill Phentermine    Last filled 08/16/2023  # 90  Last OV 09/15/2023  Next Ov  01/12/2023   Has not had UDS in 3 years  that I noticed  Do not see Controlled Substance agreement

## 2023-12-29 DIAGNOSIS — E66.01 CLASS 2 SEVERE OBESITY DUE TO EXCESS CALORIES WITH SERIOUS COMORBIDITY AND BODY MASS INDEX (BMI) OF 35.0 TO 35.9 IN ADULT: ICD-10-CM

## 2024-01-02 RX ORDER — PHENTERMINE HYDROCHLORIDE 37.5 MG/1
TABLET ORAL
Qty: 90 TABLET | Refills: 0 | Status: SHIPPED | OUTPATIENT
Start: 2024-01-02

## 2024-01-12 ENCOUNTER — OFFICE VISIT (OUTPATIENT)
Dept: FAMILY MEDICINE CLINIC | Facility: CLINIC | Age: 54
End: 2024-01-12
Payer: MEDICARE

## 2024-01-12 VITALS
DIASTOLIC BLOOD PRESSURE: 84 MMHG | BODY MASS INDEX: 43.4 KG/M2 | HEART RATE: 62 BPM | HEIGHT: 69 IN | WEIGHT: 293 LBS | SYSTOLIC BLOOD PRESSURE: 132 MMHG | OXYGEN SATURATION: 96 %

## 2024-01-12 DIAGNOSIS — G47.09 OTHER INSOMNIA: ICD-10-CM

## 2024-01-12 DIAGNOSIS — Z00.00 WELLNESS EXAMINATION: Primary | ICD-10-CM

## 2024-01-12 DIAGNOSIS — I15.9 SECONDARY HYPERTENSION: ICD-10-CM

## 2024-01-12 DIAGNOSIS — E34.9 ENDOCRINE DISORDER: ICD-10-CM

## 2024-01-12 DIAGNOSIS — E88.810 ABDOMINAL OBESITY AND METABOLIC SYNDROME: ICD-10-CM

## 2024-01-12 DIAGNOSIS — N95.1 VASOMOTOR SYMPTOMS DUE TO MENOPAUSE: ICD-10-CM

## 2024-01-12 DIAGNOSIS — Z78.0 MENOPAUSE: ICD-10-CM

## 2024-01-12 DIAGNOSIS — E66.9 ABDOMINAL OBESITY AND METABOLIC SYNDROME: ICD-10-CM

## 2024-01-12 DIAGNOSIS — E66.01 MORBID (SEVERE) OBESITY DUE TO EXCESS CALORIES: ICD-10-CM

## 2024-01-12 RX ORDER — ESTRADIOL AND NORETHINDRONE ACETATE .5; .1 MG/1; MG/1
1 TABLET ORAL DAILY
Qty: 28 TABLET | Refills: 0 | Status: SHIPPED | OUTPATIENT
Start: 2024-01-12

## 2024-01-12 RX ORDER — TRAZODONE HYDROCHLORIDE 100 MG/1
100 TABLET ORAL NIGHTLY PRN
Qty: 90 TABLET | Refills: 3 | Status: SHIPPED | OUTPATIENT
Start: 2024-01-12

## 2024-01-12 NOTE — PROGRESS NOTES
Subjective   Karen Nash is a 53 y.o. female. Presents today for   Chief Complaint   Patient presents with    Annual Exam     6 MONTH FOLLOW UP       History of Present Illness  Patient here for wellness exam;   she has gained more weight;   has metabolic issues and would like helkp with losing;  no cp/soa;  has chornic pain issues with RA and OA;   needs refill sleep medication  Review of Systems   Constitutional:  Positive for fatigue.   Respiratory:  Negative for shortness of breath.    Cardiovascular:  Negative for chest pain.   Musculoskeletal:  Positive for arthralgias.       Patient Active Problem List   Diagnosis    Moderate persistent asthma without complication    Atopic rhinitis    Anemia    Benign essential hypertension    Constipation    Dyslipidemia    Gastroesophageal reflux disease    Intractable migraine without aura and without status migrainosus    Muscle cramps    Vocal cord dysfunction    Female dyspareunia    Vaginal atrophy    Pelvic pain    Stress incontinence of urine    Fibromyalgia, primary    Rheumatoid arthritis of multiple sites with negative rheumatoid factor    Severe episode of recurrent major depressive disorder, without psychotic features    Panic disorder    Generalized anxiety disorder    Primary osteoarthritis involving multiple joints    Class 3 severe obesity due to excess calories with serious comorbidity and body mass index (BMI) of 40.0 to 44.9 in adult    Esophageal dysphagia    Dyspepsia    Hypersomnia with sleep apnea       Social History     Socioeconomic History    Marital status:     Number of children: 0   Tobacco Use    Smoking status: Former     Packs/day: .1     Types: Cigarettes     Start date: 1/1/2001     Quit date: 1/1/2009     Years since quitting: 15.0    Smokeless tobacco: Never   Vaping Use    Vaping Use: Never used   Substance and Sexual Activity    Alcohol use: Not Currently     Alcohol/week: 1.0 standard drink of alcohol     Types: 1 Glasses of  wine per week     Comment: SOCIAL USE//caffeine coffee daily     Drug use: No    Sexual activity: Yes     Partners: Male     Birth control/protection: None       Allergies   Allergen Reactions    Sulfamethoxazole-Trimethoprim Hives and Itching    Corn-Containing Products Rash    Penicillins     Bacitracin Rash       Current Outpatient Medications on File Prior to Visit   Medication Sig Dispense Refill    acetaminophen (TYLENOL) 500 MG tablet Take 1 tablet by mouth Every 6 (Six) Hours As Needed for Mild Pain.      albuterol (ACCUNEB) 1.25 MG/3ML nebulizer solution Take 3 mL by nebulization Every 4 (Four) Hours As Needed for Wheezing. Dx.  J45.40 120 mL 11    albuterol sulfate  (90 Base) MCG/ACT inhaler Inhale 2 puffs Every 6 (Six) Hours As Needed for Wheezing. 18 g 5    amLODIPine (NORVASC) 10 MG tablet Take 1 tablet by mouth Daily. 90 tablet 3    azelastine (OPTIVAR) 0.05 % ophthalmic solution Administer 1 drop to both eyes 2 (Two) Times a Day As Needed (eye allergies). 1 each 12    budesonide-formoterol (Symbicort) 160-4.5 MCG/ACT inhaler Inhale 2 puffs 2 (Two) Times a Day. 10.2 g 11    busPIRone (BUSPAR) 10 MG tablet Take 1 tablet by mouth 3 (Three) Times a Day. 270 tablet 3    CALCIUM-MAGNESIUM-ZINC PO Take  by mouth.      Cetirizine HCl 10 MG capsule Take 10 mg by mouth Daily. 30 capsule 5    FLUoxetine (PROzac) 40 MG capsule Take 2 capsules by mouth Daily. 180 capsule 3    folic acid (FOLVITE) 1 MG tablet Take 1 tablet by mouth Daily. 90 tablet 3    hydroCHLOROthiazide (HYDRODIURIL) 25 MG tablet Take 1 tablet by mouth Daily. 90 tablet 3    hydroxychloroquine (PLAQUENIL) 200 MG tablet 2 po daily  Indications: Rheumatoid Arthritis 180 tablet 3    hydrOXYzine (ATARAX) 10 MG tablet Take 1 tablet by mouth 3 (Three) Times a Day As Needed for Anxiety. 90 tablet 3    Multiple Vitamin (MULTI VITAMIN DAILY PO) Take by mouth daily.      pantoprazole (PROTONIX) 40 MG EC tablet Take 1 tablet by mouth 2 (Two) Times a  "Day. 180 tablet 3    phentermine (ADIPEX-P) 37.5 MG tablet TAKE 1 TABLET BY MOUTH IN THE MORNING 90 tablet 0    polyethylene glycol (MIRALAX) pack packet mix 1 packet into drink every night 30 packet 4    pregabalin (LYRICA) 150 MG capsule Take 1 capsule by mouth 2 (Two) Times a Day. 180 capsule 1    topiramate (TOPAMAX) 100 MG tablet Take 1 tablet by mouth 2 (Two) Times a Day. 180 tablet 3     No current facility-administered medications on file prior to visit.       Objective   Vitals:    01/12/24 1112   BP: 132/84   Pulse: 62   SpO2: 96%   Weight: (!) 139 kg (305 lb 6.4 oz)   Height: 175.3 cm (69\")     Body mass index is 45.1 kg/m².    Physical Exam  Vitals and nursing note reviewed.   Constitutional:       Appearance: She is well-developed.   HENT:      Head: Normocephalic and atraumatic.   Neck:      Thyroid: No thyromegaly.      Vascular: No JVD.   Cardiovascular:      Rate and Rhythm: Normal rate and regular rhythm.      Heart sounds: Normal heart sounds. No murmur heard.     No friction rub. No gallop.   Pulmonary:      Effort: Pulmonary effort is normal. No respiratory distress.      Breath sounds: Normal breath sounds. No wheezing or rales.   Abdominal:      General: Bowel sounds are normal. There is no distension.      Palpations: Abdomen is soft.      Tenderness: There is no abdominal tenderness. There is no guarding or rebound.   Musculoskeletal:      Cervical back: Neck supple.   Skin:     General: Skin is warm and dry.   Neurological:      Mental Status: She is alert.   Psychiatric:         Behavior: Behavior normal.         Assessment & Plan   Diagnoses and all orders for this visit:    1. Wellness examination (Primary)    2. Endocrine disorder  -     Tirzepatide (Mounjaro) 2.5 MG/0.5ML solution pen-injector pen; Inject 0.5 mL under the skin into the appropriate area as directed 1 (One) Time Per Week. Call for dose increases if tolerates  Dispense: 2 mL; Refill: 3    3. Abdominal obesity and " metabolic syndrome  -     Tirzepatide (Mounjaro) 2.5 MG/0.5ML solution pen-injector pen; Inject 0.5 mL under the skin into the appropriate area as directed 1 (One) Time Per Week. Call for dose increases if tolerates  Dispense: 2 mL; Refill: 3    4. Secondary hypertension  -     Tirzepatide (Mounjaro) 2.5 MG/0.5ML solution pen-injector pen; Inject 0.5 mL under the skin into the appropriate area as directed 1 (One) Time Per Week. Call for dose increases if tolerates  Dispense: 2 mL; Refill: 3    5. Morbid (severe) obesity due to excess calories  -     Tirzepatide (Mounjaro) 2.5 MG/0.5ML solution pen-injector pen; Inject 0.5 mL under the skin into the appropriate area as directed 1 (One) Time Per Week. Call for dose increases if tolerates  Dispense: 2 mL; Refill: 3    6. Other insomnia  -     traZODone (DESYREL) 100 MG tablet; Take 1 tablet by mouth At Night As Needed for Sleep.  Dispense: 90 tablet; Refill: 3    Counseled on diet and exercise  Counseled on weigh tloss at length a nd good candidate for glp1ra;  on several weight gaining medications.                   -Follow up: 3 months and prn

## 2024-01-23 DIAGNOSIS — N95.1 VASOMOTOR SYMPTOMS DUE TO MENOPAUSE: ICD-10-CM

## 2024-01-23 DIAGNOSIS — Z78.0 MENOPAUSE: ICD-10-CM

## 2024-01-23 RX ORDER — ESTRADIOL AND NORETHINDRONE ACETATE .5; .1 MG/1; MG/1
1 TABLET ORAL DAILY
Qty: 28 TABLET | Refills: 0 | Status: SHIPPED | OUTPATIENT
Start: 2024-01-23

## 2024-03-06 ENCOUNTER — TELEPHONE (OUTPATIENT)
Dept: FAMILY MEDICINE CLINIC | Facility: CLINIC | Age: 54
End: 2024-03-06

## 2024-03-06 RX ORDER — DEXTROMETHORPHAN HYDROBROMIDE AND PROMETHAZINE HYDROCHLORIDE 15; 6.25 MG/5ML; MG/5ML
5 SYRUP ORAL 4 TIMES DAILY PRN
Qty: 180 ML | Refills: 0 | Status: SHIPPED | OUTPATIENT
Start: 2024-03-06

## 2024-03-06 RX ORDER — DOXYCYCLINE HYCLATE 100 MG/1
100 CAPSULE ORAL 2 TIMES DAILY
Qty: 20 CAPSULE | Refills: 0 | Status: SHIPPED | OUTPATIENT
Start: 2024-03-06

## 2024-03-06 NOTE — TELEPHONE ENCOUNTER
Caller: Karen Nash    Relationship: Self    Best call back number: [phone]    What is the best time to reach you: ANYTIME     Who are you requesting to speak with (clinical staff, provider,  specific staff member): CLINICAL STAFF     Do you know the name of the person who called: SELF     What was the call regarding: PATIENT CALLED AND STATED TO PLEASE SEND SOME MEDICATION IN FOR HER BAD COUGH. PLEASE SEND MEDICATION IN TO [facility] [address]    Is it okay if the provider responds through MyChart: NO

## 2024-03-10 DIAGNOSIS — F41.1 GAD (GENERALIZED ANXIETY DISORDER): ICD-10-CM

## 2024-03-11 RX ORDER — HYDROXYZINE HYDROCHLORIDE 10 MG/1
10 TABLET, FILM COATED ORAL 3 TIMES DAILY PRN
Qty: 90 TABLET | Refills: 0 | Status: SHIPPED | OUTPATIENT
Start: 2024-03-11

## 2024-04-17 ENCOUNTER — OFFICE VISIT (OUTPATIENT)
Dept: FAMILY MEDICINE CLINIC | Facility: CLINIC | Age: 54
End: 2024-04-17
Payer: MEDICARE

## 2024-04-17 VITALS
BODY MASS INDEX: 43.4 KG/M2 | HEART RATE: 88 BPM | OXYGEN SATURATION: 96 % | WEIGHT: 293 LBS | HEIGHT: 69 IN | SYSTOLIC BLOOD PRESSURE: 122 MMHG | DIASTOLIC BLOOD PRESSURE: 86 MMHG

## 2024-04-17 DIAGNOSIS — J45.41 MODERATE PERSISTENT ASTHMA WITH ACUTE EXACERBATION: ICD-10-CM

## 2024-04-17 DIAGNOSIS — R53.83 OTHER FATIGUE: ICD-10-CM

## 2024-04-17 DIAGNOSIS — Z00.00 MEDICARE ANNUAL WELLNESS VISIT, SUBSEQUENT: Primary | ICD-10-CM

## 2024-04-17 DIAGNOSIS — E66.01 MORBID (SEVERE) OBESITY DUE TO EXCESS CALORIES: ICD-10-CM

## 2024-04-17 RX ORDER — METHYLPREDNISOLONE 4 MG/1
TABLET ORAL
Qty: 21 TABLET | Refills: 0 | Status: SHIPPED | OUTPATIENT
Start: 2024-04-17

## 2024-04-17 RX ORDER — DEXTROMETHORPHAN HYDROBROMIDE AND PROMETHAZINE HYDROCHLORIDE 15; 6.25 MG/5ML; MG/5ML
5 SYRUP ORAL 4 TIMES DAILY PRN
Qty: 180 ML | Refills: 0 | Status: SHIPPED | OUTPATIENT
Start: 2024-04-17

## 2024-04-17 NOTE — PATIENT INSTRUCTIONS
Advance Care Planning and Advance Directives     You make decisions on a daily basis - decisions about where you want to live, your career, your home, your life. Perhaps one of the most important decisions you face is your choice for future medical care. Take time to talk with your family and your healthcare team and start planning today.  Advance Care Planning is a process that can help you:  Understand possible future healthcare decisions in light of your own experiences  Reflect on those decision in light of your goals and values  Discuss your decisions with those closest to you and the healthcare professionals that care for you  Make a plan by creating a document that reflects your wishes    Surrogate Decision Maker  In the event of a medical emergency, which has left you unable to communicate or to make your own decisions, you would need someone to make decisions for you.  It is important to discuss your preferences for medical treatment with this person while you are in good health.     Qualities of a surrogate decision maker:  Willing to take on this role and responsibility  Knows what you want for future medical care  Willing to follow your wishes even if they don't agree with them  Able to make difficult medical decisions under stressful circumstances    Advance Directives  These are legal documents you can create that will guide your healthcare team and decision maker(s) when needed. These documents can be stored in the electronic medical record.    Living Will - a legal document to guide your care if you have a terminal condition or a serious illness and are unable to communicate. States vary by statute in document names/types, but most forms may include one or more of the following:        -  Directions regarding life-prolonging treatments        -  Directions regarding artificially provided nutrition/hydration        -  Choosing a healthcare decision maker        -  Direction regarding organ/tissue  donation    Durable Power of  for Healthcare - this document names an -in-fact to make medical decisions for you, but it may also allow this person to make personal and financial decisions for you. Please seek the advice of an  if you need this type of document.    **Advance Directives are not required and no one may discriminate against you if you do not sign one.    Medical Orders  Many states allow specific forms/orders signed by your physician to record your wishes for medical treatment in your current state of health. This form, signed in personal communication with your physician, addresses resuscitation and other medical interventions that you may or may not want.      For more information or to schedule a time with a [facility] Advance Care Planning Facilitator contact: [email or web address] or call [phone] and someone will contact you directly.Calorie Counting for Weight Loss  Calories are units of energy. Your body needs a certain number of calories from food to keep going throughout the day. When you eat or drink more calories than your body needs, your body stores the extra calories mostly as fat. When you eat or drink fewer calories than your body needs, your body burns fat to get the energy it needs.  Calorie counting means keeping track of how many calories you eat and drink each day. Calorie counting can be helpful if you need to lose weight. If you eat fewer calories than your body needs, you should lose weight. Ask your health care provider what a healthy weight is for you.  For calorie counting to work, you will need to eat the right number of calories each day to lose a healthy amount of weight per week. A dietitian can help you figure out how many calories you need in a day and will suggest ways to reach your calorie goal.  A healthy amount of weight to lose each week is usually 1-2 lb (0.5-0.9 kg). This usually means that your daily calorie intake should be  reduced by 500-750 calories.  Eating 1,200-1,500 calories a day can help most women lose weight.  Eating 1,500-1,800 calories a day can help most men lose weight.  What do I need to know about calorie counting?  Work with your health care provider or dietitian to determine how many calories you should get each day. To meet your daily calorie goal, you will need to:  Find out how many calories are in each food that you would like to eat. Try to do this before you eat.  Decide how much of the food you plan to eat.  Keep a food log. Do this by writing down what you ate and how many calories it had.  To successfully lose weight, it is important to balance calorie counting with a healthy lifestyle that includes regular activity.  Where do I find calorie information?  The number of calories in a food can be found on a Nutrition Facts label. If a food does not have a Nutrition Facts label, try to look up the calories online or ask your dietitian for help.  Remember that calories are listed per serving. If you choose to have more than one serving of a food, you will have to multiply the calories per serving by the number of servings you plan to eat. For example, the label on a package of bread might say that a serving size is 1 slice and that there are 90 calories in a serving. If you eat 1 slice, you will have eaten 90 calories. If you eat 2 slices, you will have eaten 180 calories.  How do I keep a food log?  After each time that you eat, record the following in your food log as soon as possible:  What you ate. Be sure to include toppings, sauces, and other extras on the food.  How much you ate. This can be measured in cups, ounces, or number of items.  How many calories were in each food and drink.  The total number of calories in the food you ate.  Keep your food log near you, such as in a pocket-sized notebook or on an krystle or website on your mobile phone. Some programs will calculate calories for you and show you how  many calories you have left to meet your daily goal.  What are some portion-control tips?  Know how many calories are in a serving. This will help you know how many servings you can have of a certain food.  Use a measuring cup to measure serving sizes. You could also try weighing out portions on a kitchen scale. With time, you will be able to estimate serving sizes for some foods.  Take time to put servings of different foods on your favorite plates or in your favorite bowls and cups so you know what a serving looks like.  Try not to eat straight from a food's packaging, such as from a bag or box. Eating straight from the package makes it hard to see how much you are eating and can lead to overeating. Put the amount you would like to eat in a cup or on a plate to make sure you are eating the right portion.  Use smaller plates, glasses, and bowls for smaller portions and to prevent overeating.  Try not to multitask. For example, avoid watching TV or using your computer while eating. If it is time to eat, sit down at a table and enjoy your food. This will help you recognize when you are full. It will also help you be more mindful of what and how much you are eating.  What are tips for following this plan?  Reading food labels  Check the calorie count compared with the serving size. The serving size may be smaller than what you are used to eating.  Check the source of the calories. Try to choose foods that are high in protein, fiber, and vitamins, and low in saturated fat, trans fat, and sodium.  Shopping  Read nutrition labels while you shop. This will help you make healthy decisions about which foods to buy.  Pay attention to nutrition labels for low-fat or fat-free foods. These foods sometimes have the same number of calories or more calories than the full-fat versions. They also often have added sugar, starch, or salt to make up for flavor that was removed with the fat.  Make a grocery list of lower-calorie foods  and stick to it.  Cooking  Try to cook your favorite foods in a healthier way. For example, try baking instead of frying.  Use low-fat dairy products.  Meal planning  Use more fruits and vegetables. One-half of your plate should be fruits and vegetables.  Include lean proteins, such as chicken, turkey, and fish.  Lifestyle  Each week, aim to do one of the followin minutes of moderate exercise, such as walking.  75 minutes of vigorous exercise, such as running.  General information  Know how many calories are in the foods you eat most often. This will help you calculate calorie counts faster.  Find a way of tracking calories that works for you. Get creative. Try different apps or programs if writing down calories does not work for you.  What foods should I eat?    Eat nutritious foods. It is better to have a nutritious, high-calorie food, such as an avocado, than a food with few nutrients, such as a bag of potato chips.  Use your calories on foods and drinks that will fill you up and will not leave you hungry soon after eating.  Examples of foods that fill you up are nuts and nut butters, vegetables, lean proteins, and high-fiber foods such as whole grains. High-fiber foods are foods with more than 5 g of fiber per serving.  Pay attention to calories in drinks. Low-calorie drinks include water and unsweetened drinks.  The items listed above may not be a complete list of foods and beverages you can eat. Contact a dietitian for more information.  What foods should I limit?  Limit foods or drinks that are not good sources of vitamins, minerals, or protein or that are high in unhealthy fats. These include:  Candy.  Other sweets.  Sodas, specialty coffee drinks, alcohol, and juice.  The items listed above may not be a complete list of foods and beverages you should avoid. Contact a dietitian for more information.  How do I count calories when eating out?  Pay attention to portions. Often, portions are much larger  when eating out. Try these tips to keep portions smaller:  Consider sharing a meal instead of getting your own.  If you get your own meal, eat only half of it. Before you start eating, ask for a container and put half of your meal into it.  When available, consider ordering smaller portions from the menu instead of full portions.  Pay attention to your food and drink choices. Knowing the way food is cooked and what is included with the meal can help you eat fewer calories.  If calories are listed on the menu, choose the lower-calorie options.  Choose dishes that include vegetables, fruits, whole grains, low-fat dairy products, and lean proteins.  Choose items that are boiled, broiled, grilled, or steamed. Avoid items that are buttered, battered, fried, or served with cream sauce. Items labeled as crispy are usually fried, unless stated otherwise.  Choose water, low-fat milk, unsweetened iced tea, or other drinks without added sugar. If you want an alcoholic beverage, choose a lower-calorie option, such as a glass of wine or light beer.  Ask for dressings, sauces, and syrups on the side. These are usually high in calories, so you should limit the amount you eat.  If you want a salad, choose a garden salad and ask for grilled meats. Avoid extra toppings such as garcia, cheese, or fried items. Ask for the dressing on the side, or ask for olive oil and vinegar or lemon to use as dressing.  Estimate how many servings of a food you are given. Knowing serving sizes will help you be aware of how much food you are eating at restaurants.  Where to find more information  Centers for Disease Control and Prevention: www.cdc.gov  [place] Department of Agriculture: myplate.gov  Summary  Calorie counting means keeping track of how many calories you eat and drink each day. If you eat fewer calories than your body needs, you should lose weight.  A healthy amount of weight to lose per week is usually 1-2 lb (0.5-0.9 kg). This usually  means reducing your daily calorie intake by 500-750 calories.  The number of calories in a food can be found on a Nutrition Facts label. If a food does not have a Nutrition Facts label, try to look up the calories online or ask your dietitian for help.  Use smaller plates, glasses, and bowls for smaller portions and to prevent overeating.  Use your calories on foods and drinks that will fill you up and not leave you hungry shortly after a meal.  This information is not intended to replace advice given to you by your health care provider. Make sure you discuss any questions you have with your health care provider.  Document Revised: 01/28/2021 Document Reviewed: 01/28/2021  [employer] Patient Education © 2022 [employer]    Exercising to Lose Weight  Getting regular exercise is important for everyone. It is especially important if you are overweight. Being overweight increases your risk of heart disease, stroke, diabetes, high blood pressure, and several types of cancer. Exercising, and reducing the calories you consume, can help you lose weight and improve fitness and health.  Exercise can be moderate or vigorous intensity. To lose weight, most people need to do a certain amount of moderate or vigorous-intensity exercise each week.  How can exercise affect me?  You lose weight when you exercise enough to burn more calories than you eat. Exercise also reduces body fat and builds muscle. The more muscle you have, the more calories you burn. Exercise also:  Improves mood.  Reduces stress and tension.  Improves your overall fitness, flexibility, and endurance.  Increases bone strength.  Moderate-intensity exercise  Moderate-intensity exercise is any activity that gets you moving enough to burn at least three times more energy (calories) than if you were sitting.  Examples of moderate exercise include:  Walking a mile in 15 minutes.  Doing light yard work.  Biking at an easy pace.  Most people should get at least 150  minutes of moderate-intensity exercise a week to maintain their body weight.  Vigorous-intensity exercise  Vigorous-intensity exercise is any activity that gets you moving enough to burn at least six times more calories than if you were sitting. When you exercise at this intensity, you should be working hard enough that you are not able to carry on a conversation.  Examples of vigorous exercise include:  Running.  Playing a team sport, such as football, basketball, and soccer.  Jumping rope.  Most people should get at least 75 minutes a week of vigorous exercise to maintain their body weight.  What actions can I take to lose weight?  The amount of exercise you need to lose weight depends on:  Your age.  The type of exercise.  Any health conditions you have.  Your overall physical ability.  Talk to your health care provider about how much exercise you need and what types of activities are safe for you.  Nutrition    Make changes to your diet as told by your health care provider or diet and nutrition specialist (dietitian). This may include:  Eating fewer calories.  Eating more protein.  Eating less unhealthy fats.  Eating a diet that includes fresh fruits and vegetables, whole grains, low-fat dairy products, and lean protein.  Avoiding foods with added fat, salt, and sugar.  Drink plenty of water while you exercise to prevent dehydration or heat stroke.  Activity  Choose an activity that you enjoy and set realistic goals. Your health care provider can help you make an exercise plan that works for you.  Exercise at a moderate or vigorous intensity most days of the week.  The intensity of exercise may vary from person to person. You can tell how intense a workout is for you by paying attention to your breathing and heartbeat. Most people will notice their breathing and heartbeat get faster with more intense exercise.  Do resistance training twice each week, such as:  Push-ups.  Sit-ups.  Lifting weights.  Using  resistance bands.  Getting short amounts of exercise can be just as helpful as long, structured periods of exercise. If you have trouble finding time to exercise, try doing these things as part of your daily routine:  Get up, stretch, and walk around every 30 minutes throughout the day.  Go for a walk during your lunch break.  Park your car farther away from your destination.  If you take public transportation, get off one stop early and walk the rest of the way.  Make phone calls while standing up and walking around.  Take the stairs instead of elevators or escalators.  Wear comfortable clothes and shoes with good support.  Do not exercise so much that you hurt yourself, feel dizzy, or get very short of breath.  Where to find more information  [place] Department of Health and Human Services: www.hhs.gov  Centers for Disease Control and Prevention: www.cdc.gov  Contact a health care provider:  Before starting a new exercise program.  If you have questions or concerns about your weight.  If you have a medical problem that keeps you from exercising.  Get help right away if:  You have any of the following while exercising:  Injury.  Dizziness.  Difficulty breathing or shortness of breath that does not go away when you stop exercising.  Chest pain.  Rapid heartbeat.  These symptoms may represent a serious problem that is an emergency. Do not wait to see if the symptoms will go away. Get medical help right away. Call your local emergency services (911 in the [place] Do not drive yourself to the hospital.  Summary  Getting regular exercise is especially important if you are overweight.  Being overweight increases your risk of heart disease, stroke, diabetes, high blood pressure, and several types of cancer.  Losing weight happens when you burn more calories than you eat.  Reducing the amount of calories you eat, and getting regular moderate or vigorous exercise each week, helps you lose weight.  This information is not  intended to replace advice given to you by your health care provider. Make sure you discuss any questions you have with your health care provider.  Document Revised: 02/13/2022 Document Reviewed: 02/13/2022  [employer] Patient Education © 2022 [employer]

## 2024-04-17 NOTE — PROGRESS NOTES
QUICK REFERENCE INFORMATION:  The ABCs of the Annual Wellness Visit    subsequent Medicare Wellness Visit    HEALTH RISK ASSESSMENT    1970  Karen Nash is a 53 y.o. female who presents for an Subsequent Wellness Visit.    The following portions of the patient's history were reviewed and   updated as appropriate: allergies, current medications, past family history, past medical history, past social history, past surgical history, and problem list.    Compared to one year ago, the patient feels his physical   health is worse.    Compared to one year ago, the patient feels his mental   health is the same.    Recent Hospitalizations:  She was not admitted to the hospital during the last year.     Current Medical Providers:  Patient Care Team:  Rob Bardales DO as PCP - General  Corona Reyes MD as Consulting Physician (Pulmonary Disease)  Aamir Moore MD as Consulting Physician (Rheumatology)    I reviewed list of current Medical providers and suppliers with patient and are listed above to the best of the patient's and my knowledge.    Smoking Status:  Social History     Tobacco Use   Smoking Status Former    Current packs/day: 0.00    Average packs/day: 0.1 packs/day for 8.0 years (0.8 ttl pk-yrs)    Types: Cigarettes    Start date: 1/1/2001    Quit date: 1/1/2009    Years since quitting: 15.3   Smokeless Tobacco Never       Alcohol Consumption:  Social History     Substance and Sexual Activity   Alcohol Use Not Currently    Alcohol/week: 1.0 standard drink of alcohol    Types: 1 Glasses of wine per week    Comment: SOCIAL USE//caffeine coffee daily        Depression Screen:       4/17/2024     9:00 AM   PHQ-2/PHQ-9 Depression Screening   Little Interest or Pleasure in Doing Things 2-->more than half the days   Feeling Down, Depressed or Hopeless 2-->more than half the days   Trouble Falling or Staying Asleep, or Sleeping Too Much 3-->nearly every day   Feeling Tired or Having Little  Energy 3-->nearly every day   Poor Appetite or Overeating 1-->several days   Feeling Bad about Yourself - or that You are a Failure or Have Let Yourself or Your Family Down 3-->nearly every day   Trouble Concentrating on Things, Such as Reading the Newspaper or Watching Television 1-->several days   Moving or Speaking So Slowly that Other People Could Have Noticed? Or the Opposite - Being So Fidgety 0-->not at all   Thoughts that You Would be Better Off Dead or of Hurting Yourself in Some Way 0-->not at all   PHQ-9: Brief Depression Severity Measure Score 15   If You Checked Off Any Problems, How Difficult Have These Problems Made It For You to Do Your Work, Take Care of Things at Home, or Get Along with Other People? somewhat difficult       Health Habits and Functional and Cognitive Screenin/17/2024     9:00 AM   Functional & Cognitive Status   Do you have difficulty preparing food and eating? No   Do you have difficulty bathing yourself, getting dressed or grooming yourself? No   Do you have difficulty using the toilet? No   Do you have difficulty moving around from place to place? No   Do you have trouble with steps or getting out of a bed or a chair? No   Current Diet Well Balanced Diet   Dental Exam Not up to date   Eye Exam Not up to date   Exercise (times per week) 0 times per week   Current Exercises Include No Regular Exercise   Do you need help using the phone?  No   Are you deaf or do you have serious difficulty hearing?  No   Do you need help to go to places out of walking distance? No   Do you need help shopping? No   Do you need help preparing meals?  No   Do you need help with housework?  No   Do you need help with laundry? No   Do you need help taking your medications? No   Do you need help managing money? No   Do you ever drive or ride in a car without wearing a seat belt? No   Have you felt unusual stress, anger or loneliness in the last month? No   Who do you live with? Spouse   If you  need help, do you have trouble finding someone available to you? No   Have you been bothered in the last four weeks by sexual problems? No   Do you have difficulty concentrating, remembering or making decisions? No       Does the patient have evidence of cognitive impairment? No    Aspirin use counseling: Does not need ASA (and currently is not on it)    Recent Lab Results:  CMP:  Lab Results   Component Value Date    BUN 18 07/13/2023    CREATININE 0.92 07/13/2023    EGFRIFNONA 64 01/21/2021    EGFRIFAFRI >60 01/10/2023    BCR 19.6 07/13/2023     07/13/2023    K 4.1 07/13/2023    CO2 21.9 (L) 07/13/2023    CALCIUM 9.4 07/13/2023    PROTENTOTREF 6.6 07/13/2023    ALBUMIN 4.1 07/13/2023    LABGLOBREF 2.5 07/13/2023    LABIL2 1.6 07/13/2023    BILITOT 0.3 07/13/2023    ALKPHOS 97 07/13/2023    AST 30 07/13/2023    ALT 34 (H) 07/13/2023     Lipid Panel:  Lab Results   Component Value Date    TRIG 125 07/13/2023    HDL 53 07/13/2023    VLDL 22 07/13/2023     HbA1c:       Visual Acuity:  No results found.    Age-appropriate Screening Schedule:  Refer to the list below for future screening recommendations based on patient's age, sex and/or medical conditions. Orders for these recommended tests are listed in the plan section. The patient has been provided with a written plan.    Health Maintenance   Topic Date Due    HEPATITIS C SCREENING  Never done    PAP SMEAR  12/21/2021    COVID-19 Vaccine (3 - 2023-24 season) 09/01/2023    MAMMOGRAM  05/26/2024    LIPID PANEL  07/13/2024    INFLUENZA VACCINE  08/01/2024    BMI FOLLOWUP  01/02/2025    ANNUAL WELLNESS VISIT  04/17/2025    COLORECTAL CANCER SCREENING  07/07/2026    TDAP/TD VACCINES (2 - Td or Tdap) 08/11/2026    Pneumococcal Vaccine 0-64 (3 of 3 - PPSV23 or PCV20) 06/11/2033    DXA SCAN  Discontinued          Outpatient Medications Prior to Visit   Medication Sig Dispense Refill    acetaminophen (TYLENOL) 500 MG tablet Take 1 tablet by mouth Every 6 (Six) Hours As  Needed for Mild Pain.      albuterol (ACCUNEB) 1.25 MG/3ML nebulizer solution Take 3 mL by nebulization Every 4 (Four) Hours As Needed for Wheezing. Dx.  J45.40 120 mL 11    albuterol sulfate  (90 Base) MCG/ACT inhaler Inhale 2 puffs Every 6 (Six) Hours As Needed for Wheezing. 18 g 5    amLODIPine (NORVASC) 10 MG tablet Take 1 tablet by mouth Daily. 90 tablet 3    azelastine (OPTIVAR) 0.05 % ophthalmic solution Administer 1 drop to both eyes 2 (Two) Times a Day As Needed (eye allergies). 1 each 12    budesonide-formoterol (Symbicort) 160-4.5 MCG/ACT inhaler Inhale 2 puffs 2 (Two) Times a Day. 10.2 g 11    busPIRone (BUSPAR) 10 MG tablet Take 1 tablet by mouth 3 (Three) Times a Day. 270 tablet 3    CALCIUM-MAGNESIUM-ZINC PO Take  by mouth.      Cetirizine HCl 10 MG capsule Take 10 mg by mouth Daily. 30 capsule 5    Estradiol-Norethindrone Acet 0.5-0.1 MG per tablet TAKE 1 TABLET BY MOUTH EVERY DAY 28 tablet 0    FLUoxetine (PROzac) 40 MG capsule Take 2 capsules by mouth Daily. 180 capsule 3    folic acid (FOLVITE) 1 MG tablet Take 1 tablet by mouth Daily. 90 tablet 3    hydroCHLOROthiazide (HYDRODIURIL) 25 MG tablet Take 1 tablet by mouth Daily. 90 tablet 3    hydroxychloroquine (PLAQUENIL) 200 MG tablet 2 po daily  Indications: Rheumatoid Arthritis 180 tablet 3    hydrOXYzine (ATARAX) 10 MG tablet Take 1 tablet by mouth 3 (Three) Times a Day As Needed for Anxiety. 90 tablet 0    Multiple Vitamin (MULTI VITAMIN DAILY PO) Take by mouth daily.      pantoprazole (PROTONIX) 40 MG EC tablet Take 1 tablet by mouth 2 (Two) Times a Day. 180 tablet 3    phentermine (ADIPEX-P) 37.5 MG tablet TAKE 1 TABLET BY MOUTH IN THE MORNING 90 tablet 0    polyethylene glycol (MIRALAX) pack packet mix 1 packet into drink every night 30 packet 4    pregabalin (LYRICA) 150 MG capsule Take 1 capsule by mouth 2 (Two) Times a Day. 180 capsule 1    topiramate (TOPAMAX) 100 MG tablet Take 1 tablet by mouth 2 (Two) Times a Day. 180 tablet 3     traZODone (DESYREL) 100 MG tablet Take 1 tablet by mouth At Night As Needed for Sleep. 90 tablet 3    doxycycline (VIBRAMYCIN) 100 MG capsule Take 1 capsule by mouth 2 (Two) Times a Day. (Patient not taking: Reported on 4/17/2024) 20 capsule 0    promethazine-dextromethorphan (PROMETHAZINE-DM) 6.25-15 MG/5ML syrup Take 5 mL by mouth 4 (Four) Times a Day As Needed for Cough. (Patient not taking: Reported on 4/17/2024) 180 mL 0    Tirzepatide (Mounjaro) 2.5 MG/0.5ML solution pen-injector pen Inject 0.5 mL under the skin into the appropriate area as directed 1 (One) Time Per Week. Call for dose increases if tolerates (Patient not taking: Reported on 4/17/2024) 2 mL 3     No facility-administered medications prior to visit.       Patient Active Problem List   Diagnosis    Moderate persistent asthma without complication    Atopic rhinitis    Anemia    Benign essential hypertension    Constipation    Dyslipidemia    Gastroesophageal reflux disease    Intractable migraine without aura and without status migrainosus    Muscle cramps    Vocal cord dysfunction    Female dyspareunia    Vaginal atrophy    Pelvic pain    Stress incontinence of urine    Fibromyalgia, primary    Rheumatoid arthritis of multiple sites with negative rheumatoid factor    Severe episode of recurrent major depressive disorder, without psychotic features    Panic disorder    Generalized anxiety disorder    Primary osteoarthritis involving multiple joints    Class 3 severe obesity due to excess calories with serious comorbidity and body mass index (BMI) of 40.0 to 44.9 in adult    Esophageal dysphagia    Dyspepsia    Hypersomnia with sleep apnea       Advance Care Planning:  ACP discussion was held with the patient during this visit. Patient does not have an advance directive, information provided.    Identification of Risk Factors:  Risk factors include: Obesity/Overweight .    Review of Systems   Constitutional:  Positive for fatigue and  "malaise/fatigue. Negative for fever.   HENT:  Negative for hoarse voice.    Respiratory:  Positive for cough, sputum production, shortness of breath and wheezing. Negative for hemoptysis.    Cardiovascular:  Negative for chest pain and PND.       Compared to one year ago, the patient feels her physical health is the same.  Compared to one year ago, the patient feels her mental health is the same.    Objective     Physical Exam  Vitals and nursing note reviewed.   Constitutional:       Appearance: She is well-developed.   HENT:      Head: Normocephalic and atraumatic.   Neck:      Thyroid: No thyromegaly.      Vascular: No JVD.   Cardiovascular:      Rate and Rhythm: Normal rate and regular rhythm.      Heart sounds: Normal heart sounds. No murmur heard.     No friction rub. No gallop.   Pulmonary:      Effort: Pulmonary effort is normal. No respiratory distress.      Breath sounds: Normal breath sounds. Decreased air movement present. Decreased breath sounds and wheezing present. No rales.   Abdominal:      General: Bowel sounds are normal. There is no distension.      Palpations: Abdomen is soft.      Tenderness: There is no abdominal tenderness. There is no guarding or rebound.   Musculoskeletal:      Cervical back: Neck supple.      Right lower leg: Edema present.      Left lower leg: Edema present.   Skin:     General: Skin is warm and dry.   Neurological:      Mental Status: She is alert.   Psychiatric:         Behavior: Behavior normal.         Vitals:    04/17/24 0925   BP: 122/86   Pulse: 88   SpO2: 96%   Weight: (!) 141 kg (310 lb)   Height: 175.3 cm (69\")   PainSc: 0-No pain     Body mass index is 45.78 kg/m².           Assessment & Plan   Patient Self-Management and Personalized Health Advice  The patient has been provided with information about: diet and exercise and preventive services including:   Annual Wellness Visit (AWV).    Visit Diagnoses:    ICD-10-CM ICD-9-CM   1. Medicare annual wellness " visit, subsequent  Z00.00 V70.0   2. Morbid (severe) obesity due to excess calories  E66.01 278.01   3. Other fatigue  R53.83 780.79   4. Moderate persistent asthma with acute exacerbation  J45.41 493.92       Orders Placed This Encounter   Procedures    Ambulatory Referral to Sleep Medicine     Referral Priority:   Routine     Referral Type:   Consultation     Referral Reason:   Specialty Services Required     Referred to Provider:   Corona Reyes MD     Requested Specialty:   Sleep Medicine     Number of Visits Requested:   1    Ambulatory Referral to Bariatric Surgery     Referral Priority:   Routine     Referral Type:   Consultation     Referral Reason:   Specialty Services Required     Referred to Provider:   Abebe Nur Jr., MD     Requested Specialty:   Bariatrics     Number of Visits Requested:   1       Outpatient Encounter Medications as of 4/17/2024   Medication Sig Dispense Refill    acetaminophen (TYLENOL) 500 MG tablet Take 1 tablet by mouth Every 6 (Six) Hours As Needed for Mild Pain.      albuterol (ACCUNEB) 1.25 MG/3ML nebulizer solution Take 3 mL by nebulization Every 4 (Four) Hours As Needed for Wheezing. Dx.  J45.40 120 mL 11    albuterol sulfate  (90 Base) MCG/ACT inhaler Inhale 2 puffs Every 6 (Six) Hours As Needed for Wheezing. 18 g 5    amLODIPine (NORVASC) 10 MG tablet Take 1 tablet by mouth Daily. 90 tablet 3    azelastine (OPTIVAR) 0.05 % ophthalmic solution Administer 1 drop to both eyes 2 (Two) Times a Day As Needed (eye allergies). 1 each 12    budesonide-formoterol (Symbicort) 160-4.5 MCG/ACT inhaler Inhale 2 puffs 2 (Two) Times a Day. 10.2 g 11    busPIRone (BUSPAR) 10 MG tablet Take 1 tablet by mouth 3 (Three) Times a Day. 270 tablet 3    CALCIUM-MAGNESIUM-ZINC PO Take  by mouth.      Cetirizine HCl 10 MG capsule Take 10 mg by mouth Daily. 30 capsule 5    Estradiol-Norethindrone Acet 0.5-0.1 MG per tablet TAKE 1 TABLET BY MOUTH EVERY DAY 28 tablet 0     FLUoxetine (PROzac) 40 MG capsule Take 2 capsules by mouth Daily. 180 capsule 3    folic acid (FOLVITE) 1 MG tablet Take 1 tablet by mouth Daily. 90 tablet 3    hydroCHLOROthiazide (HYDRODIURIL) 25 MG tablet Take 1 tablet by mouth Daily. 90 tablet 3    hydroxychloroquine (PLAQUENIL) 200 MG tablet 2 po daily  Indications: Rheumatoid Arthritis 180 tablet 3    hydrOXYzine (ATARAX) 10 MG tablet Take 1 tablet by mouth 3 (Three) Times a Day As Needed for Anxiety. 90 tablet 0    Multiple Vitamin (MULTI VITAMIN DAILY PO) Take by mouth daily.      pantoprazole (PROTONIX) 40 MG EC tablet Take 1 tablet by mouth 2 (Two) Times a Day. 180 tablet 3    phentermine (ADIPEX-P) 37.5 MG tablet TAKE 1 TABLET BY MOUTH IN THE MORNING 90 tablet 0    polyethylene glycol (MIRALAX) pack packet mix 1 packet into drink every night 30 packet 4    pregabalin (LYRICA) 150 MG capsule Take 1 capsule by mouth 2 (Two) Times a Day. 180 capsule 1    topiramate (TOPAMAX) 100 MG tablet Take 1 tablet by mouth 2 (Two) Times a Day. 180 tablet 3    traZODone (DESYREL) 100 MG tablet Take 1 tablet by mouth At Night As Needed for Sleep. 90 tablet 3    methylPREDNISolone (MEDROL) 4 MG dose pack Take as directed on package instructions. 21 tablet 0    promethazine-dextromethorphan (PROMETHAZINE-DM) 6.25-15 MG/5ML syrup Take 5 mL by mouth 4 (Four) Times a Day As Needed for Cough. 180 mL 0    [DISCONTINUED] doxycycline (VIBRAMYCIN) 100 MG capsule Take 1 capsule by mouth 2 (Two) Times a Day. (Patient not taking: Reported on 4/17/2024) 20 capsule 0    [DISCONTINUED] promethazine-dextromethorphan (PROMETHAZINE-DM) 6.25-15 MG/5ML syrup Take 5 mL by mouth 4 (Four) Times a Day As Needed for Cough. (Patient not taking: Reported on 4/17/2024) 180 mL 0    [DISCONTINUED] Tirzepatide (Mounjaro) 2.5 MG/0.5ML solution pen-injector pen Inject 0.5 mL under the skin into the appropriate area as directed 1 (One) Time Per Week. Call for dose increases if tolerates (Patient not  taking: Reported on 4/17/2024) 2 mL 3     No facility-administered encounter medications on file as of 4/17/2024.       Reviewed use of high risk medication in the elderly: yes  Reviewed for potential of harmful drug interactions in the elderly: yes  No opioid medication identified on active medication list. I have reviewed chart for other potential  high risk medication/s and harmful drug interactions in the elderly.    Social History     Socioeconomic History    Marital status:     Number of children: 0   Tobacco Use    Smoking status: Former     Current packs/day: 0.00     Average packs/day: 0.1 packs/day for 8.0 years (0.8 ttl pk-yrs)     Types: Cigarettes     Start date: 1/1/2001     Quit date: 1/1/2009     Years since quitting: 15.3    Smokeless tobacco: Never   Vaping Use    Vaping status: Never Used   Substance and Sexual Activity    Alcohol use: Not Currently     Alcohol/week: 1.0 standard drink of alcohol     Types: 1 Glasses of wine per week     Comment: SOCIAL USE//caffeine coffee daily     Drug use: No    Sexual activity: Yes     Partners: Male     Birth control/protection: None     Patient was screened for OUD and BERTA risk factors.  Karen Nash's pain level was assessed as well today.  I included a review current recommendations on pain management, best use practices, current CDC guidelines, alternatives to opioids including OTC & Rx topicals, non-opioid oral medications (eg nsaids, acetominophen) and life style interventions such as yoga, alla chi, stretching, regular exercise, PT/OT and referral to specialty care like Pain Management that specialize in pain treatment when appropriate.   I also included a review of serious risks of opioid use and substance use disorder.  I have included all of this in the after visit summary along with other risk factors identified that are pertinent to the patient today.      Follow Up:  Return in about 3 months (around 7/17/2024), or if symptoms worsen or  fail to improve.     An After Visit Summary and PPPS with all of these plans were given to the patient.           ++++++++++++++++++++++++++++++++++++++++++++++++++++++++++++++++++   Additional E&M Note during same encounter follows:  Patient has multiple medical problems which are significant and separately identifiable that require additional work above and beyond the Medicare Wellness Visit.   Chief Complaint   Patient presents with    Medicare Wellness-subsequent    Asthma    Cough    Fatigue     Asthma  She complains of cough, frequent throat clearing, shortness of breath, sputum production and wheezing. There is no hemoptysis or hoarse voice. This is a recurrent problem. The current episode started in the past 7 days. The problem occurs constantly. The problem has been rapidly worsening. Associated symptoms include malaise/fatigue. Pertinent negatives include no chest pain, fever, orthopnea or PND. Her symptoms are aggravated by change in weather. Her symptoms are alleviated by beta-agonist. She reports moderate improvement on treatment. Her symptoms are not alleviated by steroid inhaler. Her past medical history is significant for asthma.   Cough  This is a new problem. The current episode started in the past 7 days. The problem has been worse. The problem occurs constantly. Associated symptoms include shortness of breath and wheezing. Pertinent negatives include no chest pain, fever or hemoptysis. She has tried a beta-agonist inhaler for the symptoms. The treatment provided moderate relief. Her past medical history is significant for asthma.   Fatigue  This is a chronic problem. The problem has been gradually worsening. Associated symptoms include coughing and fatigue. Pertinent negatives include no chest pain or fever. Treatments tried: had inconclusive home study, had to cancel inpatietn as  cancer;   since home study gained another 20+lbs and fatigue worse;  friend notes snores, apnea.     Gaining  "more weight despite weight loss meds and has signficant medical history;   would be good candidate for bariatric procedure such as gastric sleeve.  Referred last year, but was not able to proceed due to husbands cancer recurrence.    Review of Systems   Constitutional: Positive for fatigue and malaise/fatigue. Negative for fever.   HENT:  Negative for hoarse voice.    Cardiovascular:  Negative for chest pain and paroxysmal nocturnal dyspnea.   Respiratory:  Positive for cough, shortness of breath, sputum production and wheezing. Negative for hemoptysis.        Social History     Tobacco Use    Smoking status: Former     Current packs/day: 0.00     Average packs/day: 0.1 packs/day for 8.0 years (0.8 ttl pk-yrs)     Types: Cigarettes     Start date: 1/1/2001     Quit date: 1/1/2009     Years since quitting: 15.3    Smokeless tobacco: Never   Substance Use Topics    Alcohol use: Not Currently     Alcohol/week: 1.0 standard drink of alcohol     Types: 1 Glasses of wine per week     Comment: SOCIAL USE//caffeine coffee daily      O:   Vitals:    04/17/24 0925   BP: 122/86   Pulse: 88   SpO2: 96%   Weight: (!) 141 kg (310 lb)   Height: 175.3 cm (69\")   PainSc: 0-No pain     Body mass index is 45.78 kg/m².  Vitals and nursing note reviewed.   Constitutional:       Appearance: Well-developed.   HENT:      Head: Normocephalic and atraumatic.   Neck:      Thyroid: No thyromegaly.      Vascular: No JVD.   Pulmonary:      Effort: Pulmonary effort is normal. No respiratory distress.      Breath sounds: Normal breath sounds. Decreased air movement present. Decreased breath sounds present. Wheezing present. No rales.   Cardiovascular:      Normal rate. Regular rhythm. Normal heart sounds.      No gallop.  No friction rub.   Edema:     Pretibial: bilateral edema of the pretibial area.  Abdominal:      General: Bowel sounds are normal. There is no distension.      Palpations: Abdomen is soft.      Tenderness: There is no abdominal " tenderness. There is no guarding or rebound.   Musculoskeletal:      Cervical back: Neck supple. Skin:     General: Skin is warm and dry.   Neurological:      Mental Status: Alert.   Psychiatric:         Behavior: Behavior normal.         Diagnoses and all orders for this visit:    1. Medicare annual wellness visit, subsequent (Primary)    2. Morbid (severe) obesity due to excess calories  -     Ambulatory Referral to Bariatric Surgery    3. Other fatigue  -     Ambulatory Referral to Sleep Medicine    4. Moderate persistent asthma with acute exacerbation  -     methylPREDNISolone (MEDROL) 4 MG dose pack; Take as directed on package instructions.  Dispense: 21 tablet; Refill: 0  -     promethazine-dextromethorphan (PROMETHAZINE-DM) 6.25-15 MG/5ML syrup; Take 5 mL by mouth 4 (Four) Times a Day As Needed for Cough.  Dispense: 180 mL; Refill: 0    Continue laba/ics and prudence;  will give steroid pack;  ok cough syrup but mainstay of treatment steroid.  May need add lama if frequent flares to avoid oral steroids.  Fatigue - suspect dianna, refer again to sleep specialist  Recommend weight loss, struggling;  good candidate for bariatric procedure, will refer again.  Leg swelling-  elevate legs, work on weight loss;  avoid salt and follow low Na diet.    Return in about 3 months (around 7/17/2024), or if symptoms worsen or fail to improve.

## 2024-04-20 DIAGNOSIS — M79.7 FIBROMYALGIA, PRIMARY: ICD-10-CM

## 2024-04-22 RX ORDER — PREGABALIN 150 MG/1
150 CAPSULE ORAL 2 TIMES DAILY
Qty: 180 CAPSULE | Refills: 1 | Status: SHIPPED | OUTPATIENT
Start: 2024-04-22

## 2024-05-22 DIAGNOSIS — G47.09 OTHER INSOMNIA: ICD-10-CM

## 2024-05-22 RX ORDER — FOLIC ACID 1 MG/1
TABLET ORAL
Qty: 90 TABLET | Refills: 0 | Status: SHIPPED | OUTPATIENT
Start: 2024-05-22

## 2024-05-22 RX ORDER — TRAZODONE HYDROCHLORIDE 50 MG/1
50 TABLET ORAL NIGHTLY PRN
Qty: 90 TABLET | Refills: 0 | OUTPATIENT
Start: 2024-05-22

## 2024-05-24 RX ORDER — AMLODIPINE BESYLATE 10 MG/1
10 TABLET ORAL DAILY
Qty: 90 TABLET | Refills: 1 | Status: SHIPPED | OUTPATIENT
Start: 2024-05-24

## 2024-06-19 ENCOUNTER — OFFICE VISIT (OUTPATIENT)
Dept: SLEEP MEDICINE | Facility: HOSPITAL | Age: 54
End: 2024-06-19
Payer: MEDICARE

## 2024-06-19 VITALS — BODY MASS INDEX: 43.4 KG/M2 | WEIGHT: 293 LBS | OXYGEN SATURATION: 97 % | HEIGHT: 69 IN | HEART RATE: 57 BPM

## 2024-06-19 DIAGNOSIS — F59 UNSPECIFIED BEHAVIORAL SYNDROMES ASSOCIATED WITH PHYSIOLOGICAL DISTURBANCES AND PHYSICAL FACTORS: ICD-10-CM

## 2024-06-19 DIAGNOSIS — R40.0 SOMNOLENCE: ICD-10-CM

## 2024-06-19 DIAGNOSIS — G47.10 HYPERSOMNIA, UNSPECIFIED: Primary | ICD-10-CM

## 2024-06-19 DIAGNOSIS — R41.89 OTHER SYMPTOMS AND SIGNS INVOLVING COGNITIVE FUNCTIONS AND AWARENESS: ICD-10-CM

## 2024-06-19 DIAGNOSIS — E66.01 CLASS 3 SEVERE OBESITY DUE TO EXCESS CALORIES WITH SERIOUS COMORBIDITY AND BODY MASS INDEX (BMI) OF 45.0 TO 49.9 IN ADULT: ICD-10-CM

## 2024-06-19 DIAGNOSIS — R53.83 OTHER FATIGUE: ICD-10-CM

## 2024-06-19 PROCEDURE — 1160F RVW MEDS BY RX/DR IN RCRD: CPT | Performed by: INTERNAL MEDICINE

## 2024-06-19 PROCEDURE — 1159F MED LIST DOCD IN RCRD: CPT | Performed by: INTERNAL MEDICINE

## 2024-06-19 PROCEDURE — G0463 HOSPITAL OUTPT CLINIC VISIT: HCPCS

## 2024-06-19 NOTE — PROGRESS NOTES
Follow Up [facility] Note     Chief Complaint:  TERESITA     Primary Care Physician: Rob Bardales DO    Interval History:   The patient is a 54 y.o. female who I last saw 7/27/2022 and that note was reviewed.  Home sleep study performed 9/6/2022.  No obstructive sleep apnea identified.  AHI 1.4 events per hour.  No sleep-related hypoxia.  The patient snored 30.2% of total monitoring time.  The patient did not follow-up at that time.    The patient is here today for follow-up and states that she is still tired.  She goes to bed and wakes up several times during the night.  She snores and talks in her sleep.  She is always tired.  She goes to bed between 9 and 10 PM and gets out of bed at 6:45 AM.  She is tired upon awakening.  She will take 1 or 2 naps daily.  Pace Sleepiness Scale is abnormal at 17.  She has difficulty driving due to sleepiness.  She has awaken coughing and choking.  She has morning headaches and dry mouth.  She has leg jerking at night.  She has nocturnal pain.  She grinds her teeth.  She has complaints of CHANDLER.  She will have sudden episodes of sleep during the daytime.  She has difficulty staying asleep at night and her sleep is generally restless with frequent awakenings.  She uses the restroom 2 or 3 times during the nighttime.    Review of Systems:    A complete review of systems was done and all were negative with the exception of frequent urination, fatigue, swollen ankles toward the end the day, sometimes she is short of breath with occasional wheezing and she takes medicines for heartburn and she has anxiety and depression    Social History:    Social History     Socioeconomic History    Marital status:     Number of children: 0   Tobacco Use    Smoking status: Former     Current packs/day: 0.00     Average packs/day: 0.1 packs/day for 8.0 years (0.8 ttl pk-yrs)     Types: Cigarettes     Start date: 1/1/2001     Quit date: 1/1/2009     Years since quitting: 15.4     "Smokeless tobacco: Never   Vaping Use    Vaping status: Never Used   Substance and Sexual Activity    Alcohol use: Not Currently     Alcohol/week: 1.0 standard drink of alcohol     Types: 1 Glasses of wine per week     Comment: SOCIAL USE//caffeine coffee daily     Drug use: No    Sexual activity: Yes     Partners: Male     Birth control/protection: None       Allergies:  Sulfamethoxazole-trimethoprim, Corn-containing products, Penicillins, and Bacitracin     Medication Review:  Reviewed.      Vital Signs:    Vitals:    06/19/24 1050   Pulse: 57   SpO2: 97%   Weight: (!) 143 kg (314 lb 6.4 oz)   Height: 175.3 cm (69\")     Body mass index is 46.43 kg/m².    Physical Exam:    Constitutional:  Well developed 54 y.o. female that appears in no apparent distress.  Awake & oriented times 3.  Normal mood with normal recent and remote memory and normal judgement.  Eyes:  Conjunctivae normal.  Oropharynx: Previously, moist mucous membranes without exudate and a large tongue and normal uvula and patent posterior pharyngeal opening and class II Mallampati airway.    Self-administered [place] Sleepiness Scale test results: 17  0-5 Lower normal daytime sleepiness  6-10 Higher normal daytime sleepiness  11-12 Mild, 13-15 Moderate, & 16-24 Severe excessive daytime sleepiness     Impression:   Hypersomnolence, unspecified, with home sleep study performed 9/6/2022, weight 288 pounds, demonstrating no obstructive sleep apnea and no sleep-related hypoxia the patient did snore 30.2% of total monitoring time.    Plan:  Good sleep hygiene measures should be maintained.  Weight loss would be beneficial in this patient who has class III severe obesity by Body mass index is 46.43 kg/m².      After reviewing all with the patient, I would recommend and she is agreeable to proceed with overnight polysomnogram.  With weight gain, obstructive sleep apnea needs to be ruled out.  If no obstructive sleep apnea is identified, multiple sleep latency " test should be performed.  The patient states she has been sleepy all her life, even dating back to high school.  If no obstructive sleep apnea is present rule out narcolepsy without symptoms of cataplexy.    I answered all of the patient's questions.  The patient will call the [facility] for any problems and will follow up after testing has been performed         Corona Reyes MD  Sleep Medicine  06/19/24  10:52 EDT

## 2024-07-02 ENCOUNTER — TELEPHONE (OUTPATIENT)
Dept: FAMILY MEDICINE CLINIC | Facility: CLINIC | Age: 54
End: 2024-07-02
Payer: MEDICARE

## 2024-07-02 DIAGNOSIS — R60.1 GENERALIZED EDEMA: Primary | ICD-10-CM

## 2024-07-02 NOTE — TELEPHONE ENCOUNTER
Caller: Karen Nash    Relationship: Self    Best call back number: [phone]     What medication are you requesting:      What are your current symptoms: BOTH LEGS AND FEET SWELLING    How long have you been experiencing symptoms:      Have you had these symptoms before:    [] Yes  [] No    Have you been treated for these symptoms before:   [] Yes  [] No    If a prescription is needed, what is your preferred pharmacy and phone number: [id number] - [place], [place] - [address] [place] [phone] [facility] [phone]      Additional notes: PATIENT CALLED IN STATED SHE HAD TALKED WITH DR MCRAE AT LAST APPT ABOUT HER LEGS/ANKLES SWELLING.  IT HAS GOTTEN WORSE AND STAYS SWELLED ALL DAY.  WANTS TO KNOW WHAT DR MCRAE CAN CALL IN FOR HER SWELLING.  HE HAD PREVIOUSLY TALKED ABOUT A WATER PILL.

## 2024-07-05 RX ORDER — HYDROCHLOROTHIAZIDE 25 MG/1
50 TABLET ORAL DAILY
Qty: 90 TABLET | Refills: 3 | Status: SHIPPED | OUTPATIENT
Start: 2024-07-05

## 2024-07-11 DIAGNOSIS — K21.00 GASTROESOPHAGEAL REFLUX DISEASE WITH ESOPHAGITIS: ICD-10-CM

## 2024-07-11 DIAGNOSIS — F32.4 MAJOR DEPRESSIVE DISORDER WITH SINGLE EPISODE, IN PARTIAL REMISSION: ICD-10-CM

## 2024-07-11 DIAGNOSIS — E66.01 CLASS 2 SEVERE OBESITY DUE TO EXCESS CALORIES WITH SERIOUS COMORBIDITY AND BODY MASS INDEX (BMI) OF 35.0 TO 35.9 IN ADULT: ICD-10-CM

## 2024-07-11 DIAGNOSIS — K22.4 ESOPHAGEAL DYSMOTILITY: ICD-10-CM

## 2024-07-11 DIAGNOSIS — F41.1 GAD (GENERALIZED ANXIETY DISORDER): ICD-10-CM

## 2024-07-11 RX ORDER — FOLIC ACID 1 MG/1
TABLET ORAL
Qty: 90 TABLET | Refills: 0 | Status: SHIPPED | OUTPATIENT
Start: 2024-07-11

## 2024-07-11 RX ORDER — PANTOPRAZOLE SODIUM 40 MG/1
40 TABLET, DELAYED RELEASE ORAL 2 TIMES DAILY
Qty: 180 TABLET | Refills: 0 | Status: SHIPPED | OUTPATIENT
Start: 2024-07-11

## 2024-07-11 RX ORDER — BUSPIRONE HYDROCHLORIDE 10 MG/1
TABLET ORAL
Qty: 270 TABLET | Refills: 0 | Status: SHIPPED | OUTPATIENT
Start: 2024-07-11

## 2024-07-11 RX ORDER — OXYBUTYNIN CHLORIDE 5 MG/1
5 TABLET, EXTENDED RELEASE ORAL DAILY
Qty: 90 TABLET | Refills: 0 | OUTPATIENT
Start: 2024-07-11

## 2024-07-11 RX ORDER — FLUOXETINE HYDROCHLORIDE 40 MG/1
80 CAPSULE ORAL DAILY
Qty: 180 CAPSULE | Refills: 0 | Status: SHIPPED | OUTPATIENT
Start: 2024-07-11

## 2024-07-11 RX ORDER — HYDROXYZINE HYDROCHLORIDE 10 MG/1
10 TABLET, FILM COATED ORAL 3 TIMES DAILY PRN
Qty: 90 TABLET | Refills: 0 | Status: SHIPPED | OUTPATIENT
Start: 2024-07-11

## 2024-07-11 RX ORDER — PHENTERMINE HYDROCHLORIDE 37.5 MG/1
TABLET ORAL
Qty: 90 TABLET | Refills: 0 | Status: SHIPPED | OUTPATIENT
Start: 2024-07-11

## 2024-07-17 NOTE — TELEPHONE ENCOUNTER
How Severe Are Your Spot(S)?: mild rx pending jm   What Is The Reason For Today's Visit?: Upper Body Skin Exam

## 2024-07-24 ENCOUNTER — HOSPITAL ENCOUNTER (OUTPATIENT)
Dept: SLEEP MEDICINE | Facility: HOSPITAL | Age: 54
End: 2024-07-24
Payer: MEDICARE

## 2024-07-24 VITALS — HEIGHT: 69 IN | WEIGHT: 293 LBS | BODY MASS INDEX: 43.4 KG/M2

## 2024-07-24 DIAGNOSIS — F59 UNSPECIFIED BEHAVIORAL SYNDROMES ASSOCIATED WITH PHYSIOLOGICAL DISTURBANCES AND PHYSICAL FACTORS: ICD-10-CM

## 2024-07-24 DIAGNOSIS — R40.0 SOMNOLENCE: ICD-10-CM

## 2024-07-24 DIAGNOSIS — R41.89 OTHER SYMPTOMS AND SIGNS INVOLVING COGNITIVE FUNCTIONS AND AWARENESS: ICD-10-CM

## 2024-07-24 DIAGNOSIS — G47.10 HYPERSOMNIA, UNSPECIFIED: ICD-10-CM

## 2024-07-24 PROCEDURE — 95810 POLYSOM 6/> YRS 4/> PARAM: CPT

## 2024-07-26 DIAGNOSIS — F41.1 GAD (GENERALIZED ANXIETY DISORDER): ICD-10-CM

## 2024-07-26 DIAGNOSIS — N32.81 OAB (OVERACTIVE BLADDER): ICD-10-CM

## 2024-07-29 ENCOUNTER — TELEPHONE (OUTPATIENT)
Dept: SLEEP MEDICINE | Facility: HOSPITAL | Age: 54
End: 2024-07-29
Payer: MEDICARE

## 2024-07-29 PROCEDURE — 95810 POLYSOM 6/> YRS 4/> PARAM: CPT | Performed by: INTERNAL MEDICINE

## 2024-07-29 RX ORDER — HYDROXYZINE HYDROCHLORIDE 10 MG/1
10 TABLET, FILM COATED ORAL 3 TIMES DAILY PRN
Qty: 90 TABLET | Refills: 0 | Status: SHIPPED | OUTPATIENT
Start: 2024-07-29

## 2024-07-29 RX ORDER — SOLIFENACIN SUCCINATE 10 MG/1
10 TABLET, FILM COATED ORAL DAILY
Qty: 30 TABLET | Refills: 0 | OUTPATIENT
Start: 2024-07-29

## 2024-08-02 ENCOUNTER — OFFICE VISIT (OUTPATIENT)
Dept: FAMILY MEDICINE CLINIC | Facility: CLINIC | Age: 54
End: 2024-08-02
Payer: MEDICARE

## 2024-08-02 VITALS
BODY MASS INDEX: 43.4 KG/M2 | WEIGHT: 293 LBS | HEART RATE: 79 BPM | SYSTOLIC BLOOD PRESSURE: 118 MMHG | DIASTOLIC BLOOD PRESSURE: 78 MMHG | HEIGHT: 69 IN | OXYGEN SATURATION: 94 %

## 2024-08-02 DIAGNOSIS — L57.8 SUN-DAMAGED SKIN: ICD-10-CM

## 2024-08-02 DIAGNOSIS — E66.01 MORBID OBESITY: ICD-10-CM

## 2024-08-02 DIAGNOSIS — G47.33 OSA (OBSTRUCTIVE SLEEP APNEA): Primary | ICD-10-CM

## 2024-08-02 RX ORDER — MELOXICAM 15 MG/1
15 TABLET ORAL DAILY
COMMUNITY
Start: 2024-07-12 | End: 2024-10-10

## 2024-08-02 RX ORDER — OLANZAPINE 20 MG/1
1 TABLET ORAL DAILY
COMMUNITY
Start: 2024-05-22

## 2024-08-02 RX ORDER — SOLIFENACIN SUCCINATE 10 MG/1
1 TABLET, FILM COATED ORAL DAILY
COMMUNITY
Start: 2024-05-24

## 2024-08-02 NOTE — PROGRESS NOTES
Subjective   Karen Nash is a 54 y.o. female. Presents today for   Chief Complaint   Patient presents with    Hypertension    Sleep Apnea    Fatigue       History of Present Illness  Patient very pleasant 55 y/o here for f/u of fatigue.  Sent to sleep specialist and did see sleep specialist with home sleep study and does have dianna;  She has not heard back on cpap;  Per notes orders for autocpap per Dr. Reyes's orders went to [facility].   I gave her contact information for DME to contact.   Also I put in another referral to bariatrics, but notes reports not able to make contact with donald.  Patient was unaware of the attempts.  I verified mobile number and is correct.   She is still interested.  She is on mood stabilizers, has asthma, rheumatoid arhtirits, fibromyalgia with chornic pain and so has been hard to maintain weight.  Her insurace does not cover glp1, I have given weight loss medications but plateau on weight and with recurrent steroids for RA/asthma makes challenging to lose.   She has depression but has been stable and a possible candidate for bariatric procedure.   Has a growing number of pigmented skin lesions concerned about  Review of Systems   Constitutional:  Positive for fatigue.   Respiratory:  Negative for shortness of breath.    Cardiovascular:  Negative for chest pain and palpitations.   Gastrointestinal:  Negative for abdominal pain.   Musculoskeletal:  Positive for arthralgias, back pain and myalgias.       Patient Active Problem List   Diagnosis    Moderate persistent asthma without complication    Atopic rhinitis    Anemia    Benign essential hypertension    Constipation    Dyslipidemia    Gastroesophageal reflux disease    Intractable migraine without aura and without status migrainosus    Muscle cramps    Vocal cord dysfunction    Female dyspareunia    Vaginal atrophy    Pelvic pain    Stress incontinence of urine    Fibromyalgia, primary    Rheumatoid arthritis of multiple sites  with negative rheumatoid factor    Severe episode of recurrent major depressive disorder, without psychotic features    Panic disorder    Generalized anxiety disorder    Primary osteoarthritis involving multiple joints    Class 3 severe obesity due to excess calories with serious comorbidity and body mass index (BMI) of 45.0 to 49.9 in adult    Esophageal dysphagia    Dyspepsia    Hypersomnia, unspecified       Social History     Socioeconomic History    Marital status:     Number of children: 0   Tobacco Use    Smoking status: Former     Current packs/day: 0.00     Average packs/day: 0.1 packs/day for 8.0 years (0.8 ttl pk-yrs)     Types: Cigarettes     Start date: 1/1/2001     Quit date: 1/1/2009     Years since quitting: 15.5    Smokeless tobacco: Never   Vaping Use    Vaping status: Never Used   Substance and Sexual Activity    Alcohol use: Not Currently     Alcohol/week: 1.0 standard drink of alcohol     Types: 1 Glasses of wine per week     Comment: SOCIAL USE//caffeine coffee daily     Drug use: No    Sexual activity: Yes     Partners: Male     Birth control/protection: None       Allergies   Allergen Reactions    Sulfamethoxazole-Trimethoprim Hives and Itching    Corn-Containing Products Rash    Penicillins     Bacitracin Rash       Current Outpatient Medications on File Prior to Visit   Medication Sig Dispense Refill    meloxicam (MOBIC) 15 MG tablet Take 1 tablet by mouth Daily.      OLANZapine (zyPREXA) 20 MG tablet Take 1 tablet by mouth Daily.      solifenacin (VESICARE) 10 MG tablet Take 1 tablet by mouth Daily.      acetaminophen (TYLENOL) 500 MG tablet Take 1 tablet by mouth Every 6 (Six) Hours As Needed for Mild Pain.      albuterol (ACCUNEB) 1.25 MG/3ML nebulizer solution Take 3 mL by nebulization Every 4 (Four) Hours As Needed for Wheezing. Dx.  J45.40 120 mL 11    albuterol sulfate  (90 Base) MCG/ACT inhaler Inhale 2 puffs Every 6 (Six) Hours As Needed for Wheezing. 18 g 5     amLODIPine (NORVASC) 10 MG tablet TAKE 1 TABLET BY MOUTH EVERY DAY 90 tablet 1    azelastine (OPTIVAR) 0.05 % ophthalmic solution Administer 1 drop to both eyes 2 (Two) Times a Day As Needed (eye allergies). 1 each 12    budesonide-formoterol (Symbicort) 160-4.5 MCG/ACT inhaler Inhale 2 puffs 2 (Two) Times a Day. 10.2 g 11    busPIRone (BUSPAR) 10 MG tablet TAKE 1 TABLET BY MOUTH 3 TIMES A DAY (GENERIC FOR BUSPAR) 270 tablet 0    CALCIUM-MAGNESIUM-ZINC PO Take  by mouth.      Cetirizine HCl 10 MG capsule Take 10 mg by mouth Daily. 30 capsule 5    Estradiol-Norethindrone Acet 0.5-0.1 MG per tablet TAKE 1 TABLET BY MOUTH EVERY DAY 28 tablet 0    FLUoxetine (PROzac) 40 MG capsule TAKE 2 CAPSULES BY MOUTH EVERY  capsule 0    folic acid (FOLVITE) 1 MG tablet TAKE 1 TABLET BY MOUTH DAILY (GENERIC FOR FOLVITE) 90 tablet 0    hydroCHLOROthiazide 25 MG tablet Take 2 tablets by mouth Daily. 90 tablet 3    hydroxychloroquine (PLAQUENIL) 200 MG tablet 2 po daily  Indications: Rheumatoid Arthritis 180 tablet 3    hydrOXYzine (ATARAX) 10 MG tablet TAKE 1 TABLET BY MOUTH 3 TIMES A DAY AS NEEDED FOR ANXIETY 90 tablet 0    methylPREDNISolone (MEDROL) 4 MG dose pack Take as directed on package instructions. 21 tablet 0    Multiple Vitamin (MULTI VITAMIN DAILY PO) Take by mouth daily.      pantoprazole (PROTONIX) 40 MG EC tablet TAKE 1 TABLET BY MOUTH 2 TIMES A  tablet 0    phentermine (ADIPEX-P) 37.5 MG tablet TAKE 1 TABLET BY MOUTH IN THE MORNING 90 tablet 0    polyethylene glycol (MIRALAX) pack packet mix 1 packet into drink every night 30 packet 4    pregabalin (LYRICA) 150 MG capsule TAKE 1 CAPSULE BY MOUTH 2 TIMES A  capsule 1    promethazine-dextromethorphan (PROMETHAZINE-DM) 6.25-15 MG/5ML syrup Take 5 mL by mouth 4 (Four) Times a Day As Needed for Cough. 180 mL 0    topiramate (TOPAMAX) 100 MG tablet Take 1 tablet by mouth 2 (Two) Times a Day. 180 tablet 3    traZODone (DESYREL) 100 MG tablet Take 1  "tablet by mouth At Night As Needed for Sleep. 90 tablet 3     No current facility-administered medications on file prior to visit.       Objective   Vitals:    08/02/24 1304   BP: 118/78   Pulse: 79   SpO2: 94%   Weight: (!) 143 kg (315 lb)   Height: 175.3 cm (69\")     Body mass index is 46.52 kg/m².    Physical Exam  Vitals and nursing note reviewed.   Constitutional:       Appearance: Normal appearance. She is not toxic-appearing or diaphoretic.   HENT:      Head: Normocephalic and atraumatic.   Musculoskeletal:      Cervical back: Neck supple.   Skin:     General: Skin is warm and dry.      Capillary Refill: Capillary refill takes less than 2 seconds.      Comments: Solar lentigo  Faustino k  Red, scaly lesiosn   Neurological:      Mental Status: She is alert.   Psychiatric:         Mood and Affect: Mood normal.         Behavior: Behavior normal.         Assessment & Plan   Diagnoses and all orders for this visit:    1. TERESITA (obstructive sleep apnea) (Primary)    2. Sun-damaged skin  -     Ambulatory Referral to Dermatology    3. Morbid obesity    Most skin lesions look like benign sun damaged skin lesions, but on forehead ? AK;   given sun damage and age, recommend see a dermatologist annually to perform skin exam.  TERESITA - gave contact number for [facility], asked to call if need orders sent again   I gave contact information for scheduling to call back bariatrics to arrange appt;  I gave sample of ozempic to use low dose see if helps.                 -Follow up: 3 months and prn   "

## 2024-08-02 NOTE — PATIENT INSTRUCTIONS
Calorie Counting for Weight Loss  Calories are units of energy. Your body needs a certain number of calories from food to keep going throughout the day. When you eat or drink more calories than your body needs, your body stores the extra calories mostly as fat. When you eat or drink fewer calories than your body needs, your body burns fat to get the energy it needs.  Calorie counting means keeping track of how many calories you eat and drink each day. Calorie counting can be helpful if you need to lose weight. If you eat fewer calories than your body needs, you should lose weight. Ask your health care provider what a healthy weight is for you.  For calorie counting to work, you will need to eat the right number of calories each day to lose a healthy amount of weight per week. A dietitian can help you figure out how many calories you need in a day and will suggest ways to reach your calorie goal.  A healthy amount of weight to lose each week is usually 1-2 lb (0.5-0.9 kg). This usually means that your daily calorie intake should be reduced by 500-750 calories.  Eating 1,200-1,500 calories a day can help most women lose weight.  Eating 1,500-1,800 calories a day can help most men lose weight.  What do I need to know about calorie counting?  Work with your health care provider or dietitian to determine how many calories you should get each day. To meet your daily calorie goal, you will need to:  Find out how many calories are in each food that you would like to eat. Try to do this before you eat.  Decide how much of the food you plan to eat.  Keep a food log. Do this by writing down what you ate and how many calories it had.  To successfully lose weight, it is important to balance calorie counting with a healthy lifestyle that includes regular activity.  Where do I find calorie information?  The number of calories in a food can be found on a Nutrition Facts label. If a food does not have a Nutrition Facts label, try to  look up the calories online or ask your dietitian for help.  Remember that calories are listed per serving. If you choose to have more than one serving of a food, you will have to multiply the calories per serving by the number of servings you plan to eat. For example, the label on a package of bread might say that a serving size is 1 slice and that there are 90 calories in a serving. If you eat 1 slice, you will have eaten 90 calories. If you eat 2 slices, you will have eaten 180 calories.  How do I keep a food log?  After each time that you eat, record the following in your food log as soon as possible:  What you ate. Be sure to include toppings, sauces, and other extras on the food.  How much you ate. This can be measured in cups, ounces, or number of items.  How many calories were in each food and drink.  The total number of calories in the food you ate.  Keep your food log near you, such as in a pocket-sized notebook or on an krystle or website on your mobile phone. Some programs will calculate calories for you and show you how many calories you have left to meet your daily goal.  What are some portion-control tips?  Know how many calories are in a serving. This will help you know how many servings you can have of a certain food.  Use a measuring cup to measure serving sizes. You could also try weighing out portions on a kitchen scale. With time, you will be able to estimate serving sizes for some foods.  Take time to put servings of different foods on your favorite plates or in your favorite bowls and cups so you know what a serving looks like.  Try not to eat straight from a food's packaging, such as from a bag or box. Eating straight from the package makes it hard to see how much you are eating and can lead to overeating. Put the amount you would like to eat in a cup or on a plate to make sure you are eating the right portion.  Use smaller plates, glasses, and bowls for smaller portions and to prevent  overeating.  Try not to multitask. For example, avoid watching TV or using your computer while eating. If it is time to eat, sit down at a table and enjoy your food. This will help you recognize when you are full. It will also help you be more mindful of what and how much you are eating.  What are tips for following this plan?  Reading food labels  Check the calorie count compared with the serving size. The serving size may be smaller than what you are used to eating.  Check the source of the calories. Try to choose foods that are high in protein, fiber, and vitamins, and low in saturated fat, trans fat, and sodium.  Shopping  Read nutrition labels while you shop. This will help you make healthy decisions about which foods to buy.  Pay attention to nutrition labels for low-fat or fat-free foods. These foods sometimes have the same number of calories or more calories than the full-fat versions. They also often have added sugar, starch, or salt to make up for flavor that was removed with the fat.  Make a grocery list of lower-calorie foods and stick to it.  Cooking  Try to cook your favorite foods in a healthier way. For example, try baking instead of frying.  Use low-fat dairy products.  Meal planning  Use more fruits and vegetables. One-half of your plate should be fruits and vegetables.  Include lean proteins, such as chicken, turkey, and fish.  Lifestyle  Each week, aim to do one of the followin minutes of moderate exercise, such as walking.  75 minutes of vigorous exercise, such as running.  General information  Know how many calories are in the foods you eat most often. This will help you calculate calorie counts faster.  Find a way of tracking calories that works for you. Get creative. Try different apps or programs if writing down calories does not work for you.  What foods should I eat?    Eat nutritious foods. It is better to have a nutritious, high-calorie food, such as an avocado, than a food with  few nutrients, such as a bag of potato chips.  Use your calories on foods and drinks that will fill you up and will not leave you hungry soon after eating.  Examples of foods that fill you up are nuts and nut butters, vegetables, lean proteins, and high-fiber foods such as whole grains. High-fiber foods are foods with more than 5 g of fiber per serving.  Pay attention to calories in drinks. Low-calorie drinks include water and unsweetened drinks.  The items listed above may not be a complete list of foods and beverages you can eat. Contact a dietitian for more information.  What foods should I limit?  Limit foods or drinks that are not good sources of vitamins, minerals, or protein or that are high in unhealthy fats. These include:  Candy.  Other sweets.  Sodas, specialty coffee drinks, alcohol, and juice.  The items listed above may not be a complete list of foods and beverages you should avoid. Contact a dietitian for more information.  How do I count calories when eating out?  Pay attention to portions. Often, portions are much larger when eating out. Try these tips to keep portions smaller:  Consider sharing a meal instead of getting your own.  If you get your own meal, eat only half of it. Before you start eating, ask for a container and put half of your meal into it.  When available, consider ordering smaller portions from the menu instead of full portions.  Pay attention to your food and drink choices. Knowing the way food is cooked and what is included with the meal can help you eat fewer calories.  If calories are listed on the menu, choose the lower-calorie options.  Choose dishes that include vegetables, fruits, whole grains, low-fat dairy products, and lean proteins.  Choose items that are boiled, broiled, grilled, or steamed. Avoid items that are buttered, battered, fried, or served with cream sauce. Items labeled as crispy are usually fried, unless stated otherwise.  Choose water, low-fat milk,  unsweetened iced tea, or other drinks without added sugar. If you want an alcoholic beverage, choose a lower-calorie option, such as a glass of wine or light beer.  Ask for dressings, sauces, and syrups on the side. These are usually high in calories, so you should limit the amount you eat.  If you want a salad, choose a garden salad and ask for grilled meats. Avoid extra toppings such as garcia, cheese, or fried items. Ask for the dressing on the side, or ask for olive oil and vinegar or lemon to use as dressing.  Estimate how many servings of a food you are given. Knowing serving sizes will help you be aware of how much food you are eating at restaurants.  Where to find more information  Centers for Disease Control and Prevention: www.cdc.gov  [place] Department of Agriculture: myplate.gov  Summary  Calorie counting means keeping track of how many calories you eat and drink each day. If you eat fewer calories than your body needs, you should lose weight.  A healthy amount of weight to lose per week is usually 1-2 lb (0.5-0.9 kg). This usually means reducing your daily calorie intake by 500-750 calories.  The number of calories in a food can be found on a Nutrition Facts label. If a food does not have a Nutrition Facts label, try to look up the calories online or ask your dietitian for help.  Use smaller plates, glasses, and bowls for smaller portions and to prevent overeating.  Use your calories on foods and drinks that will fill you up and not leave you hungry shortly after a meal.  This information is not intended to replace advice given to you by your health care provider. Make sure you discuss any questions you have with your health care provider.  Document Revised: 01/28/2021 Document Reviewed: 01/28/2021  Elsevier Patient Education © 2022 Elsevier Inc.    Exercising to Lose Weight  Getting regular exercise is important for everyone. It is especially important if you are overweight. Being overweight increases  your risk of heart disease, stroke, diabetes, high blood pressure, and several types of cancer. Exercising, and reducing the calories you consume, can help you lose weight and improve fitness and health.  Exercise can be moderate or vigorous intensity. To lose weight, most people need to do a certain amount of moderate or vigorous-intensity exercise each week.  How can exercise affect me?  You lose weight when you exercise enough to burn more calories than you eat. Exercise also reduces body fat and builds muscle. The more muscle you have, the more calories you burn. Exercise also:  Improves mood.  Reduces stress and tension.  Improves your overall fitness, flexibility, and endurance.  Increases bone strength.  Moderate-intensity exercise  Moderate-intensity exercise is any activity that gets you moving enough to burn at least three times more energy (calories) than if you were sitting.  Examples of moderate exercise include:  Walking a mile in 15 minutes.  Doing light yard work.  Biking at an easy pace.  Most people should get at least 150 minutes of moderate-intensity exercise a week to maintain their body weight.  Vigorous-intensity exercise  Vigorous-intensity exercise is any activity that gets you moving enough to burn at least six times more calories than if you were sitting. When you exercise at this intensity, you should be working hard enough that you are not able to carry on a conversation.  Examples of vigorous exercise include:  Running.  Playing a team sport, such as football, basketball, and soccer.  Jumping rope.  Most people should get at least 75 minutes a week of vigorous exercise to maintain their body weight.  What actions can I take to lose weight?  The amount of exercise you need to lose weight depends on:  Your age.  The type of exercise.  Any health conditions you have.  Your overall physical ability.  Talk to your health care provider about how much exercise you need and what types of  activities are safe for you.  Nutrition    Make changes to your diet as told by your health care provider or diet and nutrition specialist (dietitian). This may include:  Eating fewer calories.  Eating more protein.  Eating less unhealthy fats.  Eating a diet that includes fresh fruits and vegetables, whole grains, low-fat dairy products, and lean protein.  Avoiding foods with added fat, salt, and sugar.  Drink plenty of water while you exercise to prevent dehydration or heat stroke.  Activity  Choose an activity that you enjoy and set realistic goals. Your health care provider can help you make an exercise plan that works for you.  Exercise at a moderate or vigorous intensity most days of the week.  The intensity of exercise may vary from person to person. You can tell how intense a workout is for you by paying attention to your breathing and heartbeat. Most people will notice their breathing and heartbeat get faster with more intense exercise.  Do resistance training twice each week, such as:  Push-ups.  Sit-ups.  Lifting weights.  Using resistance bands.  Getting short amounts of exercise can be just as helpful as long, structured periods of exercise. If you have trouble finding time to exercise, try doing these things as part of your daily routine:  Get up, stretch, and walk around every 30 minutes throughout the day.  Go for a walk during your lunch break.  Park your car farther away from your destination.  If you take public transportation, get off one stop early and walk the rest of the way.  Make phone calls while standing up and walking around.  Take the stairs instead of elevators or escalators.  Wear comfortable clothes and shoes with good support.  Do not exercise so much that you hurt yourself, feel dizzy, or get very short of breath.  Where to find more information  [place] Department of Health and Human Services: www.hhs.gov  Centers for Disease Control and Prevention: www.cdc.gov  Contact a health care  provider:  Before starting a new exercise program.  If you have questions or concerns about your weight.  If you have a medical problem that keeps you from exercising.  Get help right away if:  You have any of the following while exercising:  Injury.  Dizziness.  Difficulty breathing or shortness of breath that does not go away when you stop exercising.  Chest pain.  Rapid heartbeat.  These symptoms may represent a serious problem that is an emergency. Do not wait to see if the symptoms will go away. Get medical help right away. Call your local emergency services (911 in the [place] Do not drive yourself to the hospital.  Summary  Getting regular exercise is especially important if you are overweight.  Being overweight increases your risk of heart disease, stroke, diabetes, high blood pressure, and several types of cancer.  Losing weight happens when you burn more calories than you eat.  Reducing the amount of calories you eat, and getting regular moderate or vigorous exercise each week, helps you lose weight.  This information is not intended to replace advice given to you by your health care provider. Make sure you discuss any questions you have with your health care provider.  Document Revised: 02/13/2022 Document Reviewed: 02/13/2022  Elsevier Patient Education © 2022 Elsevier Inc.

## 2024-08-10 DIAGNOSIS — N95.1 VASOMOTOR SYMPTOMS DUE TO MENOPAUSE: ICD-10-CM

## 2024-08-10 DIAGNOSIS — Z78.0 MENOPAUSE: ICD-10-CM

## 2024-08-12 RX ORDER — ESTRADIOL AND NORETHINDRONE ACETATE .5; .1 MG/1; MG/1
1 TABLET ORAL DAILY
Qty: 28 TABLET | Refills: 5 | Status: SHIPPED | OUTPATIENT
Start: 2024-08-12

## 2024-08-12 RX ORDER — SOLIFENACIN SUCCINATE 10 MG/1
10 TABLET, FILM COATED ORAL DAILY
Qty: 30 TABLET | Refills: 5 | Status: SHIPPED | OUTPATIENT
Start: 2024-08-12

## 2024-08-30 DIAGNOSIS — G43.009 MIGRAINE WITHOUT AURA AND WITHOUT STATUS MIGRAINOSUS, NOT INTRACTABLE: ICD-10-CM

## 2024-08-30 RX ORDER — TOPIRAMATE 100 MG/1
100 TABLET, FILM COATED ORAL 2 TIMES DAILY
Qty: 180 TABLET | Refills: 0 | Status: SHIPPED | OUTPATIENT
Start: 2024-08-30

## 2024-09-12 DIAGNOSIS — F41.1 GAD (GENERALIZED ANXIETY DISORDER): ICD-10-CM

## 2024-09-12 DIAGNOSIS — K21.00 GASTROESOPHAGEAL REFLUX DISEASE WITH ESOPHAGITIS: ICD-10-CM

## 2024-09-12 DIAGNOSIS — K22.4 ESOPHAGEAL DYSMOTILITY: ICD-10-CM

## 2024-09-13 RX ORDER — PANTOPRAZOLE SODIUM 40 MG/1
40 TABLET, DELAYED RELEASE ORAL 2 TIMES DAILY
Qty: 180 TABLET | Refills: 0 | Status: SHIPPED | OUTPATIENT
Start: 2024-09-13

## 2024-09-13 RX ORDER — HYDROXYZINE HYDROCHLORIDE 10 MG/1
10 TABLET, FILM COATED ORAL 3 TIMES DAILY PRN
Qty: 90 TABLET | Refills: 0 | Status: SHIPPED | OUTPATIENT
Start: 2024-09-13

## 2024-10-05 DIAGNOSIS — F32.4 MAJOR DEPRESSIVE DISORDER WITH SINGLE EPISODE, IN PARTIAL REMISSION: ICD-10-CM

## 2024-10-07 RX ORDER — BUSPIRONE HYDROCHLORIDE 10 MG/1
10 TABLET ORAL 3 TIMES DAILY
Qty: 270 TABLET | Refills: 0 | Status: SHIPPED | OUTPATIENT
Start: 2024-10-07

## 2024-10-07 RX ORDER — FLUOXETINE 40 MG/1
80 CAPSULE ORAL DAILY
Qty: 180 CAPSULE | Refills: 0 | Status: SHIPPED | OUTPATIENT
Start: 2024-10-07

## 2024-10-11 NOTE — TELEPHONE ENCOUNTER
Radha JOHN - let patient know I sent in hydroxyzine to take as needed for anxiety and sleep.   Let know how going and when Grupo gets out.  RRj      Received message from patient stating    I took an MIW out on Grupo on Monday.  [facility] is at [facility].  Things got really bad around here.  My nerves are of the grid.  Can Dr. ORTEGA adjust my meds for a little while till we get thru this?   Male

## 2024-11-05 ENCOUNTER — OFFICE VISIT (OUTPATIENT)
Dept: FAMILY MEDICINE CLINIC | Facility: CLINIC | Age: 54
End: 2024-11-05
Payer: MEDICARE

## 2024-11-05 VITALS
HEIGHT: 69 IN | OXYGEN SATURATION: 97 % | BODY MASS INDEX: 43.4 KG/M2 | DIASTOLIC BLOOD PRESSURE: 80 MMHG | WEIGHT: 293 LBS | HEART RATE: 88 BPM | SYSTOLIC BLOOD PRESSURE: 110 MMHG

## 2024-11-05 DIAGNOSIS — I10 PRIMARY HYPERTENSION: Primary | ICD-10-CM

## 2024-11-05 DIAGNOSIS — E66.9 OBESITY (BMI 30-39.9): ICD-10-CM

## 2024-11-05 PROCEDURE — 3074F SYST BP LT 130 MM HG: CPT | Performed by: FAMILY MEDICINE

## 2024-11-05 PROCEDURE — 1126F AMNT PAIN NOTED NONE PRSNT: CPT | Performed by: FAMILY MEDICINE

## 2024-11-05 PROCEDURE — 3079F DIAST BP 80-89 MM HG: CPT | Performed by: FAMILY MEDICINE

## 2024-11-05 PROCEDURE — 99213 OFFICE O/P EST LOW 20 MIN: CPT | Performed by: FAMILY MEDICINE

## 2024-11-05 NOTE — PROGRESS NOTES
Subjective   Karen Nash is a 54 y.o. female. Presents today for   Chief Complaint   Patient presents with    Hypertension    Weight Check       History of Present Illness  Patient 53y/o with htn and metabolic syndrome;  has dropped 8 lbs;  Hard time with RA and asthma exercisign;   Strugglign to drop weight and glp1 not covered.    Review of Systems   Respiratory:  Positive for shortness of breath.    Cardiovascular:  Negative for chest pain.   Gastrointestinal:  Negative for abdominal pain.       Patient Active Problem List   Diagnosis    Moderate persistent asthma without complication    Atopic rhinitis    Anemia    Benign essential hypertension    Constipation    Dyslipidemia    Gastroesophageal reflux disease    Intractable migraine without aura and without status migrainosus    Muscle cramps    Vocal cord dysfunction    Female dyspareunia    Vaginal atrophy    Pelvic pain    Stress incontinence of urine    Fibromyalgia, primary    Rheumatoid arthritis of multiple sites with negative rheumatoid factor    Severe episode of recurrent major depressive disorder, without psychotic features    Panic disorder    Generalized anxiety disorder    Primary osteoarthritis involving multiple joints    Class 3 severe obesity due to excess calories with serious comorbidity and body mass index (BMI) of 45.0 to 49.9 in adult    Esophageal dysphagia    Dyspepsia    Hypersomnia, unspecified       Social History     Socioeconomic History    Marital status:     Number of children: 0   Tobacco Use    Smoking status: Former     Current packs/day: 0.00     Average packs/day: 0.1 packs/day for 8.0 years (0.8 ttl pk-yrs)     Types: Cigarettes     Start date: 1/1/2001     Quit date: 1/1/2009     Years since quitting: 15.8     Passive exposure: Past    Smokeless tobacco: Never   Vaping Use    Vaping status: Never Used   Substance and Sexual Activity    Alcohol use: Not Currently     Alcohol/week: 1.0 standard drink of alcohol      Types: 1 Glasses of wine per week     Comment: SOCIAL USE//caffeine coffee daily     Drug use: No    Sexual activity: Yes     Partners: Male     Birth control/protection: None       Allergies   Allergen Reactions    Sulfamethoxazole-Trimethoprim Hives and Itching    Corn-Containing Products Rash    Penicillins     Bacitracin Rash       Current Outpatient Medications on File Prior to Visit   Medication Sig Dispense Refill    acetaminophen (TYLENOL) 500 MG tablet Take 1 tablet by mouth Every 6 (Six) Hours As Needed for Mild Pain.      albuterol (ACCUNEB) 1.25 MG/3ML nebulizer solution Take 3 mL by nebulization Every 4 (Four) Hours As Needed for Wheezing. Dx.  J45.40 120 mL 11    albuterol sulfate  (90 Base) MCG/ACT inhaler Inhale 2 puffs Every 6 (Six) Hours As Needed for Wheezing. 18 g 5    amLODIPine (NORVASC) 10 MG tablet TAKE 1 TABLET BY MOUTH EVERY DAY 90 tablet 1    budesonide-formoterol (Symbicort) 160-4.5 MCG/ACT inhaler Inhale 2 puffs 2 (Two) Times a Day. 10.2 g 11    busPIRone (BUSPAR) 10 MG tablet TAKE 1 TABLET BY MOUTH 3 TIMES A  tablet 0    CALCIUM-MAGNESIUM-ZINC PO Take  by mouth.      Cetirizine HCl 10 MG capsule Take 10 mg by mouth Daily. 30 capsule 5    Estradiol-Norethindrone Acet 0.5-0.1 MG per tablet TAKE 1 TABLET BY MOUTH EVERY DAY 28 tablet 5    FLUoxetine (PROzac) 40 MG capsule TAKE 2 CAPSULES BY MOUTH EVERY  capsule 0    folic acid (FOLVITE) 1 MG tablet TAKE 1 TABLET BY MOUTH DAILY (GENERIC FOR FOLVITE) 90 tablet 0    hydroCHLOROthiazide 25 MG tablet Take 2 tablets by mouth Daily. 90 tablet 3    hydroxychloroquine (PLAQUENIL) 200 MG tablet 2 po daily  Indications: Rheumatoid Arthritis 180 tablet 3    hydrOXYzine (ATARAX) 10 MG tablet TAKE 1 TABLET BY MOUTH 3 TIMES A DAY AS NEEDED FOR ANXIETY 90 tablet 0    Multiple Vitamin (MULTI VITAMIN DAILY PO) Take by mouth daily.      OLANZapine (zyPREXA) 20 MG tablet Take 1 tablet by mouth Daily.      pantoprazole (PROTONIX) 40 MG EC  "tablet TAKE 1 TABLET BY MOUTH 2 TIMES A  tablet 0    phentermine (ADIPEX-P) 37.5 MG tablet TAKE 1 TABLET BY MOUTH IN THE MORNING 90 tablet 0    polyethylene glycol (MIRALAX) pack packet mix 1 packet into drink every night 30 packet 4    pregabalin (LYRICA) 150 MG capsule TAKE 1 CAPSULE BY MOUTH 2 TIMES A  capsule 1    solifenacin (VESICARE) 10 MG tablet TAKE 1 TABLET BY MOUTH EVERY DAY 30 tablet 5    topiramate (TOPAMAX) 100 MG tablet TAKE 1 TABLET BY MOUTH 2 TIMES A  tablet 0    traZODone (DESYREL) 100 MG tablet Take 1 tablet by mouth At Night As Needed for Sleep. 90 tablet 3    [DISCONTINUED] azelastine (OPTIVAR) 0.05 % ophthalmic solution Administer 1 drop to both eyes 2 (Two) Times a Day As Needed (eye allergies). (Patient not taking: Reported on 11/5/2024) 1 each 12    [DISCONTINUED] methylPREDNISolone (MEDROL) 4 MG dose pack Take as directed on package instructions. (Patient not taking: Reported on 11/5/2024) 21 tablet 0    [DISCONTINUED] promethazine-dextromethorphan (PROMETHAZINE-DM) 6.25-15 MG/5ML syrup Take 5 mL by mouth 4 (Four) Times a Day As Needed for Cough. (Patient not taking: Reported on 11/5/2024) 180 mL 0     No current facility-administered medications on file prior to visit.       Objective   Vitals:    11/05/24 1147   BP: 110/80   Pulse: 88   SpO2: 97%   Weight: (!) 139 kg (307 lb)   Height: 175.3 cm (69\")     Body mass index is 45.34 kg/m².    Physical Exam  Vitals and nursing note reviewed.   Constitutional:       Appearance: She is well-developed.   Skin:     General: Skin is warm and dry.   Neurological:      Mental Status: She is alert and oriented to person, place, and time.   Psychiatric:         Behavior: Behavior normal.         Assessment & Plan   Diagnoses and all orders for this visit:    1. Primary hypertension (Primary)    2. Obesity (BMI 30-39.9)    Do not have samples of ozempic or wegovy;    Give metabolic syndrome would benefit from glp1;  completed " patient assistance application for ozempic provider part and gave to patient;  Encouraged to return for submission.                 -Follow up: 3 months and prn

## 2024-11-20 DIAGNOSIS — M79.7 FIBROMYALGIA, PRIMARY: ICD-10-CM

## 2024-11-20 DIAGNOSIS — G43.009 MIGRAINE WITHOUT AURA AND WITHOUT STATUS MIGRAINOSUS, NOT INTRACTABLE: ICD-10-CM

## 2024-11-20 DIAGNOSIS — F41.1 GAD (GENERALIZED ANXIETY DISORDER): ICD-10-CM

## 2024-11-20 RX ORDER — AMLODIPINE BESYLATE 10 MG/1
10 TABLET ORAL DAILY
Qty: 90 TABLET | Refills: 0 | Status: SHIPPED | OUTPATIENT
Start: 2024-11-20

## 2024-11-20 RX ORDER — FOLIC ACID 1 MG/1
1000 TABLET ORAL DAILY
Qty: 90 TABLET | Refills: 0 | Status: SHIPPED | OUTPATIENT
Start: 2024-11-20

## 2024-11-20 RX ORDER — TOPIRAMATE 100 MG/1
100 TABLET, FILM COATED ORAL 2 TIMES DAILY
Qty: 180 TABLET | Refills: 0 | Status: SHIPPED | OUTPATIENT
Start: 2024-11-20

## 2024-11-20 RX ORDER — PREGABALIN 150 MG/1
150 CAPSULE ORAL 2 TIMES DAILY
Qty: 180 CAPSULE | Refills: 1 | Status: SHIPPED | OUTPATIENT
Start: 2024-11-20

## 2024-11-20 RX ORDER — HYDROXYZINE HYDROCHLORIDE 10 MG/1
10 TABLET, FILM COATED ORAL 3 TIMES DAILY PRN
Qty: 90 TABLET | Refills: 0 | Status: SHIPPED | OUTPATIENT
Start: 2024-11-20

## 2024-11-20 RX ORDER — OLANZAPINE 20 MG/1
20 TABLET ORAL DAILY
Qty: 90 TABLET | Refills: 0 | Status: SHIPPED | OUTPATIENT
Start: 2024-11-20

## 2024-12-22 DIAGNOSIS — K21.00 GASTROESOPHAGEAL REFLUX DISEASE WITH ESOPHAGITIS: ICD-10-CM

## 2024-12-22 DIAGNOSIS — K22.4 ESOPHAGEAL DYSMOTILITY: ICD-10-CM

## 2024-12-23 RX ORDER — PANTOPRAZOLE SODIUM 40 MG/1
40 TABLET, DELAYED RELEASE ORAL 2 TIMES DAILY
Qty: 180 TABLET | Refills: 0 | Status: SHIPPED | OUTPATIENT
Start: 2024-12-23

## 2025-01-01 DIAGNOSIS — F32.4 MAJOR DEPRESSIVE DISORDER WITH SINGLE EPISODE, IN PARTIAL REMISSION: ICD-10-CM

## 2025-01-02 RX ORDER — BUSPIRONE HYDROCHLORIDE 10 MG/1
10 TABLET ORAL 3 TIMES DAILY
Qty: 270 TABLET | Refills: 0 | Status: SHIPPED | OUTPATIENT
Start: 2025-01-02

## 2025-01-02 RX ORDER — FLUOXETINE 40 MG/1
80 CAPSULE ORAL DAILY
Qty: 180 CAPSULE | Refills: 0 | Status: SHIPPED | OUTPATIENT
Start: 2025-01-02

## 2025-01-03 DIAGNOSIS — E66.01 CLASS 2 SEVERE OBESITY DUE TO EXCESS CALORIES WITH SERIOUS COMORBIDITY AND BODY MASS INDEX (BMI) OF 35.0 TO 35.9 IN ADULT: ICD-10-CM

## 2025-01-03 DIAGNOSIS — E66.812 CLASS 2 SEVERE OBESITY DUE TO EXCESS CALORIES WITH SERIOUS COMORBIDITY AND BODY MASS INDEX (BMI) OF 35.0 TO 35.9 IN ADULT: ICD-10-CM

## 2025-01-03 DIAGNOSIS — F32.4 MAJOR DEPRESSIVE DISORDER WITH SINGLE EPISODE, IN PARTIAL REMISSION: ICD-10-CM

## 2025-01-07 RX ORDER — PHENTERMINE HYDROCHLORIDE 37.5 MG/1
TABLET ORAL
Qty: 90 TABLET | Refills: 0 | Status: SHIPPED | OUTPATIENT
Start: 2025-01-07

## 2025-01-07 RX ORDER — AMLODIPINE BESYLATE 10 MG/1
10 TABLET ORAL DAILY
Qty: 90 TABLET | Refills: 0 | Status: SHIPPED | OUTPATIENT
Start: 2025-01-07

## 2025-01-07 RX ORDER — BUSPIRONE HYDROCHLORIDE 10 MG/1
10 TABLET ORAL 3 TIMES DAILY
Qty: 270 TABLET | Refills: 0 | Status: SHIPPED | OUTPATIENT
Start: 2025-01-07

## 2025-01-07 RX ORDER — FLUOXETINE 40 MG/1
80 CAPSULE ORAL DAILY
Qty: 180 CAPSULE | Refills: 0 | Status: SHIPPED | OUTPATIENT
Start: 2025-01-07

## 2025-02-04 DIAGNOSIS — F41.1 GAD (GENERALIZED ANXIETY DISORDER): ICD-10-CM

## 2025-02-04 RX ORDER — HYDROXYZINE HYDROCHLORIDE 10 MG/1
10 TABLET, FILM COATED ORAL 3 TIMES DAILY PRN
Qty: 90 TABLET | Refills: 0 | Status: SHIPPED | OUTPATIENT
Start: 2025-02-04

## 2025-02-07 RX ORDER — AZELASTINE HYDROCHLORIDE, FLUTICASONE PROPIONATE 137; 50 UG/1; UG/1
SPRAY, METERED NASAL
Qty: 23 G | Refills: 0 | OUTPATIENT
Start: 2025-02-07

## 2025-02-07 RX ORDER — ALBUTEROL SULFATE 90 UG/1
2 AEROSOL, METERED RESPIRATORY (INHALATION) EVERY 6 HOURS PRN
Qty: 18 G | Refills: 0 | Status: SHIPPED | OUTPATIENT
Start: 2025-02-07

## 2025-02-09 DIAGNOSIS — G47.09 OTHER INSOMNIA: ICD-10-CM

## 2025-02-10 RX ORDER — TRAZODONE HYDROCHLORIDE 100 MG/1
100 TABLET ORAL NIGHTLY PRN
Qty: 90 TABLET | Refills: 0 | Status: SHIPPED | OUTPATIENT
Start: 2025-02-10

## 2025-02-20 DIAGNOSIS — N95.1 VASOMOTOR SYMPTOMS DUE TO MENOPAUSE: ICD-10-CM

## 2025-02-20 DIAGNOSIS — Z78.0 MENOPAUSE: ICD-10-CM

## 2025-02-20 RX ORDER — ESTRADIOL AND NORETHINDRONE ACETATE .5; .1 MG/1; MG/1
1 TABLET ORAL DAILY
Qty: 28 TABLET | Refills: 2 | Status: SHIPPED | OUTPATIENT
Start: 2025-02-20

## 2025-02-24 RX ORDER — FOLIC ACID 1 MG/1
1000 TABLET ORAL DAILY
Qty: 90 TABLET | Refills: 0 | Status: SHIPPED | OUTPATIENT
Start: 2025-02-24

## 2025-02-24 RX ORDER — OLANZAPINE 20 MG/1
20 TABLET ORAL DAILY
Qty: 90 TABLET | Refills: 0 | Status: SHIPPED | OUTPATIENT
Start: 2025-02-24

## 2025-02-27 DIAGNOSIS — G43.009 MIGRAINE WITHOUT AURA AND WITHOUT STATUS MIGRAINOSUS, NOT INTRACTABLE: ICD-10-CM

## 2025-02-28 RX ORDER — TOPIRAMATE 100 MG/1
100 TABLET, FILM COATED ORAL 2 TIMES DAILY
Qty: 180 TABLET | Refills: 0 | Status: SHIPPED | OUTPATIENT
Start: 2025-02-28

## 2025-03-08 DIAGNOSIS — F41.1 GAD (GENERALIZED ANXIETY DISORDER): ICD-10-CM

## 2025-03-09 RX ORDER — HYDROXYZINE HYDROCHLORIDE 10 MG/1
10 TABLET, FILM COATED ORAL 3 TIMES DAILY PRN
Qty: 90 TABLET | Refills: 0 | Status: SHIPPED | OUTPATIENT
Start: 2025-03-09

## 2025-03-13 DIAGNOSIS — F41.1 GAD (GENERALIZED ANXIETY DISORDER): ICD-10-CM

## 2025-03-13 DIAGNOSIS — F32.4 MAJOR DEPRESSIVE DISORDER WITH SINGLE EPISODE, IN PARTIAL REMISSION: ICD-10-CM

## 2025-03-13 DIAGNOSIS — Z78.0 MENOPAUSE: ICD-10-CM

## 2025-03-13 DIAGNOSIS — G47.09 OTHER INSOMNIA: ICD-10-CM

## 2025-03-13 DIAGNOSIS — N95.1 VASOMOTOR SYMPTOMS DUE TO MENOPAUSE: ICD-10-CM

## 2025-03-13 DIAGNOSIS — K21.00 GASTROESOPHAGEAL REFLUX DISEASE WITH ESOPHAGITIS: ICD-10-CM

## 2025-03-13 DIAGNOSIS — K22.4 ESOPHAGEAL DYSMOTILITY: ICD-10-CM

## 2025-03-13 DIAGNOSIS — G43.009 MIGRAINE WITHOUT AURA AND WITHOUT STATUS MIGRAINOSUS, NOT INTRACTABLE: ICD-10-CM

## 2025-03-13 RX ORDER — PANTOPRAZOLE SODIUM 40 MG/1
40 TABLET, DELAYED RELEASE ORAL 2 TIMES DAILY
Qty: 180 TABLET | Refills: 0 | Status: SHIPPED | OUTPATIENT
Start: 2025-03-13

## 2025-03-13 RX ORDER — ALBUTEROL SULFATE 90 UG/1
2 INHALANT RESPIRATORY (INHALATION) EVERY 6 HOURS PRN
Qty: 18 G | Refills: 0 | Status: SHIPPED | OUTPATIENT
Start: 2025-03-13

## 2025-03-13 RX ORDER — FLUOXETINE HYDROCHLORIDE 40 MG/1
80 CAPSULE ORAL DAILY
Qty: 180 CAPSULE | Refills: 0 | Status: SHIPPED | OUTPATIENT
Start: 2025-03-13

## 2025-03-13 RX ORDER — OLANZAPINE 20 MG/1
20 TABLET ORAL DAILY
Qty: 90 TABLET | Refills: 0 | Status: SHIPPED | OUTPATIENT
Start: 2025-03-13

## 2025-03-13 RX ORDER — ESTRADIOL AND NORETHINDRONE ACETATE .5; .1 MG/1; MG/1
1 TABLET ORAL DAILY
Qty: 28 TABLET | Refills: 2 | Status: SHIPPED | OUTPATIENT
Start: 2025-03-13

## 2025-03-13 RX ORDER — AMLODIPINE BESYLATE 10 MG/1
10 TABLET ORAL DAILY
Qty: 90 TABLET | Refills: 0 | Status: SHIPPED | OUTPATIENT
Start: 2025-03-13

## 2025-03-13 RX ORDER — BUSPIRONE HYDROCHLORIDE 10 MG/1
10 TABLET ORAL 3 TIMES DAILY
Qty: 270 TABLET | Refills: 0 | Status: SHIPPED | OUTPATIENT
Start: 2025-03-13

## 2025-03-13 RX ORDER — HYDROXYZINE HYDROCHLORIDE 10 MG/1
10 TABLET, FILM COATED ORAL 3 TIMES DAILY PRN
Qty: 90 TABLET | Refills: 0 | Status: SHIPPED | OUTPATIENT
Start: 2025-03-13

## 2025-03-13 RX ORDER — TOPIRAMATE 100 MG/1
100 TABLET, FILM COATED ORAL 2 TIMES DAILY
Qty: 180 TABLET | Refills: 0 | Status: SHIPPED | OUTPATIENT
Start: 2025-03-13

## 2025-03-13 RX ORDER — TRAZODONE HYDROCHLORIDE 100 MG/1
100 TABLET ORAL NIGHTLY PRN
Qty: 90 TABLET | Refills: 0 | Status: SHIPPED | OUTPATIENT
Start: 2025-03-13

## 2025-03-13 RX ORDER — SOLIFENACIN SUCCINATE 10 MG/1
10 TABLET, FILM COATED ORAL DAILY
Qty: 30 TABLET | Refills: 5 | Status: SHIPPED | OUTPATIENT
Start: 2025-03-13

## 2025-03-13 RX ORDER — FOLIC ACID 1 MG/1
1000 TABLET ORAL DAILY
Qty: 90 TABLET | Refills: 0 | Status: SHIPPED | OUTPATIENT
Start: 2025-03-13

## 2025-03-17 DIAGNOSIS — E66.812 CLASS 2 SEVERE OBESITY DUE TO EXCESS CALORIES WITH SERIOUS COMORBIDITY AND BODY MASS INDEX (BMI) OF 35.0 TO 35.9 IN ADULT: ICD-10-CM

## 2025-03-17 DIAGNOSIS — M79.7 FIBROMYALGIA, PRIMARY: ICD-10-CM

## 2025-03-17 DIAGNOSIS — E66.01 CLASS 2 SEVERE OBESITY DUE TO EXCESS CALORIES WITH SERIOUS COMORBIDITY AND BODY MASS INDEX (BMI) OF 35.0 TO 35.9 IN ADULT: ICD-10-CM

## 2025-03-17 RX ORDER — PREGABALIN 150 MG/1
150 CAPSULE ORAL 2 TIMES DAILY
Qty: 180 CAPSULE | Refills: 1 | Status: SHIPPED | OUTPATIENT
Start: 2025-03-17

## 2025-03-17 RX ORDER — PHENTERMINE HYDROCHLORIDE 37.5 MG/1
37.5 TABLET ORAL EVERY MORNING
Qty: 90 TABLET | Refills: 0 | Status: SHIPPED | OUTPATIENT
Start: 2025-03-17

## 2025-03-17 NOTE — TELEPHONE ENCOUNTER
[place]: [facility] - [place], [place] - [address] [facility] - [phone] [facility] [phone] FX    Relationship: Pharmacy    Best call back number: [phone]    Requested Prescriptions:   Requested Prescriptions     Pending Prescriptions Disp Refills    pregabalin (LYRICA) 150 MG capsule 180 capsule 1     Sig: Take 1 capsule by mouth 2 (Two) Times a Day.    phentermine (ADIPEX-P) 37.5 MG tablet 90 tablet 0     Sig: Take 1 tablet by mouth Every Morning.        Pharmacy where request should be sent: [facility] - [place], [place] - [address] [facility] - [phone] [facility] [phone] FX  [facility] - [place], [place] - [address] [place] [address] [phone] [facility] [phone] FX     Last office visit with prescribing clinician: 11/5/2024   Last telemedicine visit with prescribing clinician: Visit date not found   Next office visit with prescribing clinician: Visit date not found     Additional details provided by patient: BOTH OF THESE SHOULD GO TO [facility]    Does the patient have less than a 3 day supply:  [x] Yes  [] No    Would you like a call back once the refill request has been completed: [] Yes [x] No    If the office needs to give you a call back, can they leave a voicemail: [] Yes [x] No    Karey Batista Rep   03/17/25 10:16 EDT

## 2025-04-01 DIAGNOSIS — R60.1 GENERALIZED EDEMA: ICD-10-CM

## 2025-04-01 RX ORDER — HYDROCHLOROTHIAZIDE 25 MG/1
50 TABLET ORAL DAILY
Qty: 90 TABLET | Refills: 0 | Status: SHIPPED | OUTPATIENT
Start: 2025-04-01 | End: 2025-04-04

## 2025-04-01 RX ORDER — HYDROXYCHLOROQUINE SULFATE 200 MG/1
TABLET, FILM COATED ORAL
Qty: 180 TABLET | Refills: 0 | Status: SHIPPED | OUTPATIENT
Start: 2025-04-01 | End: 2032-04-01

## 2025-04-01 NOTE — TELEPHONE ENCOUNTER
Caller: [facility] - [place], [place] - [address] [facility] - [phone] [facility] [phone] FX    Relationship: Pharmacy    Best call back number:[phone]    Requested Prescriptions:   Requested Prescriptions     Pending Prescriptions Disp Refills    hydroCHLOROthiazide 25 MG tablet 90 tablet 3     Sig: Take 2 tablets by mouth Daily.    hydroxychloroquine (PLAQUENIL) 200 MG tablet 180 tablet 3     Si po daily  Indications: Rheumatoid Arthritis        Pharmacy where request should be sent: [facility] - [place], [place] - [address] [facility] - [phone] [facility] [phone] FX     Last office visit with prescribing clinician: 2024   Last telemedicine visit with prescribing clinician: Visit date not found   Next office visit with prescribing clinician: Visit date not found     Additional details provided by patient: SHE IS OUT OF MEDICATION    Does the patient have less than a 3 day supply:  [x] Yes  [] No    Would you like a call back once the refill request has been completed: [] Yes [x] No    If the office needs to give you a call back, can they leave a voicemail: [] Yes [x] No    Karey Batista Rep   25 12:22 EDT

## 2025-04-01 NOTE — TELEPHONE ENCOUNTER
Caller: [facility] - [place], [place] - [address] [facility] - [phone] [facility] [phone] FX    Relationship: Pharmacy    Best call back number:[phone]    What medication are you requesting: MELOXICAM      Have you had these symptoms before:    [x] Yes  [] No    Have you been treated for these symptoms before:   [x] Yes  [] No    If a prescription is needed, what is your preferred pharmacy and phone number: [facility] - [place], [place] - [address] [facility] - [phone]  - [phone] FX

## 2025-04-03 RX ORDER — ALBUTEROL SULFATE 90 UG/1
2 INHALANT RESPIRATORY (INHALATION) EVERY 6 HOURS PRN
Qty: 18 G | Refills: 0 | Status: SHIPPED | OUTPATIENT
Start: 2025-04-03

## 2025-04-04 DIAGNOSIS — R60.1 GENERALIZED EDEMA: ICD-10-CM

## 2025-04-04 DIAGNOSIS — F41.1 GAD (GENERALIZED ANXIETY DISORDER): ICD-10-CM

## 2025-04-04 RX ORDER — HYDROCHLOROTHIAZIDE 25 MG/1
TABLET ORAL
Qty: 180 TABLET | Refills: 0 | Status: SHIPPED | OUTPATIENT
Start: 2025-04-04

## 2025-04-04 RX ORDER — HYDROXYZINE HYDROCHLORIDE 10 MG/1
10 TABLET, FILM COATED ORAL 3 TIMES DAILY PRN
Qty: 270 TABLET | Refills: 0 | Status: SHIPPED | OUTPATIENT
Start: 2025-04-04 | End: 2025-04-06

## 2025-04-05 DIAGNOSIS — F41.1 GAD (GENERALIZED ANXIETY DISORDER): ICD-10-CM

## 2025-04-06 RX ORDER — HYDROXYZINE HYDROCHLORIDE 10 MG/1
10 TABLET, FILM COATED ORAL 3 TIMES DAILY PRN
Qty: 90 TABLET | Refills: 0 | Status: SHIPPED | OUTPATIENT
Start: 2025-04-06

## 2025-04-16 ENCOUNTER — OFFICE VISIT (OUTPATIENT)
Dept: FAMILY MEDICINE CLINIC | Facility: CLINIC | Age: 55
End: 2025-04-16
Payer: MEDICARE

## 2025-04-16 VITALS
HEART RATE: 126 BPM | OXYGEN SATURATION: 97 % | DIASTOLIC BLOOD PRESSURE: 100 MMHG | BODY MASS INDEX: 43.4 KG/M2 | SYSTOLIC BLOOD PRESSURE: 150 MMHG | WEIGHT: 293 LBS | HEIGHT: 69 IN

## 2025-04-16 DIAGNOSIS — R22.0 FACIAL SWELLING: ICD-10-CM

## 2025-04-16 DIAGNOSIS — T78.3XXA ANGIOEDEMA, INITIAL ENCOUNTER: Primary | ICD-10-CM

## 2025-04-16 RX ORDER — FAMOTIDINE 20 MG/1
20 TABLET, FILM COATED ORAL 2 TIMES DAILY
Qty: 10 TABLET | Refills: 0 | Status: SHIPPED | OUTPATIENT
Start: 2025-04-16

## 2025-04-16 RX ORDER — DIPHENHYDRAMINE HYDROCHLORIDE 50 MG/ML
50 INJECTION, SOLUTION INTRAMUSCULAR; INTRAVENOUS ONCE
Status: COMPLETED | OUTPATIENT
Start: 2025-04-16 | End: 2025-04-16

## 2025-04-16 RX ORDER — METHYLPREDNISOLONE SODIUM SUCCINATE 125 MG/2ML
125 INJECTION INTRAMUSCULAR; INTRAVENOUS ONCE
Status: COMPLETED | OUTPATIENT
Start: 2025-04-16 | End: 2025-04-16

## 2025-04-16 RX ORDER — HYDROXYZINE HYDROCHLORIDE 25 MG/1
TABLET, FILM COATED ORAL
Qty: 15 TABLET | Refills: 0 | Status: SHIPPED | OUTPATIENT
Start: 2025-04-16

## 2025-04-16 RX ORDER — PREDNISONE 10 MG/1
TABLET ORAL
Qty: 32 TABLET | Refills: 0 | Status: SHIPPED | OUTPATIENT
Start: 2025-04-16

## 2025-04-16 RX ADMIN — METHYLPREDNISOLONE SODIUM SUCCINATE 125 MG: 125 INJECTION INTRAMUSCULAR; INTRAVENOUS at 10:04

## 2025-04-16 RX ADMIN — DIPHENHYDRAMINE HYDROCHLORIDE 50 MG: 50 INJECTION, SOLUTION INTRAMUSCULAR; INTRAVENOUS at 10:03

## 2025-04-16 NOTE — PROGRESS NOTES
Subjective   Karen Nash is a 54 y.o. female. Presents today for   Chief Complaint   Patient presents with    Allergic Reaction                Allergic Reaction  Associated symptoms include a rash. Pertinent negatives include no abdominal pain, chest pain, coughing, stridor, trouble swallowing, vomiting or wheezing.   Patient 53 y/o female well known to me with asthma, htn, hld, RA, depression that past few days sudden onset facial swelling and redness.  Very pruritic;  NO f/c; no throat or tongue swellign; no dyspnea;   She denies any new medications, foods, soaps/detergents or lotions;  no new make up or anything new topical wise.  Has not had a reaction like this before.   Taken some benadryl with some relief.     History of Present Illness    Review of Systems   Constitutional:  Negative for chills and fever.   HENT:  Negative for congestion and trouble swallowing.    Respiratory:  Negative for cough, shortness of breath, wheezing and stridor.    Cardiovascular:  Negative for chest pain and palpitations.   Gastrointestinal:  Negative for abdominal pain, nausea and vomiting.   Skin:  Positive for rash.       Patient Active Problem List   Diagnosis    Moderate persistent asthma without complication    Atopic rhinitis    Anemia    Benign essential hypertension    Constipation    Dyslipidemia    Gastroesophageal reflux disease    Intractable migraine without aura and without status migrainosus    Muscle cramps    Vocal cord dysfunction    Female dyspareunia    Vaginal atrophy    Pelvic pain    Stress incontinence of urine    Fibromyalgia, primary    Rheumatoid arthritis of multiple sites with negative rheumatoid factor    Severe episode of recurrent major depressive disorder, without psychotic features    Panic disorder    Generalized anxiety disorder    Primary osteoarthritis involving multiple joints    Class 3 severe obesity due to excess calories with serious comorbidity and body mass index (BMI) of 45.0 to  "49.9 in adult    Esophageal dysphagia    Dyspepsia    Hypersomnia, unspecified       Social History     Socioeconomic History    Marital status:     Number of children: 0   Tobacco Use    Smoking status: Former     Current packs/day: 0.00     Average packs/day: 0.1 packs/day for 8.0 years (0.8 ttl pk-yrs)     Types: Cigarettes     Start date: 2001     Quit date: 2009     Years since quittin.2     Passive exposure: Past    Smokeless tobacco: Never   Vaping Use    Vaping status: Never Used   Substance and Sexual Activity    Alcohol use: Not Currently     Alcohol/week: 1.0 standard drink of alcohol     Types: 1 Glasses of wine per week     Comment: SOCIAL USE//caffeine coffee daily     Drug use: No    Sexual activity: Yes     Partners: Male     Birth control/protection: None       Allergies   Allergen Reactions    Sulfamethoxazole-Trimethoprim Hives and Itching    Corn-Containing Products Rash    Penicillins     Bacitracin Rash         Objective   Vitals:    25 0933   BP: 150/100   Pulse: (!) 126   SpO2: 97%   Weight: 134 kg (295 lb)   Height: 175.3 cm (69\")     Body mass index is 43.56 kg/m².    Physical Exam  Vitals and nursing note reviewed.   Constitutional:       Appearance: She is well-developed.   HENT:      Head: Normocephalic and atraumatic.   Neck:      Thyroid: No thyromegaly.      Vascular: No JVD.   Cardiovascular:      Rate and Rhythm: Normal rate and regular rhythm.      Heart sounds: Normal heart sounds. No murmur heard.     No friction rub. No gallop.   Pulmonary:      Effort: Pulmonary effort is normal. No respiratory distress.      Breath sounds: Normal breath sounds. No wheezing or rales.   Abdominal:      General: Bowel sounds are normal. There is no distension.      Palpations: Abdomen is soft.      Tenderness: There is no abdominal tenderness. There is no guarding or rebound.   Musculoskeletal:      Cervical back: Neck supple.   Skin:     General: Skin is warm and dry. "   Neurological:      Mental Status: She is alert.      Gait: Gait normal.   Psychiatric:         Behavior: Behavior normal.         Results       Assessment & Plan   Diagnoses and all orders for this visit:    1. Angioedema, initial encounter (Primary)  -     methylPREDNISolone sodium succinate (SOLU-Medrol) injection 125 mg  -     diphenhydrAMINE (BENADRYL) injection 50 mg  -     hydrOXYzine (ATARAX) 25 MG tablet; 1 po 3x a day x 5d then stop  Dispense: 15 tablet; Refill: 0  -     famotidine (Pepcid) 20 MG tablet; Take 1 tablet by mouth 2 (Two) Times a Day.  Dispense: 10 tablet; Refill: 0  -     predniSONE (DELTASONE) 10 MG tablet; 6 tabs po qd x 2 days, 4 tabs po qd x 2 days, 3 tabs po qd x 2 days, 2 tabs po qd x 2 days, 1 tab po qd x 2 days start 4/17/25  Dispense: 32 tablet; Refill: 0  -     CBC & Differential  -     Comprehensive Metabolic Panel  -     C-reactive Protein  -     Sedimentation Rate  -     Food Allergy Profile    2. Facial swelling  -     methylPREDNISolone sodium succinate (SOLU-Medrol) injection 125 mg  -     diphenhydrAMINE (BENADRYL) injection 50 mg  -     hydrOXYzine (ATARAX) 25 MG tablet; 1 po 3x a day x 5d then stop  Dispense: 15 tablet; Refill: 0  -     famotidine (Pepcid) 20 MG tablet; Take 1 tablet by mouth 2 (Two) Times a Day.  Dispense: 10 tablet; Refill: 0  -     predniSONE (DELTASONE) 10 MG tablet; 6 tabs po qd x 2 days, 4 tabs po qd x 2 days, 3 tabs po qd x 2 days, 2 tabs po qd x 2 days, 1 tab po qd x 2 days start 4/17/25  Dispense: 32 tablet; Refill: 0    She is not on an acei, bu tis on amlodipine though less common could potentially cause;  Will keep on for now;  BP up, but not feelign well and will monitor;  If any tongue, throat swelling and/or difficulty breathign go ED ASAP;  Injections as above and will follow veyr closely given significant risks       Assessment & Plan            -Follow up: 2 days and prn     ________________________________________  Rob Bardales  DO, MS    Current Outpatient Medications on File Prior to Visit   Medication Sig Dispense Refill    acetaminophen (TYLENOL) 500 MG tablet Take 1 tablet by mouth Every 6 (Six) Hours As Needed for Mild Pain.      albuterol (ACCUNEB) 1.25 MG/3ML nebulizer solution Take 3 mL by nebulization Every 4 (Four) Hours As Needed for Wheezing. Dx.  J45.40 120 mL 11    albuterol sulfate  (90 Base) MCG/ACT inhaler INHALE TWO PUFFS BY MOUTH EVERY 6 HOURS AS NEEDED FOR WHEEZING 18 g 0    amLODIPine (NORVASC) 10 MG tablet Take 1 tablet by mouth Daily. 90 tablet 0    budesonide-formoterol (Symbicort) 160-4.5 MCG/ACT inhaler Inhale 2 puffs 2 (Two) Times a Day. 10.2 g 11    busPIRone (BUSPAR) 10 MG tablet Take 1 tablet by mouth 3 (Three) Times a Day. 270 tablet 0    CALCIUM-MAGNESIUM-ZINC PO Take  by mouth.      Cetirizine HCl 10 MG capsule Take 10 mg by mouth Daily. 30 capsule 5    Estradiol-Norethindrone Acet 0.5-0.1 MG per tablet Take 1 tablet by mouth Daily. 28 tablet 2    FLUoxetine (PROzac) 40 MG capsule Take 2 capsules by mouth Daily. 180 capsule 0    folic acid (FOLVITE) 1 MG tablet Take 1 tablet by mouth Daily. 90 tablet 0    hydroCHLOROthiazide 25 MG tablet TAKE TWO TABLETS BY MOUTH DAILY AT 9  tablet 0    hydroxychloroquine (PLAQUENIL) 200 MG tablet 2 po daily  Indications: Rheumatoid Arthritis 180 tablet 0    Multiple Vitamin (MULTI VITAMIN DAILY PO) Take by mouth daily.      OLANZapine (zyPREXA) 20 MG tablet Take 1 tablet by mouth Daily. 90 tablet 0    pantoprazole (PROTONIX) 40 MG EC tablet Take 1 tablet by mouth 2 (Two) Times a Day. 180 tablet 0    phentermine (ADIPEX-P) 37.5 MG tablet Take 1 tablet by mouth Every Morning. 90 tablet 0    polyethylene glycol (MIRALAX) pack packet mix 1 packet into drink every night 30 packet 4    pregabalin (LYRICA) 150 MG capsule Take 1 capsule by mouth 2 (Two) Times a Day. 180 capsule 1    solifenacin (VESICARE) 10 MG tablet Take 1 tablet by mouth Daily. 30 tablet 5     topiramate (TOPAMAX) 100 MG tablet Take 1 tablet by mouth 2 (Two) Times a Day. 180 tablet 0    traZODone (DESYREL) 100 MG tablet Take 1 tablet by mouth At Night As Needed for Sleep. in the evening as needed for sleep 90 tablet 0    [DISCONTINUED] hydrOXYzine (ATARAX) 10 MG tablet TAKE 1 TABLET BY MOUTH 3 TIMES A DAY AS NEEDED FOR ANXIETY 90 tablet 0     No current facility-administered medications on file prior to visit.

## 2025-04-18 ENCOUNTER — OFFICE VISIT (OUTPATIENT)
Dept: FAMILY MEDICINE CLINIC | Facility: CLINIC | Age: 55
End: 2025-04-18
Payer: MEDICARE

## 2025-04-18 VITALS
SYSTOLIC BLOOD PRESSURE: 132 MMHG | BODY MASS INDEX: 43.4 KG/M2 | HEART RATE: 92 BPM | WEIGHT: 293 LBS | HEIGHT: 69 IN | OXYGEN SATURATION: 95 % | DIASTOLIC BLOOD PRESSURE: 88 MMHG

## 2025-04-18 DIAGNOSIS — L01.00 IMPETIGO: ICD-10-CM

## 2025-04-18 DIAGNOSIS — T78.3XXD ANGIOEDEMA, SUBSEQUENT ENCOUNTER: ICD-10-CM

## 2025-04-18 DIAGNOSIS — Z00.00 MEDICARE ANNUAL WELLNESS VISIT, SUBSEQUENT: Primary | ICD-10-CM

## 2025-04-18 DIAGNOSIS — H10.33 ACUTE BACTERIAL CONJUNCTIVITIS OF BOTH EYES: ICD-10-CM

## 2025-04-18 DIAGNOSIS — L03.211 FACIAL CELLULITIS: ICD-10-CM

## 2025-04-18 RX ORDER — TOBRAMYCIN 3 MG/ML
2 SOLUTION/ DROPS OPHTHALMIC EVERY 6 HOURS SCHEDULED
Qty: 5 ML | Refills: 0 | Status: SHIPPED | OUTPATIENT
Start: 2025-04-18

## 2025-04-18 RX ORDER — TACROLIMUS 1 MG/G
1 OINTMENT TOPICAL 2 TIMES DAILY
Qty: 30 G | Refills: 2 | Status: SHIPPED | OUTPATIENT
Start: 2025-04-18

## 2025-04-18 RX ORDER — MUPIROCIN 20 MG/G
1 OINTMENT TOPICAL 3 TIMES DAILY
Qty: 30 G | Refills: 2 | Status: SHIPPED | OUTPATIENT
Start: 2025-04-18

## 2025-04-18 RX ORDER — DOXYCYCLINE 100 MG/1
100 CAPSULE ORAL 2 TIMES DAILY
Qty: 28 CAPSULE | Refills: 0 | Status: SHIPPED | OUTPATIENT
Start: 2025-04-18

## 2025-04-18 NOTE — PATIENT INSTRUCTIONS
Advance Care Planning and Advance Directives     You make decisions on a daily basis - decisions about where you want to live, your career, your home, your life. Perhaps one of the most important decisions you face is your choice for future medical care. Take time to talk with your family and your healthcare team and start planning today.  Advance Care Planning is a process that can help you:  Understand possible future healthcare decisions in light of your own experiences  Reflect on those decision in light of your goals and values  Discuss your decisions with those closest to you and the healthcare professionals that care for you  Make a plan by creating a document that reflects your wishes    Surrogate Decision Maker  In the event of a medical emergency, which has left you unable to communicate or to make your own decisions, you would need someone to make decisions for you.  It is important to discuss your preferences for medical treatment with this person while you are in good health.     Qualities of a surrogate decision maker:  Willing to take on this role and responsibility  Knows what you want for future medical care  Willing to follow your wishes even if they don't agree with them  Able to make difficult medical decisions under stressful circumstances    Advance Directives  These are legal documents you can create that will guide your healthcare team and decision maker(s) when needed. These documents can be stored in the electronic medical record.    Living Will - a legal document to guide your care if you have a terminal condition or a serious illness and are unable to communicate. States vary by statute in document names/types, but most forms may include one or more of the following:        -  Directions regarding life-prolonging treatments        -  Directions regarding artificially provided nutrition/hydration        -  Choosing a healthcare decision maker        -  Direction regarding organ/tissue  donation    Durable Power of  for Healthcare - this document names an -in-fact to make medical decisions for you, but it may also allow this person to make personal and financial decisions for you. Please seek the advice of an  if you need this type of document.    **Advance Directives are not required and no one may discriminate against you if you do not sign one.    Medical Orders  Many states allow specific forms/orders signed by your physician to record your wishes for medical treatment in your current state of health. This form, signed in personal communication with your physician, addresses resuscitation and other medical interventions that you may or may not want.      For more information or to schedule a time with a [facility] Advance Care Planning Facilitator contact: [facility] or call [phone] and someone will contact you directly.Calorie Counting for Weight Loss  Calories are units of energy. Your body needs a certain number of calories from food to keep going throughout the day. When you eat or drink more calories than your body needs, your body stores the extra calories mostly as fat. When you eat or drink fewer calories than your body needs, your body burns fat to get the energy it needs.  Calorie counting means keeping track of how many calories you eat and drink each day. Calorie counting can be helpful if you need to lose weight. If you eat fewer calories than your body needs, you should lose weight. Ask your health care provider what a healthy weight is for you.  For calorie counting to work, you will need to eat the right number of calories each day to lose a healthy amount of weight per week. A dietitian can help you figure out how many calories you need in a day and will suggest ways to reach your calorie goal.  A healthy amount of weight to lose each week is usually 1-2 lb (0.5-0.9 kg). This usually means that your daily calorie intake should be  reduced by 500-750 calories.  Eating 1,200-1,500 calories a day can help most women lose weight.  Eating 1,500-1,800 calories a day can help most men lose weight.  What do I need to know about calorie counting?  Work with your health care provider or dietitian to determine how many calories you should get each day. To meet your daily calorie goal, you will need to:  Find out how many calories are in each food that you would like to eat. Try to do this before you eat.  Decide how much of the food you plan to eat.  Keep a food log. Do this by writing down what you ate and how many calories it had.  To successfully lose weight, it is important to balance calorie counting with a healthy lifestyle that includes regular activity.  Where do I find calorie information?  The number of calories in a food can be found on a Nutrition Facts label. If a food does not have a Nutrition Facts label, try to look up the calories online or ask your dietitian for help.  Remember that calories are listed per serving. If you choose to have more than one serving of a food, you will have to multiply the calories per serving by the number of servings you plan to eat. For example, the label on a package of bread might say that a serving size is 1 slice and that there are 90 calories in a serving. If you eat 1 slice, you will have eaten 90 calories. If you eat 2 slices, you will have eaten 180 calories.  How do I keep a food log?  After each time that you eat, record the following in your food log as soon as possible:  What you ate. Be sure to include toppings, sauces, and other extras on the food.  How much you ate. This can be measured in cups, ounces, or number of items.  How many calories were in each food and drink.  The total number of calories in the food you ate.  Keep your food log near you, such as in a pocket-sized notebook or on an krystle or website on your mobile phone. Some programs will calculate calories for you and show you how  many calories you have left to meet your daily goal.  What are some portion-control tips?  Know how many calories are in a serving. This will help you know how many servings you can have of a certain food.  Use a measuring cup to measure serving sizes. You could also try weighing out portions on a kitchen scale. With time, you will be able to estimate serving sizes for some foods.  Take time to put servings of different foods on your favorite plates or in your favorite bowls and cups so you know what a serving looks like.  Try not to eat straight from a food's packaging, such as from a bag or box. Eating straight from the package makes it hard to see how much you are eating and can lead to overeating. Put the amount you would like to eat in a cup or on a plate to make sure you are eating the right portion.  Use smaller plates, glasses, and bowls for smaller portions and to prevent overeating.  Try not to multitask. For example, avoid watching TV or using your computer while eating. If it is time to eat, sit down at a table and enjoy your food. This will help you recognize when you are full. It will also help you be more mindful of what and how much you are eating.  What are tips for following this plan?  Reading food labels  Check the calorie count compared with the serving size. The serving size may be smaller than what you are used to eating.  Check the source of the calories. Try to choose foods that are high in protein, fiber, and vitamins, and low in saturated fat, trans fat, and sodium.  Shopping  Read nutrition labels while you shop. This will help you make healthy decisions about which foods to buy.  Pay attention to nutrition labels for low-fat or fat-free foods. These foods sometimes have the same number of calories or more calories than the full-fat versions. They also often have added sugar, starch, or salt to make up for flavor that was removed with the fat.  Make a grocery list of lower-calorie foods  and stick to it.  Cooking  Try to cook your favorite foods in a healthier way. For example, try baking instead of frying.  Use low-fat dairy products.  Meal planning  Use more fruits and vegetables. One-half of your plate should be fruits and vegetables.  Include lean proteins, such as chicken, turkey, and fish.  Lifestyle  Each week, aim to do one of the followin minutes of moderate exercise, such as walking.  75 minutes of vigorous exercise, such as running.  General information  Know how many calories are in the foods you eat most often. This will help you calculate calorie counts faster.  Find a way of tracking calories that works for you. Get creative. Try different apps or programs if writing down calories does not work for you.  What foods should I eat?    Eat nutritious foods. It is better to have a nutritious, high-calorie food, such as an avocado, than a food with few nutrients, such as a bag of potato chips.  Use your calories on foods and drinks that will fill you up and will not leave you hungry soon after eating.  Examples of foods that fill you up are nuts and nut butters, vegetables, lean proteins, and high-fiber foods such as whole grains. High-fiber foods are foods with more than 5 g of fiber per serving.  Pay attention to calories in drinks. Low-calorie drinks include water and unsweetened drinks.  The items listed above may not be a complete list of foods and beverages you can eat. Contact a dietitian for more information.  What foods should I limit?  Limit foods or drinks that are not good sources of vitamins, minerals, or protein or that are high in unhealthy fats. These include:  Candy.  Other sweets.  Sodas, specialty coffee drinks, alcohol, and juice.  The items listed above may not be a complete list of foods and beverages you should avoid. Contact a dietitian for more information.  How do I count calories when eating out?  Pay attention to portions. Often, portions are much larger  when eating out. Try these tips to keep portions smaller:  Consider sharing a meal instead of getting your own.  If you get your own meal, eat only half of it. Before you start eating, ask for a container and put half of your meal into it.  When available, consider ordering smaller portions from the menu instead of full portions.  Pay attention to your food and drink choices. Knowing the way food is cooked and what is included with the meal can help you eat fewer calories.  If calories are listed on the menu, choose the lower-calorie options.  Choose dishes that include vegetables, fruits, whole grains, low-fat dairy products, and lean proteins.  Choose items that are boiled, broiled, grilled, or steamed. Avoid items that are buttered, battered, fried, or served with cream sauce. Items labeled as crispy are usually fried, unless stated otherwise.  Choose water, low-fat milk, unsweetened iced tea, or other drinks without added sugar. If you want an alcoholic beverage, choose a lower-calorie option, such as a glass of wine or light beer.  Ask for dressings, sauces, and syrups on the side. These are usually high in calories, so you should limit the amount you eat.  If you want a salad, choose a garden salad and ask for grilled meats. Avoid extra toppings such as garcia, cheese, or fried items. Ask for the dressing on the side, or ask for olive oil and vinegar or lemon to use as dressing.  Estimate how many servings of a food you are given. Knowing serving sizes will help you be aware of how much food you are eating at restaurants.  Where to find more information  Centers for Disease Control and Prevention: www.cdc.gov  [place] Department of Agriculture: myplate.gov  Summary  Calorie counting means keeping track of how many calories you eat and drink each day. If you eat fewer calories than your body needs, you should lose weight.  A healthy amount of weight to lose per week is usually 1-2 lb (0.5-0.9 kg). This usually  means reducing your daily calorie intake by 500-750 calories.  The number of calories in a food can be found on a Nutrition Facts label. If a food does not have a Nutrition Facts label, try to look up the calories online or ask your dietitian for help.  Use smaller plates, glasses, and bowls for smaller portions and to prevent overeating.  Use your calories on foods and drinks that will fill you up and not leave you hungry shortly after a meal.  This information is not intended to replace advice given to you by your health care provider. Make sure you discuss any questions you have with your health care provider.  Document Revised: 01/28/2021 Document Reviewed: 01/28/2021  [employer] Patient Education © 2022 [employer]    Exercising to Lose Weight  Getting regular exercise is important for everyone. It is especially important if you are overweight. Being overweight increases your risk of heart disease, stroke, diabetes, high blood pressure, and several types of cancer. Exercising, and reducing the calories you consume, can help you lose weight and improve fitness and health.  Exercise can be moderate or vigorous intensity. To lose weight, most people need to do a certain amount of moderate or vigorous-intensity exercise each week.  How can exercise affect me?  You lose weight when you exercise enough to burn more calories than you eat. Exercise also reduces body fat and builds muscle. The more muscle you have, the more calories you burn. Exercise also:  Improves mood.  Reduces stress and tension.  Improves your overall fitness, flexibility, and endurance.  Increases bone strength.  Moderate-intensity exercise  Moderate-intensity exercise is any activity that gets you moving enough to burn at least three times more energy (calories) than if you were sitting.  Examples of moderate exercise include:  Walking a mile in 15 minutes.  Doing light yard work.  Biking at an easy pace.  Most people should get at least 150  minutes of moderate-intensity exercise a week to maintain their body weight.  Vigorous-intensity exercise  Vigorous-intensity exercise is any activity that gets you moving enough to burn at least six times more calories than if you were sitting. When you exercise at this intensity, you should be working hard enough that you are not able to carry on a conversation.  Examples of vigorous exercise include:  Running.  Playing a team sport, such as football, basketball, and soccer.  Jumping rope.  Most people should get at least 75 minutes a week of vigorous exercise to maintain their body weight.  What actions can I take to lose weight?  The amount of exercise you need to lose weight depends on:  Your age.  The type of exercise.  Any health conditions you have.  Your overall physical ability.  Talk to your health care provider about how much exercise you need and what types of activities are safe for you.  Nutrition    Make changes to your diet as told by your health care provider or diet and nutrition specialist (dietitian). This may include:  Eating fewer calories.  Eating more protein.  Eating less unhealthy fats.  Eating a diet that includes fresh fruits and vegetables, whole grains, low-fat dairy products, and lean protein.  Avoiding foods with added fat, salt, and sugar.  Drink plenty of water while you exercise to prevent dehydration or heat stroke.  Activity  Choose an activity that you enjoy and set realistic goals. Your health care provider can help you make an exercise plan that works for you.  Exercise at a moderate or vigorous intensity most days of the week.  The intensity of exercise may vary from person to person. You can tell how intense a workout is for you by paying attention to your breathing and heartbeat. Most people will notice their breathing and heartbeat get faster with more intense exercise.  Do resistance training twice each week, such as:  Push-ups.  Sit-ups.  Lifting weights.  Using  resistance bands.  Getting short amounts of exercise can be just as helpful as long, structured periods of exercise. If you have trouble finding time to exercise, try doing these things as part of your daily routine:  Get up, stretch, and walk around every 30 minutes throughout the day.  Go for a walk during your lunch break.  Park your car farther away from your destination.  If you take public transportation, get off one stop early and walk the rest of the way.  Make phone calls while standing up and walking around.  Take the stairs instead of elevators or escalators.  Wear comfortable clothes and shoes with good support.  Do not exercise so much that you hurt yourself, feel dizzy, or get very short of breath.  Where to find more information  [place] Department of Health and Human Services: www.hhs.gov  Centers for Disease Control and Prevention: www.cdc.gov  Contact a health care provider:  Before starting a new exercise program.  If you have questions or concerns about your weight.  If you have a medical problem that keeps you from exercising.  Get help right away if:  You have any of the following while exercising:  Injury.  Dizziness.  Difficulty breathing or shortness of breath that does not go away when you stop exercising.  Chest pain.  Rapid heartbeat.  These symptoms may represent a serious problem that is an emergency. Do not wait to see if the symptoms will go away. Get medical help right away. Call your local emergency services (911 in the [place] Do not drive yourself to the hospital.  Summary  Getting regular exercise is especially important if you are overweight.  Being overweight increases your risk of heart disease, stroke, diabetes, high blood pressure, and several types of cancer.  Losing weight happens when you burn more calories than you eat.  Reducing the amount of calories you eat, and getting regular moderate or vigorous exercise each week, helps you lose weight.  This information is not  intended to replace advice given to you by your health care provider. Make sure you discuss any questions you have with your health care provider.  Document Revised: 02/13/2022 Document Reviewed: 02/13/2022  [employer] Patient Education © 2022 [employer]

## 2025-04-18 NOTE — PROGRESS NOTES
QUICK REFERENCE INFORMATION:  The ABCs of the Annual Wellness Visit    Subsequent Medicare Wellness Visit    HEALTH RISK ASSESSMENT    1970  Karen Nash is a 54 y.o. female who presents for an Subsequent Wellness Visit.    The following portions of the patient's history were reviewed and   updated as appropriate: allergies, current medications, past family history, past medical history, past social history, past surgical history, and problem list.    Compared to one year ago, the patient feels his physical   health is worse.    Compared to one year ago, the patient feels his mental   health is the same.    Recent Hospitalizations:  She was not admitted to the hospital during the last year.     Current Medical Providers:  Patient Care Team:  Rob Bardales DO as PCP - General  Corona Reyes MD as Consulting Physician (Pulmonary Disease)  Aamir Moore MD as Consulting Physician (Rheumatology)    I reviewed list of current Medical providers and suppliers with patient and are listed above to the best of the patient's and my knowledge.    Smoking Status:  Social History     Tobacco Use   Smoking Status Former    Current packs/day: 0.00    Average packs/day: 0.1 packs/day for 8.0 years (0.8 ttl pk-yrs)    Types: Cigarettes    Start date: 2001    Quit date: 2009    Years since quittin.3    Passive exposure: Past   Smokeless Tobacco Never       Alcohol Consumption:  Social History     Substance and Sexual Activity   Alcohol Use Not Currently    Alcohol/week: 1.0 standard drink of alcohol    Types: 1 Glasses of wine per week    Comment: SOCIAL USE//caffeine coffee daily        Depression Screen:       2025    11:00 AM   PHQ-2/PHQ-9 Depression Screening   Little interest or pleasure in doing things Not at all   Feeling down, depressed, or hopeless Not at all       Health Habits and Functional and Cognitive Screenin/18/2025    11:00 AM   Functional & Cognitive Status   Do  you have difficulty preparing food and eating? No   Do you have difficulty bathing yourself, getting dressed or grooming yourself? No   Do you have difficulty using the toilet? No   Do you have difficulty moving around from place to place? No   Do you have trouble with steps or getting out of a bed or a chair? No   Current Diet Well Balanced Diet   Dental Exam Not up to date   Eye Exam Not up to date   Exercise (times per week) 0 times per week   Current Exercises Include No Regular Exercise   Do you need help using the phone?  No   Are you deaf or do you have serious difficulty hearing?  No   Do you need help to go to places out of walking distance? No   Do you need help shopping? No   Do you need help preparing meals?  No   Do you need help with housework?  No   Do you need help with laundry? No   Do you need help taking your medications? No   Do you need help managing money? No   Do you ever drive or ride in a car without wearing a seat belt? No   Have you felt unusual stress, anger or loneliness in the last month? No   Who do you live with? Spouse   If you need help, do you have trouble finding someone available to you? No   Have you been bothered in the last four weeks by sexual problems? No   Do you have difficulty concentrating, remembering or making decisions? No       Does the patient have evidence of cognitive impairment? No    Aspirin use counseling: Does not need ASA (and currently is not on it)    Recent Lab Results:  CMP:  Lab Results   Component Value Date    Glucose 85 07/13/2023    Glucose 89 09/21/2020    Glucose, UA Negative 10/22/2020    BUN 18 07/13/2023    BUN 12 09/21/2020    BUN/Creatinine Ratio 19.6 07/13/2023    BUN/Creatinine Ratio 13.2 09/21/2020    Creatinine 0.92 07/13/2023    Creatinine 0.72 01/10/2023    Ketones, UA Negative 10/22/2020    CO2 24.1 09/21/2020    Total CO2 21.9 (L) 07/13/2023    Calcium 9.4 07/13/2023    Calcium 9.6 09/21/2020    Albumin 4.1 07/13/2023    Albumin 4.30  "09/21/2020    AST (SGOT) 38 (H) 08/21/2024    ALT (SGPT) 42 08/21/2024     Lipid Panel:  Lab Results   Component Value Date    Total Cholesterol 190 07/13/2023    Triglycerides 125 07/13/2023    HDL Cholesterol 53 07/13/2023    VLDL Cholesterol 16 04/07/2020    VLDL Cholesterol David 22 07/13/2023     HbA1c:  No components found for: \"HGBA1C\"    Visual Acuity:  No results found.    Age-appropriate Screening Schedule:  Refer to the list below for future screening recommendations based on patient's age, sex and/or medical conditions. Orders for these recommended tests are listed in the plan section. The patient has been provided with a written plan.    Health Maintenance   Topic Date Due    HEPATITIS C SCREENING  Never done    PAP SMEAR  12/21/2021    MAMMOGRAM  05/26/2025    Pneumococcal Vaccine 50+ (3 of 3 - PCV20 or PCV21) 10/15/2025 (Originally 11/13/2022)    COVID-19 Vaccine (3 - 2024-25 season) 10/18/2025 (Originally 9/1/2024)    INFLUENZA VACCINE  07/01/2025    ANNUAL WELLNESS VISIT  04/18/2026    COLORECTAL CANCER SCREENING  07/07/2026    TDAP/TD VACCINES (2 - Td or Tdap) 08/11/2026          Outpatient Medications Prior to Visit   Medication Sig Dispense Refill    acetaminophen (TYLENOL) 500 MG tablet Take 1 tablet by mouth Every 6 (Six) Hours As Needed for Mild Pain.      albuterol (ACCUNEB) 1.25 MG/3ML nebulizer solution Take 3 mL by nebulization Every 4 (Four) Hours As Needed for Wheezing. Dx.  J45.40 120 mL 11    albuterol sulfate  (90 Base) MCG/ACT inhaler INHALE TWO PUFFS BY MOUTH EVERY 6 HOURS AS NEEDED FOR WHEEZING 18 g 0    amLODIPine (NORVASC) 10 MG tablet Take 1 tablet by mouth Daily. 90 tablet 0    budesonide-formoterol (Symbicort) 160-4.5 MCG/ACT inhaler Inhale 2 puffs 2 (Two) Times a Day. 10.2 g 11    busPIRone (BUSPAR) 10 MG tablet Take 1 tablet by mouth 3 (Three) Times a Day. 270 tablet 0    CALCIUM-MAGNESIUM-ZINC PO Take  by mouth.      Cetirizine HCl 10 MG capsule Take 10 mg by mouth " Daily. 30 capsule 5    Estradiol-Norethindrone Acet 0.5-0.1 MG per tablet Take 1 tablet by mouth Daily. 28 tablet 2    famotidine (Pepcid) 20 MG tablet Take 1 tablet by mouth 2 (Two) Times a Day. 10 tablet 0    FLUoxetine (PROzac) 40 MG capsule Take 2 capsules by mouth Daily. 180 capsule 0    folic acid (FOLVITE) 1 MG tablet Take 1 tablet by mouth Daily. 90 tablet 0    hydroCHLOROthiazide 25 MG tablet TAKE TWO TABLETS BY MOUTH DAILY AT 9  tablet 0    hydroxychloroquine (PLAQUENIL) 200 MG tablet 2 po daily  Indications: Rheumatoid Arthritis 180 tablet 0    hydrOXYzine (ATARAX) 25 MG tablet 1 po 3x a day x 5d then stop 15 tablet 0    Multiple Vitamin (MULTI VITAMIN DAILY PO) Take by mouth daily.      OLANZapine (zyPREXA) 20 MG tablet Take 1 tablet by mouth Daily. 90 tablet 0    pantoprazole (PROTONIX) 40 MG EC tablet Take 1 tablet by mouth 2 (Two) Times a Day. 180 tablet 0    phentermine (ADIPEX-P) 37.5 MG tablet Take 1 tablet by mouth Every Morning. 90 tablet 0    polyethylene glycol (MIRALAX) pack packet mix 1 packet into drink every night 30 packet 4    predniSONE (DELTASONE) 10 MG tablet 6 tabs po qd x 2 days, 4 tabs po qd x 2 days, 3 tabs po qd x 2 days, 2 tabs po qd x 2 days, 1 tab po qd x 2 days start 4/17/25 32 tablet 0    pregabalin (LYRICA) 150 MG capsule Take 1 capsule by mouth 2 (Two) Times a Day. 180 capsule 1    solifenacin (VESICARE) 10 MG tablet Take 1 tablet by mouth Daily. 30 tablet 5    topiramate (TOPAMAX) 100 MG tablet Take 1 tablet by mouth 2 (Two) Times a Day. 180 tablet 0    traZODone (DESYREL) 100 MG tablet Take 1 tablet by mouth At Night As Needed for Sleep. in the evening as needed for sleep 90 tablet 0     No facility-administered medications prior to visit.       Patient Active Problem List   Diagnosis    Moderate persistent asthma without complication    Atopic rhinitis    Anemia    Benign essential hypertension    Constipation    Dyslipidemia    Gastroesophageal reflux disease     Intractable migraine without aura and without status migrainosus    Muscle cramps    Vocal cord dysfunction    Female dyspareunia    Vaginal atrophy    Pelvic pain    Stress incontinence of urine    Fibromyalgia, primary    Rheumatoid arthritis of multiple sites with negative rheumatoid factor    Severe episode of recurrent major depressive disorder, without psychotic features    Panic disorder    Generalized anxiety disorder    Primary osteoarthritis involving multiple joints    Class 3 severe obesity due to excess calories with serious comorbidity and body mass index (BMI) of 45.0 to 49.9 in adult    Esophageal dysphagia    Dyspepsia    Hypersomnia, unspecified       Advance Care Planning:  ACP discussion was held with the patient during this visit. Patient does not have an advance directive, information provided.    Identification of Risk Factors:  Risk factors include: Obesity/Overweight .    Review of Systems   Constitutional:  Positive for chills and fever.   HENT:  Negative for trouble swallowing and voice change.    Eyes:  Positive for discharge. Negative for photophobia and visual disturbance.   Respiratory:  Negative for cough, choking, shortness of breath and wheezing.    Skin:  Positive for rash.   Neurological:  Positive for headaches.       Objective     Physical Exam  Vitals and nursing note reviewed.   Constitutional:       Appearance: She is well-developed.   HENT:      Head: Normocephalic and atraumatic.        Comments: Area marked, erythema, warmth and swelling;  Eyelids less swollen;  b/l eyes purulent d/c  Skin over face peeling.       Mouth/Throat:      Mouth: Mucous membranes are moist.      Pharynx: No oropharyngeal exudate or posterior oropharyngeal erythema.   Neck:      Thyroid: No thyromegaly.      Vascular: No JVD.   Cardiovascular:      Rate and Rhythm: Normal rate and regular rhythm.      Heart sounds: Normal heart sounds. No murmur heard.     No friction rub. No gallop.   Pulmonary:  "     Effort: Pulmonary effort is normal. No respiratory distress.      Breath sounds: Normal breath sounds. No wheezing or rales.   Abdominal:      General: Bowel sounds are normal. There is no distension.      Palpations: Abdomen is soft.      Tenderness: There is no abdominal tenderness. There is no guarding or rebound.   Musculoskeletal:      Cervical back: Neck supple.   Skin:     General: Skin is warm and dry.   Neurological:      Mental Status: She is alert.      Gait: Gait normal.   Psychiatric:         Behavior: Behavior normal.         Vitals:    04/18/25 1150   BP: 132/88   Pulse: 92   SpO2: 95%   Weight: 134 kg (295 lb)   Height: 175.3 cm (69\")   PainSc: 3    PainLoc: Face     Body mass index is 43.56 kg/m².           Assessment & Plan   Patient Self-Management and Personalized Health Advice  The patient has been provided with information about: diet and exercise and preventive services including:   Annual Wellness Visit (AWV).    Visit Diagnoses:    ICD-10-CM ICD-9-CM   1. Medicare annual wellness visit, subsequent  Z00.00 V70.0   2. Angioedema, subsequent encounter  T78.3XXD V58.89     995.1   3. Facial cellulitis  L03.211 682.0   4. Impetigo  L01.00 684   5. Acute bacterial conjunctivitis of both eyes  H10.33 372.03       No orders of the defined types were placed in this encounter.      Outpatient Encounter Medications as of 4/18/2025   Medication Sig Dispense Refill    acetaminophen (TYLENOL) 500 MG tablet Take 1 tablet by mouth Every 6 (Six) Hours As Needed for Mild Pain.      albuterol (ACCUNEB) 1.25 MG/3ML nebulizer solution Take 3 mL by nebulization Every 4 (Four) Hours As Needed for Wheezing. Dx.  J45.40 120 mL 11    albuterol sulfate  (90 Base) MCG/ACT inhaler INHALE TWO PUFFS BY MOUTH EVERY 6 HOURS AS NEEDED FOR WHEEZING 18 g 0    amLODIPine (NORVASC) 10 MG tablet Take 1 tablet by mouth Daily. 90 tablet 0    budesonide-formoterol (Symbicort) 160-4.5 MCG/ACT inhaler Inhale 2 puffs 2 " (Two) Times a Day. 10.2 g 11    busPIRone (BUSPAR) 10 MG tablet Take 1 tablet by mouth 3 (Three) Times a Day. 270 tablet 0    CALCIUM-MAGNESIUM-ZINC PO Take  by mouth.      Cetirizine HCl 10 MG capsule Take 10 mg by mouth Daily. 30 capsule 5    Estradiol-Norethindrone Acet 0.5-0.1 MG per tablet Take 1 tablet by mouth Daily. 28 tablet 2    famotidine (Pepcid) 20 MG tablet Take 1 tablet by mouth 2 (Two) Times a Day. 10 tablet 0    FLUoxetine (PROzac) 40 MG capsule Take 2 capsules by mouth Daily. 180 capsule 0    folic acid (FOLVITE) 1 MG tablet Take 1 tablet by mouth Daily. 90 tablet 0    hydroCHLOROthiazide 25 MG tablet TAKE TWO TABLETS BY MOUTH DAILY AT 9  tablet 0    hydroxychloroquine (PLAQUENIL) 200 MG tablet 2 po daily  Indications: Rheumatoid Arthritis 180 tablet 0    hydrOXYzine (ATARAX) 25 MG tablet 1 po 3x a day x 5d then stop 15 tablet 0    Multiple Vitamin (MULTI VITAMIN DAILY PO) Take by mouth daily.      OLANZapine (zyPREXA) 20 MG tablet Take 1 tablet by mouth Daily. 90 tablet 0    pantoprazole (PROTONIX) 40 MG EC tablet Take 1 tablet by mouth 2 (Two) Times a Day. 180 tablet 0    phentermine (ADIPEX-P) 37.5 MG tablet Take 1 tablet by mouth Every Morning. 90 tablet 0    polyethylene glycol (MIRALAX) pack packet mix 1 packet into drink every night 30 packet 4    predniSONE (DELTASONE) 10 MG tablet 6 tabs po qd x 2 days, 4 tabs po qd x 2 days, 3 tabs po qd x 2 days, 2 tabs po qd x 2 days, 1 tab po qd x 2 days start 4/17/25 32 tablet 0    pregabalin (LYRICA) 150 MG capsule Take 1 capsule by mouth 2 (Two) Times a Day. 180 capsule 1    solifenacin (VESICARE) 10 MG tablet Take 1 tablet by mouth Daily. 30 tablet 5    topiramate (TOPAMAX) 100 MG tablet Take 1 tablet by mouth 2 (Two) Times a Day. 180 tablet 0    traZODone (DESYREL) 100 MG tablet Take 1 tablet by mouth At Night As Needed for Sleep. in the evening as needed for sleep 90 tablet 0    doxycycline (VIBRAMYCIN) 100 MG capsule Take 1 capsule by  mouth 2 (Two) Times a Day. 28 capsule 0    mupirocin (BACTROBAN) 2 % ointment Apply 1 Application topically to the appropriate area as directed 3 (Three) Times a Day. 30 g 2    tacrolimus (Protopic) 0.1 % ointment Apply 1 Application topically to the appropriate area as directed 2 (Two) Times a Day. To face 30 g 2    tobramycin (Tobrex) 0.3 % solution ophthalmic solution Administer 2 drops to both eyes Every 6 (Six) Hours. 5 mL 0     No facility-administered encounter medications on file as of 2025.       Reviewed use of high risk medication in the elderly: yes  Reviewed for potential of harmful drug interactions in the elderly: yes  No opioid medication identified on active medication list. I have reviewed chart for other potential  high risk medication/s and harmful drug interactions in the elderly.    Social History     Socioeconomic History    Marital status:     Number of children: 0   Tobacco Use    Smoking status: Former     Current packs/day: 0.00     Average packs/day: 0.1 packs/day for 8.0 years (0.8 ttl pk-yrs)     Types: Cigarettes     Start date: 2001     Quit date: 2009     Years since quittin.3     Passive exposure: Past    Smokeless tobacco: Never   Vaping Use    Vaping status: Never Used   Substance and Sexual Activity    Alcohol use: Not Currently     Alcohol/week: 1.0 standard drink of alcohol     Types: 1 Glasses of wine per week     Comment: SOCIAL USE//caffeine coffee daily     Drug use: No    Sexual activity: Yes     Partners: Male     Birth control/protection: None     Patient was screened for OUD and BERTA risk factors.  Karen Nash's pain level was assessed as well today.  I included a review current recommendations on pain management, best use practices, current CDC guidelines, alternatives to opioids including OTC & Rx topicals, non-opioid oral medications (eg nsaids, acetominophen) and life style interventions such as yoga, alla chi, stretching, regular exercise,  PT/OT and referral to specialty care like Pain Management that specialize in pain treatment when appropriate.   I also included a review of serious risks of opioid use and substance use disorder.  I have included all of this in the after visit summary along with other risk factors identified that are pertinent to the patient today.      Follow Up:  Return in about 3 days (around 4/21/2025), or if symptoms worsen or fail to improve, for Recheck.     An After Visit Summary and PPPS with all of these plans were given to the patient.           ++++++++++++++++++++++++++++++++++++++++++++++++++++++++++++++++++   Additional E&M Note during same encounter follows:  Patient has multiple medical problems which are significant and separately identifiable that require additional work above and beyond the Medicare Wellness Visit.   Chief Complaint   Patient presents with    Medicare Wellness-subsequent    Rash    Fever       Rash  Associated symptoms include a fever. Pertinent negatives include no cough or shortness of breath.   Fever   Associated symptoms include headaches and a rash. Pertinent negatives include no coughing or wheezing.     Patient 55 y/o female with sudden facial swelling, redness and very pruritic, saw 2 days ago (labs pending).   No throat or tongue swelling;  no difficulty breathing.   She is now having fevers, redness spread farther over forehead and to ears;  Face peeling some;   Still very pruritic, but now painful and hot to touch;   Still does not recall new meds, foods, soaps or lotions.   Eye swelling is better and can see better, but now also purulent drainage both eyes.    History of Present Illness      Review of Systems   Constitutional: Positive for chills and fever.   HENT:  Negative for trouble swallowing and voice change.    Eyes:  Positive for discharge. Negative for photophobia and visual disturbance.   Respiratory:  Negative for choking, cough, shortness of breath and wheezing.    Skin:   "Positive for rash.   Neurological:  Positive for headaches.       Social History     Tobacco Use    Smoking status: Former     Current packs/day: 0.00     Average packs/day: 0.1 packs/day for 8.0 years (0.8 ttl pk-yrs)     Types: Cigarettes     Start date: 2001     Quit date: 2009     Years since quittin.3     Passive exposure: Past    Smokeless tobacco: Never   Substance Use Topics    Alcohol use: Not Currently     Alcohol/week: 1.0 standard drink of alcohol     Types: 1 Glasses of wine per week     Comment: SOCIAL USE//caffeine coffee daily      O:   Vitals:    25 1150   BP: 132/88   Pulse: 92   SpO2: 95%   Weight: 134 kg (295 lb)   Height: 175.3 cm (69\")   PainSc: 3    PainLoc: Face     Body mass index is 43.56 kg/m².  Vitals and nursing note reviewed.   Constitutional:       Appearance: Well-developed.   HENT:      Head: Normocephalic and atraumatic.        Comments: Area marked, erythema, warmth and swelling;  Eyelids less swollen;  b/l eyes purulent d/c  Skin over face peeling.     Mouth/Throat:      Mouth: Mucous membranes are moist.      Pharynx: No oropharyngeal exudate or posterior oropharyngeal erythema.   Neck:      Thyroid: No thyromegaly.      Vascular: No JVD.   Pulmonary:      Effort: Pulmonary effort is normal. No respiratory distress.      Breath sounds: Normal breath sounds. No wheezing. No rales.   Cardiovascular:      Normal rate. Regular rhythm. Normal heart sounds.      No gallop.  No friction rub.   Abdominal:      General: Bowel sounds are normal. There is no distension.      Palpations: Abdomen is soft.      Tenderness: There is no abdominal tenderness. There is no guarding or rebound.   Musculoskeletal:      Cervical back: Neck supple. Skin:     General: Skin is warm and dry.   Neurological:      Mental Status: Alert.      Gait: Gait normal.   Psychiatric:         Behavior: Behavior normal.       Results       Diagnoses and all orders for this visit:    1. Medicare " annual wellness visit, subsequent (Primary)    2. Angioedema, subsequent encounter    3. Facial cellulitis  -     mupirocin (BACTROBAN) 2 % ointment; Apply 1 Application topically to the appropriate area as directed 3 (Three) Times a Day.  Dispense: 30 g; Refill: 2  -     doxycycline (VIBRAMYCIN) 100 MG capsule; Take 1 capsule by mouth 2 (Two) Times a Day.  Dispense: 28 capsule; Refill: 0    4. Impetigo  -     mupirocin (BACTROBAN) 2 % ointment; Apply 1 Application topically to the appropriate area as directed 3 (Three) Times a Day.  Dispense: 30 g; Refill: 2    5. Acute bacterial conjunctivitis of both eyes  -     tobramycin (Tobrex) 0.3 % solution ophthalmic solution; Administer 2 drops to both eyes Every 6 (Six) Hours.  Dispense: 5 mL; Refill: 0    Other orders  -     tacrolimus (Protopic) 0.1 % ointment; Apply 1 Application topically to the appropriate area as directed 2 (Two) Times a Day. To face  Dispense: 30 g; Refill: 2    Given severe pruritus and swelling, suspect angioedema;  However, she now has much more warmth, fevers and pain as well with crakcing in skin, ? Impetigo with now superimposed infection.  Peeling skin also possible staph infection;   She has listed allergy to pcn and sulfa;  She does not recall ever having a cephalosporin, I reviewed past medications, but do not see every tried.  Will give doxy, but directed to go straight to pharmacy and obtain;  Will give topicals as well, mupirocin for possible impetigo and protopic for itch.   I will go ahead and give ophth as well.  I would continue antihistamines given.  I directed to hold hydroxychloroquine for now.   If continued progression, any airway compromise, recommend go ER immediately.   I will follow very closely as very high risk   Labs pending still    Return in about 3 days (around 4/21/2025), or if symptoms worsen or fail to improve, for Recheck.      _____________________________________  Rob Bardales DO, MS    Current  Outpatient Medications on File Prior to Visit   Medication Sig Dispense Refill    acetaminophen (TYLENOL) 500 MG tablet Take 1 tablet by mouth Every 6 (Six) Hours As Needed for Mild Pain.      albuterol (ACCUNEB) 1.25 MG/3ML nebulizer solution Take 3 mL by nebulization Every 4 (Four) Hours As Needed for Wheezing. Dx.  J45.40 120 mL 11    albuterol sulfate  (90 Base) MCG/ACT inhaler INHALE TWO PUFFS BY MOUTH EVERY 6 HOURS AS NEEDED FOR WHEEZING 18 g 0    amLODIPine (NORVASC) 10 MG tablet Take 1 tablet by mouth Daily. 90 tablet 0    budesonide-formoterol (Symbicort) 160-4.5 MCG/ACT inhaler Inhale 2 puffs 2 (Two) Times a Day. 10.2 g 11    busPIRone (BUSPAR) 10 MG tablet Take 1 tablet by mouth 3 (Three) Times a Day. 270 tablet 0    CALCIUM-MAGNESIUM-ZINC PO Take  by mouth.      Cetirizine HCl 10 MG capsule Take 10 mg by mouth Daily. 30 capsule 5    Estradiol-Norethindrone Acet 0.5-0.1 MG per tablet Take 1 tablet by mouth Daily. 28 tablet 2    famotidine (Pepcid) 20 MG tablet Take 1 tablet by mouth 2 (Two) Times a Day. 10 tablet 0    FLUoxetine (PROzac) 40 MG capsule Take 2 capsules by mouth Daily. 180 capsule 0    folic acid (FOLVITE) 1 MG tablet Take 1 tablet by mouth Daily. 90 tablet 0    hydroCHLOROthiazide 25 MG tablet TAKE TWO TABLETS BY MOUTH DAILY AT 9  tablet 0    hydroxychloroquine (PLAQUENIL) 200 MG tablet 2 po daily  Indications: Rheumatoid Arthritis 180 tablet 0    hydrOXYzine (ATARAX) 25 MG tablet 1 po 3x a day x 5d then stop 15 tablet 0    Multiple Vitamin (MULTI VITAMIN DAILY PO) Take by mouth daily.      OLANZapine (zyPREXA) 20 MG tablet Take 1 tablet by mouth Daily. 90 tablet 0    pantoprazole (PROTONIX) 40 MG EC tablet Take 1 tablet by mouth 2 (Two) Times a Day. 180 tablet 0    phentermine (ADIPEX-P) 37.5 MG tablet Take 1 tablet by mouth Every Morning. 90 tablet 0    polyethylene glycol (MIRALAX) pack packet mix 1 packet into drink every night 30 packet 4    predniSONE (DELTASONE) 10 MG  tablet 6 tabs po qd x 2 days, 4 tabs po qd x 2 days, 3 tabs po qd x 2 days, 2 tabs po qd x 2 days, 1 tab po qd x 2 days start 4/17/25 32 tablet 0    pregabalin (LYRICA) 150 MG capsule Take 1 capsule by mouth 2 (Two) Times a Day. 180 capsule 1    solifenacin (VESICARE) 10 MG tablet Take 1 tablet by mouth Daily. 30 tablet 5    topiramate (TOPAMAX) 100 MG tablet Take 1 tablet by mouth 2 (Two) Times a Day. 180 tablet 0    traZODone (DESYREL) 100 MG tablet Take 1 tablet by mouth At Night As Needed for Sleep. in the evening as needed for sleep 90 tablet 0     No current facility-administered medications on file prior to visit.

## 2025-04-21 ENCOUNTER — OFFICE VISIT (OUTPATIENT)
Dept: FAMILY MEDICINE CLINIC | Facility: CLINIC | Age: 55
End: 2025-04-21
Payer: MEDICARE

## 2025-04-21 VITALS
SYSTOLIC BLOOD PRESSURE: 110 MMHG | BODY MASS INDEX: 42.36 KG/M2 | DIASTOLIC BLOOD PRESSURE: 84 MMHG | HEART RATE: 98 BPM | OXYGEN SATURATION: 94 % | HEIGHT: 69 IN | WEIGHT: 286 LBS

## 2025-04-21 DIAGNOSIS — L03.211 FACIAL CELLULITIS: ICD-10-CM

## 2025-04-21 DIAGNOSIS — T78.3XXD ANGIOEDEMA, SUBSEQUENT ENCOUNTER: Primary | ICD-10-CM

## 2025-04-21 PROCEDURE — 3079F DIAST BP 80-89 MM HG: CPT | Performed by: FAMILY MEDICINE

## 2025-04-21 PROCEDURE — 99213 OFFICE O/P EST LOW 20 MIN: CPT | Performed by: FAMILY MEDICINE

## 2025-04-21 PROCEDURE — 1125F AMNT PAIN NOTED PAIN PRSNT: CPT | Performed by: FAMILY MEDICINE

## 2025-04-21 PROCEDURE — 3074F SYST BP LT 130 MM HG: CPT | Performed by: FAMILY MEDICINE

## 2025-04-21 PROCEDURE — G2211 COMPLEX E/M VISIT ADD ON: HCPCS | Performed by: FAMILY MEDICINE

## 2025-04-21 NOTE — PROGRESS NOTES
Subjective   Karen Nash is a 54 y.o. female. Presents today for   Chief Complaint   Patient presents with    Allergic Reaction         History of Present Illness  Patient 53 y/o female presented to office last week for sudden onset severe facial redness and swelling.   Denied any new lotions, soaps, foods or medications.  It was severely pruritic.  No tongue or throat swelling.  She did have some cracking of skin.   I gave steroids and antihistamines to take, this helped pruritus, but had come back 48 hours and spread to ears over all over forehead and became suddenly painful with warmth to touch (though still pruritic too).  I started on topicals and oral antibiotics for probable superimposed infection.   She has significantly improved over the weekend.  Redness has greatly receded, warmth and pain gone.  Still itches;    History of Present Illness    Review of Systems   Constitutional:  Negative for chills and fever.   Skin:  Positive for rash. Negative for wound.       Patient Active Problem List   Diagnosis    Moderate persistent asthma without complication    Atopic rhinitis    Anemia    Benign essential hypertension    Constipation    Dyslipidemia    Gastroesophageal reflux disease    Intractable migraine without aura and without status migrainosus    Muscle cramps    Vocal cord dysfunction    Female dyspareunia    Vaginal atrophy    Pelvic pain    Stress incontinence of urine    Fibromyalgia, primary    Rheumatoid arthritis of multiple sites with negative rheumatoid factor    Severe episode of recurrent major depressive disorder, without psychotic features    Panic disorder    Generalized anxiety disorder    Primary osteoarthritis involving multiple joints    Class 3 severe obesity due to excess calories with serious comorbidity and body mass index (BMI) of 45.0 to 49.9 in adult    Esophageal dysphagia    Dyspepsia    Hypersomnia, unspecified       Social History     Socioeconomic History    Marital  "status:     Number of children: 0   Tobacco Use    Smoking status: Former     Current packs/day: 0.00     Average packs/day: 0.1 packs/day for 8.0 years (0.8 ttl pk-yrs)     Types: Cigarettes     Start date: 2001     Quit date: 2009     Years since quittin.3     Passive exposure: Past    Smokeless tobacco: Never   Vaping Use    Vaping status: Never Used   Substance and Sexual Activity    Alcohol use: Not Currently     Alcohol/week: 1.0 standard drink of alcohol     Types: 1 Glasses of wine per week     Comment: SOCIAL USE//caffeine coffee daily     Drug use: No    Sexual activity: Yes     Partners: Male     Birth control/protection: None       Allergies   Allergen Reactions    Sulfamethoxazole-Trimethoprim Hives and Itching    Corn-Containing Products Rash    Penicillins     Bacitracin Rash         Objective   Vitals:    25 1302   BP: 130/100   Pulse: 98   SpO2: 94%   Weight: 130 kg (286 lb)   Height: 175.3 cm (69\")     Body mass index is 42.23 kg/m².    Physical Exam  Vitals and nursing note reviewed.   Constitutional:       Appearance: Normal appearance. She is not toxic-appearing or diaphoretic.   HENT:      Head: Normocephalic and atraumatic.   Musculoskeletal:      Cervical back: Neck supple.   Skin:     General: Skin is warm and dry.      Capillary Refill: Capillary refill takes less than 2 seconds.   Neurological:      Mental Status: She is alert.   Psychiatric:         Mood and Affect: Mood normal.         Behavior: Behavior normal.         Results  Patient gave permission to place picture in chart.       Assessment & Plan   Diagnoses and all orders for this visit:    1. Angioedema, subsequent encounter (Primary)    2. Facial cellulitis    Will have finish abx, steroids, antihistamines and topicals  If angioedema, ? Source;   Possible amlodipine or hctz, though rare, other meds even less likelier.   Discussed that if recurs, will try stopping one medication at a time      "   Assessment & Plan            -Follow up: 5/7/25 and prn     ________________________________________  Rob Bardales DO, MS    Current Outpatient Medications on File Prior to Visit   Medication Sig Dispense Refill    acetaminophen (TYLENOL) 500 MG tablet Take 1 tablet by mouth Every 6 (Six) Hours As Needed for Mild Pain.      albuterol (ACCUNEB) 1.25 MG/3ML nebulizer solution Take 3 mL by nebulization Every 4 (Four) Hours As Needed for Wheezing. Dx.  J45.40 120 mL 11    albuterol sulfate  (90 Base) MCG/ACT inhaler INHALE TWO PUFFS BY MOUTH EVERY 6 HOURS AS NEEDED FOR WHEEZING 18 g 0    amLODIPine (NORVASC) 10 MG tablet Take 1 tablet by mouth Daily. 90 tablet 0    budesonide-formoterol (Symbicort) 160-4.5 MCG/ACT inhaler Inhale 2 puffs 2 (Two) Times a Day. 10.2 g 11    busPIRone (BUSPAR) 10 MG tablet Take 1 tablet by mouth 3 (Three) Times a Day. 270 tablet 0    CALCIUM-MAGNESIUM-ZINC PO Take  by mouth.      Cetirizine HCl 10 MG capsule Take 10 mg by mouth Daily. 30 capsule 5    doxycycline (VIBRAMYCIN) 100 MG capsule Take 1 capsule by mouth 2 (Two) Times a Day. 28 capsule 0    Estradiol-Norethindrone Acet 0.5-0.1 MG per tablet Take 1 tablet by mouth Daily. 28 tablet 2    famotidine (Pepcid) 20 MG tablet Take 1 tablet by mouth 2 (Two) Times a Day. 10 tablet 0    FLUoxetine (PROzac) 40 MG capsule Take 2 capsules by mouth Daily. 180 capsule 0    folic acid (FOLVITE) 1 MG tablet Take 1 tablet by mouth Daily. 90 tablet 0    hydroCHLOROthiazide 25 MG tablet TAKE TWO TABLETS BY MOUTH DAILY AT 9  tablet 0    hydroxychloroquine (PLAQUENIL) 200 MG tablet 2 po daily  Indications: Rheumatoid Arthritis 180 tablet 0    hydrOXYzine (ATARAX) 25 MG tablet 1 po 3x a day x 5d then stop 15 tablet 0    Multiple Vitamin (MULTI VITAMIN DAILY PO) Take by mouth daily.      mupirocin (BACTROBAN) 2 % ointment Apply 1 Application topically to the appropriate area as directed 3 (Three) Times a Day. 30 g 2    OLANZapine  (zyPREXA) 20 MG tablet Take 1 tablet by mouth Daily. 90 tablet 0    pantoprazole (PROTONIX) 40 MG EC tablet Take 1 tablet by mouth 2 (Two) Times a Day. 180 tablet 0    phentermine (ADIPEX-P) 37.5 MG tablet Take 1 tablet by mouth Every Morning. 90 tablet 0    polyethylene glycol (MIRALAX) pack packet mix 1 packet into drink every night 30 packet 4    predniSONE (DELTASONE) 10 MG tablet 6 tabs po qd x 2 days, 4 tabs po qd x 2 days, 3 tabs po qd x 2 days, 2 tabs po qd x 2 days, 1 tab po qd x 2 days start 4/17/25 32 tablet 0    pregabalin (LYRICA) 150 MG capsule Take 1 capsule by mouth 2 (Two) Times a Day. 180 capsule 1    solifenacin (VESICARE) 10 MG tablet Take 1 tablet by mouth Daily. 30 tablet 5    tacrolimus (Protopic) 0.1 % ointment Apply 1 Application topically to the appropriate area as directed 2 (Two) Times a Day. To face 30 g 2    tobramycin (Tobrex) 0.3 % solution ophthalmic solution Administer 2 drops to both eyes Every 6 (Six) Hours. 5 mL 0    topiramate (TOPAMAX) 100 MG tablet Take 1 tablet by mouth 2 (Two) Times a Day. 180 tablet 0    traZODone (DESYREL) 100 MG tablet Take 1 tablet by mouth At Night As Needed for Sleep. in the evening as needed for sleep 90 tablet 0     No current facility-administered medications on file prior to visit.

## 2025-04-24 LAB
ALBUMIN SERPL-MCNC: 4.2 G/DL (ref 3.8–4.9)
ALP SERPL-CCNC: 109 IU/L (ref 44–121)
ALT SERPL-CCNC: 29 IU/L (ref 0–32)
AST SERPL-CCNC: 28 IU/L (ref 0–40)
BASOPHILS # BLD AUTO: 0 X10E3/UL (ref 0–0.2)
BASOPHILS NFR BLD AUTO: 0 %
BILIRUB SERPL-MCNC: 0.4 MG/DL (ref 0–1.2)
BUN SERPL-MCNC: 11 MG/DL (ref 6–24)
BUN/CREAT SERPL: 11 (ref 9–23)
CALCIUM SERPL-MCNC: 8.8 MG/DL (ref 8.7–10.2)
CHLORIDE SERPL-SCNC: 104 MMOL/L (ref 96–106)
CLAM IGE QN: <0.1 KU/L
CO2 SERPL-SCNC: 19 MMOL/L (ref 20–29)
CODFISH IGE QN: <0.1 KU/L
CONV CLASS DESCRIPTION: NORMAL
CORN IGE QN: <0.1 KU/L
COW MILK IGE QN: <0.1 KU/L
CREAT SERPL-MCNC: 1.02 MG/DL (ref 0.57–1)
CRP SERPL-MCNC: 136 MG/L (ref 0–10)
EGFRCR SERPLBLD CKD-EPI 2021: 65 ML/MIN/1.73
EGG WHITE IGE QN: <0.1 KU/L
EOSINOPHIL # BLD AUTO: 0 X10E3/UL (ref 0–0.4)
EOSINOPHIL NFR BLD AUTO: 0 %
ERYTHROCYTE [DISTWIDTH] IN BLOOD BY AUTOMATED COUNT: 13 % (ref 11.7–15.4)
ERYTHROCYTE [SEDIMENTATION RATE] IN BLOOD BY WESTERGREN METHOD: 28 MM/HR (ref 0–40)
GLOBULIN SER CALC-MCNC: 2.6 G/DL (ref 1.5–4.5)
GLUCOSE SERPL-MCNC: 99 MG/DL (ref 70–99)
HCT VFR BLD AUTO: 46.6 % (ref 34–46.6)
HGB BLD-MCNC: 14.7 G/DL (ref 11.1–15.9)
IMM GRANULOCYTES # BLD AUTO: 0 X10E3/UL (ref 0–0.1)
IMM GRANULOCYTES NFR BLD AUTO: 0 %
LYMPHOCYTES # BLD AUTO: 1.1 X10E3/UL (ref 0.7–3.1)
LYMPHOCYTES NFR BLD AUTO: 8 %
MCH RBC QN AUTO: 28.7 PG (ref 26.6–33)
MCHC RBC AUTO-ENTMCNC: 31.5 G/DL (ref 31.5–35.7)
MCV RBC AUTO: 91 FL (ref 79–97)
MONOCYTES # BLD AUTO: 0.8 X10E3/UL (ref 0.1–0.9)
MONOCYTES NFR BLD AUTO: 6 %
NEUTROPHILS # BLD AUTO: 12.3 X10E3/UL (ref 1.4–7)
NEUTROPHILS NFR BLD AUTO: 86 %
PEANUT IGE QN: <0.1 KU/L
PLATELET # BLD AUTO: 256 X10E3/UL (ref 150–450)
POTASSIUM SERPL-SCNC: 3.9 MMOL/L (ref 3.5–5.2)
PROT SERPL-MCNC: 6.8 G/DL (ref 6–8.5)
RBC # BLD AUTO: 5.13 X10E6/UL (ref 3.77–5.28)
SCALLOP IGE QN: <0.1 KU/L
SESAME SEED IGE QN: <0.1 KU/L
SHRIMP IGE QN: <0.1 KU/L
SODIUM SERPL-SCNC: 138 MMOL/L (ref 134–144)
SOYBEAN IGE QN: <0.1 KU/L
WALNUT IGE QN: <0.1 KU/L
WBC # BLD AUTO: 14.3 X10E3/UL (ref 3.4–10.8)
WHEAT IGE QN: <0.1 KU/L

## 2025-04-30 DIAGNOSIS — R22.0 FACIAL SWELLING: ICD-10-CM

## 2025-04-30 DIAGNOSIS — T78.3XXA ANGIOEDEMA, INITIAL ENCOUNTER: ICD-10-CM

## 2025-05-01 RX ORDER — FAMOTIDINE 20 MG/1
20 TABLET, FILM COATED ORAL 2 TIMES DAILY
Qty: 10 TABLET | Refills: 0 | Status: SHIPPED | OUTPATIENT
Start: 2025-05-01

## 2025-05-05 ENCOUNTER — TELEPHONE (OUTPATIENT)
Dept: FAMILY MEDICINE CLINIC | Facility: CLINIC | Age: 55
End: 2025-05-05
Payer: MEDICARE

## 2025-05-05 RX ORDER — FLUCONAZOLE 200 MG/1
200 TABLET ORAL DAILY
Qty: 1 TABLET | Refills: 0 | Status: SHIPPED | OUTPATIENT
Start: 2025-05-05 | End: 2025-05-07

## 2025-05-05 NOTE — TELEPHONE ENCOUNTER
Caller: Karen Nash    Relationship: Self    Best call back number:   Telephone Information:   Mobile [phone]     What medication are you requesting: YEAST INFECTION MEDICATION    What are your current symptoms: VAGINAL ODOR AND ITCHY     How long have you been experiencing symptoms:  FEW DAYS    Have you had these symptoms before:    [] Yes  [] No    Have you been treated for these symptoms before:   [] Yes  [] No    If a prescription is needed, what is your preferred pharmacy and phone number: [id number] - [facility] [address] [facility] [phone] [facility] [phone]      Additional notes: PATIENT STATED THAT SHE WAS PRESCRIBED AN ANTIBIOTIC BY DR MCRAE AND NOW BELIEVES SHE HAS A YEAST INFECTION. PATIENT IS REQUESTING IF DR MCRAE CAN SEND IN YEAST INFECTION MEDICATION. PLEASE ADVISE.

## 2025-05-07 ENCOUNTER — OFFICE VISIT (OUTPATIENT)
Dept: FAMILY MEDICINE CLINIC | Facility: CLINIC | Age: 55
End: 2025-05-07
Payer: MEDICARE

## 2025-05-07 VITALS
HEIGHT: 69 IN | SYSTOLIC BLOOD PRESSURE: 128 MMHG | BODY MASS INDEX: 43.4 KG/M2 | DIASTOLIC BLOOD PRESSURE: 84 MMHG | OXYGEN SATURATION: 96 % | HEART RATE: 101 BPM | WEIGHT: 293 LBS

## 2025-05-07 DIAGNOSIS — J30.1 SEASONAL ALLERGIC RHINITIS DUE TO POLLEN: Primary | ICD-10-CM

## 2025-05-07 DIAGNOSIS — T78.3XXD ANGIOEDEMA, SUBSEQUENT ENCOUNTER: ICD-10-CM

## 2025-05-07 RX ORDER — TRIAMCINOLONE ACETONIDE 40 MG/ML
80 INJECTION, SUSPENSION INTRA-ARTICULAR; INTRAMUSCULAR ONCE
Status: COMPLETED | OUTPATIENT
Start: 2025-05-07 | End: 2025-05-07

## 2025-05-07 RX ADMIN — TRIAMCINOLONE ACETONIDE 80 MG: 40 INJECTION, SUSPENSION INTRA-ARTICULAR; INTRAMUSCULAR at 12:30

## 2025-05-07 NOTE — PROGRESS NOTES
Subjective   Karen Nash is a 54 y.o. female. Presents today for   Chief Complaint   Patient presents with    Facial Swelling         History of Present Illness  History of Present Illness  The patient is a 54-year-old female who presents for follow-up of facial swelling. The swelling was initially treated with steroids and antihistamines as it appeared consistent with angioedema. However, she later developed crusting, worsening redness, and warmth, raising concerns for facial cellulitis during a follow-up visit. Antibiotics were subsequently added to her treatment regimen, which showed improvement upon last check. Given the severity of the condition, close monitoring is being maintained.    She reports experiencing congestion today, which she attributes to her allergies. Symptoms include itchy and watery eyes. She does not report any throat or tongue swelling. Her current medication regimen includes Zyrtec.  Review of Systems   HENT:  Positive for congestion and postnasal drip. Negative for trouble swallowing.    Eyes:  Positive for itching.   Respiratory:  Negative for shortness of breath.    Cardiovascular:  Negative for chest pain.   Allergic/Immunologic: Positive for environmental allergies.       Patient Active Problem List   Diagnosis    Moderate persistent asthma without complication    Atopic rhinitis    Anemia    Benign essential hypertension    Constipation    Dyslipidemia    Gastroesophageal reflux disease    Intractable migraine without aura and without status migrainosus    Muscle cramps    Vocal cord dysfunction    Female dyspareunia    Vaginal atrophy    Pelvic pain    Stress incontinence of urine    Fibromyalgia, primary    Rheumatoid arthritis of multiple sites with negative rheumatoid factor    Severe episode of recurrent major depressive disorder, without psychotic features    Panic disorder    Generalized anxiety disorder    Primary osteoarthritis involving multiple joints    Class 3 severe  "obesity due to excess calories with serious comorbidity and body mass index (BMI) of 45.0 to 49.9 in adult    Esophageal dysphagia    Dyspepsia    Hypersomnia, unspecified       Social History     Socioeconomic History    Marital status:     Number of children: 0   Tobacco Use    Smoking status: Former     Current packs/day: 0.00     Average packs/day: 0.1 packs/day for 8.0 years (0.8 ttl pk-yrs)     Types: Cigarettes     Start date: 2001     Quit date: 2009     Years since quittin.3     Passive exposure: Past    Smokeless tobacco: Never   Vaping Use    Vaping status: Never Used   Substance and Sexual Activity    Alcohol use: Not Currently     Alcohol/week: 1.0 standard drink of alcohol     Types: 1 Glasses of wine per week     Comment: SOCIAL USE//caffeine coffee daily     Drug use: No    Sexual activity: Yes     Partners: Male     Birth control/protection: None       Allergies   Allergen Reactions    Sulfamethoxazole-Trimethoprim Hives and Itching    Corn-Containing Products Rash    Penicillins     Bacitracin Rash         Objective   Vitals:    25 1137   BP: 128/84   Pulse: 101   SpO2: 96%   Weight: 133 kg (294 lb)   Height: 175.3 cm (69\")     Body mass index is 43.42 kg/m².    Physical Exam  Vitals and nursing note reviewed.   Constitutional:       Appearance: She is well-developed.   HENT:      Head: Normocephalic and atraumatic.      Right Ear: There is impacted cerumen.      Left Ear: There is impacted cerumen.      Nose: Congestion present.   Neck:      Thyroid: No thyromegaly.      Vascular: No JVD.   Cardiovascular:      Rate and Rhythm: Normal rate and regular rhythm.      Heart sounds: Normal heart sounds. No murmur heard.     No friction rub. No gallop.   Pulmonary:      Effort: Pulmonary effort is normal. No respiratory distress.      Breath sounds: Normal breath sounds. No wheezing or rales.   Abdominal:      General: Bowel sounds are normal. There is no distension.      " Palpations: Abdomen is soft.      Tenderness: There is no abdominal tenderness. There is no guarding or rebound.   Musculoskeletal:      Cervical back: Neck supple.   Skin:     General: Skin is warm and dry.      Comments: Dry, peelign skin face, ears mild;  facial swelling resolved   Neurological:      Mental Status: She is alert.      Gait: Gait normal.   Psychiatric:         Behavior: Behavior normal.         Results       Assessment & Plan   Diagnoses and all orders for this visit:    1. Seasonal allergic rhinitis due to pollen (Primary)  -     triamcinolone acetonide (KENALOG-40) injection 80 mg    2. Angioedema, subsequent encounter         Assessment & Plan  1. Facial swelling.  - Significant facial swelling previously treated with steroids and antihistamines, consistent with angioedema.  - Development of crusting, redness, and warmth led to concern for facial cellulitis; antibiotics were added and improvement noted.  - Physical examination reveals no throat or tongue swelling; facial condition appears improved.  - Kenalog injection will be administered today; patient advised to report any recurrence of facial swelling immediately.    2. Allergic rhinitis.  - Symptoms include congestion, itchy and watery eyes.  - Physical examination reveals significant ear drainage.  - Discussion about current allergy medication, patient is taking cetirizine (Zyrtec).  - Kenalog injection planned to manage allergy symptoms.          -Follow up: 6 months and prn     ________________________________________  Rob Bardales DO, MS    Current Outpatient Medications on File Prior to Visit   Medication Sig Dispense Refill    acetaminophen (TYLENOL) 500 MG tablet Take 1 tablet by mouth Every 6 (Six) Hours As Needed for Mild Pain.      albuterol (ACCUNEB) 1.25 MG/3ML nebulizer solution Take 3 mL by nebulization Every 4 (Four) Hours As Needed for Wheezing. Dx.  J45.40 120 mL 11    albuterol sulfate  (90 Base) MCG/ACT  inhaler INHALE TWO PUFFS BY MOUTH EVERY 6 HOURS AS NEEDED FOR WHEEZING 18 g 0    amLODIPine (NORVASC) 10 MG tablet Take 1 tablet by mouth Daily. 90 tablet 0    budesonide-formoterol (Symbicort) 160-4.5 MCG/ACT inhaler Inhale 2 puffs 2 (Two) Times a Day. 10.2 g 11    busPIRone (BUSPAR) 10 MG tablet Take 1 tablet by mouth 3 (Three) Times a Day. 270 tablet 0    CALCIUM-MAGNESIUM-ZINC PO Take  by mouth.      Cetirizine HCl 10 MG capsule Take 10 mg by mouth Daily. 30 capsule 5    Estradiol-Norethindrone Acet 0.5-0.1 MG per tablet Take 1 tablet by mouth Daily. 28 tablet 2    famotidine (PEPCID) 20 MG tablet TAKE 1 TABLET BY MOUTH 2 TIMES A DAY 10 tablet 0    FLUoxetine (PROzac) 40 MG capsule Take 2 capsules by mouth Daily. 180 capsule 0    folic acid (FOLVITE) 1 MG tablet Take 1 tablet by mouth Daily. 90 tablet 0    hydroCHLOROthiazide 25 MG tablet TAKE TWO TABLETS BY MOUTH DAILY AT 9  tablet 0    hydroxychloroquine (PLAQUENIL) 200 MG tablet 2 po daily  Indications: Rheumatoid Arthritis 180 tablet 0    hydrOXYzine (ATARAX) 25 MG tablet 1 po 3x a day x 5d then stop 15 tablet 0    Multiple Vitamin (MULTI VITAMIN DAILY PO) Take by mouth daily.      mupirocin (BACTROBAN) 2 % ointment Apply 1 Application topically to the appropriate area as directed 3 (Three) Times a Day. 30 g 2    OLANZapine (zyPREXA) 20 MG tablet Take 1 tablet by mouth Daily. 90 tablet 0    pantoprazole (PROTONIX) 40 MG EC tablet Take 1 tablet by mouth 2 (Two) Times a Day. 180 tablet 0    phentermine (ADIPEX-P) 37.5 MG tablet Take 1 tablet by mouth Every Morning. 90 tablet 0    polyethylene glycol (MIRALAX) pack packet mix 1 packet into drink every night 30 packet 4    pregabalin (LYRICA) 150 MG capsule Take 1 capsule by mouth 2 (Two) Times a Day. 180 capsule 1    solifenacin (VESICARE) 10 MG tablet Take 1 tablet by mouth Daily. 30 tablet 5    tacrolimus (Protopic) 0.1 % ointment Apply 1 Application topically to the appropriate area as directed 2 (Two)  Times a Day. To face 30 g 2    tobramycin (Tobrex) 0.3 % solution ophthalmic solution Administer 2 drops to both eyes Every 6 (Six) Hours. 5 mL 0    topiramate (TOPAMAX) 100 MG tablet Take 1 tablet by mouth 2 (Two) Times a Day. 180 tablet 0    traZODone (DESYREL) 100 MG tablet Take 1 tablet by mouth At Night As Needed for Sleep. in the evening as needed for sleep 90 tablet 0    [DISCONTINUED] doxycycline (VIBRAMYCIN) 100 MG capsule Take 1 capsule by mouth 2 (Two) Times a Day. (Patient not taking: Reported on 5/7/2025) 28 capsule 0    [DISCONTINUED] fluconazole (DIFLUCAN) 200 MG tablet Take 1 tablet by mouth Daily. (Patient not taking: Reported on 5/7/2025) 1 tablet 0    [DISCONTINUED] predniSONE (DELTASONE) 10 MG tablet 6 tabs po qd x 2 days, 4 tabs po qd x 2 days, 3 tabs po qd x 2 days, 2 tabs po qd x 2 days, 1 tab po qd x 2 days start 4/17/25 (Patient not taking: Reported on 5/7/2025) 32 tablet 0     No current facility-administered medications on file prior to visit.

## 2025-05-13 DIAGNOSIS — N95.1 VASOMOTOR SYMPTOMS DUE TO MENOPAUSE: ICD-10-CM

## 2025-05-13 DIAGNOSIS — Z78.0 MENOPAUSE: ICD-10-CM

## 2025-05-13 RX ORDER — ESTRADIOL AND NORETHINDRONE ACETATE .5; .1 MG/1; MG/1
1 TABLET ORAL DAILY
Qty: 28 TABLET | Refills: 5 | Status: SHIPPED | OUTPATIENT
Start: 2025-05-13

## 2025-05-16 DIAGNOSIS — R22.0 FACIAL SWELLING: ICD-10-CM

## 2025-05-16 DIAGNOSIS — T78.3XXA ANGIOEDEMA, INITIAL ENCOUNTER: ICD-10-CM

## 2025-05-16 RX ORDER — FAMOTIDINE 20 MG/1
20 TABLET, FILM COATED ORAL 2 TIMES DAILY
Qty: 60 TABLET | Refills: 1 | Status: SHIPPED | OUTPATIENT
Start: 2025-05-16

## 2025-05-18 DIAGNOSIS — G47.09 OTHER INSOMNIA: ICD-10-CM

## 2025-05-19 RX ORDER — TRAZODONE HYDROCHLORIDE 100 MG/1
100 TABLET ORAL NIGHTLY PRN
Qty: 90 TABLET | Refills: 1 | Status: SHIPPED | OUTPATIENT
Start: 2025-05-19

## 2025-05-23 RX ORDER — ALBUTEROL SULFATE 90 UG/1
2 INHALANT RESPIRATORY (INHALATION) EVERY 6 HOURS PRN
Qty: 18 G | Refills: 2 | Status: SHIPPED | OUTPATIENT
Start: 2025-05-23

## 2025-05-23 NOTE — TELEPHONE ENCOUNTER
[place]: [facility] - [place], [place] - [address] [facility] - [phone] [facility] [phone] FX    Relationship: Pharmacy    Best call back number: [phone]*    Requested Prescriptions:   Requested Prescriptions     Pending Prescriptions Disp Refills    albuterol sulfate  (90 Base) MCG/ACT inhaler 18 g 0     Si puffs Every 6 (Six) Hours As Needed for Wheezing.        Pharmacy where request should be sent: [facility] ([place]) - [place], [place] - [address] [facility] - [phone] [facility] [phone] FX     Last office visit with prescribing clinician: 2025   Last telemedicine visit with prescribing clinician: Visit date not found   Next office visit with prescribing clinician: 2025     Additional details provided by patient: REQUEST REFILL FOR THE PATIENT    Does the patient have less than a 3 day supply:  [x] Yes  [] No    Would you like a call back once the refill request has been completed: [] Yes [x] No    If the office needs to give you a call back, can they leave a voicemail: [] Yes [x] No    Astrid Howard   25 15:59 EDT

## 2025-05-28 RX ORDER — ALBUTEROL SULFATE 90 UG/1
2 INHALANT RESPIRATORY (INHALATION) EVERY 6 HOURS PRN
Qty: 6.7 G | Refills: 2 | Status: SHIPPED | OUTPATIENT
Start: 2025-05-28

## 2025-06-04 DIAGNOSIS — E66.01 CLASS 2 SEVERE OBESITY DUE TO EXCESS CALORIES WITH SERIOUS COMORBIDITY AND BODY MASS INDEX (BMI) OF 35.0 TO 35.9 IN ADULT: ICD-10-CM

## 2025-06-04 DIAGNOSIS — E66.812 CLASS 2 SEVERE OBESITY DUE TO EXCESS CALORIES WITH SERIOUS COMORBIDITY AND BODY MASS INDEX (BMI) OF 35.0 TO 35.9 IN ADULT: ICD-10-CM

## 2025-06-04 DIAGNOSIS — F32.4 MAJOR DEPRESSIVE DISORDER WITH SINGLE EPISODE, IN PARTIAL REMISSION: ICD-10-CM

## 2025-06-05 RX ORDER — FLUOXETINE HYDROCHLORIDE 40 MG/1
80 CAPSULE ORAL DAILY
Qty: 180 CAPSULE | Refills: 3 | Status: SHIPPED | OUTPATIENT
Start: 2025-06-05

## 2025-06-05 RX ORDER — OLANZAPINE 20 MG/1
20 TABLET, FILM COATED ORAL NIGHTLY
Qty: 90 TABLET | Refills: 3 | Status: SHIPPED | OUTPATIENT
Start: 2025-06-05

## 2025-06-05 RX ORDER — BUSPIRONE HYDROCHLORIDE 10 MG/1
10 TABLET ORAL 3 TIMES DAILY
Qty: 270 TABLET | Refills: 3 | Status: SHIPPED | OUTPATIENT
Start: 2025-06-05

## 2025-06-05 RX ORDER — HYDROXYCHLOROQUINE SULFATE 200 MG/1
400 TABLET, FILM COATED ORAL DAILY
Qty: 180 TABLET | Refills: 3 | Status: SHIPPED | OUTPATIENT
Start: 2025-06-05

## 2025-06-05 RX ORDER — FOLIC ACID 1 MG/1
1 TABLET ORAL DAILY
Qty: 90 TABLET | Refills: 3 | Status: SHIPPED | OUTPATIENT
Start: 2025-06-05

## 2025-06-06 RX ORDER — PHENTERMINE HYDROCHLORIDE 37.5 MG/1
37.5 TABLET ORAL EVERY MORNING
Qty: 90 TABLET | Refills: 1 | Status: SHIPPED | OUTPATIENT
Start: 2025-06-06

## 2025-06-10 ENCOUNTER — APPOINTMENT (OUTPATIENT)
Dept: CT IMAGING | Facility: HOSPITAL | Age: 55
End: 2025-06-10
Payer: MEDICARE

## 2025-06-10 ENCOUNTER — HOSPITAL ENCOUNTER (OUTPATIENT)
Facility: HOSPITAL | Age: 55
Setting detail: OBSERVATION
Discharge: HOME | End: 2025-06-12
Attending: EMERGENCY MEDICINE | Admitting: STUDENT IN AN ORGANIZED HEALTH CARE EDUCATION/TRAINING PROGRAM
Payer: MEDICARE

## 2025-06-10 ENCOUNTER — APPOINTMENT (OUTPATIENT)
Dept: GENERAL RADIOLOGY | Facility: HOSPITAL | Age: 55
End: 2025-06-10
Payer: MEDICARE

## 2025-06-10 DIAGNOSIS — R40.0 DAYTIME SOMNOLENCE: ICD-10-CM

## 2025-06-10 DIAGNOSIS — M79.7 FIBROMYALGIA, PRIMARY: ICD-10-CM

## 2025-06-10 DIAGNOSIS — F32.4 MAJOR DEPRESSIVE DISORDER WITH SINGLE EPISODE, IN PARTIAL REMISSION: ICD-10-CM

## 2025-06-10 DIAGNOSIS — R41.82 ALTERED MENTAL STATUS, UNSPECIFIED ALTERED MENTAL STATUS TYPE: Primary | ICD-10-CM

## 2025-06-10 PROBLEM — E87.6 HYPOKALEMIA: Status: ACTIVE | Noted: 2025-06-10

## 2025-06-10 LAB
ALBUMIN SERPL-MCNC: 3.7 G/DL (ref 3.5–5.2)
ALBUMIN/GLOB SERPL: 1.2 G/DL
ALP SERPL-CCNC: 103 U/L (ref 39–117)
ALT SERPL W P-5'-P-CCNC: 18 U/L (ref 1–33)
AMPHET+METHAMPHET UR QL: POSITIVE
AMPHETAMINES UR QL: NEGATIVE
ANION GAP SERPL CALCULATED.3IONS-SCNC: 11.1 MMOL/L (ref 5–15)
AST SERPL-CCNC: 21 U/L (ref 1–32)
BACTERIA UR QL AUTO: ABNORMAL /HPF
BARBITURATES UR QL SCN: NEGATIVE
BASOPHILS # BLD AUTO: 0.08 10*3/MM3 (ref 0–0.2)
BASOPHILS NFR BLD AUTO: 0.7 % (ref 0–1.5)
BENZODIAZ UR QL SCN: POSITIVE
BILIRUB SERPL-MCNC: 0.4 MG/DL (ref 0–1.2)
BILIRUB UR QL STRIP: NEGATIVE
BUN SERPL-MCNC: 12 MG/DL (ref 6–20)
BUN/CREAT SERPL: 10.7 (ref 7–25)
BUPRENORPHINE SERPL-MCNC: NEGATIVE NG/ML
CALCIUM SPEC-SCNC: 9 MG/DL (ref 8.6–10.5)
CANNABINOIDS SERPL QL: POSITIVE
CHLORIDE SERPL-SCNC: 102 MMOL/L (ref 98–107)
CLARITY UR: ABNORMAL
CO2 SERPL-SCNC: 22.9 MMOL/L (ref 22–29)
COCAINE UR QL: NEGATIVE
COLOR UR: YELLOW
CREAT SERPL-MCNC: 1.12 MG/DL (ref 0.57–1)
DEPRECATED RDW RBC AUTO: 46 FL (ref 37–54)
EGFRCR SERPLBLD CKD-EPI 2021: 57.8 ML/MIN/1.73
EOSINOPHIL # BLD AUTO: 0.29 10*3/MM3 (ref 0–0.4)
EOSINOPHIL NFR BLD AUTO: 2.6 % (ref 0.3–6.2)
ERYTHROCYTE [DISTWIDTH] IN BLOOD BY AUTOMATED COUNT: 13.4 % (ref 12.3–15.4)
ETHANOL BLD-MCNC: <10 MG/DL (ref 0–10)
ETHANOL UR QL: <0.01 %
FENTANYL UR-MCNC: NEGATIVE NG/ML
GLOBULIN UR ELPH-MCNC: 3 GM/DL
GLUCOSE SERPL-MCNC: 112 MG/DL (ref 65–99)
GLUCOSE UR STRIP-MCNC: NEGATIVE MG/DL
HBA1C MFR BLD: 5.5 % (ref 4.8–5.6)
HCT VFR BLD AUTO: 46.6 % (ref 34–46.6)
HGB BLD-MCNC: 14.6 G/DL (ref 12–15.9)
HGB UR QL STRIP.AUTO: NEGATIVE
HYALINE CASTS UR QL AUTO: ABNORMAL /LPF
IMM GRANULOCYTES # BLD AUTO: 0.06 10*3/MM3 (ref 0–0.05)
IMM GRANULOCYTES NFR BLD AUTO: 0.5 % (ref 0–0.5)
KETONES UR QL STRIP: NEGATIVE
LEUKOCYTE ESTERASE UR QL STRIP.AUTO: ABNORMAL
LYMPHOCYTES # BLD AUTO: 2.4 10*3/MM3 (ref 0.7–3.1)
LYMPHOCYTES NFR BLD AUTO: 21.7 % (ref 19.6–45.3)
MCH RBC QN AUTO: 29.4 PG (ref 26.6–33)
MCHC RBC AUTO-ENTMCNC: 31.3 G/DL (ref 31.5–35.7)
MCV RBC AUTO: 94 FL (ref 79–97)
METHADONE UR QL SCN: NEGATIVE
MONOCYTES # BLD AUTO: 0.61 10*3/MM3 (ref 0.1–0.9)
MONOCYTES NFR BLD AUTO: 5.5 % (ref 5–12)
NEUTROPHILS NFR BLD AUTO: 69 % (ref 42.7–76)
NEUTROPHILS NFR BLD AUTO: 7.61 10*3/MM3 (ref 1.7–7)
NITRITE UR QL STRIP: NEGATIVE
NRBC BLD AUTO-RTO: 0 /100 WBC (ref 0–0.2)
OPIATES UR QL: NEGATIVE
OXYCODONE UR QL SCN: NEGATIVE
PCP UR QL SCN: NEGATIVE
PH UR STRIP.AUTO: <=5 [PH] (ref 5–8)
PLATELET # BLD AUTO: 311 10*3/MM3 (ref 140–450)
PMV BLD AUTO: 10 FL (ref 6–12)
POTASSIUM SERPL-SCNC: 3.3 MMOL/L (ref 3.5–5.2)
PROT SERPL-MCNC: 6.7 G/DL (ref 6–8.5)
PROT UR QL STRIP: NEGATIVE
RBC # BLD AUTO: 4.96 10*6/MM3 (ref 3.77–5.28)
RBC # UR STRIP: ABNORMAL /HPF
REF LAB TEST METHOD: ABNORMAL
SODIUM SERPL-SCNC: 136 MMOL/L (ref 136–145)
SP GR UR STRIP: 1.02 (ref 1–1.03)
SQUAMOUS #/AREA URNS HPF: ABNORMAL /HPF
TRICYCLICS UR QL SCN: NEGATIVE
UROBILINOGEN UR QL STRIP: ABNORMAL
WBC # UR STRIP: ABNORMAL /HPF
WBC NRBC COR # BLD AUTO: 11.05 10*3/MM3 (ref 3.4–10.8)

## 2025-06-10 PROCEDURE — 80053 COMPREHEN METABOLIC PANEL: CPT | Performed by: EMERGENCY MEDICINE

## 2025-06-10 PROCEDURE — 80307 DRUG TEST PRSMV CHEM ANLYZR: CPT | Performed by: EMERGENCY MEDICINE

## 2025-06-10 PROCEDURE — 99285 EMERGENCY DEPT VISIT HI MDM: CPT

## 2025-06-10 PROCEDURE — 83036 HEMOGLOBIN GLYCOSYLATED A1C: CPT | Performed by: STUDENT IN AN ORGANIZED HEALTH CARE EDUCATION/TRAINING PROGRAM

## 2025-06-10 PROCEDURE — 25810000003 LACTATED RINGERS PER 1000 ML: Performed by: STUDENT IN AN ORGANIZED HEALTH CARE EDUCATION/TRAINING PROGRAM

## 2025-06-10 PROCEDURE — 71045 X-RAY EXAM CHEST 1 VIEW: CPT

## 2025-06-10 PROCEDURE — 81001 URINALYSIS AUTO W/SCOPE: CPT | Performed by: EMERGENCY MEDICINE

## 2025-06-10 PROCEDURE — 96361 HYDRATE IV INFUSION ADD-ON: CPT

## 2025-06-10 PROCEDURE — G0378 HOSPITAL OBSERVATION PER HR: HCPCS

## 2025-06-10 PROCEDURE — 94761 N-INVAS EAR/PLS OXIMETRY MLT: CPT

## 2025-06-10 PROCEDURE — 93005 ELECTROCARDIOGRAM TRACING: CPT | Performed by: EMERGENCY MEDICINE

## 2025-06-10 PROCEDURE — 82077 ASSAY SPEC XCP UR&BREATH IA: CPT | Performed by: EMERGENCY MEDICINE

## 2025-06-10 PROCEDURE — 93010 ELECTROCARDIOGRAM REPORT: CPT | Performed by: INTERNAL MEDICINE

## 2025-06-10 PROCEDURE — P9612 CATHETERIZE FOR URINE SPEC: HCPCS

## 2025-06-10 PROCEDURE — 94640 AIRWAY INHALATION TREATMENT: CPT

## 2025-06-10 PROCEDURE — 96360 HYDRATION IV INFUSION INIT: CPT

## 2025-06-10 PROCEDURE — 94799 UNLISTED PULMONARY SVC/PX: CPT

## 2025-06-10 PROCEDURE — 70450 CT HEAD/BRAIN W/O DYE: CPT

## 2025-06-10 PROCEDURE — 85025 COMPLETE CBC W/AUTO DIFF WBC: CPT | Performed by: EMERGENCY MEDICINE

## 2025-06-10 RX ORDER — BISACODYL 5 MG/1
5 TABLET, DELAYED RELEASE ORAL DAILY PRN
Status: DISCONTINUED | OUTPATIENT
Start: 2025-06-10 | End: 2025-06-12 | Stop reason: HOSPADM

## 2025-06-10 RX ORDER — ALBUTEROL SULFATE 0.83 MG/ML
2.5 SOLUTION RESPIRATORY (INHALATION)
Status: DISCONTINUED | OUTPATIENT
Start: 2025-06-10 | End: 2025-06-12 | Stop reason: HOSPADM

## 2025-06-10 RX ORDER — FAMOTIDINE 20 MG/1
20 TABLET, FILM COATED ORAL 2 TIMES DAILY
Status: DISCONTINUED | OUTPATIENT
Start: 2025-06-10 | End: 2025-06-12 | Stop reason: HOSPADM

## 2025-06-10 RX ORDER — TRAZODONE HYDROCHLORIDE 100 MG/1
100 TABLET ORAL NIGHTLY PRN
Status: DISCONTINUED | OUTPATIENT
Start: 2025-06-10 | End: 2025-06-12 | Stop reason: HOSPADM

## 2025-06-10 RX ORDER — SODIUM CHLORIDE 0.9 % (FLUSH) 0.9 %
10 SYRINGE (ML) INJECTION AS NEEDED
Status: DISCONTINUED | OUTPATIENT
Start: 2025-06-10 | End: 2025-06-12 | Stop reason: HOSPADM

## 2025-06-10 RX ORDER — FOLIC ACID 1 MG/1
1 TABLET ORAL DAILY
Status: DISCONTINUED | OUTPATIENT
Start: 2025-06-11 | End: 2025-06-12 | Stop reason: HOSPADM

## 2025-06-10 RX ORDER — AMLODIPINE BESYLATE 10 MG/1
10 TABLET ORAL DAILY
Status: DISCONTINUED | OUTPATIENT
Start: 2025-06-11 | End: 2025-06-12 | Stop reason: HOSPADM

## 2025-06-10 RX ORDER — ONDANSETRON 4 MG/1
4 TABLET, ORALLY DISINTEGRATING ORAL EVERY 6 HOURS PRN
Status: DISCONTINUED | OUTPATIENT
Start: 2025-06-10 | End: 2025-06-12 | Stop reason: HOSPADM

## 2025-06-10 RX ORDER — TOPIRAMATE 100 MG/1
100 TABLET, FILM COATED ORAL 2 TIMES DAILY
Status: DISCONTINUED | OUTPATIENT
Start: 2025-06-10 | End: 2025-06-11

## 2025-06-10 RX ORDER — HYDROCHLOROTHIAZIDE 25 MG/1
25 TABLET ORAL DAILY
Status: DISCONTINUED | OUTPATIENT
Start: 2025-06-11 | End: 2025-06-12 | Stop reason: HOSPADM

## 2025-06-10 RX ORDER — AMOXICILLIN 250 MG
2 CAPSULE ORAL 2 TIMES DAILY PRN
Status: DISCONTINUED | OUTPATIENT
Start: 2025-06-10 | End: 2025-06-12 | Stop reason: HOSPADM

## 2025-06-10 RX ORDER — ACETAMINOPHEN 160 MG/5ML
650 SOLUTION ORAL EVERY 4 HOURS PRN
Status: DISCONTINUED | OUTPATIENT
Start: 2025-06-10 | End: 2025-06-12 | Stop reason: HOSPADM

## 2025-06-10 RX ORDER — ACETAMINOPHEN 325 MG/1
650 TABLET ORAL EVERY 4 HOURS PRN
Status: DISCONTINUED | OUTPATIENT
Start: 2025-06-10 | End: 2025-06-12 | Stop reason: HOSPADM

## 2025-06-10 RX ORDER — SODIUM CHLORIDE, SODIUM LACTATE, POTASSIUM CHLORIDE, CALCIUM CHLORIDE 600; 310; 30; 20 MG/100ML; MG/100ML; MG/100ML; MG/100ML
100 INJECTION, SOLUTION INTRAVENOUS CONTINUOUS
Status: ACTIVE | OUTPATIENT
Start: 2025-06-10 | End: 2025-06-11

## 2025-06-10 RX ORDER — BISACODYL 10 MG
10 SUPPOSITORY, RECTAL RECTAL DAILY PRN
Status: DISCONTINUED | OUTPATIENT
Start: 2025-06-10 | End: 2025-06-12 | Stop reason: HOSPADM

## 2025-06-10 RX ORDER — ACETAMINOPHEN 650 MG/1
650 SUPPOSITORY RECTAL EVERY 4 HOURS PRN
Status: DISCONTINUED | OUTPATIENT
Start: 2025-06-10 | End: 2025-06-12 | Stop reason: HOSPADM

## 2025-06-10 RX ORDER — HYDROXYCHLOROQUINE SULFATE 200 MG/1
400 TABLET, FILM COATED ORAL DAILY
Status: DISCONTINUED | OUTPATIENT
Start: 2025-06-11 | End: 2025-06-12 | Stop reason: HOSPADM

## 2025-06-10 RX ORDER — PREGABALIN 75 MG/1
150 CAPSULE ORAL 2 TIMES DAILY
Status: DISCONTINUED | OUTPATIENT
Start: 2025-06-10 | End: 2025-06-11

## 2025-06-10 RX ORDER — POLYETHYLENE GLYCOL 3350 17 G/17G
17 POWDER, FOR SOLUTION ORAL DAILY PRN
Status: DISCONTINUED | OUTPATIENT
Start: 2025-06-10 | End: 2025-06-12 | Stop reason: HOSPADM

## 2025-06-10 RX ORDER — ONDANSETRON 2 MG/ML
4 INJECTION INTRAMUSCULAR; INTRAVENOUS EVERY 6 HOURS PRN
Status: DISCONTINUED | OUTPATIENT
Start: 2025-06-10 | End: 2025-06-12 | Stop reason: HOSPADM

## 2025-06-10 RX ORDER — TOBRAMYCIN 3 MG/ML
2 SOLUTION/ DROPS OPHTHALMIC EVERY 6 HOURS SCHEDULED
Status: DISCONTINUED | OUTPATIENT
Start: 2025-06-11 | End: 2025-06-12 | Stop reason: HOSPADM

## 2025-06-10 RX ORDER — BUDESONIDE AND FORMOTEROL FUMARATE DIHYDRATE 160; 4.5 UG/1; UG/1
2 AEROSOL RESPIRATORY (INHALATION) 2 TIMES DAILY
Status: DISCONTINUED | OUTPATIENT
Start: 2025-06-10 | End: 2025-06-12 | Stop reason: HOSPADM

## 2025-06-10 RX ORDER — POTASSIUM CHLORIDE 1500 MG/1
20 TABLET, EXTENDED RELEASE ORAL ONCE
Status: COMPLETED | OUTPATIENT
Start: 2025-06-10 | End: 2025-06-10

## 2025-06-10 RX ORDER — DIPHENOXYLATE HYDROCHLORIDE AND ATROPINE SULFATE 2.5; .025 MG/1; MG/1
1 TABLET ORAL DAILY
Status: DISCONTINUED | OUTPATIENT
Start: 2025-06-11 | End: 2025-06-12 | Stop reason: HOSPADM

## 2025-06-10 RX ORDER — PANTOPRAZOLE SODIUM 40 MG/1
40 TABLET, DELAYED RELEASE ORAL 2 TIMES DAILY
Status: DISCONTINUED | OUTPATIENT
Start: 2025-06-10 | End: 2025-06-12 | Stop reason: HOSPADM

## 2025-06-10 RX ORDER — BUSPIRONE HYDROCHLORIDE 10 MG/1
10 TABLET ORAL 3 TIMES DAILY
Status: DISCONTINUED | OUTPATIENT
Start: 2025-06-10 | End: 2025-06-12 | Stop reason: HOSPADM

## 2025-06-10 RX ORDER — OLANZAPINE 5 MG/1
20 TABLET, FILM COATED ORAL NIGHTLY
Status: DISCONTINUED | OUTPATIENT
Start: 2025-06-10 | End: 2025-06-11

## 2025-06-10 RX ADMIN — BUDESONIDE AND FORMOTEROL FUMARATE DIHYDRATE 2 PUFF: 160; 4.5 AEROSOL RESPIRATORY (INHALATION) at 22:57

## 2025-06-10 RX ADMIN — POTASSIUM CHLORIDE 20 MEQ: 1500 TABLET, EXTENDED RELEASE ORAL at 22:41

## 2025-06-10 RX ADMIN — FLUOXETINE 40 MG: 20 CAPSULE ORAL at 22:41

## 2025-06-10 RX ADMIN — PANTOPRAZOLE SODIUM 40 MG: 40 TABLET, DELAYED RELEASE ORAL at 22:41

## 2025-06-10 RX ADMIN — BUSPIRONE HYDROCHLORIDE 10 MG: 10 TABLET ORAL at 22:41

## 2025-06-10 RX ADMIN — SODIUM CHLORIDE, POTASSIUM CHLORIDE, SODIUM LACTATE AND CALCIUM CHLORIDE 100 ML/HR: 600; 310; 30; 20 INJECTION, SOLUTION INTRAVENOUS at 22:19

## 2025-06-10 RX ADMIN — FAMOTIDINE 20 MG: 20 TABLET, FILM COATED ORAL at 22:41

## 2025-06-10 RX ADMIN — PREGABALIN 150 MG: 75 CAPSULE ORAL at 22:41

## 2025-06-10 RX ADMIN — TOPIRAMATE 100 MG: 100 TABLET, FILM COATED ORAL at 22:41

## 2025-06-10 RX ADMIN — OLANZAPINE 20 MG: 5 TABLET, FILM COATED ORAL at 22:41
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
## 2025-06-10 NOTE — LETTER
[facility]  [phone]    ACCESS CENTER STATEMENT OF DISPOSITION      I, Karen Nash, was assessed in the [facility] on 6/12/2025.  I understand the recommendations below and what follow-up action is expected of me.      Outpatient Counseling:   [facility] [phone]    Chandu Miller [phone]    Leonor Mccurdy [phone]    Ellie Grey [phone]      Outpatient Psychiatry (medications):  Deborah Robert [phone]    Porsha Lopes [phone]    [facility] [phone]    Liz Starks [phone]        ________________________________  Patient/Parent/Guardian/POA Signature    ________________________________  Clinician Signature    6/12/2025  05:34 EDT
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
## 2025-06-11 ENCOUNTER — APPOINTMENT (OUTPATIENT)
Dept: MRI IMAGING | Facility: HOSPITAL | Age: 55
End: 2025-06-11
Payer: MEDICARE

## 2025-06-11 ENCOUNTER — APPOINTMENT (OUTPATIENT)
Dept: NEUROLOGY | Facility: HOSPITAL | Age: 55
End: 2025-06-11
Payer: MEDICARE

## 2025-06-11 LAB
ANION GAP SERPL CALCULATED.3IONS-SCNC: 10.2 MMOL/L (ref 5–15)
BUN SERPL-MCNC: 10 MG/DL (ref 6–20)
BUN/CREAT SERPL: 10.8 (ref 7–25)
CALCIUM SPEC-SCNC: 8.8 MG/DL (ref 8.6–10.5)
CHLORIDE SERPL-SCNC: 103 MMOL/L (ref 98–107)
CO2 SERPL-SCNC: 25.8 MMOL/L (ref 22–29)
CREAT SERPL-MCNC: 0.93 MG/DL (ref 0.57–1)
DEPRECATED RDW RBC AUTO: 44.1 FL (ref 37–54)
EGFRCR SERPLBLD CKD-EPI 2021: 71.8 ML/MIN/1.73
ERYTHROCYTE [DISTWIDTH] IN BLOOD BY AUTOMATED COUNT: 13.4 % (ref 12.3–15.4)
FOLATE SERPL-MCNC: >20 NG/ML (ref 4.78–24.2)
GLUCOSE SERPL-MCNC: 107 MG/DL (ref 65–99)
HCT VFR BLD AUTO: 42.3 % (ref 34–46.6)
HGB BLD-MCNC: 13.7 G/DL (ref 12–15.9)
HIV 1+2 AB+HIV1 P24 AG SERPL QL IA: NORMAL
MAGNESIUM SERPL-MCNC: 2.4 MG/DL (ref 1.6–2.6)
MCH RBC QN AUTO: 29 PG (ref 26.6–33)
MCHC RBC AUTO-ENTMCNC: 32.4 G/DL (ref 31.5–35.7)
MCV RBC AUTO: 89.6 FL (ref 79–97)
PLATELET # BLD AUTO: 297 10*3/MM3 (ref 140–450)
PMV BLD AUTO: 9.8 FL (ref 6–12)
POTASSIUM SERPL-SCNC: 3.2 MMOL/L (ref 3.5–5.2)
POTASSIUM SERPL-SCNC: 3.6 MMOL/L (ref 3.5–5.2)
QT INTERVAL: 417 MS
QTC INTERVAL: 507 MS
RBC # BLD AUTO: 4.72 10*6/MM3 (ref 3.77–5.28)
RPR SER QL: NORMAL
SODIUM SERPL-SCNC: 139 MMOL/L (ref 136–145)
VIT B12 BLD-MCNC: 666 PG/ML (ref 211–946)
WBC NRBC COR # BLD AUTO: 9.56 10*3/MM3 (ref 3.4–10.8)

## 2025-06-11 PROCEDURE — 99204 OFFICE O/P NEW MOD 45 MIN: CPT | Performed by: PSYCHIATRY & NEUROLOGY

## 2025-06-11 PROCEDURE — G0378 HOSPITAL OBSERVATION PER HR: HCPCS

## 2025-06-11 PROCEDURE — 95816 EEG AWAKE AND DROWSY: CPT

## 2025-06-11 PROCEDURE — 95819 EEG AWAKE AND ASLEEP: CPT | Performed by: STUDENT IN AN ORGANIZED HEALTH CARE EDUCATION/TRAINING PROGRAM

## 2025-06-11 PROCEDURE — 94799 UNLISTED PULMONARY SVC/PX: CPT

## 2025-06-11 PROCEDURE — A9577 INJ MULTIHANCE: HCPCS | Performed by: STUDENT IN AN ORGANIZED HEALTH CARE EDUCATION/TRAINING PROGRAM

## 2025-06-11 PROCEDURE — 80048 BASIC METABOLIC PNL TOTAL CA: CPT | Performed by: STUDENT IN AN ORGANIZED HEALTH CARE EDUCATION/TRAINING PROGRAM

## 2025-06-11 PROCEDURE — 90791 PSYCH DIAGNOSTIC EVALUATION: CPT

## 2025-06-11 PROCEDURE — 84132 ASSAY OF SERUM POTASSIUM: CPT | Performed by: STUDENT IN AN ORGANIZED HEALTH CARE EDUCATION/TRAINING PROGRAM

## 2025-06-11 PROCEDURE — 96361 HYDRATE IV INFUSION ADD-ON: CPT

## 2025-06-11 PROCEDURE — 86592 SYPHILIS TEST NON-TREP QUAL: CPT | Performed by: STUDENT IN AN ORGANIZED HEALTH CARE EDUCATION/TRAINING PROGRAM

## 2025-06-11 PROCEDURE — 94762 N-INVAS EAR/PLS OXIMTRY CONT: CPT

## 2025-06-11 PROCEDURE — 36415 COLL VENOUS BLD VENIPUNCTURE: CPT | Performed by: STUDENT IN AN ORGANIZED HEALTH CARE EDUCATION/TRAINING PROGRAM

## 2025-06-11 PROCEDURE — 83735 ASSAY OF MAGNESIUM: CPT | Performed by: STUDENT IN AN ORGANIZED HEALTH CARE EDUCATION/TRAINING PROGRAM

## 2025-06-11 PROCEDURE — 85027 COMPLETE CBC AUTOMATED: CPT | Performed by: STUDENT IN AN ORGANIZED HEALTH CARE EDUCATION/TRAINING PROGRAM

## 2025-06-11 PROCEDURE — G0432 EIA HIV-1/HIV-2 SCREEN: HCPCS | Performed by: STUDENT IN AN ORGANIZED HEALTH CARE EDUCATION/TRAINING PROGRAM

## 2025-06-11 PROCEDURE — 25510000002 GADOBENATE DIMEGLUMINE 529 MG/ML SOLUTION: Performed by: STUDENT IN AN ORGANIZED HEALTH CARE EDUCATION/TRAINING PROGRAM

## 2025-06-11 PROCEDURE — 82607 VITAMIN B-12: CPT | Performed by: STUDENT IN AN ORGANIZED HEALTH CARE EDUCATION/TRAINING PROGRAM

## 2025-06-11 PROCEDURE — 82746 ASSAY OF FOLIC ACID SERUM: CPT | Performed by: STUDENT IN AN ORGANIZED HEALTH CARE EDUCATION/TRAINING PROGRAM

## 2025-06-11 PROCEDURE — 70553 MRI BRAIN STEM W/O & W/DYE: CPT

## 2025-06-11 RX ORDER — POTASSIUM CHLORIDE 1500 MG/1
20 TABLET, EXTENDED RELEASE ORAL ONCE
Status: COMPLETED | OUTPATIENT
Start: 2025-06-11 | End: 2025-06-11

## 2025-06-11 RX ORDER — PREGABALIN 75 MG/1
75 CAPSULE ORAL 2 TIMES DAILY
Status: DISCONTINUED | OUTPATIENT
Start: 2025-06-11 | End: 2025-06-12 | Stop reason: HOSPADM

## 2025-06-11 RX ORDER — OLANZAPINE 5 MG/1
10 TABLET, FILM COATED ORAL NIGHTLY
Status: DISCONTINUED | OUTPATIENT
Start: 2025-06-11 | End: 2025-06-12 | Stop reason: HOSPADM

## 2025-06-11 RX ORDER — TOPIRAMATE 50 MG/1
50 TABLET, FILM COATED ORAL 2 TIMES DAILY
Status: DISCONTINUED | OUTPATIENT
Start: 2025-06-11 | End: 2025-06-12 | Stop reason: HOSPADM

## 2025-06-11 RX ADMIN — FOLIC ACID 1 MG: 1 TABLET ORAL at 09:26

## 2025-06-11 RX ADMIN — OLANZAPINE 10 MG: 5 TABLET, FILM COATED ORAL at 20:52

## 2025-06-11 RX ADMIN — TOBRAMYCIN 2 DROP: 3 SOLUTION/ DROPS OPHTHALMIC at 00:37

## 2025-06-11 RX ADMIN — PREGABALIN 150 MG: 75 CAPSULE ORAL at 09:26

## 2025-06-11 RX ADMIN — BUSPIRONE HYDROCHLORIDE 10 MG: 10 TABLET ORAL at 16:58

## 2025-06-11 RX ADMIN — BUDESONIDE AND FORMOTEROL FUMARATE DIHYDRATE 2 PUFF: 160; 4.5 AEROSOL RESPIRATORY (INHALATION) at 21:37

## 2025-06-11 RX ADMIN — TOPIRAMATE 100 MG: 100 TABLET, FILM COATED ORAL at 09:26

## 2025-06-11 RX ADMIN — POTASSIUM CHLORIDE 20 MEQ: 1500 TABLET, EXTENDED RELEASE ORAL at 06:30

## 2025-06-11 RX ADMIN — FLUOXETINE 40 MG: 20 CAPSULE ORAL at 09:26

## 2025-06-11 RX ADMIN — FLUOXETINE 40 MG: 20 CAPSULE ORAL at 20:52

## 2025-06-11 RX ADMIN — GADOBENATE DIMEGLUMINE 20 ML: 529 INJECTION, SOLUTION INTRAVENOUS at 08:19

## 2025-06-11 RX ADMIN — PANTOPRAZOLE SODIUM 40 MG: 40 TABLET, DELAYED RELEASE ORAL at 09:26

## 2025-06-11 RX ADMIN — FAMOTIDINE 20 MG: 20 TABLET, FILM COATED ORAL at 20:52

## 2025-06-11 RX ADMIN — TOBRAMYCIN 2 DROP: 3 SOLUTION/ DROPS OPHTHALMIC at 17:00

## 2025-06-11 RX ADMIN — POTASSIUM CHLORIDE 20 MEQ: 1500 TABLET, EXTENDED RELEASE ORAL at 20:52

## 2025-06-11 RX ADMIN — FAMOTIDINE 20 MG: 20 TABLET, FILM COATED ORAL at 09:25

## 2025-06-11 RX ADMIN — HYDROXYCHLOROQUINE SULFATE 400 MG: 200 TABLET, FILM COATED ORAL at 09:26

## 2025-06-11 RX ADMIN — TOBRAMYCIN 2 DROP: 3 SOLUTION/ DROPS OPHTHALMIC at 06:30

## 2025-06-11 RX ADMIN — PREGABALIN 75 MG: 75 CAPSULE ORAL at 20:52

## 2025-06-11 RX ADMIN — PANTOPRAZOLE SODIUM 40 MG: 40 TABLET, DELAYED RELEASE ORAL at 20:52

## 2025-06-11 RX ADMIN — THERA TABS 1 TABLET: TAB at 09:26

## 2025-06-11 RX ADMIN — TOBRAMYCIN 2 DROP: 3 SOLUTION/ DROPS OPHTHALMIC at 12:12

## 2025-06-11 RX ADMIN — TOPIRAMATE 50 MG: 50 TABLET, FILM COATED ORAL at 20:52

## 2025-06-11 RX ADMIN — BUDESONIDE AND FORMOTEROL FUMARATE DIHYDRATE 2 PUFF: 160; 4.5 AEROSOL RESPIRATORY (INHALATION) at 07:13

## 2025-06-11 RX ADMIN — BUSPIRONE HYDROCHLORIDE 10 MG: 10 TABLET ORAL at 09:25

## 2025-06-11 RX ADMIN — BUSPIRONE HYDROCHLORIDE 10 MG: 10 TABLET ORAL at 20:52

## 2025-06-11 NOTE — PLAN OF CARE
Goal Outcome Evaluation:  Plan of Care Reviewed With: patient        Progress: improving  Outcome Evaluation: Pt disoriented to situation, on 1L NC during sleep. IVF continues. MRI ordered. Neuro consult placed. Purewick in place. K+ replaced this shift. VSS. Rested well.

## 2025-06-11 NOTE — ED NOTES
Nursing report ED to floor  Karen Nash  54 y.o.  female    HPI :  HPI  Stated Reason for Visit: AMS, headache  History Obtained From: EMS    Chief Complaint  Chief Complaint   Patient presents with    Altered Mental Status    Headache       Admitting doctor:   Bam Jara MD    Admitting diagnosis:   The encounter diagnosis was Altered mental status, unspecified altered mental status type.    Code status:   Current Code Status       Date Active Code Status Order ID Comments User Context       6/10/2025 1924 CPR (Attempt to Resuscitate) [id number]  Bam Jara MD ED        Question Answer    Code Status (Patient has no pulse and is not breathing) CPR (Attempt to Resuscitate)    Medical Interventions (Patient has pulse or is breathing) Full    Level Of Support Discussed With Patient                    Allergies:   Sulfamethoxazole-trimethoprim, Corn-containing products, Penicillins, and Bacitracin    Isolation:   No active isolations    Intake and Output  No intake or output data in the 24 hours ending 06/10/25 2023    Weight:   There were no vitals filed for this visit.    Most recent vitals:   Vitals:    06/10/25 1809 06/10/25 1903 06/10/25 1907 06/10/25 2000   BP:  128/91  111/76   Pulse: 81 89  79   Resp:   16    Temp:       TempSrc:       SpO2: 94% 95%  96%       Active LDAs/IV Access:   Lines, Drains & Airways       Active LDAs       Name Placement date Placement time Site Days    Peripheral IV 06/10/25 1619 20 G Left;Posterior Hand 06/10/25  1619  Hand  less than 1    Peripheral IV 06/10/25 1642 20 G Right Antecubital 06/10/25  1642  Antecubital  less than 1    External Urinary Catheter 06/10/25  1701  --  less than 1                    Labs (abnormal labs have a star):   Labs Reviewed   COMPREHENSIVE METABOLIC PANEL - Abnormal; Notable for the following components:       Result Value    Glucose 112 (*)     Creatinine 1.12 (*)     Potassium 3.3 (*)     eGFR 57.8 (*)     All other  components within normal limits    Narrative:     GFR Categories in Chronic Kidney Disease (CKD)              GFR Category          GFR (mL/min/1.73)    Interpretation  G1                    90 or greater        Normal or high (1)  G2                    60-89                Mild decrease (1)  G3a                   45-59                Mild to moderate decrease  G3b                   30-44                Moderate to severe decrease  G4                    15-29                Severe decrease  G5                    14 or less           Kidney failure    (1)In the absence of evidence of kidney disease, neither GFR category G1 or G2 fulfill the criteria for CKD.    eGFR calculation 2021 CKD-EPI creatinine equation, which does not include race as a factor   URINALYSIS W/ MICROSCOPIC IF INDICATED (NO CULTURE) - Abnormal; Notable for the following components:    Appearance, UA Turbid (*)     Leuk Esterase, UA Small (1+) (*)     All other components within normal limits   URINE DRUG SCREEN - Abnormal; Notable for the following components:    THC, Screen, Urine Positive (*)     Amphetamine Screen, Urine Positive (*)     Benzodiazepine Screen, Urine Positive (*)     All other components within normal limits    Narrative:     Cutoff For Drugs Screened:    Amphetamines               500 ng/ml  Barbiturates               200 ng/ml  Benzodiazepines            150 ng/ml  Cocaine                    150 ng/ml  Methadone                  200 ng/ml  Opiates                    100 ng/ml  Phencyclidine               25 ng/ml  THC                         50 ng/ml  Methamphetamine            500 ng/ml  Tricyclic Antidepressants  300 ng/ml  Oxycodone                  100 ng/ml  Buprenorphine               10 ng/ml    The normal value for all drugs tested is negative. This report includes unconfirmed screening results, with the cutoff values listed, to be used for medical treatment purposes only.  Unconfirmed results must not be used for  non-medical purposes such as employment or legal testing.  Clinical consideration should be applied to any drug of abuse test, particularly when unconfirmed results are used.     CBC WITH AUTO DIFFERENTIAL - Abnormal; Notable for the following components:    WBC 11.05 (*)     MCHC 31.3 (*)     Neutrophils, Absolute 7.61 (*)     Immature Grans, Absolute 0.06 (*)     All other components within normal limits   URINALYSIS, MICROSCOPIC ONLY - Abnormal; Notable for the following components:    WBC, UA 3-5 (*)     Squamous Epithelial Cells, UA 7-12 (*)     All other components within normal limits   FENTANYL, URINE - Normal    Narrative:     Negative Threshold:      Fentanyl 5 ng/mL     The normal value for the drug tested is negative. This report includes final unconfirmed screening results to be used for medical treatment purposes only. Unconfirmed results must not be used for non-medical purposes such as employment or legal testing. Clinical consideration should be applied to any drug of abuse test, particularly when unconfirmed results are used.          ETHANOL   CBC AND DIFFERENTIAL    Narrative:     The following orders were created for panel order CBC & Differential.  Procedure                               Abnormality         Status                     ---------                               -----------         ------                     CBC Auto Differential[[id number]]        Abnormal            Final result                 Please view results for these tests on the individual orders.       EKG:   ECG 12 Lead Stroke Evaluation   Preliminary Result   HEART RATE=89  bpm   RR Xuucoqaa=342  ms   OH Qynbfbic=616  ms   P Horizontal Axis=60  deg   P Front Axis=-11  deg   QRSD Alosurco=061  ms   QT Icxmfaec=384  ms   YVpG=504  ms   QRS Axis=-45  deg   T Wave Axis=16  deg   - BORDERLINE ECG -   Sinus rhythm   Left axis deviation   Abnormal R-wave progression, late transition   Borderline prolonged QT interval   When  compared with ECG of 02-May-2020 18:40:54,   Significant axis, voltage or hypertrophy change   Date and Time of Study:2025-06-10 16:49:26          Meds given in ED:   Medications   sodium chloride 0.9 % flush 10 mL (has no administration in time range)   ondansetron ODT (ZOFRAN-ODT) disintegrating tablet 4 mg (has no administration in time range)     Or   ondansetron (ZOFRAN) injection 4 mg (has no administration in time range)   sennosides-docusate (PERICOLACE) 8.6-50 MG per tablet 2 tablet (has no administration in time range)     And   polyethylene glycol (MIRALAX) packet 17 g (has no administration in time range)     And   bisacodyl (DULCOLAX) EC tablet 5 mg (has no administration in time range)     And   bisacodyl (DULCOLAX) suppository 10 mg (has no administration in time range)   acetaminophen (TYLENOL) tablet 650 mg (has no administration in time range)     Or   acetaminophen (TYLENOL) 160 MG/5ML oral solution 650 mg (has no administration in time range)     Or   acetaminophen (TYLENOL) suppository 650 mg (has no administration in time range)       Imaging results:  CT Head Without Contrast  Result Date: 6/10/2025   No acute intracranial hemorrhage or hydrocephalus. Mild prominence of the superior ophthalmic vein caliber bilaterally. If further imaging evaluation is indicated, MRI could be considered.  This report was finalized on 6/10/2025 6:18 PM by Dr. Shashi Reece M.D on Workstation: [id number]      XR Chest 1 View  Result Date: 6/10/2025  1. No acute process.         Ambulatory status:   - assist x1    Social issues:   Social History     Socioeconomic History    Marital status:     Number of children: 0   Tobacco Use    Smoking status: Former     Current packs/day: 0.00     Average packs/day: 0.1 packs/day for 8.0 years (0.8 ttl pk-yrs)     Types: Cigarettes     Start date: 2001     Quit date: 2009     Years since quittin.4     Passive exposure: Past    Smokeless tobacco: Never    Vaping Use    Vaping status: Never Used   Substance and Sexual Activity    Alcohol use: Not Currently     Alcohol/week: 1.0 standard drink of alcohol     Types: 1 Glasses of wine per week     Comment: SOCIAL USE//caffeine coffee daily     Drug use: No    Sexual activity: Yes     Partners: Male     Birth control/protection: None       Peripheral Neurovascular  Peripheral Neurovascular (Adult)  Peripheral Neurovascular WDL: .WDL except  Additional Documentation: Edema (Group)  Edema  Edema: ankle, left, ankle, right  Ankle, Left Edema: 1+ (Trace)  Ankle, Right Edema: 1+ (Trace)    Neuro Cognitive  Neuro Cognitive (Adult)  Cognitive/Neuro/Behavioral WDL: .WDL except, orientation, arousability, speech  Arousal Level: arouses to voice  Orientation: disoriented to, situation  Speech: logical, other (see comments) (slow to respond)  Ronda Coma Scale  Best Eye Response: 4-->(E4) spontaneous  Best Motor Response: 6-->(M6) obeys commands  Best Verbal Response: 5-->(V5) oriented  Ronda Coma Scale Score: 15    Learning  Learning Assessment  Learning Readiness and Ability: cognitive limitation noted  Education Provided  Person Taught: patient  Teaching Method: verbal instruction  Teaching Focus: symptom/problem overview  Education Outcome Evaluation: verbalizes understanding    Respiratory  Respiratory WDL  Respiratory WDL: WDL    Abdominal Pain       Pain Assessments  Pain (Adult)  (0-10) Pain Rating: Rest: 1  Pain Location: head    NIH Stroke Scale       Dilshad Boucher RN  06/10/25 20:23 EDT

## 2025-06-11 NOTE — PLAN OF CARE
Goal Outcome Evaluation:  Plan of Care Reviewed With: patient           Outcome Evaluation: MRI normal, drowsy most of day, EEG performed, sleep study ordered for tonight, sedating medications adjusted to lower doses, vss, will continue to monitor
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
## 2025-06-11 NOTE — PROGRESS NOTES
Name: Karen Nash ADMIT: 6/10/2025   : 1970  PCP: Rob Bardales DO    MRN: [id number] LOS: 0 days   AGE/SEX: 55 y.o. female  ROOM: [facility]/     Subjective   Subjective   Resting in bed, family at bedside. Dr. Breaux at bedside. She reports some improvement in her symptoms overnight but still feels not to her baseline.       Objective   Objective   Vital Signs  Temp:  [97.5 °F (36.4 °C)-98.4 °F (36.9 °C)] 97.5 °F (36.4 °C)  Heart Rate:  [57-89] 57  Resp:  [15-22] 22  BP: ()/(60-91) 113/76  SpO2:  [90 %-96 %] 94 %  on  Flow (L/min) (Oxygen Therapy):  [1] 1;   Device (Oxygen Therapy): nasal cannula  Body mass index is 43.42 kg/m².  Physical Exam  Constitutional:       General: She is not in acute distress.     Appearance: She is obese. She is ill-appearing.   Cardiovascular:      Rate and Rhythm: Normal rate and regular rhythm.   Pulmonary:      Effort: Pulmonary effort is normal. No respiratory distress.      Breath sounds: Normal breath sounds. No wheezing.   Abdominal:      General: Abdomen is flat. Bowel sounds are normal.      Palpations: Abdomen is soft.      Tenderness: There is no abdominal tenderness.   Musculoskeletal:         General: No swelling or tenderness.      Right lower leg: No edema.      Left lower leg: No edema.   Skin:     General: Skin is warm and dry.   Neurological:      General: No focal deficit present.      Mental Status: She is alert and oriented to person, place, and time.      Comments: Oriented, answers questions appropriately-a little slowly.          Results Review     I reviewed the patient's new clinical results.  Results from last 7 days   Lab Units 25  0443 06/10/25  1643   WBC 10*3/mm3 9.56 11.05*   HEMOGLOBIN g/dL 13.7 14.6   PLATELETS 10*3/mm3 297 311     Results from last 7 days   Lab Units 25  1153 25  0443 06/10/25  1643   SODIUM mmol/L  --  139 136   POTASSIUM mmol/L 3.6 3.2* 3.3*   CHLORIDE mmol/L  --  103 102   CO2 mmol/L  --   25.8 22.9   BUN mg/dL  --  10.0 12.0   CREATININE mg/dL  --  0.93 1.12*   GLUCOSE mg/dL  --  107* 112*   CrCl cannot be calculated (Unknown ideal weight.).  Results from last 7 days   Lab Units 06/10/25  1643   ALBUMIN g/dL 3.7   BILIRUBIN mg/dL 0.4   ALK PHOS U/L 103   AST (SGOT) U/L 21   ALT (SGPT) U/L 18     Results from last 7 days   Lab Units 06/11/25  0443 06/10/25  1643   CALCIUM mg/dL 8.8 9.0   ALBUMIN g/dL  --  3.7   MAGNESIUM mg/dL 2.4  --        SARS-CoV-2, KATY   Date Value Ref Range Status   11/02/2020 Not Detected Not Detected Final     SARS-CoV-2 PCR   Date Value Ref Range Status   09/28/2020 Not Detected Not Detected Final     Comment:     Nucleic acid specific to SARS-CoV-2 (COVID-19) virus was not detected inthis sample by the TaqPath (TM) COVID-19 Combo Kit.SARS-CoV-2 (COVID-19) nucleic acid testing performed using e[employer] Aptima (R) SARS-CoV-2 Assay or Chargeback TaqPath   (TM)COVID-19 Combo Kit as indicated above under Emergency Use Authorization (EUA) from the FDA. Aptima (R) and TaqPath (TM) test performancecharacteristics verified by [facility] in accordance with the FDAs Guidance Document (Policy for Diagnostic   Tests for Coronavirus Disease-2019during the Public Health Emergency) issued on March 16, 2020. The laboratory is regulated under CLIA as qualified to perform high-complexity testingUnless otherwise noted, all testing was performed at [facility],   CLIA No. [id number], [place] State Licensee No. [id number]     Hemoglobin A1C   Date/Time Value Ref Range Status   06/10/2025 1643 5.50 4.80 - 5.60 % Final       MRI Brain With & Without Contrast  Narrative: MRI OF THE BRAIN WITH AND WITHOUT CONTRAST ON 06/11/2025     CLINICAL HISTORY: This is a 55-year-old female patient with  encephalopathy. The patient has altered mental status and disorientation  that has been ongoing for the past week.     TECHNIQUE: Axial T1, FLAIR, fat-suppressed T2, axial diffusion and  gradient echo  T2, sagittal T1 and postcontrast axial fat-suppressed T1  and coronal T1-weighted images were obtained of the entire head.     This is correlated to a prior MRI of the brain from [facility] on 07/05/2019.     FINDINGS: There is minimal T2 high signal in the periventricular white  matter and scattered tiny patchy nodular foci in the subcortical white  matter of the cerebral hemispheres and I think the findings are most  consistent with mild small vessel disease. There are tiny 3-4 mm foci of  T2 high signal in the posterior inferior cerebellar hemispheres  bilaterally that are dark on the T1-weighted images, could be dilated  perivascular spaces, potentially old tiny PICA territory infarcts  unchanged. The remainder of the brain parenchyma is normal in signal  intensity. Specifically, no diffusion weighted abnormality is identified  with no acute infarct identified. On the gradient echo T2 weighted  sequence, no acute or old blood breakdown products are seen  intracranially. The ventricles are normal in size. I see no focal mass  effect. There is no midline shift. No extra-axial fluid collections are  identified. No abnormal areas of enhancement are seen in the head. The  calvarium and skull base demonstrate normal marrow signal intensity. The  orbits are normal in appearance. The paranasal sinuses and the mastoid  air cells and the middle ear cavities are clear. Good flow voids are  demonstrated within the cerebral vessels and in the dural venous  sinuses.     Impression: 1. No acute intracranial abnormality is identified with no significant  change when compared to this patient's prior MRI of the brain from  [facility] on 07/05/2019.     2. There is mild small vessel disease in the cerebral white matter and  there are bilateral 3-4 mm foci of T1 low signal and T2 high signal in  the posterior inferior cerebellar hemispheres that are either dilated  perivascular spaces or tiny old PICA  territory infarcts unchanged. The  remainder of the MRI of the brain is normal. Specifically, no acute  infarct is identified and the etiology of this patient's altered mental  status and confusion over the last week is not established on this  examination.       Scheduled Medications  [Held by provider] amLODIPine, 10 mg, Oral, Daily  budesonide-formoterol, 2 puff, Inhalation, BID  busPIRone, 10 mg, Oral, TID  famotidine, 20 mg, Oral, BID  FLUoxetine, 40 mg, Oral, BID  folic acid, 1 mg, Oral, Daily  [Held by provider] hydroCHLOROthiazide, 25 mg, Oral, Daily  hydroxychloroquine, 400 mg, Oral, Daily  multivitamin, 1 tablet, Oral, Daily  OLANZapine, 10 mg, Oral, Nightly  pantoprazole, 40 mg, Oral, BID  pregabalin, 75 mg, Oral, BID  tobramycin, 2 drop, Both Eyes, Q6H  topiramate, 50 mg, Oral, BID    Infusions   Diet  Diet: Regular/House; Fluid Consistency: Thin (IDDSI 0)         I have personally reviewed:  [x]  Laboratory   []  Microbiology   []  Radiology   [x]  EKG/Telemetry   []  Cardiology/Vascular   []  Pathology   []  Records    Assessment/Plan     Active Hospital Problems    Diagnosis  POA    **AMS (altered mental status) [R41.82]  Yes    Hypokalemia [E87.6]  Yes    Generalized anxiety disorder [F41.1]  Yes    Fibromyalgia, primary [M79.7]  Yes    Benign essential hypertension [I10]  Yes    Dyslipidemia [E78.5]  Yes      Resolved Hospital Problems   No resolved problems to display.       Ms. Nash is a 54 y.o. female with HTN, HLD, fibromyalgia, generalized anxiety presenting with altered mental status.     Acute encephalopathy  - likely related to polypharmacy vs undiagnosed TERESITA/both  -UDS is positive for amphetamine, benzodiazepine, THC-she is prescribed phentermine  - Suspect polypharmacy as numerous sedating meds are listed on her med rec- lyrica, topamax and zyprexa doses reduced  - b12, folate ok, rpr pending  -MRI negative for acute finding, did show   -Neurology consulted- recommend eeg to r/o seizure,  also recommend sleep study outpatient, will check sleep oximetry- pt may be able to get nocturnal o2 as bridge     Hyperglycemia  - A1c 5.5%      HTN  -Amlodipine, HCTZ on hold, resume when appropriate  -BP remain normal/el controlled- cont to hold     RA  -Plaquenil     Fibromyalgia  Depression/anxiety  -BuSpar, Prozac, Zyprexa, Topamax, Lyirca  -resume prn trazodone at half dose  - zypreza, topamax and lyrica doses reduced    SCDs for DVT prophylaxis.  Full code.  Discussed with patient, family, nursing staff, and consulting provider.  Anticipated discharge home, tomorrow        Laine Dinero MD  [facility]  06/11/25  13:38 EDT    I wore protective equipment throughout this patient encounter including gloves.  Hand hygiene was performed before donning protective equipment and after removal when leaving the room.    Expected Discharge Date and Time       Expected Discharge Date Expected Discharge Time    Jun 13, 2025              Copied text in this note has been reviewed and is accurate as of 06/11/25.

## 2025-06-12 ENCOUNTER — READMISSION MANAGEMENT (OUTPATIENT)
Dept: CALL CENTER | Facility: HOSPITAL | Age: 55
End: 2025-06-12
Payer: MEDICARE

## 2025-06-12 VITALS
OXYGEN SATURATION: 95 % | BODY MASS INDEX: 43.42 KG/M2 | HEART RATE: 76 BPM | SYSTOLIC BLOOD PRESSURE: 116 MMHG | DIASTOLIC BLOOD PRESSURE: 79 MMHG | HEIGHT: 69 IN | TEMPERATURE: 98.1 F | RESPIRATION RATE: 18 BRPM

## 2025-06-12 DIAGNOSIS — J45.40 MODERATE PERSISTENT ASTHMA WITHOUT COMPLICATION: ICD-10-CM

## 2025-06-12 LAB
MAGNESIUM SERPL-MCNC: 2.4 MG/DL (ref 1.6–2.6)
POTASSIUM SERPL-SCNC: 3.5 MMOL/L (ref 3.5–5.2)
POTASSIUM SERPL-SCNC: 4.4 MMOL/L (ref 3.5–5.2)

## 2025-06-12 PROCEDURE — G0378 HOSPITAL OBSERVATION PER HR: HCPCS

## 2025-06-12 PROCEDURE — 84132 ASSAY OF SERUM POTASSIUM: CPT | Performed by: STUDENT IN AN ORGANIZED HEALTH CARE EDUCATION/TRAINING PROGRAM

## 2025-06-12 PROCEDURE — 97530 THERAPEUTIC ACTIVITIES: CPT

## 2025-06-12 PROCEDURE — 94799 UNLISTED PULMONARY SVC/PX: CPT

## 2025-06-12 PROCEDURE — 83735 ASSAY OF MAGNESIUM: CPT | Performed by: STUDENT IN AN ORGANIZED HEALTH CARE EDUCATION/TRAINING PROGRAM

## 2025-06-12 PROCEDURE — 97162 PT EVAL MOD COMPLEX 30 MIN: CPT

## 2025-06-12 RX ORDER — PREGABALIN 75 MG/1
75 CAPSULE ORAL 2 TIMES DAILY
Qty: 60 CAPSULE | Refills: 0 | Status: SHIPPED | OUTPATIENT
Start: 2025-06-12

## 2025-06-12 RX ORDER — POTASSIUM CHLORIDE 1500 MG/1
20 TABLET, EXTENDED RELEASE ORAL ONCE
Status: COMPLETED | OUTPATIENT
Start: 2025-06-12 | End: 2025-06-12

## 2025-06-12 RX ORDER — OLANZAPINE 10 MG/1
10 TABLET, FILM COATED ORAL NIGHTLY
Qty: 30 TABLET | Refills: 0 | Status: SHIPPED | OUTPATIENT
Start: 2025-06-12

## 2025-06-12 RX ORDER — ALBUTEROL SULFATE 1.25 MG/3ML
1 SOLUTION RESPIRATORY (INHALATION) EVERY 4 HOURS PRN
Qty: 120 ML | Refills: 5 | Status: SHIPPED | OUTPATIENT
Start: 2025-06-12

## 2025-06-12 RX ORDER — FLUOXETINE HYDROCHLORIDE 40 MG/1
40 CAPSULE ORAL 2 TIMES DAILY
Start: 2025-06-12

## 2025-06-12 RX ORDER — TOPIRAMATE 50 MG/1
50 TABLET, FILM COATED ORAL 2 TIMES DAILY
Qty: 60 TABLET | Refills: 0 | Status: SHIPPED | OUTPATIENT
Start: 2025-06-12

## 2025-06-12 RX ADMIN — BUDESONIDE AND FORMOTEROL FUMARATE DIHYDRATE 2 PUFF: 160; 4.5 AEROSOL RESPIRATORY (INHALATION) at 07:18

## 2025-06-12 RX ADMIN — TOBRAMYCIN 2 DROP: 3 SOLUTION/ DROPS OPHTHALMIC at 00:13

## 2025-06-12 RX ADMIN — PREGABALIN 75 MG: 75 CAPSULE ORAL at 08:33

## 2025-06-12 RX ADMIN — TOPIRAMATE 50 MG: 50 TABLET, FILM COATED ORAL at 08:33

## 2025-06-12 RX ADMIN — FAMOTIDINE 20 MG: 20 TABLET, FILM COATED ORAL at 08:33

## 2025-06-12 RX ADMIN — HYDROXYCHLOROQUINE SULFATE 400 MG: 200 TABLET, FILM COATED ORAL at 08:33

## 2025-06-12 RX ADMIN — PANTOPRAZOLE SODIUM 40 MG: 40 TABLET, DELAYED RELEASE ORAL at 08:33

## 2025-06-12 RX ADMIN — THERA TABS 1 TABLET: TAB at 08:33

## 2025-06-12 RX ADMIN — POTASSIUM CHLORIDE 20 MEQ: 1500 TABLET, EXTENDED RELEASE ORAL at 06:19

## 2025-06-12 RX ADMIN — FLUOXETINE 40 MG: 20 CAPSULE ORAL at 08:33

## 2025-06-12 RX ADMIN — FOLIC ACID 1 MG: 1 TABLET ORAL at 08:33

## 2025-06-12 RX ADMIN — TOBRAMYCIN 2 DROP: 3 SOLUTION/ DROPS OPHTHALMIC at 06:19

## 2025-06-12 RX ADMIN — BUSPIRONE HYDROCHLORIDE 10 MG: 10 TABLET ORAL at 08:33

## 2025-06-12 NOTE — PLAN OF CARE
Goal Outcome Evaluation:  Plan of Care Reviewed With: patient        Progress: no change  Outcome Evaluation: Pt disoriented to time and situation. Overnight sleep oximetry study completed. Purewick in place.  Psych consult placed. K+ replaced this shift. Rested well. VSS.
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
## 2025-06-12 NOTE — PROGRESS NOTES
"[facility] follow up d/t mental status changes, anxiety, and confusion; this writer reviewed chart, spoke with RN Thu, and met with pt individually in room 3112. Per RN, patient is doing fine, no behavioral health concerns noted; she slept well overnight. Inpatient psychiatrist consult ordered for today; patient willing to consider outpatient behavioral health resources.  This writer met with patient and provided outpatient psychiatry and counseling referrals; she is alert and oriented x3 (some confusion noted as to why she's in the hospital). Pt denies current suicidal and/or homicidal, thoughts, plans, and/or intention; she is future oriented regarding the possibility of discharging home today. Patient states anxiety and depressive symptoms are \"ok\"; she denies hallucinations. No further needs and/or concerns noted at this time per RN and/or medical team. [facility] to follow as needed.  "

## 2025-06-12 NOTE — OUTREACH NOTE
Prep Survey      Flowsheet Row Responses   [facility] patient discharged from? [facility]   Is LACE score < 7 ? No   Eligibility [facility]   Date of Admission 06/10/25   Date of Discharge 06/12/25   Discharge diagnosis AMS (altered mental status)   Does the patient have one of the following disease processes/diagnoses(primary or secondary)? Other   Prep survey completed? Yes            Sita CHRISTIANSON - Registered Nurse
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
## 2025-06-12 NOTE — TELEPHONE ENCOUNTER
Caller: [facility] ([place]) - [place] [phone] [facility] [phone] [facility] [phone] FX    Relationship: Pharmacy    Best call back number: [phone]    Requested Prescriptions:   Requested Prescriptions     Pending Prescriptions Disp Refills    albuterol (ACCUNEB) 1.25 MG/3ML nebulizer solution 120 mL 11     Sig: Take 3 mL by nebulization Every 4 (Four) Hours As Needed for Wheezing. Dx.  J45.40        Pharmacy where request should be sent: [facility] ([place]) - [place], [place] - [address] [phone] [facility] [phone] FX     Last office visit with prescribing clinician: 5/7/2025   Last telemedicine visit with prescribing clinician: Visit date not found   Next office visit with prescribing clinician: 11/11/2025     Karey Alfred Rep   06/12/25 12:26 EDT

## 2025-06-12 NOTE — DISCHARGE SUMMARY
Patient Name: Karen Nash  : 1970  MRN: [id number]    Date of Admission: 6/10/2025  Date of Discharge:  2025  Primary Care Physician: Rob Bardales DO      Chief Complaint:   Altered Mental Status and Headache      Discharge Diagnoses     Active Hospital Problems    Diagnosis  POA    **AMS (altered mental status) [R41.82]  Yes    Hypokalemia [E87.6]  Yes    Generalized anxiety disorder [F41.1]  Yes    Fibromyalgia, primary [M79.7]  Yes    Benign essential hypertension [I10]  Yes    Dyslipidemia [E78.5]  Yes      Resolved Hospital Problems   No resolved problems to display.        Hospital Course     Ms. Nash is a 55 y.o. female with a history of  HTN, HLD, fibromyalgia, generalized anxiety who presented to [facility] initially complaining of altered mental status.  Please see the admitting history and physical for further details.  She was admitted to the hospital for further evaluation and treatment.  Neurology consulted on admission for additional management recommendations.  She underwent an MRI which is negative for any acute intracranial abnormality.  She also underwent EEG which was negative for any epileptogenic activity or seizure activity.  Of note her urine drug screen was positive for benzodiazepines and THC, she additionally takes several prescription medications which can cause sedating effects-including Lyrica, Topamax, trazodone, Zyprexa.  Patient's Lyrica, Topamax and Zyprexa doses have been reduced by half.  Patient had clearing of her altered mental status/drowsiness with reduction in medication doses and time.  Neurology is also recommending outpatient sleep study for possible undiagnosed sleep apnea.  A sleep oximetry was ordered but patient did not qualify for nocturnal oxygen.  It was communicated to her that despite not needing nocturnal oxygen she still needs to follow-up with sleep medicine for a sleep study.  Patient is stable for discharge, she has  been instructed follow-up with PCP in a week for posthospital follow-up visit and to continue to discuss with her PCP about reducing medications that can cause sedation.    Day of Discharge     Subjective:  In bed, continues to feel more clear/sharper.  Eager to go home.    Physical Exam:  Temp:  [97.5 °F (36.4 °C)-98.1 °F (36.7 °C)] 98.1 °F (36.7 °C)  Heart Rate:  [68-80] 80  Resp:  [18-22] 18  BP: (114-121)/(71-79) 116/79  Body mass index is 43.42 kg/m².  Physical Exam  Constitutional:       General: She is not in acute distress.     Appearance: She is obese. She is ill-appearing.   Cardiovascular:      Rate and Rhythm: Normal rate and regular rhythm.   Pulmonary:      Effort: Pulmonary effort is normal. No respiratory distress.      Breath sounds: Normal breath sounds. No wheezing.   Abdominal:      General: Abdomen is flat. Bowel sounds are normal.      Palpations: Abdomen is soft.      Tenderness: There is no abdominal tenderness.   Musculoskeletal:         General: No swelling or tenderness.      Right lower leg: No edema.      Left lower leg: No edema.   Skin:     General: Skin is warm and dry.   Neurological:      General: No focal deficit present.      Mental Status: She is alert and oriented to person, place, and time.      Comments: Oriented, answers questions appropriately- improved from yesterday         Consultants     Consult Orders (all) (From admission, onward)       Start     Ordered    06/12/25 0106  Inpatient Psychiatrist Consult  Once        Specialty:  Psychiatry  Provider:  Diaz Vides III, MD    06/12/25 0106    06/11/25 1723  Inpatient Psychiatrist Consult  Once,   Status:  Canceled        Specialty:  Psychiatry  Provider:  (Not yet assigned)    06/11/25 1723    06/11/25 1505  Inpatient Access Center Consult  Once        Provider:  (Not yet assigned)    06/11/25 1504    06/11/25 0702  Inpatient Neurology Consult General  IN AM        Specialty:  Neurology  Provider:  Namita  Petar Bauer MD    06/10/25 2055    06/10/25 1844  [facility] (on-call MD unless specified) Details  Once        Specialty:  Hospitalist  Provider:  (Not yet assigned)    06/10/25 1843                  Procedures     Imaging Results (All)       Procedure Component Value Units Date/Time    MRI Brain With & Without Contrast [[id number]] Collected: 06/11/25 0952     Updated: 06/12/25 0822    Narrative:      MRI OF THE BRAIN WITH AND WITHOUT CONTRAST ON 06/11/2025     CLINICAL HISTORY: This is a 55-year-old female patient with  encephalopathy. The patient has altered mental status and disorientation  that has been ongoing for the past week.     TECHNIQUE: Axial T1, FLAIR, fat-suppressed T2, axial diffusion and  gradient echo T2, sagittal T1 and postcontrast axial fat-suppressed T1  and coronal T1-weighted images were obtained of the entire head.     This is correlated to a prior MRI of the brain from [facility] on 07/05/2019.     FINDINGS: There is minimal T2 high signal in the periventricular white  matter and scattered tiny patchy nodular foci in the subcortical white  matter of the cerebral hemispheres and I think the findings are most  consistent with mild small vessel disease. There are tiny 3-4 mm foci of  T2 high signal in the posterior inferior cerebellar hemispheres  bilaterally that are dark on the T1-weighted images, could be dilated  perivascular spaces, potentially old tiny PICA territory infarcts  unchanged. The remainder of the brain parenchyma is normal in signal  intensity. Specifically, no diffusion weighted abnormality is identified  with no acute infarct identified. On the gradient echo T2 weighted  sequence, no acute or old blood breakdown products are seen  intracranially. The ventricles are normal in size. I see no focal mass  effect. There is no midline shift. No extra-axial fluid collections are  identified. No abnormal areas of enhancement are seen in the head. The  calvarium and  skull base demonstrate normal marrow signal intensity. The  orbits are normal in appearance. The paranasal sinuses and the mastoid  air cells and the middle ear cavities are clear. Good flow voids are  demonstrated within the cerebral vessels and in the dural venous  sinuses.       Impression:      1. No acute intracranial abnormality is identified with no significant  change when compared to this patient's prior MRI of the brain from  [facility] on 07/05/2019.     2. There is mild small vessel disease in the cerebral white matter and  there are bilateral 3-4 mm foci of T1 low signal and T2 high signal in  the posterior inferior cerebellar hemispheres that are either dilated  perivascular spaces or tiny old PICA territory infarcts unchanged. The  remainder of the MRI of the brain is normal. Specifically, no acute  infarct is identified and the etiology of this patient's altered mental  status and confusion over the last week is not established on this  examination.     This report was finalized on 6/12/2025 8:19 AM by Dr. Rob Gallegos M.D  on Workstation: [id number]       XR Chest 1 View [[id number]] Collected: 06/10/25 1714     Updated: 06/10/25 2157    Narrative:      XR CHEST 1 VW-6/10/2025     HISTORY: Confusion.     Heart size is within normal limits. Lungs are slightly underinflated but  appear clear. Bones and soft tissues are unremarkable.       Impression:      1. No acute process.        This report was finalized on 6/10/2025 9:54 PM by Dr. Stefan Chance M.D on Workstation: [id number]       CT Head Without Contrast [[id number]] Collected: 06/10/25 1816     Updated: 06/10/25 1822    Narrative:      CT HEAD WO CONTRAST-     INDICATIONS: Confusion     TECHNIQUE: Radiation dose reduction techniques were utilized, including  automated exposure control and exposure modulation based on body size.  Noncontrast head CT     COMPARISON: None available     FINDINGS:     No acute intracranial  "hemorrhage, midline shift or mass effect. No acute  territorial infarct is identified.     Ventricles, cisterns, cerebral sulci are unremarkable for patient age.     The frontal sinuses are hypoplastic. Mild prominence of the superior  ophthalmic vein caliber bilaterally could be evidence of increased  intracranial pressure. The visualized paranasal sinuses, orbits, mastoid  air cells are otherwise unremarkable.          Impression:         No acute intracranial hemorrhage or hydrocephalus. Mild prominence of  the superior ophthalmic vein caliber bilaterally. If further imaging  evaluation is indicated, MRI could be considered.     This report was finalized on 6/10/2025 6:18 PM by Dr. Shashi Reece M.D on Workstation: [id number]               Pertinent Labs     Results from last 7 days   Lab Units 06/11/25  0443 06/10/25  1643   WBC 10*3/mm3 9.56 11.05*   HEMOGLOBIN g/dL 13.7 14.6   PLATELETS 10*3/mm3 297 311     Results from last 7 days   Lab Units 06/12/25  1015 06/12/25  0149 06/11/25  1153 06/11/25  0443 06/10/25  1643   SODIUM mmol/L  --   --   --  139 136   POTASSIUM mmol/L 4.4 3.5 3.6 3.2* 3.3*   CHLORIDE mmol/L  --   --   --  103 102   CO2 mmol/L  --   --   --  25.8 22.9   BUN mg/dL  --   --   --  10.0 12.0   CREATININE mg/dL  --   --   --  0.93 1.12*   GLUCOSE mg/dL  --   --   --  107* 112*   CrCl cannot be calculated (Unknown ideal weight.).  Results from last 7 days   Lab Units 06/10/25  1643   ALBUMIN g/dL 3.7   BILIRUBIN mg/dL 0.4   ALK PHOS U/L 103   AST (SGOT) U/L 21   ALT (SGPT) U/L 18     Results from last 7 days   Lab Units 06/12/25  0149 06/11/25  0443 06/10/25  1643   CALCIUM mg/dL  --  8.8 9.0   ALBUMIN g/dL  --   --  3.7   MAGNESIUM mg/dL 2.4 2.4  --                Invalid input(s): \"LDLCALC\"        Test Results Pending at Discharge       Discharge Details        Discharge Medications        Changes to Medications        Instructions Start Date   OLANZapine 10 MG tablet  Commonly known as: " zyPREXA  What changed:   medication strength  how much to take   10 mg, Oral, Nightly      pregabalin 75 MG capsule  Commonly known as: LYRICA  What changed:   medication strength  how much to take   75 mg, Oral, 2 Times Daily      topiramate 50 MG tablet  Commonly known as: TOPAMAX  What changed:   medication strength  how much to take   50 mg, Oral, 2 Times Daily             Continue These Medications        Instructions Start Date   acetaminophen 500 MG tablet  Commonly known as: TYLENOL   500 mg, Every 6 Hours PRN      albuterol 1.25 MG/3ML nebulizer solution  Commonly known as: ACCUNEB   1.25 mg, Nebulization, Every 4 Hours PRN, Dx.  J45.40      albuterol sulfate  (90 Base) MCG/ACT inhaler  Commonly known as: PROVENTIL HFA;VENTOLIN HFA;PROAIR HFA   2 puffs, Every 6 Hours PRN      budesonide-formoterol 160-4.5 MCG/ACT inhaler  Commonly known as: Symbicort   2 puffs, Inhalation, 2 Times Daily      busPIRone 10 MG tablet  Commonly known as: BUSPAR   10 mg, Oral, 3 Times Daily      CALCIUM-MAGNESIUM-ZINC PO   Take  by mouth.      Cetirizine HCl 10 MG capsule   10 mg, Oral, Daily      Estradiol-Norethindrone Acet 0.5-0.1 MG per tablet   1 tablet, Oral, Daily      famotidine 20 MG tablet  Commonly known as: PEPCID   20 mg, Oral, 2 Times Daily      FLUoxetine 40 MG capsule  Commonly known as: PROzac   40 mg, Oral, 2 Times Daily      folic acid 1 MG tablet  Commonly known as: FOLVITE   1 mg, Oral, Daily      hydroxychloroquine 200 MG tablet  Commonly known as: PLAQUENIL   400 mg, Oral, Daily, TAKE TWO TABLETS BY MOUTH DAILY AT 9 AM (INDICATIONS RHEUMATOID ARTHRITIS)      multivitamin tablet tablet  Commonly known as: THERAGRAN   Daily      mupirocin 2 % ointment  Commonly known as: BACTROBAN   1 Application, Topical, 3 Times Daily      pantoprazole 40 MG EC tablet  Commonly known as: PROTONIX   40 mg, Oral, 2 Times Daily      phentermine 37.5 MG tablet  Commonly known as: ADIPEX-P   37.5 mg, Oral, Every  Morning      polyethylene glycol 17 g packet  Commonly known as: MIRALAX   mix 1 packet into drink every night      solifenacin 10 MG tablet  Commonly known as: VESICARE   10 mg, Oral, Daily      tacrolimus 0.1 % ointment  Commonly known as: Protopic   1 Application, Topical, 2 Times Daily, To face      tobramycin 0.3 % solution ophthalmic solution  Commonly known as: Tobrex   2 drops, Both Eyes, Every 6 Hours Scheduled      traZODone 100 MG tablet  Commonly known as: DESYREL   100 mg, Oral, Nightly PRN, in the evening as needed for sleep             Stop These Medications      amLODIPine 10 MG tablet  Commonly known as: NORVASC     hydroCHLOROthiazide 25 MG tablet     hydrOXYzine 25 MG tablet  Commonly known as: ATARAX              Allergies   Allergen Reactions    Sulfamethoxazole-Trimethoprim Hives and Itching    Corn-Containing Products Rash    Penicillins     Bacitracin Rash         Discharge Disposition:  Home or Self Care    Discharge Diet:  Diet Order   Procedures    Diet: Regular/House; Fluid Consistency: Thin (IDDSI 0)       Discharge Activity:   Activity Instructions       Activity as Tolerated              CODE STATUS:    Code Status and Medical Interventions: CPR (Attempt to Resuscitate); Full   Ordered at: 06/10/25 1924     Code Status (Patient has no pulse and is not breathing):    CPR (Attempt to Resuscitate)     Medical Interventions (Patient has pulse or is breathing):    Full     Level Of Support Discussed With:    Patient       Future Appointments   Date Time Provider Department Center   11/11/2025 11:30 AM Rob Bardales DO [facility]     Additional Instructions for the Follow-ups that You Need to Schedule       Ambulatory Referral to Sleep Medicine   As directed      Follow-up needed: Yes        Discharge Follow-up with PCP   As directed       Currently Documented PCP:    Rob Bardales DO    PCP Phone Number:    [phone]     Follow Up Details: post-hospital follow up                Follow-up Information       Rob Bardales DO .    Specialties: Family Medicine, Urgent Care, Emergency Medicine  Why: post-hospital follow up  Contact information:  [address]  [place] [address]  [phone]                             Additional Instructions for the Follow-ups that You Need to Schedule       Ambulatory Referral to Sleep Medicine   As directed      Follow-up needed: Yes        Discharge Follow-up with PCP   As directed       Currently Documented PCP:    Rob Bardales DO    PCP Phone Number:    [phone]     Follow Up Details: post-hospital follow up            Time Spent on Discharge:  I spent greater than 30 minutes on this discharge activity which included: face-to-face encounter with the patient, reviewing the data in the system, coordination of the care with the nursing staff as well as consultants, documentation, and entering orders.       Laine Dinero MD  [facility]  06/12/25  11:31 EDT

## 2025-06-13 ENCOUNTER — TRANSITIONAL CARE MANAGEMENT TELEPHONE ENCOUNTER (OUTPATIENT)
Dept: CALL CENTER | Facility: HOSPITAL | Age: 55
End: 2025-06-13
Payer: MEDICARE

## 2025-06-13 NOTE — OUTREACH NOTE
Call Center TCM Note      Flowsheet Row Responses   [facility] patient discharged from? [place]   Does the patient have one of the following disease processes/diagnoses(primary or secondary)? Other   TCM attempt successful? No   Unsuccessful attempts Attempt 1   Call Status Left message            Fernando Singh Registered Nurse    6/13/2025, 13:52 EDT

## 2025-06-13 NOTE — OUTREACH NOTE
Call Center TCM Note      Flowsheet Row Responses   [facility] patient discharged from? [place]   Does the patient have one of the following disease processes/diagnoses(primary or secondary)? Other   TCM attempt successful? No   Unsuccessful attempts Attempt 2  [ and friend on release]            Fernando GR - Registered Nurse    6/13/2025, 14:33 EDT

## 2025-06-13 NOTE — PROGRESS NOTES
Case Management Discharge Note      Final Note: Dc'd home 6/12         Selected Continued Care - Discharged on 6/12/2025 Admission date: 6/10/2025 - Discharge disposition: Home or Self Care           Transportation Services  Private: Car    Final Discharge Disposition Code: 01 - home or self-care

## 2025-06-14 ENCOUNTER — TRANSITIONAL CARE MANAGEMENT TELEPHONE ENCOUNTER (OUTPATIENT)
Dept: CALL CENTER | Facility: HOSPITAL | Age: 55
End: 2025-06-14
Payer: MEDICARE

## 2025-06-14 NOTE — OUTREACH NOTE
Call Center TCM Note      Flowsheet Row Responses   [facility] patient discharged from? [place]   Does the patient have one of the following disease processes/diagnoses(primary or secondary)? Other   TCM attempt successful? No   Unsuccessful attempts Attempt 3            Sita CHRISTIANSON - Registered Nurse    6/14/2025, 14:28 EDT

## 2025-06-16 RX ORDER — ALBUTEROL SULFATE 90 UG/1
2 INHALANT RESPIRATORY (INHALATION) EVERY 6 HOURS PRN
Qty: 6.7 G | Refills: 2 | Status: SHIPPED | OUTPATIENT
Start: 2025-06-16 | End: 2025-06-20 | Stop reason: SDUPTHER

## 2025-06-20 DIAGNOSIS — K21.00 GASTROESOPHAGEAL REFLUX DISEASE WITH ESOPHAGITIS: ICD-10-CM

## 2025-06-20 DIAGNOSIS — K22.4 ESOPHAGEAL DYSMOTILITY: ICD-10-CM

## 2025-06-20 RX ORDER — PANTOPRAZOLE SODIUM 40 MG/1
40 TABLET, DELAYED RELEASE ORAL 2 TIMES DAILY
Qty: 180 TABLET | Refills: 1 | Status: SHIPPED | OUTPATIENT
Start: 2025-06-20

## 2025-06-20 RX ORDER — ALBUTEROL SULFATE 90 UG/1
2 INHALANT RESPIRATORY (INHALATION) EVERY 6 HOURS PRN
Qty: 6.7 G | Refills: 2 | Status: SHIPPED | OUTPATIENT
Start: 2025-06-20

## 2025-06-20 NOTE — TELEPHONE ENCOUNTER
[place]er: [facility] - [place], [place] - [address] - [phone] [facility] [phone]    Relationship: Pharmacy    Best call back number: [phone]     Requested Prescriptions:   Requested Prescriptions     Pending Prescriptions Disp Refills    pantoprazole (PROTONIX) 40 MG EC tablet 180 tablet 0     Sig: Take 1 tablet by mouth 2 (Two) Times a Day.    albuterol sulfate  (90 Base) MCG/ACT inhaler 6.7 g 2     Si puffs Every 6 (Six) Hours As Needed for Wheezing.        Pharmacy where request should be sent: [facility] - [place], [place] - [address] - [phone] [facility] [phone] FX     Last office visit with prescribing clinician: 2025   Last telemedicine visit with prescribing clinician: Visit date not found   Next office visit with prescribing clinician: 2025     Additional details provided by patient: [facility] STATED THE PATIENT IS OUT OF ALBUTEROL, AND WILL BE OUT OF PANTOPRAZOLE ON 2025    Does the patient have less than a 3 day supply:  [x] Yes  [] No    Karey Jiménez Rep   25 12:05 EDT

## 2025-06-25 ENCOUNTER — TELEPHONE (OUTPATIENT)
Dept: FAMILY MEDICINE CLINIC | Facility: CLINIC | Age: 55
End: 2025-06-25
Payer: MEDICARE

## 2025-06-25 NOTE — TELEPHONE ENCOUNTER
Patient needs a hospital follow up; anxiety/exhaustion.    Please advise when to schedule. Nothing available in the 10 day window.

## 2025-06-27 DIAGNOSIS — K21.00 GASTROESOPHAGEAL REFLUX DISEASE WITH ESOPHAGITIS: ICD-10-CM

## 2025-06-27 DIAGNOSIS — K22.4 ESOPHAGEAL DYSMOTILITY: ICD-10-CM

## 2025-06-27 RX ORDER — PANTOPRAZOLE SODIUM 40 MG/1
TABLET, DELAYED RELEASE ORAL
Qty: 180 TABLET | Refills: 0 | Status: SHIPPED | OUTPATIENT
Start: 2025-06-27

## 2025-07-01 ENCOUNTER — OFFICE VISIT (OUTPATIENT)
Dept: SLEEP MEDICINE | Facility: HOSPITAL | Age: 55
End: 2025-07-01
Payer: MEDICARE

## 2025-07-01 VITALS — HEART RATE: 85 BPM | BODY MASS INDEX: 43.25 KG/M2 | OXYGEN SATURATION: 95 % | HEIGHT: 69 IN | WEIGHT: 292 LBS

## 2025-07-01 DIAGNOSIS — E66.813 CLASS 3 SEVERE OBESITY DUE TO EXCESS CALORIES WITH SERIOUS COMORBIDITY AND BODY MASS INDEX (BMI) OF 40.0 TO 44.9 IN ADULT: ICD-10-CM

## 2025-07-01 DIAGNOSIS — G47.14 HYPERSOMNIA DUE TO MEDICAL CONDITION: ICD-10-CM

## 2025-07-01 DIAGNOSIS — G47.33 OBSTRUCTIVE SLEEP APNEA: Primary | ICD-10-CM

## 2025-07-01 DIAGNOSIS — G47.36 HYPOXEMIA ASSOCIATED WITH SLEEP: ICD-10-CM

## 2025-07-01 DIAGNOSIS — G47.33 OSA ON CPAP: ICD-10-CM

## 2025-07-01 DIAGNOSIS — R40.0 DAYTIME SOMNOLENCE: ICD-10-CM

## 2025-07-01 PROCEDURE — G0463 HOSPITAL OUTPT CLINIC VISIT: HCPCS

## 2025-07-01 PROCEDURE — 99214 OFFICE O/P EST MOD 30 MIN: CPT | Performed by: INTERNAL MEDICINE
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
## 2025-07-02 PROBLEM — G47.33 OSA ON CPAP: Status: ACTIVE | Noted: 2024-07-24

## 2025-07-02 PROBLEM — G47.14 HYPERSOMNIA DUE TO MEDICAL CONDITION: Status: ACTIVE | Noted: 2022-08-01

## 2025-07-02 PROBLEM — G47.10 HYPERSOMNIA, UNSPECIFIED: Status: RESOLVED | Noted: 2022-08-01 | Resolved: 2025-07-02

## 2025-07-03 ENCOUNTER — TELEPHONE (OUTPATIENT)
Dept: FAMILY MEDICINE CLINIC | Facility: CLINIC | Age: 55
End: 2025-07-03

## 2025-07-03 DIAGNOSIS — K22.4 ESOPHAGEAL DYSMOTILITY: ICD-10-CM

## 2025-07-03 DIAGNOSIS — K21.00 GASTROESOPHAGEAL REFLUX DISEASE WITH ESOPHAGITIS: ICD-10-CM

## 2025-07-03 RX ORDER — ALBUTEROL SULFATE 90 UG/1
2 INHALANT RESPIRATORY (INHALATION) EVERY 6 HOURS PRN
Qty: 6.7 G | Refills: 2 | Status: SHIPPED | OUTPATIENT
Start: 2025-07-03 | End: 2025-07-07

## 2025-07-03 RX ORDER — PANTOPRAZOLE SODIUM 40 MG/1
TABLET, DELAYED RELEASE ORAL
Qty: 180 TABLET | Refills: 0 | OUTPATIENT
Start: 2025-07-03

## 2025-07-03 NOTE — TELEPHONE ENCOUNTER
Pharmacy Name: [facility] [place] - [facility] [place] - [address] - [phone] [facility] [phone]      Pharmacy representative name: [facility]    Pharmacy representative phone number: [phone]     What medication are you calling in regards to: OLANZapine (zyPREXA) 10 MG tablet     What question does the pharmacy have: PHARMACY IS NEEDING TO CONFIRM IF THIS PATIENT IS ON BOTH 10 AND 20 MG OF THIS MEDICATION.     Who is the provider that prescribed the medication: DR MCRAE    Additional notes: PLEASE CALL TO CLARIFY.

## 2025-07-03 NOTE — TELEPHONE ENCOUNTER
Caller: [facility] ([place] - [place], [place] - [address] [facility] - [phone] [facility] [phone] FX    Relationship: Pharmacy    Best call back number: [phone]    Requested Prescriptions:   Requested Prescriptions     Pending Prescriptions Disp Refills    albuterol sulfate  (90 Base) MCG/ACT inhaler 6.7 g 2     Sig: Inhale 2 puffs Every 6 (Six) Hours As Needed for Wheezing.    pantoprazole (PROTONIX) 40 MG EC tablet 180 tablet 0        Pharmacy where request should be sent: [facility] ([place] - [place], [place] - [address] [facility] - [phone] [facility] [phone] FX     Last office visit with prescribing clinician: 5/7/2025   Last telemedicine visit with prescribing clinician: Visit date not found   Next office visit with prescribing clinician: 7/8/2025     Additional details provided by patient:     Does the patient have less than a 3 day supply:  [] Yes  [] No    Would you like a call back once the refill request has been completed: [] Yes [x] No    If the office needs to give you a call back, can they leave a voicemail: [] Yes [x] No    Karey Schreiber   07/03/25 09:55 EDT

## 2025-07-07 DIAGNOSIS — R60.1 GENERALIZED EDEMA: ICD-10-CM

## 2025-07-07 RX ORDER — ALBUTEROL SULFATE 90 UG/1
2 INHALANT RESPIRATORY (INHALATION) EVERY 6 HOURS PRN
Qty: 6.7 G | Refills: 2 | Status: SHIPPED | OUTPATIENT
Start: 2025-07-07

## 2025-07-07 RX ORDER — HYDROCHLOROTHIAZIDE 25 MG/1
TABLET ORAL
Qty: 180 TABLET | Refills: 11 | OUTPATIENT
Start: 2025-07-07

## 2025-07-08 ENCOUNTER — OFFICE VISIT (OUTPATIENT)
Dept: FAMILY MEDICINE CLINIC | Facility: CLINIC | Age: 55
End: 2025-07-08
Payer: MEDICARE

## 2025-07-08 ENCOUNTER — TELEPHONE (OUTPATIENT)
Dept: FAMILY MEDICINE CLINIC | Facility: CLINIC | Age: 55
End: 2025-07-08

## 2025-07-08 VITALS
OXYGEN SATURATION: 97 % | WEIGHT: 289 LBS | SYSTOLIC BLOOD PRESSURE: 120 MMHG | BODY MASS INDEX: 42.8 KG/M2 | DIASTOLIC BLOOD PRESSURE: 84 MMHG | HEART RATE: 61 BPM | HEIGHT: 69 IN

## 2025-07-08 DIAGNOSIS — E66.813 CLASS 3 DRUG-INDUCED OBESITY WITH SERIOUS COMORBIDITY AND BODY MASS INDEX (BMI) OF 40.0 TO 44.9 IN ADULT: ICD-10-CM

## 2025-07-08 DIAGNOSIS — R53.83 OTHER FATIGUE: ICD-10-CM

## 2025-07-08 DIAGNOSIS — E66.1 CLASS 3 DRUG-INDUCED OBESITY WITH SERIOUS COMORBIDITY AND BODY MASS INDEX (BMI) OF 40.0 TO 44.9 IN ADULT: ICD-10-CM

## 2025-07-08 DIAGNOSIS — M79.7 FIBROMYALGIA, PRIMARY: ICD-10-CM

## 2025-07-08 DIAGNOSIS — F33.1 MODERATE EPISODE OF RECURRENT MAJOR DEPRESSIVE DISORDER: Primary | ICD-10-CM

## 2025-07-08 PROCEDURE — 3074F SYST BP LT 130 MM HG: CPT | Performed by: FAMILY MEDICINE

## 2025-07-08 PROCEDURE — 3079F DIAST BP 80-89 MM HG: CPT | Performed by: FAMILY MEDICINE

## 2025-07-08 PROCEDURE — 99214 OFFICE O/P EST MOD 30 MIN: CPT | Performed by: FAMILY MEDICINE

## 2025-07-08 PROCEDURE — 1125F AMNT PAIN NOTED PAIN PRSNT: CPT | Performed by: FAMILY MEDICINE

## 2025-07-08 PROCEDURE — G2211 COMPLEX E/M VISIT ADD ON: HCPCS | Performed by: FAMILY MEDICINE

## 2025-07-08 RX ORDER — OLANZAPINE 2.5 MG/1
TABLET, FILM COATED ORAL
Qty: 42 TABLET | Refills: 0 | Status: SHIPPED | OUTPATIENT
Start: 2025-07-08
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
## 2025-07-08 NOTE — TELEPHONE ENCOUNTER
Pharmacy Name: [facility] ([place]) - [place], [place] - [address] - [phone] [facility] [phone]        Pharmacy representative phone number: [phone]    What medication are you calling in regards to: OLANZapine (ZyPREXA) 2.5 MG tablet, topiramate (TOPAMAX) 50 MG tablet  & pregabalin (LYRICA) 75 MG capsule     What question does the pharmacy have: OLANZAPINE - WHICH DOSAGE DO THEY NEED?  THEY GOT PRESCRIPTIONS FOR 20MG, 2.5MG AND 10MG.  TOPIRAMATE WAS FILLED AT A DIFFERENT LOCATION AND INSURANCE DENIED IT.  PREGABALIN IS SUPPOSED TO  OR 75MG?      Who is the provider that prescribed the medication: THOR    Additional notes:

## 2025-07-09 RX ORDER — PREGABALIN 75 MG/1
75 CAPSULE ORAL 2 TIMES DAILY
Qty: 60 CAPSULE | Refills: 2 | Status: SHIPPED | OUTPATIENT
Start: 2025-07-09

## 2025-07-09 RX ORDER — TOPIRAMATE 50 MG/1
50 TABLET, FILM COATED ORAL 2 TIMES DAILY
Qty: 180 TABLET | Refills: 1 | Status: SHIPPED | OUTPATIENT
Start: 2025-07-09

## 2025-07-11 DIAGNOSIS — F41.1 GAD (GENERALIZED ANXIETY DISORDER): ICD-10-CM

## 2025-07-11 RX ORDER — HYDROXYZINE HYDROCHLORIDE 10 MG/1
10 TABLET, FILM COATED ORAL 3 TIMES DAILY PRN
Qty: 270 TABLET | Refills: 11 | OUTPATIENT
Start: 2025-07-11

## 2025-07-16 DIAGNOSIS — K22.4 ESOPHAGEAL DYSMOTILITY: ICD-10-CM

## 2025-07-16 DIAGNOSIS — K21.00 GASTROESOPHAGEAL REFLUX DISEASE WITH ESOPHAGITIS: ICD-10-CM

## 2025-07-16 RX ORDER — ALBUTEROL SULFATE 90 UG/1
2 INHALANT RESPIRATORY (INHALATION) EVERY 6 HOURS PRN
Qty: 6.7 G | Refills: 2 | Status: SHIPPED | OUTPATIENT
Start: 2025-07-16

## 2025-07-16 RX ORDER — PANTOPRAZOLE SODIUM 40 MG/1
40 TABLET, DELAYED RELEASE ORAL DAILY
Qty: 180 TABLET | Refills: 0 | Status: SHIPPED | OUTPATIENT
Start: 2025-07-16

## 2025-07-16 NOTE — TELEPHONE ENCOUNTER
Caller: [facility] ([place] - [place], [place] - [address] [facility] - [phone] [facility] [phone] FX    Relationship: Pharmacy    Best call back number: [phone]    Requested Prescriptions:   Requested Prescriptions     Pending Prescriptions Disp Refills    albuterol sulfate  (90 Base) MCG/ACT inhaler 6.7 g 2     Si puffs Every 6 (Six) Hours As Needed for Wheezing.    pantoprazole (PROTONIX) 40 MG EC tablet 180 tablet 0        Pharmacy where request should be sent: [facility] ([place] - [place], [place] - [address] [facility] - [phone] [facility] [phone] FX     Last office visit with prescribing clinician: 2025   Last telemedicine visit with prescribing clinician: Visit date not found   Next office visit with prescribing clinician: 9/10/2025     Additional details provided by patient:     Does the patient have less than a 3 day supply:  [] Yes  [x] No    Would you like a call back once the refill request has been completed: [] Yes [x] No    If the office needs to give you a call back, can they leave a voicemail: [] Yes [x] No    Karey Batista Rep   25 16:17 EDT

## 2025-07-28 DIAGNOSIS — F41.1 GAD (GENERALIZED ANXIETY DISORDER): ICD-10-CM

## 2025-07-28 DIAGNOSIS — R60.1 GENERALIZED EDEMA: ICD-10-CM

## 2025-07-28 RX ORDER — HYDROXYZINE HYDROCHLORIDE 10 MG/1
10 TABLET, FILM COATED ORAL 3 TIMES DAILY PRN
Qty: 270 TABLET | Refills: 0 | OUTPATIENT
Start: 2025-07-28

## 2025-07-28 RX ORDER — HYDROCHLOROTHIAZIDE 25 MG/1
TABLET ORAL
Qty: 180 TABLET | Refills: 0 | OUTPATIENT
Start: 2025-07-28

## 2025-08-05 RX ORDER — AMLODIPINE BESYLATE 10 MG/1
TABLET ORAL
Qty: 90 TABLET | Refills: 11 | OUTPATIENT
Start: 2025-08-05

## (undated) DEVICE — BITEBLOCK OMNI BLOC

## (undated) DEVICE — ADAPT CLN BIOGUARD AIR/H2O DISP

## (undated) DEVICE — TUBING, SUCTION, 1/4" X 10', STRAIGHT: Brand: MEDLINE

## (undated) DEVICE — LN SMPL CO2 SHTRM SD STREAM W/M LUER

## (undated) DEVICE — FRCP BX RADJAW4 NDL 2.8 240CM LG OG BX40

## (undated) DEVICE — SENSR O2 OXIMAX FNGR A/ 18IN NONSTR

## (undated) DEVICE — KT ORCA ORCAPOD DISP STRL

## (undated) DEVICE — MSK PROC CURAPLEX O2 2/ADAPT 7FT